# Patient Record
Sex: MALE | Race: WHITE | NOT HISPANIC OR LATINO | Employment: FULL TIME | URBAN - METROPOLITAN AREA
[De-identification: names, ages, dates, MRNs, and addresses within clinical notes are randomized per-mention and may not be internally consistent; named-entity substitution may affect disease eponyms.]

---

## 2017-11-22 ENCOUNTER — ALLSCRIPTS OFFICE VISIT (OUTPATIENT)
Dept: OTHER | Facility: OTHER | Age: 57
End: 2017-11-22

## 2017-11-22 DIAGNOSIS — E78.5 HYPERLIPIDEMIA: ICD-10-CM

## 2017-11-22 DIAGNOSIS — I10 ESSENTIAL (PRIMARY) HYPERTENSION: ICD-10-CM

## 2017-11-22 DIAGNOSIS — Z12.5 ENCOUNTER FOR SCREENING FOR MALIGNANT NEOPLASM OF PROSTATE: ICD-10-CM

## 2017-11-22 DIAGNOSIS — Z72.0 TOBACCO USE: ICD-10-CM

## 2017-11-23 ENCOUNTER — LAB CONVERSION - ENCOUNTER (OUTPATIENT)
Dept: OTHER | Facility: OTHER | Age: 57
End: 2017-11-23

## 2017-11-23 LAB
A/G RATIO (HISTORICAL): 1.7 (CALC) (ref 1–2.5)
ALBUMIN SERPL BCP-MCNC: 4.5 G/DL (ref 3.6–5.1)
ALP SERPL-CCNC: 75 U/L (ref 40–115)
ALT SERPL W P-5'-P-CCNC: 32 U/L (ref 9–46)
AST SERPL W P-5'-P-CCNC: 20 U/L (ref 10–35)
BASOPHILS # BLD AUTO: 1.1 %
BASOPHILS # BLD AUTO: 79 CELLS/UL (ref 0–200)
BILIRUB SERPL-MCNC: 0.4 MG/DL (ref 0.2–1.2)
BILIRUB UR QL STRIP: NEGATIVE
BUN SERPL-MCNC: 12 MG/DL (ref 7–25)
BUN/CREA RATIO (HISTORICAL): NORMAL (CALC) (ref 6–22)
CALCIUM SERPL-MCNC: 9.2 MG/DL (ref 8.6–10.3)
CHLORIDE SERPL-SCNC: 106 MMOL/L (ref 98–110)
CHOLEST SERPL-MCNC: 156 MG/DL
CHOLEST/HDLC SERPL: 4.1 (CALC)
CO2 SERPL-SCNC: 28 MMOL/L (ref 20–31)
COLOR UR: YELLOW
COMMENT (HISTORICAL): CLEAR
CREAT SERPL-MCNC: 0.9 MG/DL (ref 0.7–1.33)
DEPRECATED RDW RBC AUTO: 12.7 % (ref 11–15)
EGFR AFRICAN AMERICAN (HISTORICAL): 109 ML/MIN/1.73M2
EGFR-AMERICAN CALC (HISTORICAL): 94 ML/MIN/1.73M2
EOSINOPHIL # BLD AUTO: 310 CELLS/UL (ref 15–500)
EOSINOPHIL # BLD AUTO: 4.3 %
FECAL OCCULT BLOOD DIAGNOSTIC (HISTORICAL): NEGATIVE
GAMMA GLOBULIN (HISTORICAL): 2.6 G/DL (CALC) (ref 1.9–3.7)
GLUCOSE (HISTORICAL): 90 MG/DL (ref 65–99)
GLUCOSE (HISTORICAL): NEGATIVE
HCT VFR BLD AUTO: 41.9 % (ref 38.5–50)
HDLC SERPL-MCNC: 38 MG/DL
HGB BLD-MCNC: 13.9 G/DL (ref 13.2–17.1)
KETONES UR STRIP-MCNC: NEGATIVE MG/DL
LDL CHOLESTEROL (HISTORICAL): 93 MG/DL (CALC)
LEUKOCYTE ESTERASE UR QL STRIP: NEGATIVE
LYMPHOCYTES # BLD AUTO: 1872 CELLS/UL (ref 850–3900)
LYMPHOCYTES # BLD AUTO: 26 %
MCH RBC QN AUTO: 30.8 PG (ref 27–33)
MCHC RBC AUTO-ENTMCNC: 33.2 G/DL (ref 32–36)
MCV RBC AUTO: 92.9 FL (ref 80–100)
MONOCYTES # BLD AUTO: 504 CELLS/UL (ref 200–950)
MONOCYTES (HISTORICAL): 7 %
NEUTROPHILS # BLD AUTO: 4435 CELLS/UL (ref 1500–7800)
NEUTROPHILS # BLD AUTO: 61.6 %
NITRITE UR QL STRIP: NEGATIVE
NON-HDL-CHOL (CHOL-HDL) (HISTORICAL): 118 MG/DL (CALC)
PH UR STRIP.AUTO: 5.5 [PH] (ref 5–8)
PLATELET # BLD AUTO: 277 THOUSAND/UL (ref 140–400)
PMV BLD AUTO: 10 FL (ref 7.5–12.5)
POTASSIUM SERPL-SCNC: 4.3 MMOL/L (ref 3.5–5.3)
PROSTATE SPECIFIC ANTIGEN TOTAL (HISTORICAL): 0.5 NG/ML
PROT UR STRIP-MCNC: NEGATIVE MG/DL
RBC # BLD AUTO: 4.51 MILLION/UL (ref 4.2–5.8)
SODIUM SERPL-SCNC: 141 MMOL/L (ref 135–146)
SP GR UR STRIP.AUTO: 1.02 (ref 1–1.03)
TOTAL PROTEIN (HISTORICAL): 7.1 G/DL (ref 6.1–8.1)
TRIGL SERPL-MCNC: 152 MG/DL
TSH SERPL DL<=0.05 MIU/L-ACNC: 0.86 MIU/L (ref 0.4–4.5)
WBC # BLD AUTO: 7.2 THOUSAND/UL (ref 3.8–10.8)

## 2017-11-23 NOTE — PROGRESS NOTES
Assessment    1  Hypertension (401 9) (I10)   2  Hyperlipidemia (272 4) (E78 5)    Plan  Encounter for colorectal cancer screening    · *1 - [address] Physician Referral  Consult  Status: Active Requested for: 63EAS4445 02:03PM  Care Summary provided  : Yes  Encounter for screening for malignant neoplasm of prostate, Hyperlipidemia,Hypertension    · (1) PSA (SCREEN) (Dx V76 44 Screen for Prostate Cancer); Status:Active; Requestedfor:22Nov2017;   Hyperlipidemia, Hypertension    · (1) CBC/PLT/DIFF; Status:Active; Requested for:22Nov2017;    · (1) COMPREHENSIVE METABOLIC PANEL; Status:Active; Requested for:22Nov2017;    · (1) LIPID PANEL FASTING W DIRECT LDL REFLEX; Status:Active; Requestedfor:22Nov2017;    · (1) TSH; Status:Active; Requested for:22Nov2017;    · (1) URINALYSIS (will reflex a microscopy if leukocytes, occult blood, protein or nitrites arenot within normal limits); Status:Active; Requested for:22Nov2017;   Hypertension    · Begin or continue regular aerobic exercise  Gradually work up to at least {count1}sessions of {dur1} of exercise a week ; Status:Complete;   Done: 54OUD6131 08:57AM   · Eat no more than 30 grams of fat per day ; Status:Complete;   Done: 52PVZ9817 08:57AM   · We want to put you on the DASH diet for 2000 calories ; Status:Complete;   Done:22Nov2017 08:57AM   · You need to quit smoking ; Status:Complete;   Done: 85EVO9983 08:57AM  Tobacco abuse    · * CT LUNG SCREENING PROGRAM; Status:Hold For - Scheduling; Requestedfor:22Nov2017;     Discussion/Summary    Await lab, referral for colonoscopy, okay on meds  The treatment plan was reviewed with the patient/guardian  The patient/guardian understands and agrees with the treatment plan      Chief Complaint  Pt here for follow up for hypertension and hyperlipidemia  Pt will like PCP to take bp not MA      History of Present Illness  The patient is here today for a follow-up visit     His hypertension is primary, but-- stable  the patient is adherent with his medication regimen  He has no significant interval events  Symptoms: The patient denies any symptoms currently  Associated symptoms include no headache  Lifestyle and Disease Management:      Review of Systems   Constitutional: recent 14 lb weight gain, but-- not feeling poorly-- and-- not feeling tired  Cardiovascular: no chest pain  Respiratory: no shortness of breath  Active Problems  1  Disc degeneration, lumbosacral (722 52) (M51 37)   2  Encounter for colorectal cancer screening (V76 51) (Z12 11,Z12 12)   3  Encounter for screening for malignant neoplasm of prostate (V76 44) (Z12 5)   4  Flu vaccine need (V04 81) (Z23)   5  Foot pain (729 5) (M79 673)   6  Hyperlipidemia (272 4) (E78 5)   7  Hypertension (401 9) (I10)   8  Lumbar facet arthropathy (721 3) (M12 88)    Past Medical History  1  History of hypercholesterolemia (V12 29) (Z86 39)   2  Hypertension (401 9) (I10)    The active problems and past medical history were reviewed and updated today  Surgical History  1  History of Knee Surgery   2  History of Knee Surgery Right    The surgical history was reviewed and updated today  Family History  Mother    1  Family history of Obesity (278 00) (E66 9)  Father    2  Family history of Aneurysm (442 9) (I72 9)    The family history was reviewed and updated today  Social History     · Active advance directive (V49 89) (Z78 9)   · Current every day smoker (305 1) (F17 200)   · Full-time employment   ·    · One child  The social history was reviewed and updated today  Current Meds   1  Olmesartan Medoxomil-HCTZ 40-12 5 MG Oral Tablet; Take 1 tablet daily; Therapy: 70LLP8808 to (Candis Caceres)  Requested for: 48Dtq9803; Last Rx:22Sns8100 Ordered   2  Simvastatin 40 MG Oral Tablet; TAKE 1 TABLET AT BEDTIME;  Therapy: 37Xlg7456 to (Evaluate:07Cqs5727)  Requested for: 70Wst0676; Last Rx:76Kby5781 Ordered    The medication list was reviewed and updated today  Allergies  1  No Known Drug Allergies    Vitals  Vital Signs    Recorded: 22Nov2017 08:53AM Recorded: 22Nov2017 08:21AM   Temperature  96 9 F, Temporal   Heart Rate  76   Respiration  16   Systolic 655    Diastolic 82    Height  6 ft 2 5 in   Weight  273 lb 6 oz   BMI Calculated  34 63   BSA Calculated  2 49       Physical Exam   Constitutional  General appearance: Abnormal   appears healthy-- and-- obese  Pulmonary  Auscultation of lungs: Clear to auscultation, equal breath sounds bilaterally, no wheezes, no rales, no rhonci  Cardiovascular  Auscultation of heart: Normal rate and rhythm, normal S1 and S2, without murmurs  Examination of extremities for edema and/or varicosities: Normal    Carotid pulses: Normal    Lymphatic  Palpation of lymph nodes in neck: No lymphadenopathy  Results/Data  PHQ-9 Adult Depression Screening 22Nov2017 08:23AM User, s     Test Name Result Flag Reference   PHQ-9 Adult Depression Score 0       Over the last two weeks, how often have you been bothered by any of the following problems? Little interest or pleasure in doing things: Not at all - 0 Feeling down, depressed, or hopeless: Not at all - 0 Trouble falling or staying asleep, or sleeping too much: Not at all - 0 Feeling tired or having little energy: Not at all - 0 Poor appetite or over eating: Not at all - 0 Feeling bad about yourself - or that you are a failure or have let yourself or your family down: Not at all - 0 Trouble concentrating on things, such as reading the newspaper or watching television: Not at all - 0 Moving or speaking so slowly that other people could have noticed   Or the opposite -  being so fidgety or restless that you have been moving around a lot more than usual: Not at all - 0 Thoughts that you would be better off dead, or of hurting yourself in some way: Not at all - 0   PHQ-9 Adult Depression Screening Negative     PHQ-9 Difficulty Level Not difficult at all PHQ-9 Severity No Depression           Signatures   Electronically signed by :  Saul Lama MD; Nov 22 2017  8:58AM EST                       (Author)

## 2017-11-25 ENCOUNTER — GENERIC CONVERSION - ENCOUNTER (OUTPATIENT)
Dept: OTHER | Facility: OTHER | Age: 57
End: 2017-11-25

## 2018-01-10 NOTE — RESULT NOTES
Verified Results  (Q) LIPID PANEL WITH REFLEX TO DIRECT LDL 89UNO8954 10:27AM Cassandra Christopher     Test Name Result Flag Reference   CHOLESTEROL, TOTAL 156 mg/dL  <200   HDL CHOLESTEROL 38 mg/dL L >79   TRIGLICERIDES 027 mg/dL H <150   LDL-CHOLESTEROL 93 mg/dL (calc)     Reference range: <100     Desirable range <100 mg/dL for patients with CHD or  diabetes and <70 mg/dL for diabetic patients with  known heart disease  LDL-C is now calculated using the Bony-Weems   calculation, which is a validated novel method providing   better accuracy than the Friedewald equation in the   estimation of LDL-C  Makenna CurrieBellin Health's Bell[facility] Memorial Hospitalnemo Simpson  [facility]6;203(01): 5839-5226   (http://Senseg/faq/ULN917)   CHOL/HDLC RATIO 4 1 (calc)  <5 0   NON HDL CHOLESTEROL 118 mg/dL (calc)  <130   For patients with diabetes plus 1 major ASCVD risk   factor, treating to a non-HDL-C goal of <100 mg/dL   (LDL-C of <70 mg/dL) is considered a therapeutic   option

## 2018-01-11 NOTE — MISCELLANEOUS
Message   Recorded as Task   Date: 02/05/2016 11:46 AM, Created By: Jaron Urban   Task Name: Miscellaneous   Assigned To: [facility]   Regarding Patient: Katherin Pelaez, Status: Active   Comment:    Peggy Nelson - 05 Feb 2016 11:46 AM     TASK CREATED  Pt came in and stated sample of Pensaid worked  He would like a rx sent to his pharmacy  Please call pt on cell [phone]  Tejas Clark - 05 Feb 2016 12:09 PM     TASK REPLIED TO: Previously Assigned To [facility],Team  Please inform patient the pennsaid was already [facility] to [facility], they should have called him  Please provide him the phone number   Kizzy Reynolds - 05 Feb 2016 1:18 PM     TASK REPLIED TO: Previously Assigned To [facility]  Spoke with pt and advised of the same  [facility] phone # provided to pt  Active Problems    1  Disc degeneration, lumbosacral (722 52) (M51 37)   2  Encounter for screening for malignant neoplasm of prostate (V76 44) (Z12 5)   3  Flu vaccine need (V04 81) (Z23)   4  Foot pain (729 5) (M79 673)   5  Hyperlipidemia (272 4) (E78 5)   6  Hypertension (401 9) (I10)   7  Left low back pain, with sciatica presence unspecified (724 2) (M54 5)   8  Lumbar facet arthropathy (721 3) (M47 816)   9  Sacroiliac joint pain (724 6) (M53 3)    Current Meds   1  Benicar HCT 40-12 5 MG Oral Tablet; TAKE 1 TABLET DAILY; Therapy: 48DBU7250 to (Evaluate:18Jun2016)  Requested for: 26TYL3487; Last   CT:80EET0905 Ordered   2  Pennsaid 2 % Transdermal Solution; 2 pumps BID prn; Therapy: 38MAM8303 to (Popeye Bassett)  Requested for: 19CYY4020; Last   Rx:56Jep7305 Ordered   3  Simvastatin 40 MG Oral Tablet; TAKE 1 TABLET DAILY AT BEDTIME; Therapy: 39Ksj8827 to (Evaluate:18Jun2016)  Requested for: 18DPC6709; Last   NO:34ONW2086 Ordered    Allergies    1  No Known Drug Allergies    Signatures   Electronically signed by :  Rick Dee, ; Feb 5 2016 1:18PM EST                       (Author)

## 2018-01-13 VITALS
DIASTOLIC BLOOD PRESSURE: 82 MMHG | WEIGHT: 273.38 LBS | HEIGHT: 75 IN | SYSTOLIC BLOOD PRESSURE: 122 MMHG | TEMPERATURE: 96.9 F | BODY MASS INDEX: 33.99 KG/M2 | HEART RATE: 76 BPM | RESPIRATION RATE: 16 BRPM

## 2018-01-13 NOTE — RESULT NOTES
Verified Results  (Q) COMPREHENSIVE METABOLIC PNL W/ADJUSTED CALCIUM 89XVJ2073 10:19AM Nicky Gut   REPORT COMMENT:  FASTING:YES     Test Name Result Flag Reference   GLUCOSE 95 mg/dL  65-99   Fasting reference interval   UREA NITROGEN (BUN) 15 mg/dL  7-25   CREATININE 0 84 mg/dL  0 70-1 33   For patients >52years of age, the reference limit  for Creatinine is approximately 13% higher for people  identified as -American  eGFR NON-AFR  AMERICAN 99 mL/min/1 73m2  > OR = 60   eGFR AFRICAN AMERICAN 114 mL/min/1 73m2  > OR = 60   BUN/CREATININE RATIO   5-06   NOT APPLICABLE (calc)   SODIUM 141 mmol/L  135-146   POTASSIUM 4 7 mmol/L  3 5-5 3   CHLORIDE 105 mmol/L     CARBON DIOXIDE 26 mmol/L  19-30   CALCIUM 9 4 mg/dL  8 6-10 3   CALCIUM (ADJUSTED FOR$ALBUMIN) 9 2 mg/dL (calc)  8 6-10 2   PROTEIN, TOTAL 7 0 g/dL  6 1-8 1   ALBUMIN 4 7 g/dL  3 6-5 1   GLOBULIN 2 3 g/dL (calc)  1 9-3 7   ALBUMIN/GLOBULIN RATIO 2 0 (calc)  1 0-2 5   BILIRUBIN, TOTAL 0 3 mg/dL  0 2-1 2   ALKALINE PHOSPHATASE 73 U/L     AST 17 U/L  10-35   ALT 22 U/L  9-46     (Q) LIPID PANEL WITH DIRECT LDL 18TRX1686 10:19AM Nicky Gut     Test Name Result Flag Reference   CHOLESTEROL, TOTAL 157 mg/dL  125-200   HDL CHOLESTEROL 43 mg/dL  > OR = 40   TRIGLICERIDES 84 mg/dL  <602   DIRECT  mg/dL  <130   Desirable range <100 mg/dL for patients with CHD or  diabetes and <70 mg/dL for diabetic patients with  known heart disease  CHOL/HDLC RATIO 3 7 (calc)  < OR = 5 0   NON HDL CHOLESTEROL 114 mg/dL (calc)     Target for non-HDL cholesterol is 30 mg/dL higher than   LDL cholesterol target

## 2018-01-15 NOTE — PROGRESS NOTES
Assessment    1  Sacroiliac joint pain (724 6) (M53 3)   2  Lumbar facet arthropathy (721 3) (M47 816)   3  Disc degeneration, lumbosacral (722 52) (M51 37)    Plan  Disc degeneration, lumbosacral, Lumbar facet arthropathy, Sacroiliac joint pain    · *1 - [facility] Physical Therapy  Consult  Please evaluate and treat for left low back pain, facet arthropathy, DDD, core  strengthening, and sij pain  Please convert to HEP when appropriate  Status: Hold For - Scheduling  Requested for:  90VWO7928   Ordered; For: Disc degeneration, lumbosacral, Lumbar facet arthropathy, Sacroiliac joint pain; Ordered By: Jenel Galeazzi Performed:  Due: 41GNX5206  Care Summary provided  : Yes  Lumbar facet arthropathy, Sacroiliac joint pain    · Pennsaid 2 % Transdermal Solution; 2 pumps BID prn   Rx By: Jenel Galeazzi; Dispense: 30 Days ; #:112 GM; Refill: 0; For: Lumbar facet arthropathy, Sacroiliac joint pain; KIET = N; Verified Transmission to [facility] [address]; Last Updated By: System, [employer]Script[employer]; 2/1/2016 3:40:51 PM    Discussion/Summary    Patient is a 59-year-old male with history of hypertension presenting with on and off chronic low back pain  Patient states pain initially began after injury at work in [employer]  which caused pain to radiate to the posterior aspect of the left lower extremity  Since then pain has been only in the low back, but no pain at this time  Patient is not positive on exam  Based on patient's location he does point to the sacroiliac region  Recommended that if the pain does return he should start physical therapy and continue home excise program  At this time provided exercise sheet to start on a daily basis to help prevent flareups  Also recommended to replace Advil with Pennsaid topical   Sample also provided  If no relief he will return to Advil and decrease in only use when necessary      If pain does return and is severe recommended to return for evaluation and examination  f/u prn  Possible side effects of new medications were reviewed with the patient/guardian today  The treatment plan was reviewed with the patient/guardian  The patient/guardian understands and agrees with the treatment plan      Chief Complaint    1  Back Pain    History of Present Illness  Patient is a 61-year-old male with history of hypertension presenting with on and off chronic low back pain  Patient states pain initially began after injury at [facility] [employer] which caused pain to radiate to the posterior aspect of the left lower extremity  Since then pain has been only in the low back  Recently pain was the left side the low back without radiation  He was given prednisone which did not provide relief the patient had discontinued this and return to Advil which did help his pain  At this time he does not have any pain  Pain is occasional described as sharp, cramping, shooting  Pain is increased with bending, coughing and sneezing  He's had moderate relief with heat and ice treatment  He is currently taking Advil 200 mg 2 tablets in the morning and one tablet in the evening  Previously was on prednisone  Denies any changes in bowel or bladder denies any weakness  Linsey Fidelnavi presents with complaints of no back pain  Review of Systems    Constitutional: no fever, no recent weight gain and no recent weight loss  Eyes: no double vision and no blurry vision  Cardiovascular: no chest pain, no palpitations and no lower extremity edema  Respiratory: no complaints of shortness of breath and no wheezing  Musculoskeletal: no difficulty walking, no muscle weakness, no joint stiffness, no joint swelling, no limb swelling`, no pain in extremity and no decreased range of motion  Neurological: no dizziness, no difficulty swallowing, no memory loss, no loss of consciousness and no seizures  Gastrointestinal: no nausea, no vomiting, no constipation and no diarrhea  Genitourinary: no difficulty initiating urine stream, no genital pain and no frequent urination  Integumentary: no complaints of skin rash  Psychiatric: no depression  Endocrine: no excessive thirst, no adrenal disease, no hypothyroidism and no hyperthyroidism  Hematologic/Lymphatic: no tendency for easy bruising and no tendency for easy bleeding  ROS reviewed  Active Problems    1  Disc degeneration, lumbosacral (722 52) (M51 37)   2  Encounter for screening for malignant neoplasm of prostate (V76 44) (Z12 5)   3  Flu vaccine need (V04 81) (Z23)   4  Foot pain (729 5) (M79 673)   5  Hyperlipidemia (272 4) (E78 5)   6  Hypertension (401 9) (I10)   7  Left low back pain, with sciatica presence unspecified (724 2) (M54 5)    Past Medical History    1  History of hypercholesterolemia (V12 29) (Z86 39)   2  Hypertension (401 9) (I10)    The active problems and past medical history were reviewed and updated today  Surgical History    1  History of Knee Surgery   2  History of Knee Surgery Right    The surgical history was reviewed and updated today  Family History    1  Family history of Obesity (278 00) (E66 9)    2  Family history of Aneurysm (442 9) (I72 9)    The family history was reviewed and updated today  Social History    · Current every day smoker (305 1) (F17 200)   ·    · One child  The social history was reviewed and updated today  The social history was reviewed and is unchanged  Current Meds   1  Benicar HCT 40-12 5 MG Oral Tablet; TAKE 1 TABLET DAILY; Therapy: 58QCK5267 to (Evaluate:18Jun2016)  Requested for: 07AZN7801; Last   IM:26HSZ4213 Ordered   2  Simvastatin 40 MG Oral Tablet; TAKE 1 TABLET DAILY AT BEDTIME; Therapy: 71Ggf3041 to (Evaluate:18Jun2016)  Requested for: 29VQQ9968; Last   Rx:57Xzz2928 Ordered    The medication list was reviewed and updated today  Allergies    1   No Known Drug Allergies    Vitals  Vital Signs [Data Includes: Current Encounter]    Recorded: 32XVW7576 03:02PM   Temperature 98 6 F, Oral   Heart Rate 86   Systolic 280   Diastolic 81   Height 6 ft 2 5 in   Weight 268 lb    BMI Calculated 33 95   BSA Calculated 2 48   Pain Scale 0     Physical Exam    Constitutional   General appearance: Well developed, well nourished, alert, in no distress, non-toxic and no overt pain behavior  Eyes   Sclera: anicteric   HEENT   Hearing grossly intact  Neck   Neck: Supple, symmetric, trachea midline, no masses  Pulmonary   Respiratory effort: Even and unlabored  Cardiovascular   Examination of extremities: No edema or pitting edema present  Skin   Skin and subcutaneous tissue: Normal without rashes or lesions, well hydrated  Psychiatric   Mood and affect: Mood and affect appropriate  Neurologic   Cranial nerves: Cranial nerves II-XII grossly intact  Musculoskeletal   Gait and station: Normal     Lumbar/Sacral Spine examination demonstrates  No tenderness to palpation The lumbosacral region  Negative straight leg raise, negative Romulo's  No motor or sensory deficits appreciated  Results/Data  Results Free Text Form [facility]:   Results     MRI:  MRI Lumbar spine dated 12/28/2015  Multilevel disc degeneration lumbar spondylosis   There is moderate right neural foraminal stenosis at L3-4 from degenerative disc bulge with small superimposed central disc protrusion, ligamentum flavum thickening, and facet hypertrophy  Mild to moderate left foraminal stenosis at thickening and facet hypertrophy  Mild to moderate left foraminal stenosis at L4-5 from disc bulge and facet hypertrophy  Disc bulge with central disc protrusion at l5-S1 with mild narrowing of the left subarticular recess  Disc material approaches but does not compress, the traversing left S1 nerve root sleeve        Signatures   Electronically signed by : Duarte Bravo MD; Feb 1 2016  4:54PM EST                       (Author)

## 2018-01-18 NOTE — RESULT NOTES
Verified Results  (Q) COMPREHENSIVE METABOLIC PNL W/O CO2, ALT, eGFR 22Oct2016 07:22AM Herb [place]     Test Name Result Flag Reference   GLUCOSE 91 mg/dL  65-99   Fasting reference interval   UREA NITROGEN (BUN) 11 mg/dL  7-25   CREATININE 0 80 mg/dL  0 70-1 33   For patients >52years of age, the reference limit  for Creatinine is approximately 13% higher for people  identified as -American  BUN/CREATININE RATIO   7-67   NOT APPLICABLE (calc)   SODIUM 142 mmol/L  135-146   POTASSIUM 4 1 mmol/L  3 5-5 3   CHLORIDE 109 mmol/L     CALCIUM 9 6 mg/dL  8 6-10 3   PROTEIN, TOTAL 7 1 g/dL  6 1-8 1   ALBUMIN 4 9 g/dL  3 6-5 1   GLOBULIN 2 2 g/dL (calc)  1 9-3 7   ALBUMIN/GLOBULIN RATIO 2 2 (calc)  1 0-2 5   BILIRUBIN, TOTAL 0 3 mg/dL  0 2-1 2   ALKALINE PHOSPHATASE 83 U/L     AST 18 U/L  10-35     (Q) LIPID PANEL WITH DIRECT LDL 22Oct2016 07:22AM Herb Den[facility]     Test Name Result Flag Reference   CHOLESTEROL, TOTAL 159 mg/dL  125-200   HDL CHOLESTEROL 37 mg/dL L > OR = 40   TRIGLICERIDES 136 mg/dL H <150   DIRECT LDL 94 mg/dL  <130   Desirable range <100 mg/dL for patients with CHD or  diabetes and <70 mg/dL for diabetic patients with  known heart disease  CHOL/HDLC RATIO 4 3 (calc)  < OR = 5 0   NON HDL CHOLESTEROL 122 mg/dL (calc)     Target for non-HDL cholesterol is 30 mg/dL higher than   LDL cholesterol target       (Q) TSH, 3RD GENERATION 22Oct2016 07:22AM Herb [place]     Test Name Result Flag Reference   TSH 1 14 mIU/L  0 40-4 50     (Q) URINALYSIS MICROSCOPIC 22Oct2016 07:22AM Herb Camelia     Test Name Result Flag Reference   WBC 0-5 /HPF  < OR = 5   RBC NONE SEEN /HPF  < OR = 2   SQUAMOUS EPITHELIAL CELLS NONE SEEN /HPF  < OR = 5   BACTERIA NONE SEEN /HPF  NONE SEEN   HYALINE CAST NONE SEEN /LPF  NONE SEEN     (1) PSA (SCREEN) (Dx V76 44 Screen for Prostate Cancer) 06SUV9461 07:22AM Herb Denise   REPORT COMMENT:  Herb Escobedo # [id number]  FASTING:YES     Test Name Result Flag Reference   PSA, TOTAL 0 6 ng/mL  < OR = 4 0   This test was performed using the Siemens  chemiluminescent method  Values obtained from  different assay methods cannot be used  interchangeably  PSA levels, regardless of  value, should not be interpreted as absolute  evidence of the presence or absence of disease

## 2018-01-29 ENCOUNTER — OFFICE VISIT (OUTPATIENT)
Dept: FAMILY MEDICINE CLINIC | Facility: CLINIC | Age: 58
End: 2018-01-29
Payer: COMMERCIAL

## 2018-01-29 VITALS
BODY MASS INDEX: 34.75 KG/M2 | SYSTOLIC BLOOD PRESSURE: 130 MMHG | OXYGEN SATURATION: 92 % | HEIGHT: 75 IN | WEIGHT: 279.5 LBS | TEMPERATURE: 96.5 F | HEART RATE: 72 BPM | DIASTOLIC BLOOD PRESSURE: 74 MMHG | RESPIRATION RATE: 16 BRPM

## 2018-01-29 DIAGNOSIS — J18.9 PNEUMONIA OF BOTH LOWER LOBES DUE TO INFECTIOUS ORGANISM: Primary | ICD-10-CM

## 2018-01-29 PROCEDURE — 99213 OFFICE O/P EST LOW 20 MIN: CPT | Performed by: FAMILY MEDICINE

## 2018-01-29 RX ORDER — PROMETHAZINE HYDROCHLORIDE AND CODEINE PHOSPHATE 6.25; 1 MG/5ML; MG/5ML
5 SYRUP ORAL EVERY 4 HOURS PRN
Qty: 120 ML | Refills: 0 | Status: SHIPPED | OUTPATIENT
Start: 2018-01-29 | End: 2018-08-01 | Stop reason: CLARIF

## 2018-01-29 RX ORDER — PREDNISONE 20 MG/1
20 TABLET ORAL DAILY
Qty: 10 TABLET | Refills: 0 | Status: SHIPPED | OUTPATIENT
Start: 2018-01-29 | End: 2018-08-01 | Stop reason: CLARIF

## 2018-01-29 RX ORDER — OLMESARTAN MEDOXOMIL AND HYDROCHLOROTHIAZIDE 40/12.5 40; 12.5 MG/1; MG/1
1 TABLET ORAL DAILY
COMMUNITY
Start: 2017-02-27 | End: 2018-07-20 | Stop reason: SDUPTHER

## 2018-01-29 RX ORDER — LEVOFLOXACIN 500 MG/1
500 TABLET, FILM COATED ORAL EVERY 24 HOURS
Qty: 10 TABLET | Refills: 0 | Status: SHIPPED | OUTPATIENT
Start: 2018-01-29 | End: 2018-02-08

## 2018-01-29 RX ORDER — SIMVASTATIN 40 MG
1 TABLET ORAL
COMMUNITY
Start: 2014-09-01 | End: 2018-07-20 | Stop reason: SDUPTHER

## 2018-01-29 NOTE — LETTER
January 29, 2018     Patient: Sara Cortes   YOB: 1960   Date of Visit: 1/29/2018       To Whom it May Concern:    Sara Cortes is under my professional care  He was seen in my office on 1/29/2018  He may return to work on 2/1/18  If you have any questions or concerns, please don't hesitate to call           Sincerely,          Ashley Guido MD        CC: Sara Rivasnd

## 2018-01-29 NOTE — PATIENT INSTRUCTIONS
See how pt does on meds  Community-Acquired Pneumonia, Ambulatory Care   GENERAL INFORMATION:   Community-acquired pneumonia  (CAP) is a lung infection that you get from being around other people in the community  When you have CAP, your lungs become inflamed and do not work well  CAP is caused by different germs, including bacteria, viruses, and fungi (yeasts)  The germs are easily spread from an infected person to others by coughing, sneezing, or close contact  Common symptoms include the following:   · Dry cough or coughing up mucus, which may be streaked with blood    · Fever, chills, or severe shaking    · Shortness of breath, wheezing, or chest pain    · Feeling tired easily    · Fast heartbeat    · Headache, muscle pain, or abdominal pain or discomfort    · Trouble thinking clearly  Seek immediate care for the following symptoms:   · Symptoms that do not get better, or they get worse    · Confusion and trouble thinking clearly    · Trouble breathing, or faster breathing than normal    · Lips or fingernails turn gray or blue  Treatment for CAP  depends on what type of germ is causing your CAP and how bad your symptoms are  Medicines may be needed to help treat pneumonia caused by bacteria, a virus, or fungus  You may also be given medicines to decrease your temperature and help you breathe easier  You may get oxygen through a mask placed over your nose and mouth or through small tubes placed in your nostrils  A thoracentesis may be done to remove extra fluid from your chest   Prevent or manage CAP:   · Avoid the spread of germs  Wash your hands often with soap and water  Use gel hand cleanser when there is no soap and water available  Do not touch your eyes, nose, or mouth unless you have washed your hands first  Cover your mouth when you cough  Cough into a tissue or your shirtsleeve so you do not spread germs from your hands  If you are sick, stay away from others as much as possible      · Drink liquids as directed  Ask how much liquid to drink each day and which liquids are best for you  Liquids help thin your mucus, which may make it easier for you to cough it up  · Get vaccinated  The pneumococcal vaccine is given to adults aged 72 years or older to prevent pneumococcal disease  People aged 23 to 59 years at high risk for pneumococcal disease also should get the pneumococcal vaccine  It may need to be repeated 5 years later  Get an influenza (flu) vaccine every year as soon as it becomes available  · Do not smoke  and do not allow others to smoke around you  Smoking increases your risk of lung infections and CAP  Smoking also makes it harder for you to get better after a lung infection  Ask for information if you need help quitting  Follow up with your healthcare provider as directed:  Write down your questions so you remember to ask them during your visits  CARE AGREEMENT:   You have the right to help plan your care  Learn about your health condition and how it may be treated  Discuss treatment options with your caregivers to decide what care you want to receive  You always have the right to refuse treatment  The above information is an  only  It is not intended as medical advice for individual conditions or treatments  Talk to your doctor, nurse or pharmacist before following any medical regimen to see if it is safe and effective for you  © 2014 [address] is for End User's use only and may not be sold, redistributed or otherwise used for commercial purposes  All illustrations and images included in CareNotes® are the copyrighted property of [employer]  or [employer]    on meds

## 2018-01-29 NOTE — PROGRESS NOTES
Assessment/Plan:    No problem-specific Assessment & Plan notes found for this encounter  Diagnoses and all orders for this visit:    Pneumonia of both lower lobes due to infectious organism  Comments:  see how pt does on antibiotics and steroids    Other orders  -     simvastatin (ZOCOR) 40 mg tablet; Take 1 tablet by mouth  -     olmesartan-hydrochlorothiazide (BENICAR HCT) 40-12 5 MG per tablet; Take 1 tablet by mouth daily          Subjective:      Patient ID: Lamin Dsouza is a 62 y o  male  Cough   This is a new problem  The current episode started in the past 7 days  The problem has been waxing and waning  The problem occurs every few hours  The cough is productive of sputum and productive of purulent sputum  Associated symptoms include chills, headaches, nasal congestion, rhinorrhea, a sore throat and shortness of breath  Pertinent negatives include no ear pain or fever  The symptoms are aggravated by cold air  Risk factors for lung disease include smoking/tobacco exposure  He has tried OTC cough suppressant for the symptoms  The treatment provided no relief  The following portions of the patient's history were reviewed and updated as appropriate: allergies, current medications, past family history, past medical history, past social history, past surgical history and problem list     Review of Systems   Constitutional: Positive for chills  Negative for fever  HENT: Positive for rhinorrhea and sore throat  Negative for ear pain  Respiratory: Positive for cough and shortness of breath  Gastrointestinal: Negative for nausea and vomiting  Neurological: Positive for headaches  Objective:     Physical Exam   Constitutional: He appears well-developed and well-nourished  HENT:   Right Ear: Tympanic membrane normal    Left Ear: Tympanic membrane normal    Nose: Mucosal edema present  Right sinus exhibits no maxillary sinus tenderness   Left sinus exhibits no maxillary sinus tenderness  Mouth/Throat: No oropharyngeal exudate or posterior oropharyngeal edema  Cardiovascular: Normal rate and regular rhythm  Pulmonary/Chest: No respiratory distress  He has wheezes in the right lower field and the left lower field  He has rhonchi in the right lower field and the left lower field  He has rales in the right lower field and the left lower field

## 2018-01-31 ENCOUNTER — OFFICE VISIT (OUTPATIENT)
Dept: FAMILY MEDICINE CLINIC | Facility: CLINIC | Age: 58
End: 2018-01-31
Payer: COMMERCIAL

## 2018-01-31 VITALS
SYSTOLIC BLOOD PRESSURE: 130 MMHG | HEIGHT: 75 IN | BODY MASS INDEX: 34.66 KG/M2 | TEMPERATURE: 98.8 F | DIASTOLIC BLOOD PRESSURE: 80 MMHG | RESPIRATION RATE: 18 BRPM | HEART RATE: 78 BPM | WEIGHT: 278.8 LBS | OXYGEN SATURATION: 95 %

## 2018-01-31 DIAGNOSIS — J40 BRONCHITIS: Primary | ICD-10-CM

## 2018-01-31 PROCEDURE — 99213 OFFICE O/P EST LOW 20 MIN: CPT | Performed by: FAMILY MEDICINE

## 2018-01-31 RX ORDER — MONTELUKAST SODIUM 10 MG/1
10 TABLET ORAL
Qty: 10 TABLET | Refills: 0 | Status: SHIPPED | OUTPATIENT
Start: 2018-01-31 | End: 2018-08-01 | Stop reason: CLARIF

## 2018-01-31 NOTE — LETTER
January 31, 2018     Patient: Ekaterina White   YOB: 1960   Date of Visit: 1/31/2018       To Whom it May Concern:    Ekaterina White is under my professional care  He was seen in my office on 1/31/2018  He may return to work on 2/2/18  If you have any questions or concerns, please don't hesitate to call           Sincerely,          Salud Cervantes MD        CC: Ekaterina Decree

## 2018-01-31 NOTE — PATIENT INSTRUCTIONS
Ok take plain mucinex with cough med, if cough persiating after done with antibiotics and prednisone rec taking Singulair

## 2018-01-31 NOTE — PROGRESS NOTES
Assessment/Plan:    Pneumonia of both lower lobes due to infectious organism  Ok take plain mucinex with cough med, if cough persiating after done with antibiotics and prednisone rec taking Singulair    Bronchitis  Ok take plain mucinex with cough med, if cough persiating after done with antibiotics and prednisone rec taking Singulair       Diagnoses and all orders for this visit:    Bronchitis          Subjective:      Patient ID: Albertina Pearson is a 62 y o  male  Cough   This is a new problem  The current episode started in the past 7 days  The problem has been gradually improving  The problem occurs every few hours  The cough is productive of purulent sputum (worse at night, now white not yellow)  Associated symptoms include nasal congestion, postnasal drip and rhinorrhea  Pertinent negatives include no chest pain, chills, ear pain, fever, headaches, sore throat or shortness of breath  The symptoms are aggravated by cold air  Risk factors for lung disease include smoking/tobacco exposure  He has tried prescription cough suppressant for the symptoms  The treatment provided moderate relief  His past medical history is significant for bronchitis  The following portions of the patient's history were reviewed and updated as appropriate: allergies, current medications, past family history, past medical history, past social history, past surgical history and problem list     Review of Systems   Constitutional: Negative for chills and fever  HENT: Positive for postnasal drip and rhinorrhea  Negative for ear pain and sore throat  Respiratory: Positive for cough  Negative for shortness of breath  Cardiovascular: Negative for chest pain  Neurological: Positive for weakness  Negative for headaches  Objective:     Physical Exam   Constitutional: He appears well-developed and well-nourished  No distress     HENT:   Right Ear: External ear normal    Left Ear: External ear normal    Mouth/Throat: No oropharyngeal exudate  Eyes: Right eye exhibits no discharge  Left eye exhibits no discharge  Cardiovascular: Normal rate and regular rhythm  Pulmonary/Chest: He has wheezes  Scattered wheezes, no rales and rhonchi   Lymphadenopathy:     He has no cervical adenopathy

## 2018-07-20 DIAGNOSIS — E78.5 HYPERLIPIDEMIA, UNSPECIFIED HYPERLIPIDEMIA TYPE: ICD-10-CM

## 2018-07-20 DIAGNOSIS — I10 ESSENTIAL HYPERTENSION: Primary | ICD-10-CM

## 2018-07-20 DIAGNOSIS — J40 BRONCHITIS: ICD-10-CM

## 2018-07-23 RX ORDER — OLMESARTAN MEDOXOMIL AND HYDROCHLOROTHIAZIDE 40/12.5 40; 12.5 MG/1; MG/1
1 TABLET ORAL DAILY
Qty: 90 TABLET | Refills: 0 | Status: SHIPPED | OUTPATIENT
Start: 2018-07-23 | End: 2019-01-04 | Stop reason: SDUPTHER

## 2018-07-23 RX ORDER — SIMVASTATIN 40 MG
40 TABLET ORAL
Qty: 90 TABLET | Refills: 0 | Status: SHIPPED | OUTPATIENT
Start: 2018-07-23 | End: 2019-01-04 | Stop reason: SDUPTHER

## 2018-08-01 ENCOUNTER — OFFICE VISIT (OUTPATIENT)
Dept: FAMILY MEDICINE CLINIC | Facility: CLINIC | Age: 58
End: 2018-08-01
Payer: COMMERCIAL

## 2018-08-01 VITALS
SYSTOLIC BLOOD PRESSURE: 138 MMHG | WEIGHT: 285.2 LBS | TEMPERATURE: 98.4 F | RESPIRATION RATE: 18 BRPM | HEIGHT: 75 IN | BODY MASS INDEX: 35.46 KG/M2 | DIASTOLIC BLOOD PRESSURE: 90 MMHG | HEART RATE: 74 BPM

## 2018-08-01 DIAGNOSIS — I10 ESSENTIAL HYPERTENSION: Primary | ICD-10-CM

## 2018-08-01 DIAGNOSIS — Z12.5 SCREENING FOR PROSTATE CANCER: ICD-10-CM

## 2018-08-01 DIAGNOSIS — E78.5 HYPERLIPIDEMIA, UNSPECIFIED HYPERLIPIDEMIA TYPE: ICD-10-CM

## 2018-08-01 PROBLEM — J18.9 PNEUMONIA OF BOTH LOWER LOBES DUE TO INFECTIOUS ORGANISM: Status: RESOLVED | Noted: 2018-01-29 | Resolved: 2018-08-01

## 2018-08-01 PROBLEM — J40 BRONCHITIS: Status: RESOLVED | Noted: 2018-01-31 | Resolved: 2018-08-01

## 2018-08-01 PROBLEM — Z72.0 TOBACCO ABUSE: Status: ACTIVE | Noted: 2017-11-22

## 2018-08-01 PROCEDURE — 99213 OFFICE O/P EST LOW 20 MIN: CPT | Performed by: FAMILY MEDICINE

## 2018-08-01 NOTE — PROGRESS NOTES
Assessment/Plan:    Hypertension  BP stable but not as good as it has been in past       Diagnoses and all orders for this visit:    Essential hypertension  -     Comprehensive metabolic panel; Future  -     CBC and differential; Future  -     Urinalysis with microscopic; Future    Hyperlipidemia, unspecified hyperlipidemia type  -     Lipid Panel with Direct LDL reflex; Future  -     TSH, 3rd generation; Future    Screening for prostate cancer  -     PSA, total and free; Future          Subjective:      Patient ID: Lady Bonilla is a 62 y o  male  Hypertension   This is a chronic problem  The current episode started more than 1 year ago  The problem is unchanged  The problem is controlled  Pertinent negatives include no anxiety, blurred vision, chest pain, headaches, palpitations, peripheral edema or shortness of breath  There are no associated agents to hypertension  Risk factors for coronary artery disease include dyslipidemia, obesity, male gender and smoking/tobacco exposure  Past treatments include angiotensin blockers  The current treatment provides moderate improvement  There are no compliance problems  The following portions of the patient's history were reviewed and updated as appropriate: allergies, current medications, past family history, past medical history, past social history, past surgical history and problem list       Review of Systems   Constitutional: Negative for activity change, appetite change, fatigue and unexpected weight change  Eyes: Negative for blurred vision  Respiratory: Negative for shortness of breath  Cardiovascular: Negative for chest pain and palpitations  Musculoskeletal: Positive for back pain  Neurological: Negative for headaches           Objective:      /90   Pulse 74   Temp 98 4 °F (36 9 °C) (Temporal)   Resp 18   Ht 6' 2 5" (1 892 m)   Wt 129 kg (285 lb 3 2 oz)   BMI 36 13 kg/m²          Physical Exam   Constitutional: He appears well-developed and well-nourished  Neck: No thyromegaly present  Cardiovascular: Normal rate, regular rhythm and normal heart sounds  Pulmonary/Chest: Breath sounds normal    Musculoskeletal: He exhibits no edema  Lymphadenopathy:     He has no cervical adenopathy  Vitals reviewed

## 2018-08-07 ENCOUNTER — TELEPHONE (OUTPATIENT)
Dept: FAMILY MEDICINE CLINIC | Facility: CLINIC | Age: 58
End: 2018-08-07

## 2018-08-07 DIAGNOSIS — M62.830 LUMBAR PARASPINAL MUSCLE SPASM: Primary | ICD-10-CM

## 2018-08-07 RX ORDER — METHOCARBAMOL 500 MG/1
500 TABLET, FILM COATED ORAL 3 TIMES DAILY
Qty: 30 TABLET | Refills: 1 | Status: SHIPPED | OUTPATIENT
Start: 2018-08-07 | End: 2018-08-22 | Stop reason: SDUPTHER

## 2018-08-07 NOTE — TELEPHONE ENCOUNTER
Pt called stating he hurt his back and in need for muscle relaxer  Pt will like Rx to be sent to [facility] on [address]

## 2018-08-08 ENCOUNTER — TELEPHONE (OUTPATIENT)
Dept: FAMILY MEDICINE CLINIC | Facility: CLINIC | Age: 58
End: 2018-08-08

## 2018-08-08 NOTE — TELEPHONE ENCOUNTER
Pharmacist states pt should be able to retrieve Rx at the [facility] in [place]

## 2018-08-22 ENCOUNTER — OFFICE VISIT (OUTPATIENT)
Dept: FAMILY MEDICINE CLINIC | Facility: CLINIC | Age: 58
End: 2018-08-22
Payer: COMMERCIAL

## 2018-08-22 VITALS
WEIGHT: 280.2 LBS | TEMPERATURE: 98.4 F | DIASTOLIC BLOOD PRESSURE: 98 MMHG | SYSTOLIC BLOOD PRESSURE: 160 MMHG | BODY MASS INDEX: 34.84 KG/M2 | RESPIRATION RATE: 18 BRPM | HEART RATE: 70 BPM | HEIGHT: 75 IN

## 2018-08-22 DIAGNOSIS — Z12.11 SCREENING FOR COLON CANCER: ICD-10-CM

## 2018-08-22 DIAGNOSIS — M51.37 DISC DEGENERATION, LUMBOSACRAL: Primary | ICD-10-CM

## 2018-08-22 DIAGNOSIS — M62.830 LUMBAR PARASPINAL MUSCLE SPASM: ICD-10-CM

## 2018-08-22 DIAGNOSIS — I10 ESSENTIAL HYPERTENSION: ICD-10-CM

## 2018-08-22 PROCEDURE — 99213 OFFICE O/P EST LOW 20 MIN: CPT | Performed by: FAMILY MEDICINE

## 2018-08-22 PROCEDURE — 3008F BODY MASS INDEX DOCD: CPT | Performed by: FAMILY MEDICINE

## 2018-08-22 RX ORDER — METHYLPREDNISOLONE 4 MG/1
TABLET ORAL
Qty: 21 TABLET | Refills: 0 | Status: SHIPPED | OUTPATIENT
Start: 2018-08-22 | End: 2018-11-14 | Stop reason: CLARIF

## 2018-08-22 RX ORDER — AMLODIPINE BESYLATE 5 MG/1
5 TABLET ORAL DAILY
Qty: 90 TABLET | Refills: 1 | Status: SHIPPED | OUTPATIENT
Start: 2018-08-22 | End: 2019-03-20 | Stop reason: SDUPTHER

## 2018-08-22 RX ORDER — METHOCARBAMOL 500 MG/1
500 TABLET, FILM COATED ORAL 3 TIMES DAILY
Qty: 30 TABLET | Refills: 1 | Status: SHIPPED | OUTPATIENT
Start: 2018-08-22 | End: 2018-12-11

## 2018-08-22 NOTE — PROGRESS NOTES
Assessment/Plan:    No problem-specific Assessment & Plan notes found for this encounter  There are no diagnoses linked to this encounter  Subjective:      Patient ID: Torres Ohara is a 62 y o  male  Back Pain   This is a chronic problem  The current episode started more than 1 year ago  The problem occurs 2 to 4 times per day  The problem has been gradually worsening since onset  The pain is present in the lumbar spine  The quality of the pain is described as aching  The pain is at a severity of 5/10  The pain is moderate  The symptoms are aggravated by bending and twisting  Pertinent negatives include no bladder incontinence, bowel incontinence, chest pain, leg pain, numbness or paresthesias  He has tried analgesics and muscle relaxant for the symptoms  The treatment provided mild relief  The following portions of the patient's history were reviewed and updated as appropriate: allergies, current medications, past family history, past medical history, past social history, past surgical history and problem list       Review of Systems   Constitutional: Negative for activity change, appetite change, fatigue and unexpected weight change  Respiratory: Negative for shortness of breath  Cardiovascular: Negative for chest pain  Gastrointestinal: Negative for bowel incontinence  Genitourinary: Negative for bladder incontinence  Musculoskeletal: Positive for back pain  Neurological: Negative for numbness and paresthesias  Objective:      Pulse 70   Temp 98 4 °F (36 9 °C) (Temporal)   Resp 18   Ht 6' 2 5" (1 892 m)   Wt 127 kg (280 lb 3 2 oz)   BMI 35 49 kg/m²          Physical Exam   Constitutional: He appears well-developed and well-nourished  Neck: No thyromegaly present  Cardiovascular: Normal rate, regular rhythm and normal heart sounds  Pulmonary/Chest: Breath sounds normal    Musculoskeletal: He exhibits tenderness          Lumbar back: He exhibits decreased range of motion, tenderness and spasm  Vitals reviewed

## 2018-09-22 ENCOUNTER — APPOINTMENT (OUTPATIENT)
Dept: LAB | Facility: HOSPITAL | Age: 58
End: 2018-09-22
Payer: COMMERCIAL

## 2018-09-22 DIAGNOSIS — Z12.11 SCREENING FOR COLON CANCER: ICD-10-CM

## 2018-09-22 LAB — HEMOCCULT STL QL IA: POSITIVE

## 2018-09-22 PROCEDURE — G0328 FECAL BLOOD SCRN IMMUNOASSAY: HCPCS

## 2018-09-23 DIAGNOSIS — R19.5 POSITIVE FECAL OCCULT BLOOD TEST: Primary | ICD-10-CM

## 2018-11-14 ENCOUNTER — OFFICE VISIT (OUTPATIENT)
Dept: FAMILY MEDICINE CLINIC | Facility: CLINIC | Age: 58
End: 2018-11-14
Payer: COMMERCIAL

## 2018-11-14 VITALS
DIASTOLIC BLOOD PRESSURE: 82 MMHG | RESPIRATION RATE: 16 BRPM | TEMPERATURE: 96.8 F | BODY MASS INDEX: 35.29 KG/M2 | HEART RATE: 86 BPM | HEIGHT: 74 IN | WEIGHT: 275 LBS | SYSTOLIC BLOOD PRESSURE: 138 MMHG

## 2018-11-14 DIAGNOSIS — E66.9 OBESITY (BMI 35.0-39.9 WITHOUT COMORBIDITY): ICD-10-CM

## 2018-11-14 DIAGNOSIS — E78.5 HYPERLIPIDEMIA, UNSPECIFIED HYPERLIPIDEMIA TYPE: ICD-10-CM

## 2018-11-14 DIAGNOSIS — I10 ESSENTIAL HYPERTENSION: Primary | ICD-10-CM

## 2018-11-14 PROCEDURE — 3075F SYST BP GE 130 - 139MM HG: CPT | Performed by: FAMILY MEDICINE

## 2018-11-14 PROCEDURE — 99213 OFFICE O/P EST LOW 20 MIN: CPT | Performed by: FAMILY MEDICINE

## 2018-11-14 PROCEDURE — 4004F PT TOBACCO SCREEN RCVD TLK: CPT | Performed by: FAMILY MEDICINE

## 2018-11-14 PROCEDURE — 3079F DIAST BP 80-89 MM HG: CPT | Performed by: FAMILY MEDICINE

## 2018-11-14 PROCEDURE — 3008F BODY MASS INDEX DOCD: CPT | Performed by: FAMILY MEDICINE

## 2018-11-14 NOTE — PATIENT INSTRUCTIONS
Same meds    Obesity   AMBULATORY CARE:   Obesity  is when your body mass index (BMI) is greater than 30  Your healthcare provider will use your height and weight to measure your BMI  The risks of obesity include  many health problems, such as injuries or physical disability  You may need tests to check for the following:  · Diabetes     · High blood pressure or high cholesterol     · Heart disease     · Gallbladder or liver disease     · Cancer of the colon, breast, prostate, liver, or kidney     · Sleep apnea     · Arthritis or gout  Seek care immediately if:   · You have a severe headache, confusion, or difficulty speaking  · You have weakness on one side of your body  · You have chest pain, sweating, or shortness of breath  Contact your healthcare provider if:   · You have symptoms of gallbladder or liver disease, such as pain in your upper abdomen  · You have knee or hip pain and discomfort while walking  · You have symptoms of diabetes, such as intense hunger and thirst, and frequent urination  · You have symptoms of sleep apnea, such as snoring or daytime sleepiness  · You have questions or concerns about your condition or care  Treatment for obesity  focuses on helping you lose weight to improve your health  Even a small decrease in BMI can reduce the risk for many health problems  Your healthcare provider will help you set a weight-loss goal   · Lifestyle changes  are the first step in treating obesity  These include making healthy food choices and getting regular physical activity  Your healthcare provider may suggest a weight-loss program that involves coaching, education, and therapy  · Medicine  may help you lose weight when it is used with a healthy diet and physical activity  · Surgery  can help you lose weight if you are very obese and have other health problems  There are several types of weight-loss surgery  Ask your healthcare provider for more information    Be successful losing weight:   · Set small, realistic goals  An example of a small goal is to walk for 20 minutes 5 days a week  Anther goal is to lose 5% of your body weight  · Tell friends, family members, and coworkers about your goals  and ask for their support  Ask a friend to lose weight with you, or join a weight-loss support group  · Identify foods or triggers that may cause you to overeat , and find ways to avoid them  Remove tempting high-calorie foods from your home and workplace  Place a bowl of fresh fruit on your kitchen counter  If stress causes you to eat, then find other ways to cope with stress  · Keep a diary to track what you eat and drink  Also write down how many minutes of physical activity you do each day  Weigh yourself once a week and record it in your diary  Eating changes: You will need to eat 500 to 1,000 fewer calories each day than you currently eat to lose 1 to 2 pounds a week  The following changes will help you cut calories:  · Eat smaller portions  Use small plates, no larger than 9 inches in diameter  Fill your plate half full of fruits and vegetables  Measure your food using measuring cups until you know what a serving size looks like  · Eat 3 meals and 1 or 2 snacks each day  Plan your meals in advance  Johny Lauren and eat at home most of the time  Eat slowly  · Eat fruits and vegetables at every meal   They are low in calories and high in fiber, which makes you feel full  Do not add butter, margarine, or cream sauce to vegetables  Use herbs to season steamed vegetables  · Eat less fat and fewer fried foods  Eat more baked or grilled chicken and fish  These protein sources are lower in calories and fat than red meat  Limit fast food  Dress your salads with olive oil and vinegar instead of bottled dressing  · Limit the amount of sugar you eat  Do not drink sugary beverages  Limit alcohol    Activity changes:  Physical activity is good for your body in many ways  It helps you burn calories and build strong muscles  It decreases stress and depression, and improves your mood  It can also help you sleep better  Talk to your healthcare provider before you begin an exercise program   · Exercise for at least 30 minutes 5 days a week  Start slowly  Set aside time each day for physical activity that you enjoy and that is convenient for you  It is best to do both weight training and an activity that increases your heart rate, such as walking, bicycling, or swimming  · Find ways to be more active  Do yard work and housecleaning  Walk up the stairs instead of using elevators  Spend your leisure time going to events that require walking, such as outdoor festivals or fairs  This extra physical activity can help you lose weight and keep it off  Follow up with your healthcare provider as directed: You may need to meet with a dietitian  Write down your questions so you remember to ask them during your visits  © 2017 [address] Information is for End User's use only and may not be sold, redistributed or otherwise used for commercial purposes  All illustrations and images included in CareNotes® are the copyrighted property of [employer]  or [employer]  The above information is an  only  It is not intended as medical advice for individual conditions or treatments  Talk to your doctor, nurse or pharmacist before following any medical regimen to see if it is safe and effective for you  Weight Management   AMBULATORY CARE:   Why it is important to manage your weight:  Being overweight increases your risk of health conditions such as heart disease, high blood pressure, type 2 diabetes, and certain types of cancer  It can also increase your risk for osteoarthritis, sleep apnea, and other respiratory problems  Aim for a slow, steady weight loss  Even a small amount of weight loss can lower your risk of health problems    How to lose weight safely:  A safe and healthy way to lose weight is to eat fewer calories and get regular exercise  You can lose up about 1 pound a week by decreasing the number of calories you eat by 500 calories each day  You can decrease calories by eating smaller portion sizes or by cutting out high-calorie foods  Read labels to find out how many calories are in the foods you eat  You can also burn calories with exercise such as walking, swimming, or biking  You will be more likely to keep weight off if you make these changes part of your lifestyle  Healthy meal plan for weight management:  A healthy meal plan includes a variety of foods, contains fewer calories, and helps you stay healthy  A healthy meal plan includes the following:  · Eat whole-grain foods more often  A healthy meal plan should contain fiber  Fiber is the part of grains, fruits, and vegetables that is not broken down by your body  Whole-grain foods are healthy and provide extra fiber in your diet  Some examples of whole-grain foods are whole-wheat breads and pastas, oatmeal, brown rice, and bulgur  · Eat a variety of vegetables every day  Include dark, leafy greens such as spinach, kale, jaya greens, and mustard greens  Eat yellow and orange vegetables such as carrots, sweet potatoes, and winter squash  · Eat a variety of fruits every day  Choose fresh or canned fruit (canned in its own juice or light syrup) instead of juice  Fruit juice has very little or no fiber  · Eat low-fat dairy foods  Drink fat-free (skim) milk or 1% milk  Eat fat-free yogurt and low-fat cottage cheese  Try low-fat cheeses such as mozzarella and other reduced-fat cheeses  · Choose meat and other protein foods that are low in fat  Choose beans or other legumes such as split peas or lentils  Choose fish, skinless poultry (chicken or turkey), or lean cuts of red meat (beef or pork)  Before you cook meat or poultry, cut off any visible fat       · Use less fat and oil  Try baking foods instead of frying them  Add less fat, such as margarine, sour cream, regular salad dressing and mayonnaise to foods  Eat fewer high-fat foods  Some examples of high-fat foods include french fries, doughnuts, ice cream, and cakes  · Eat fewer sweets  Limit foods and drinks that are high in sugar  This includes candy, cookies, regular soda, and sweetened drinks  Ways to decrease calories:   · Eat smaller portions  ¨ Use a small plate with smaller servings  ¨ Do not eat second helpings  ¨ When you eat at a restaurant, ask for a box and place half of your meal in the box before you eat  ¨ Share an entrée with someone else  · Replace high-calorie snacks with healthy, low-calorie snacks  ¨ Choose fresh fruit, vegetables, fat-free rice cakes, or air-popped popcorn instead of potato chips, nuts, or chocolate  ¨ Choose water or calorie-free drinks instead of soda or sweetened drinks  · Eat regular meals  Skipping meals can lead to overeating later in the day  Eat a healthy snack in place of a meal if you do not have time to eat a regular meal      · Do not shop for groceries when you are hungry  You may be more likely to make unhealthy food choices  Take a grocery list of healthy foods and shop after you have eaten  Exercise:  Exercise at least 30 minutes per day on most days of the week  Some examples of exercise include walking, biking, dancing, and swimming  You can also fit in more physical activity by taking the stairs instead of the elevator or parking farther away from stores  Ask your healthcare provider about the best exercise plan for you  Other things to consider as you try to lose weight:   · Be aware of situations that may give you the urge to overeat, such as eating while watching television  Find ways to avoid these situations  For example, read a book, go for a walk, or do crafts      · Meet with a weight loss support group or friends who are also trying to lose weight  This may help you stay motivated to continue working on your weight loss goals  © 2017 [address] Information is for End User's use only and may not be sold, redistributed or otherwise used for commercial purposes  All illustrations and images included in CareNotes® are the copyrighted property of A [employer] M , Inc  or [employer]  The above information is an  only  It is not intended as medical advice for individual conditions or treatments  Talk to your doctor, nurse or pharmacist before following any medical regimen to see if it is safe and effective for you

## 2018-11-14 NOTE — PROGRESS NOTES
Assessment/Plan:    Hypertension  Same meds       Diagnoses and all orders for this visit:    Essential hypertension    Hyperlipidemia, unspecified hyperlipidemia type          Subjective:      Patient ID: Nicolette Gambino is a 62 y o  male  Hypertension   This is a chronic problem  The current episode started more than 1 year ago  The problem is unchanged  The problem is controlled  Pertinent negatives include no anxiety, blurred vision, chest pain, headaches, palpitations, peripheral edema or shortness of breath  There are no associated agents to hypertension  Risk factors for coronary artery disease include obesity, male gender, smoking/tobacco exposure and dyslipidemia  Past treatments include angiotensin blockers, diuretics and calcium channel blockers  The current treatment provides significant improvement  There are no compliance problems  The following portions of the patient's history were reviewed and updated as appropriate: allergies, current medications, past family history, past medical history, past social history, past surgical history and problem list       Review of Systems   Constitutional: Negative for activity change, appetite change and unexpected weight change  Eyes: Negative for blurred vision  Respiratory: Negative for shortness of breath  Cardiovascular: Negative for chest pain and palpitations  Musculoskeletal: Positive for back pain  Neurological: Negative for headaches  Psychiatric/Behavioral: The patient is not nervous/anxious  Objective:      /82   Pulse 86   Temp (!) 96 8 °F (36 °C) (Temporal)   Resp 16   Ht 6' 2 09" (1 882 m)   Wt 125 kg (275 lb)   BMI 35 22 kg/m²          Physical Exam   Constitutional: He appears well-developed and well-nourished  Neck: No thyromegaly present  Cardiovascular: Normal rate, regular rhythm and normal heart sounds  Pulmonary/Chest: Breath sounds normal    Vitals reviewed  BMI Counseling:  Body mass index is 35 22 kg/m²  Discussed with patient's BMI with him  The BMI is above average  BMI counseling and education was provided to the patient  Nutrition recommendations include reducing portion sizes, decreasing overall calorie intake and 3-5 servings of fruits/vegetables daily  Exercise recommendations include exercising 3-5 times per week

## 2018-11-23 LAB
ALBUMIN SERPL-MCNC: 4.9 G/DL (ref 3.6–5.1)
ALBUMIN/GLOB SERPL: 1.9 (CALC) (ref 1–2.5)
ALP SERPL-CCNC: 80 U/L (ref 40–115)
ALT SERPL-CCNC: 33 U/L (ref 9–46)
APPEARANCE UR: CLEAR
AST SERPL-CCNC: 19 U/L (ref 10–35)
BACTERIA UR QL AUTO: NORMAL /HPF
BASOPHILS # BLD AUTO: 92 CELLS/UL (ref 0–200)
BASOPHILS NFR BLD AUTO: 1.2 %
BILIRUB SERPL-MCNC: 0.4 MG/DL (ref 0.2–1.2)
BILIRUB UR QL STRIP: NEGATIVE
BUN SERPL-MCNC: 14 MG/DL (ref 7–25)
BUN/CREAT SERPL: ABNORMAL (CALC) (ref 6–22)
CALCIUM SERPL-MCNC: 10 MG/DL (ref 8.6–10.3)
CHLORIDE SERPL-SCNC: 105 MMOL/L (ref 98–110)
CHOLEST SERPL-MCNC: 168 MG/DL
CHOLEST/HDLC SERPL: 4.3 (CALC)
CO2 SERPL-SCNC: 26 MMOL/L (ref 20–32)
COLOR UR: YELLOW
CREAT SERPL-MCNC: 0.97 MG/DL (ref 0.7–1.33)
EOSINOPHIL # BLD AUTO: 262 CELLS/UL (ref 15–500)
EOSINOPHIL NFR BLD AUTO: 3.4 %
ERYTHROCYTE [DISTWIDTH] IN BLOOD BY AUTOMATED COUNT: 12.2 % (ref 11–15)
GLOBULIN SER CALC-MCNC: 2.6 G/DL (CALC) (ref 1.9–3.7)
GLUCOSE SERPL-MCNC: 104 MG/DL (ref 65–99)
GLUCOSE UR QL STRIP: NEGATIVE
HCT VFR BLD AUTO: 42 % (ref 38.5–50)
HDLC SERPL-MCNC: 39 MG/DL
HGB BLD-MCNC: 14.2 G/DL (ref 13.2–17.1)
HGB UR QL STRIP: NEGATIVE
HYALINE CASTS #/AREA URNS LPF: NORMAL /LPF
KETONES UR QL STRIP: NEGATIVE
LDLC SERPL CALC-MCNC: 100 MG/DL (CALC)
LEUKOCYTE ESTERASE UR QL STRIP: NEGATIVE
LYMPHOCYTES # BLD AUTO: 1378 CELLS/UL (ref 850–3900)
LYMPHOCYTES NFR BLD AUTO: 17.9 %
MCH RBC QN AUTO: 30.9 PG (ref 27–33)
MCHC RBC AUTO-ENTMCNC: 33.8 G/DL (ref 32–36)
MCV RBC AUTO: 91.3 FL (ref 80–100)
MONOCYTES # BLD AUTO: 639 CELLS/UL (ref 200–950)
MONOCYTES NFR BLD AUTO: 8.3 %
NEUTROPHILS # BLD AUTO: 5328 CELLS/UL (ref 1500–7800)
NEUTROPHILS NFR BLD AUTO: 69.2 %
NITRITE UR QL STRIP: NEGATIVE
NONHDLC SERPL-MCNC: 129 MG/DL (CALC)
PH UR STRIP: NORMAL [PH] (ref 5–8)
PLATELET # BLD AUTO: 324 THOUSAND/UL (ref 140–400)
PMV BLD REES-ECKER: 10.1 FL (ref 7.5–12.5)
POTASSIUM SERPL-SCNC: 4.6 MMOL/L (ref 3.5–5.3)
PROT SERPL-MCNC: 7.5 G/DL (ref 6.1–8.1)
PROT UR QL STRIP: NEGATIVE
PSA FREE MFR SERPL: 25 % (CALC)
PSA FREE SERPL-MCNC: 0.2 NG/ML
PSA SERPL-MCNC: 0.8 NG/ML
RBC # BLD AUTO: 4.6 MILLION/UL (ref 4.2–5.8)
RBC #/AREA URNS HPF: NORMAL /HPF
SL AMB EGFR AFRICAN AMERICAN: 99 ML/MIN/1.73M2
SL AMB EGFR NON AFRICAN AMERICAN: 86 ML/MIN/1.73M2
SODIUM SERPL-SCNC: 141 MMOL/L (ref 135–146)
SP GR UR STRIP: 1.02 (ref 1–1.03)
SQUAMOUS #/AREA URNS HPF: NORMAL /HPF
TRIGL SERPL-MCNC: 191 MG/DL
TSH SERPL-ACNC: 1.08 MIU/L (ref 0.4–4.5)
WBC # BLD AUTO: 7.7 THOUSAND/UL (ref 3.8–10.8)
WBC #/AREA URNS HPF: NORMAL /HPF

## 2018-11-26 ENCOUNTER — TELEPHONE (OUTPATIENT)
Dept: FAMILY MEDICINE CLINIC | Facility: CLINIC | Age: 58
End: 2018-11-26

## 2018-11-26 NOTE — TELEPHONE ENCOUNTER
----- Message from Ni Moraes MD sent at 11/23/2018  3:31 PM EST -----  Lb all stable,no changes meds

## 2018-12-11 ENCOUNTER — APPOINTMENT (OUTPATIENT)
Dept: LAB | Facility: HOSPITAL | Age: 58
End: 2018-12-11
Payer: COMMERCIAL

## 2018-12-11 ENCOUNTER — OFFICE VISIT (OUTPATIENT)
Dept: GASTROENTEROLOGY | Facility: CLINIC | Age: 58
End: 2018-12-11
Payer: COMMERCIAL

## 2018-12-11 ENCOUNTER — TELEPHONE (OUTPATIENT)
Dept: GASTROENTEROLOGY | Facility: CLINIC | Age: 58
End: 2018-12-11

## 2018-12-11 VITALS
WEIGHT: 282.4 LBS | HEIGHT: 74 IN | HEART RATE: 80 BPM | SYSTOLIC BLOOD PRESSURE: 172 MMHG | BODY MASS INDEX: 36.24 KG/M2 | TEMPERATURE: 98.1 F | DIASTOLIC BLOOD PRESSURE: 93 MMHG

## 2018-12-11 DIAGNOSIS — I10 ESSENTIAL HYPERTENSION: ICD-10-CM

## 2018-12-11 DIAGNOSIS — E78.5 HYPERLIPIDEMIA, UNSPECIFIED HYPERLIPIDEMIA TYPE: ICD-10-CM

## 2018-12-11 DIAGNOSIS — Z12.5 SCREENING FOR PROSTATE CANCER: ICD-10-CM

## 2018-12-11 DIAGNOSIS — R19.5 POSITIVE FIT (FECAL IMMUNOCHEMICAL TEST): Primary | ICD-10-CM

## 2018-12-11 DIAGNOSIS — Z11.59 NEED FOR HEPATITIS C SCREENING TEST: ICD-10-CM

## 2018-12-11 PROCEDURE — 86803 HEPATITIS C AB TEST: CPT

## 2018-12-11 PROCEDURE — 36415 COLL VENOUS BLD VENIPUNCTURE: CPT

## 2018-12-11 PROCEDURE — 99242 OFF/OP CONSLTJ NEW/EST SF 20: CPT | Performed by: INTERNAL MEDICINE

## 2018-12-11 NOTE — PROGRESS NOTES
[facility] - Outpatient Consultation  Kareem Mckinnon 62 y o  male MRN: [id number]  Encounter: [id number]          ASSESSMENT AND PLAN:    Kareem Mckinnon is a 62 y o  male with history of hemorrhoids who presents today, per referral by Dr Angela Paris, in consultation regarding evaluation and management of a positive fecal occult blood test       1  Positive fecal immunohistochemical test  This was positive on 9/22/18  Most recent CBC was normal  He associated this bleeding with significant straining and constipation during the test  This is now improved  Will schedule for colonoscopy at this time to evaluate for any GI bleed  Discussed risks, benefits, and alternatives to the procedure  Bowel prep instructions were given  Given his schedule as a  , he would prefer for his procedure to be scheduled on a Saturday, will see is he can be scheduled with my colleague who has Saturday block time for procedures  2  Need for hepatitis C screening  He has never had hepatitis C screening  Will order hepatitis C antibody to be drawn  Most recent LFTs were normal      Follow up as needed  _________________________________________________________________    HPI:  Kareem Mckinnon is a 62 y o  male with history of hemorrhoids who presents today, per referral by Dr Angela Paris, in consultation regarding evaluation and management of a positive fecal occult blood test completed on 9/22/18  Patient reports that he was constipated at the time of the test and he had significant straining  He attributes this to changing his Atkins drinks to a generic brand drink  Since stopping these drinks, constipation has improved  He has never noticed any other blood in his stool or on the toilet paper  CBC on 11/21 was within normal limits  He denies melena, diarrhea, abdominal pain, nausea, vomiting, or any other GI related symptoms  He has never had prior abdominal surgery   Last colonoscopy was 10 years ago and was reportedly normal       He is a current every day smoker, smoking 1/2 pack per day  He only drinks socially at this time but has a history of heavy drinking  REVIEW OF SYSTEMS:    CONSTITUTIONAL: Denies any fever, chills, rigors, and weight loss  HEENT: No earache or tinnitus  Denies hearing loss or visual disturbances  CARDIOVASCULAR: No chest pain or palpitations  RESPIRATORY: Denies any cough, hemoptysis, shortness of breath or dyspnea on exertion  GASTROINTESTINAL: As noted in the History of Present Illness  GENITOURINARY: No problems with urination  Denies any hematuria or dysuria  NEUROLOGIC: No dizziness or vertigo, denies headaches  MUSCULOSKELETAL: Denies any muscle or joint pain  SKIN: Denies skin rashes or itching  ENDOCRINE: Denies excessive thirst  Denies intolerance to heat or cold  PSYCHOSOCIAL: Denies depression or anxiety  Denies any recent memory loss         Historical Information   Past Medical History:   Diagnosis Date    Disc degeneration, lumbosacral 1/6/2016    Hyperlipidemia     hypercholesterolemia    Hypertension     last assessed 11/22/17    [facility] 1/[facility]/2018     Past Surgical History:   Procedure Laterality Date    COLONOSCOPY      KNEE SURGERY Right     UPPER GASTROINTESTINAL ENDOSCOPY       Social History   History   Alcohol Use    Yes     Comment: Occassional     History   Drug Use No     History   Smoking Status    Current Every Day Smoker    Packs/day: 0 50    Years: 40 00    Types: Cigarettes   Smokeless Tobacco    Never Used     Family History   Problem Relation Age of Onset    Obesity Mother     Aneurysm Father        Meds/Allergies       Current Outpatient Prescriptions:     amLODIPine (NORVASC) 5 mg tablet    olmesartan-hydrochlorothiazide (BENICAR HCT) 40-12 5 MG per tablet    simvastatin (ZOCOR) 40 mg tablet    Na Sulfate-K Sulfate-Mg Sulf (SUPREP BOWEL PREP KIT) 17 5-3 13-1 6 GM/177ML SOLN    No Known Allergies        Objective     Blood pressure (!) 172/93, pulse 80, temperature 98 1 °F (36 7 °C), temperature source Tympanic, height 6' 2 09" (1 882 m), weight 128 kg (282 lb 6 4 oz)  Body mass index is 36 17 kg/m²  PHYSICAL EXAM:      General Appearance:   Alert, cooperative, no distress  Obese  HEENT:   Normocephalic, atraumatic, anicteric      Neck:  Supple, symmetrical, trachea midline   Lungs:   Clear to auscultation bilaterally; no rales, rhonchi or wheezing; respirations unlabored    Heart[de-identified]   Regular rate and rhythm; no murmur, rub, or gallop  Abdomen:   Soft, non-tender, non-distended; normal bowel sounds; no masses, no organomegaly    Genitalia:   Deferred    Rectal:   Deferred    Extremities:  No cyanosis, clubbing or edema    Pulses:  2+ and symmetric    Skin:  No jaundice, rashes, or lesions    Lymph nodes:  No palpable cervical lymphadenopathy        Lab Results:   No visits with results within 1 Day(s) from this visit     Latest known visit with results is:   Orders Only on 11/21/2018   Component Date Value    Total Cholesterol 11/21/2018 168     HDL 11/21/2018 39*    Triglycerides 11/21/2018 191*    LDL Direct 11/21/2018 100*    Chol HDLC Ratio 11/21/2018 4 3     Non-HDL Cholesterol 11/21/2018 129     Glucose, Random 11/21/2018 104*    BUN 11/21/2018 14     Creatinine 11/21/2018 0 97     eGFR Non  11/21/2018 86     SL AMB EGFR  AMER* 11/21/2018 99     SL AMB BUN/CREATININE RA* 83/31/5481 NOT APPLICABLE     Sodium 72/09/7388 141     Potassium 11/21/2018 4 6     Chloride 11/21/2018 105     CO2 11/21/2018 26     SL AMB CALCIUM 11/21/2018 10 0     SL AMB PROTEIN, TOTAL 11/21/2018 7 5     Albumin 11/21/2018 4 9     Globulin 11/21/2018 2 6     Albumin/Globulin Ratio 11/21/2018 1 9     TOTAL BILIRUBIN 11/21/2018 0 4     Alkaline Phosphatase 11/21/2018 80     SL AMB AST 11/21/2018 19     SL AMB ALT 11/21/2018 33     Color UA 11/21/2018 YELLOW     Urine Appearance 11/21/2018 CLEAR     Specific Gravity 11/21/2018 1 022     Ph 11/21/2018 < OR = 5 0     Glucose, Urine 11/21/2018 NEGATIVE     Bilirubin, Urine 11/21/2018 NEGATIVE     Ketone, Urine 11/21/2018 NEGATIVE     Blood, Urine 11/21/2018 NEGATIVE     Protein, Urine 11/21/2018 NEGATIVE     SL AMB NITRITES URINE, Q* 11/21/2018 NEGATIVE     Leukocyte Esterase 11/21/2018 NEGATIVE     SL AMB WBC, URINE 11/21/2018 NONE SEEN     RBC, Urine 11/21/2018 NONE SEEN     Squamous Epithelial Cells 11/21/2018 NONE SEEN     Bacteria, UA 11/21/2018 NONE SEEN     Hyaline Casts 11/21/2018 NONE SEEN     White Blood Cell Count 11/21/2018 7 7     Red Blood Cell Count 11/21/2018 4 60     Hemoglobin 11/21/2018 14 2     HCT 11/21/2018 42 0     MCV 11/21/2018 91 3     MCH 11/21/2018 30 9     MCHC 11/21/2018 33 8     RDW 11/21/2018 12 2     Platelet Count 66/74/2405 324     SL AMB MPV 11/21/2018 10 1     Neutrophils (Absolute) 11/21/2018 5328     Lymphocytes (Absolute) 11/21/2018 1378     Monocytes (Absolute) 11/21/2018 639     Eosinophils (Absolute) 11/21/2018 262     Basophils ABS 11/21/2018 92     Neutrophils 11/21/2018 69 2     Lymphocytes 11/21/2018 17 9     Monocytes 11/21/2018 8 3     Eosinophils 11/21/2018 3 4     Basophils PCT 11/21/2018 1 2     TSH 11/21/2018 1 08     Prostate Specific Antige* 11/21/2018 0 8     PSA, Free 11/21/2018 0 2     PSA, Free Pct 11/21/2018 25*         Radiology Results:   No results found  Attestation:   By signing my name below, Chichibrandt Shaw, attest that this documentation has been prepared under the direction and in the presence of Jose Kiran,   Electronically Signed: Shawnee Lovell  12/11/18     I, Jose Kiran, personally performed the services described in this documentation  All medical record entries made by the brandinibnemo were at my direction and in my presence   I have reviewed the chart and discharge instructions and agree that the record reflects my personal performance and is accurate and complete    Erin Cowan DO  12/11/18

## 2018-12-11 NOTE — LETTER
December 11, 2018     Angela Paris MD  [address] [facility] [place] [address]    Patient: Kareem Mckinnon   YOB: 1960   Date of Visit: 12/11/2018       Dear Dr Alice Finch:    Thank you for referring Kareem Mckinnon to me for evaluation  Below are my notes for this consultation  If you have questions, please do not hesitate to call me  I look forward to following your patient along with you  Sincerely,        Bradly Mary,         CC: No Recipients  Shawnee Bassett  12/11/2018 10:45 AM  Sign at close encounter  [facility] - Outpatient Consultation  Kareem Mckinnon 62 y o  male MRN: [id number]  Encounter: [id number]          ASSESSMENT AND PLAN:    Kareem Mckinnon is a 62 y o  male with history of hemorrhoids who presents today, per referral by Dr Angela Paris, in consultation regarding evaluation and management of a positive fecal occult blood test       1  Positive fecal occult blood test  Fecal occult blood test was positive on 9/22/18  Most recent CBC was normal  He associated this bleeding with significant straining and constipation during the test  This is now improved  Will schedule for colonoscopy at this time to evaluate for any GI bleed  Discussed risks, benefits, and alternatives to the procedure  Bowel prep instructions were given  Given his schedule as a  , he would prefer for his procedure to be scheduled on a Saturday  2  Need for hepatitis C screening  He has never had hepatitis C screening  Will order hepatitis C antibody to be drawn  Most recent LFTs were normal        ______________________________________________________________________    HPI:  Kareem Mckinnon is a 62 y o  male with history of hemorrhoids who presents today, per referral by Dr Angela Paris, in consultation regarding evaluation and management of a positive fecal occult blood test completed on 9/22/18   Patient reports that he was constipated at the time of the test and he had significant straining  He attributes this to changing his Atkins drinks to a generic brand drink  Since stopping these drinks, constipation has improved  He has never noticed any other blood in his stool or on the toilet paper  CBC on 11/21 was within normal limits  He denies melena, diarrhea, abdominal pain, nausea, vomiting, or any other GI related symptoms  He has never had prior abdominal surgery  Last colonoscopy was 10 years ago and was reportedly normal       He is a current every day smoker, smoking 1/2 pack per day  He only drinks socially at this time but has a history of heavy drinking  REVIEW OF SYSTEMS:    CONSTITUTIONAL: Denies any fever, chills, rigors, and weight loss  HEENT: No earache or tinnitus  Denies hearing loss or visual disturbances  CARDIOVASCULAR: No chest pain or palpitations  RESPIRATORY: Denies any cough, hemoptysis, shortness of breath or dyspnea on exertion  GASTROINTESTINAL: As noted in the History of Present Illness  GENITOURINARY: No problems with urination  Denies any hematuria or dysuria  NEUROLOGIC: No dizziness or vertigo, denies headaches  MUSCULOSKELETAL: Denies any muscle or joint pain  SKIN: Denies skin rashes or itching  ENDOCRINE: Denies excessive thirst  Denies intolerance to heat or cold  PSYCHOSOCIAL: Denies depression or anxiety  Denies any recent memory loss         Historical Information   Past Medical History:   Diagnosis Date    Disc degeneration, lumbosacral 1/6/2016    Hyperlipidemia     hypercholesterolemia    Hypertension     last assessed 11/22/17    Pneumonia of both lower lobes due to infectious organism ([facility] ) 1/29/2018     Past Surgical History:   Procedure Laterality Date    COLONOSCOPY      KNEE SURGERY Right     UPPER GASTROINTESTINAL ENDOSCOPY       Social History   History   Alcohol Use    Yes     Comment: Occassional     History   Drug Use No     History   Smoking Status    Current Every Day Smoker    Packs/day: 0 50    Years: 40 00    Types: Cigarettes   Smokeless Tobacco    Never Used     Family History   Problem Relation Age of Onset    Obesity Mother     Aneurysm Father        Meds/Allergies       Current Outpatient Prescriptions:     amLODIPine (NORVASC) 5 mg tablet    olmesartan-hydrochlorothiazide (BENICAR HCT) 40-12 5 MG per tablet    simvastatin (ZOCOR) 40 mg tablet    Na Sulfate-K Sulfate-Mg Sulf (SUPREP BOWEL PREP KIT) 17 5-3 13-1 6 GM/177ML SOLN    No Known Allergies        Objective     Blood pressure (!) 172/93, pulse 80, temperature 98 1 °F (36 7 °C), temperature source Tympanic, height 6' 2 09" (1 882 m), weight 128 kg (282 lb 6 4 oz)  Body mass index is 36 17 kg/m²  PHYSICAL EXAM:      General Appearance:   Alert, cooperative, no distress  Obese  HEENT:   Normocephalic, atraumatic, anicteric      Neck:  Supple, symmetrical, trachea midline   Lungs:   Clear to auscultation bilaterally; no rales, rhonchi or wheezing; respirations unlabored    Heart[de-identified]   Regular rate and rhythm; no murmur, rub, or gallop  Abdomen:   Soft, non-tender, non-distended; normal bowel sounds; no masses, no organomegaly    Genitalia:   Deferred    Rectal:   Deferred    Extremities:  No cyanosis, clubbing or edema    Pulses:  2+ and symmetric    Skin:  No jaundice, rashes, or lesions    Lymph nodes:  No palpable cervical lymphadenopathy        Lab Results:   No visits with results within 1 Day(s) from this visit     Latest known visit with results is:   Orders Only on 11/21/2018   Component Date Value    Total Cholesterol 11/21/2018 168     HDL 11/21/2018 39*    Triglycerides 11/21/2018 191*    LDL Direct 11/21/2018 100*    Chol HDLC Ratio 11/21/2018 4 3     Non-HDL Cholesterol 11/21/2018 129     Glucose, Random 11/21/2018 104*    BUN 11/21/2018 14     Creatinine 11/21/2018 0 97     eGFR Non  11/21/2018 86     SL AMB EGFR  AMER* 11/21/2018 99  SL AMB BUN/CREATININE RA* 49/00/2214 NOT APPLICABLE     Sodium 50/12/7335 141     Potassium 11/21/2018 4 6     Chloride 11/21/2018 105     CO2 11/21/2018 26     SL AMB CALCIUM 11/21/2018 10 0     SL AMB PROTEIN, TOTAL 11/21/2018 7 5     Albumin 11/21/2018 4 9     Globulin 11/21/2018 2 6     Albumin/Globulin Ratio 11/21/2018 1 9     TOTAL BILIRUBIN 11/21/2018 0 4     Alkaline Phosphatase 11/21/2018 80     SL AMB AST 11/21/2018 19     SL AMB ALT 11/21/2018 33     Color UA 11/21/2018 YELLOW     Urine Appearance 11/21/2018 CLEAR     Specific Gravity 11/21/2018 1 022     Ph 11/21/2018 < OR = 5 0     Glucose, Urine 11/21/2018 NEGATIVE     Bilirubin, Urine 11/21/2018 NEGATIVE     Ketone, Urine 11/21/2018 NEGATIVE     Blood, Urine 11/21/2018 NEGATIVE     Protein, Urine 11/21/2018 NEGATIVE     SL AMB NITRITES URINE, Q* 11/21/2018 NEGATIVE     Leukocyte Esterase 11/21/2018 NEGATIVE     SL AMB WBC, URINE 11/21/2018 NONE SEEN     RBC, Urine 11/21/2018 NONE SEEN     Squamous Epithelial Cells 11/21/2018 NONE SEEN     Bacteria, UA 11/21/2018 NONE SEEN     Hyaline Casts 11/21/2018 NONE SEEN     White Blood Cell Count 11/21/2018 7 7     Red Blood Cell Count 11/21/2018 4 60     Hemoglobin 11/21/2018 14 2     HCT 11/21/2018 42 0     MCV 11/21/2018 91 3     MCH 11/21/2018 30 9     MCHC 11/21/2018 33 8     RDW 11/21/2018 12 2     Platelet Count 36/20/7123 324     SL AMB MPV 11/21/2018 10 1     Neutrophils (Absolute) 11/21/2018 5328     Lymphocytes (Absolute) 11/21/2018 1378     Monocytes (Absolute) 11/21/2018 639     Eosinophils (Absolute) 11/21/2018 262     Basophils ABS 11/21/2018 92     Neutrophils 11/21/2018 69 2     Lymphocytes 11/21/2018 17 9     Monocytes 11/21/2018 8 3     Eosinophils 11/21/2018 3 4     Basophils PCT 11/21/2018 1 2     TSH 11/21/2018 1 08     Prostate Specific Antige* 11/21/2018 0 8     PSA, Free 11/21/2018 0 2     PSA, Free Pct 11/21/2018 25* Radiology Results:   No results found  Attestation:   By signing my name below, Juanito Shaw, attest that this documentation has been prepared under the direction and in the presence of Jose Kiran DO  Electronically Signed: Shawnee Lovell  12/11/18     I, Jose Kiran, personally performed the services described in this documentation  All medical record entries made by the brandinibnemo were at my direction and in my presence  I have reviewed the chart and discharge instructions and agree that the record reflects my personal performance and is accurate and complete    Jose Kiran DO  12/11/18

## 2018-12-11 NOTE — TELEPHONE ENCOUNTER
This patient was seen in our office by Dr Ramana Peng and needs a colonoscopy   He would like to schedule on a Saturday with with Dr Raghav Adhikari

## 2018-12-11 NOTE — PATIENT INSTRUCTIONS
Patient wants to schedule with Dr Jo-Ann Arias at [facility] on a Saturday, I sent Yousuf Haddad a message

## 2018-12-12 LAB — HCV AB SER QL: NORMAL

## 2018-12-12 NOTE — TELEPHONE ENCOUNTER
Sussy-I informed Braden Doshi of your message re: this pt  She does not have a Saturday schedule yet for Dr Justin Mayfield  But once we have it I will schedule and let you know

## 2018-12-18 ENCOUNTER — TELEPHONE (OUTPATIENT)
Dept: GASTROENTEROLOGY | Facility: AMBULARY SURGERY CENTER | Age: 58
End: 2018-12-18

## 2018-12-18 PROBLEM — Z12.11 SCREEN FOR COLON CANCER: Status: ACTIVE | Noted: 2018-12-18

## 2019-01-04 ENCOUNTER — ANESTHESIA EVENT (OUTPATIENT)
Dept: GASTROENTEROLOGY | Facility: HOSPITAL | Age: 59
End: 2019-01-04
Payer: COMMERCIAL

## 2019-01-04 DIAGNOSIS — E78.5 HYPERLIPIDEMIA, UNSPECIFIED HYPERLIPIDEMIA TYPE: ICD-10-CM

## 2019-01-04 DIAGNOSIS — I10 ESSENTIAL HYPERTENSION: ICD-10-CM

## 2019-01-04 RX ORDER — SIMVASTATIN 40 MG
40 TABLET ORAL
Qty: 90 TABLET | Refills: 0 | Status: SHIPPED | OUTPATIENT
Start: 2019-01-04 | End: 2019-03-20 | Stop reason: SDUPTHER

## 2019-01-04 RX ORDER — OLMESARTAN MEDOXOMIL AND HYDROCHLOROTHIAZIDE 40/12.5 40; 12.5 MG/1; MG/1
1 TABLET ORAL DAILY
Qty: 90 TABLET | Refills: 0 | Status: SHIPPED | OUTPATIENT
Start: 2019-01-04 | End: 2019-03-20 | Stop reason: SDUPTHER

## 2019-01-04 NOTE — ANESTHESIA PREPROCEDURE EVALUATION
Review of Systems/Medical History  Patient summary reviewed  Chart reviewed  No history of anesthetic complications     Cardiovascular  Hyperlipidemia, Hypertension ,    Pulmonary  Smoker ,        GI/Hepatic            Endo/Other     GYN       Hematology   Musculoskeletal    Arthritis     Neurology   Psychology           Physical Exam    Airway    Mallampati score: II  TM Distance: >3 FB  Neck ROM: full     Dental       Cardiovascular      Pulmonary      Other Findings        Anesthesia Plan  ASA Score- 2     Anesthesia Type- IV sedation with anesthesia with ASA Monitors  Additional Monitors:   Airway Plan:         Plan Factors-    Induction- intravenous  Postoperative Plan-     Informed Consent- Anesthetic plan and risks discussed with patient  I personally reviewed this patient with the CRNA  Discussed and agreed on the Anesthesia Plan with the CRNA  Dara Soulier

## 2019-01-05 ENCOUNTER — HOSPITAL ENCOUNTER (OUTPATIENT)
Facility: HOSPITAL | Age: 59
Setting detail: OUTPATIENT SURGERY
Discharge: HOME | End: 2019-01-05
Attending: INTERNAL MEDICINE | Admitting: INTERNAL MEDICINE
Payer: COMMERCIAL

## 2019-01-05 ENCOUNTER — ANESTHESIA (OUTPATIENT)
Dept: GASTROENTEROLOGY | Facility: HOSPITAL | Age: 59
End: 2019-01-05
Payer: COMMERCIAL

## 2019-01-05 VITALS
DIASTOLIC BLOOD PRESSURE: 61 MMHG | HEART RATE: 74 BPM | OXYGEN SATURATION: 94 % | RESPIRATION RATE: 16 BRPM | SYSTOLIC BLOOD PRESSURE: 112 MMHG | BODY MASS INDEX: 34.65 KG/M2 | HEIGHT: 74 IN | TEMPERATURE: 97.2 F | WEIGHT: 270 LBS

## 2019-01-05 DIAGNOSIS — Z12.11 SCREEN FOR COLON CANCER: ICD-10-CM

## 2019-01-05 PROCEDURE — 88305 TISSUE EXAM BY PATHOLOGIST: CPT | Performed by: PATHOLOGY

## 2019-01-05 PROCEDURE — 45385 COLONOSCOPY W/LESION REMOVAL: CPT | Performed by: INTERNAL MEDICINE

## 2019-01-05 RX ORDER — PROPOFOL 10 MG/ML
INJECTION, EMULSION INTRAVENOUS AS NEEDED
Status: DISCONTINUED | OUTPATIENT
Start: 2019-01-05 | End: 2019-01-05 | Stop reason: SURG

## 2019-01-05 RX ORDER — LIDOCAINE HYDROCHLORIDE 10 MG/ML
INJECTION, SOLUTION INFILTRATION; PERINEURAL AS NEEDED
Status: DISCONTINUED | OUTPATIENT
Start: 2019-01-05 | End: 2019-01-05 | Stop reason: SURG

## 2019-01-05 RX ORDER — SODIUM CHLORIDE 9 MG/ML
125 INJECTION, SOLUTION INTRAVENOUS CONTINUOUS
Status: DISCONTINUED | OUTPATIENT
Start: 2019-01-05 | End: 2019-01-05 | Stop reason: HOSPADM

## 2019-01-05 RX ORDER — PROPOFOL 10 MG/ML
INJECTION, EMULSION INTRAVENOUS CONTINUOUS PRN
Status: DISCONTINUED | OUTPATIENT
Start: 2019-01-05 | End: 2019-01-05 | Stop reason: SURG

## 2019-01-05 RX ADMIN — SODIUM CHLORIDE: 0.9 INJECTION, SOLUTION INTRAVENOUS at 08:42

## 2019-01-05 RX ADMIN — PROPOFOL 50 MG: 10 INJECTION, EMULSION INTRAVENOUS at 08:50

## 2019-01-05 RX ADMIN — LIDOCAINE HYDROCHLORIDE ANHYDROUS 5 ML: 10 INJECTION, SOLUTION INFILTRATION at 08:45

## 2019-01-05 RX ADMIN — PROPOFOL 50 MG: 10 INJECTION, EMULSION INTRAVENOUS at 08:59

## 2019-01-05 RX ADMIN — PROPOFOL 50 MG: 10 INJECTION, EMULSION INTRAVENOUS at 08:48

## 2019-01-05 RX ADMIN — SODIUM CHLORIDE 125 ML/HR: 0.9 INJECTION, SOLUTION INTRAVENOUS at 08:17

## 2019-01-05 RX ADMIN — PROPOFOL 50 MG: 10 INJECTION, EMULSION INTRAVENOUS at 08:55

## 2019-01-05 RX ADMIN — PROPOFOL 100 MCG/KG/MIN: 10 INJECTION, EMULSION INTRAVENOUS at 08:48

## 2019-01-05 NOTE — OP NOTE
COLONOSCOPY    PROCEDURE: Colonoscopy/ Polypectomy (Cold Snare)    INDICATIONS: Rectal Bleeding    POST-OP DIAGNOSIS: See the impression below    SEDATION: Monitored anesthesia care, check anesthesia records    PHYSICAL EXAM:    /76   Pulse 84   Temp (!) 97 2 °F (36 2 °C) (Temporal)   Resp 16   Ht 6' 2" (1 88 m)   Wt 122 kg (270 lb)   SpO2 95%   BMI 34 67 kg/m²   Body mass index is 34 67 kg/m²  General: NAD  Heart: S1 & S2 normal, RRR  Lungs: CTA, No rales or rhonchi  Abdomen: Soft, nontender, nondistended, good bowel sounds    CONSENT:  Informed consent was obtained for the procedure, including sedation after explaining the risks and benefits of the procedure  Risks including but not limited to bleeding, perforation, infection, aspiration were discussed in detail  Also explained about less than 100%$ sensitivity with the exam and other alternatives  PREPARATION:   EKG tracing, pulse oximetry, blood pressure were monitored throughout the procedure  Patient was identified by myself both verbally and by visual inspection of ID band  DESCRIPTION:   Patient was placed in the left lateral decubitus position and was sedated with the above medication  Digital rectal examination was performed  The colonoscope was introduced in to the anal canal and advanced up to cecum, which was identified by the appendiceal orifice and IC valve  A careful inspection was made as the colonoscope was withdrawn, including a retroflexed view of the rectum; findings and interventions are described below  Appropriate photodocumentation was obtained  The quality of the colonic preparation was adequate  FINDINGS:    1  Cecum and ileocecal valve-normal mucosa    2  Transverse colon-4-5 mm polyp was removed with cold snare    3  Sigmoid colon-few diverticuli seen    4   Internal hemorrhoids          IMPRESSIONS:      As above    RECOMMENDATIONS:    Check pathology    COMPLICATIONS:  None; patient tolerated the procedure well     DISPOSITION: PACU           CONDITION: Stable

## 2019-01-05 NOTE — H&P (VIEW-ONLY)
[facility] - Outpatient Consultation  Valentino March 62 y o  male MRN: [id number]  Encounter: [id number]          ASSESSMENT AND PLAN:    Valentino March is a 62 y o  male with history of hemorrhoids who presents today, per referral by Dr Eufemia Espinoza, in consultation regarding evaluation and management of a positive fecal occult blood test       1  Positive fecal immunohistochemical test  This was positive on 9/22/18  Most recent CBC was normal  He associated this bleeding with significant straining and constipation during the test  This is now improved  Will schedule for colonoscopy at this time to evaluate for any GI bleed  Discussed risks, benefits, and alternatives to the procedure  Bowel prep instructions were given  Given his schedule as a  , he would prefer for his procedure to be scheduled on a Saturday, will see is he can be scheduled with my colleague who has Saturday block time for procedures  2  Need for hepatitis C screening  He has never had hepatitis C screening  Will order hepatitis C antibody to be drawn  Most recent LFTs were normal      Follow up as needed  _________________________________________________________________    HPI:  Valentino March is a 62 y o  male with history of hemorrhoids who presents today, per referral by Dr Eufemia Espinoza, in consultation regarding evaluation and management of a positive fecal occult blood test completed on 9/22/18  Patient reports that he was constipated at the time of the test and he had significant straining  He attributes this to changing his Atkins drinks to a generic brand drink  Since stopping these drinks, constipation has improved  He has never noticed any other blood in his stool or on the toilet paper  CBC on 11/21 was within normal limits  He denies melena, diarrhea, abdominal pain, nausea, vomiting, or any other GI related symptoms  He has never had prior abdominal surgery   Last colonoscopy was 10 years ago and was reportedly normal       He is a current every day smoker, smoking 1/2 pack per day  He only drinks socially at this time but has a history of heavy drinking  REVIEW OF SYSTEMS:    CONSTITUTIONAL: Denies any fever, chills, rigors, and weight loss  HEENT: No earache or tinnitus  Denies hearing loss or visual disturbances  CARDIOVASCULAR: No chest pain or palpitations  RESPIRATORY: Denies any cough, hemoptysis, shortness of breath or dyspnea on exertion  GASTROINTESTINAL: As noted in the History of Present Illness  GENITOURINARY: No problems with urination  Denies any hematuria or dysuria  NEUROLOGIC: No dizziness or vertigo, denies headaches  MUSCULOSKELETAL: Denies any muscle or joint pain  SKIN: Denies skin rashes or itching  ENDOCRINE: Denies excessive thirst  Denies intolerance to heat or cold  PSYCHOSOCIAL: Denies depression or anxiety  Denies any recent memory loss         Historical Information   Past Medical History:   Diagnosis Date    Disc degeneration, lumbosacral 1/6/2016    Hyperlipidemia     hypercholesterolemia    Hypertension     last assessed 11/22/17    [facility]neumonia of both lower lobes [facility] organism [facility] 1/29/2018     Past Surgical History:   Procedure Laterality Date    COLONOSCOPY      KNEE SURGERY Right     UPPER GASTROINTESTINAL ENDOSCOPY       Social History   History   Alcohol Use    Yes     Comment: Occassional     History   Drug Use No     History   Smoking Status    Current Every Day Smoker    Packs/day: 0 50    Years: 40 00    Types: Cigarettes   Smokeless Tobacco    Never Used     Family History   Problem Relation Age of Onset    Obesity Mother     Aneurysm Father        Meds/Allergies       Current Outpatient Prescriptions:     amLODIPine (NORVASC) 5 mg tablet    olmesartan-hydrochlorothiazide (BENICAR HCT) 40-12 5 MG per tablet    simvastatin (ZOCOR) 40 mg tablet    Na Sulfate-K Sulfate-Mg Sulf (SUPREP BOWEL PREP KIT) 17 5-3 13-1 6 GM/177ML SOLN    No Known Allergies        Objective     Blood pressure (!) 172/93, pulse 80, temperature 98 1 °F (36 7 °C), temperature source Tympanic, height 6' 2 09" (1 882 m), weight 128 kg (282 lb 6 4 oz)  Body mass index is 36 17 kg/m²  PHYSICAL EXAM:      General Appearance:   Alert, cooperative, no distress  Obese  HEENT:   Normocephalic, atraumatic, anicteric      Neck:  Supple, symmetrical, trachea midline   Lungs:   Clear to auscultation bilaterally; no rales, rhonchi or wheezing; respirations unlabored    Heart[de-identified]   Regular rate and rhythm; no murmur, rub, or gallop  Abdomen:   Soft, non-tender, non-distended; normal bowel sounds; no masses, no organomegaly    Genitalia:   Deferred    Rectal:   Deferred    Extremities:  No cyanosis, clubbing or edema    Pulses:  2+ and symmetric    Skin:  No jaundice, rashes, or lesions    Lymph nodes:  No palpable cervical lymphadenopathy        Lab Results:   No visits with results within 1 Day(s) from this visit     Latest known visit with results is:   Orders Only on 11/21/2018   Component Date Value    Total Cholesterol 11/21/2018 168     HDL 11/21/2018 39*    Triglycerides 11/21/2018 191*    LDL Direct 11/21/2018 100*    Chol HDLC Ratio 11/21/2018 4 3     Non-HDL Cholesterol 11/21/2018 129     Glucose, Random 11/21/2018 104*    BUN 11/21/2018 14     Creatinine 11/21/2018 0 97     eGFR Non  11/21/2018 86     SL AMB EGFR  AMER* 11/21/2018 99     SL AMB BUN/CREATININE RA* 46/08/0147 NOT APPLICABLE     Sodium 08/79/4414 141     Potassium 11/21/2018 4 6     Chloride 11/21/2018 105     CO2 11/21/2018 26     SL AMB CALCIUM 11/21/2018 10 0     SL AMB PROTEIN, TOTAL 11/21/2018 7 5     Albumin 11/21/2018 4 9     Globulin 11/21/2018 2 6     Albumin/Globulin Ratio 11/21/2018 1 9     TOTAL BILIRUBIN 11/21/2018 0 4     Alkaline Phosphatase 11/21/2018 80     SL AMB AST 11/21/2018 19     SL AMB ALT 11/21/2018 33     Color UA 11/21/2018 YELLOW     Urine Appearance 11/21/2018 CLEAR     Specific Gravity 11/21/2018 1 022     Ph 11/21/2018 < OR = 5 0     Glucose, Urine 11/21/2018 NEGATIVE     Bilirubin, Urine 11/21/2018 NEGATIVE     Ketone, Urine 11/21/2018 NEGATIVE     Blood, Urine 11/21/2018 NEGATIVE     Protein, Urine 11/21/2018 NEGATIVE     SL AMB NITRITES URINE, Q* 11/21/2018 NEGATIVE     Leukocyte Esterase 11/21/2018 NEGATIVE     SL AMB WBC, URINE 11/21/2018 NONE SEEN     RBC, Urine 11/21/2018 NONE SEEN     Squamous Epithelial Cells 11/21/2018 NONE SEEN     Bacteria, UA 11/21/2018 NONE SEEN     Hyaline Casts 11/21/2018 NONE SEEN     White Blood Cell Count 11/21/2018 7 7     Red Blood Cell Count 11/21/2018 4 60     Hemoglobin 11/21/2018 14 2     HCT 11/21/2018 42 0     MCV 11/21/2018 91 3     MCH 11/21/2018 30 9     MCHC 11/21/2018 33 8     RDW 11/21/2018 12 2     Platelet Count 43/96/8612 324     SL AMB MPV 11/21/2018 10 1     Neutrophils (Absolute) 11/21/2018 5328     Lymphocytes (Absolute) 11/21/2018 1378     Monocytes (Absolute) 11/21/2018 639     Eosinophils (Absolute) 11/21/2018 262     Basophils ABS 11/21/2018 92     Neutrophils 11/21/2018 69 2     Lymphocytes 11/21/2018 17 9     Monocytes 11/21/2018 8 3     Eosinophils 11/21/2018 3 4     Basophils PCT 11/21/2018 1 2     TSH 11/21/2018 1 08     Prostate Specific Antige* 11/21/2018 0 8     PSA, Free 11/21/2018 0 2     PSA, Free Pct 11/21/2018 25*         Radiology Results:   No results found  Attestation:   By signing my name below, Pooja Vann, attest that this documentation has been prepared under the direction and in the presence of Archie Perales DO  Electronically Signed: Shawnee Ruiz  12/11/18     I, Archie Perales, personally performed the services described in this documentation  All medical record entries made by the scribnemo were at my direction and in my presence   I have reviewed the chart and discharge instructions and agree that the record reflects my personal performance and is accurate and complete    Adamaris Chopra, DO  12/11/18

## 2019-01-10 ENCOUNTER — TELEPHONE (OUTPATIENT)
Dept: GASTROENTEROLOGY | Facility: AMBULARY SURGERY CENTER | Age: 59
End: 2019-01-10

## 2019-01-10 NOTE — TELEPHONE ENCOUNTER
----- Message from Maria Luz Vivas MD sent at 1/7/2019  6:33 PM EST -----    Colon polyps removed came back as precancerous tubular adenoma  Next colonoscopy in 3 years  Patient to call our office for any GI symptoms

## 2019-01-10 NOTE — LETTER
January 10, 2019     Pamela Francisco  [address] [id number]      Dear Mr Valente Lorenzana: We have attempted to reach you regarding your results  We ask that you please contact our office upon receipt of this letter to receive your results  Thank you in advance for your cooperation and assistance          Sincerely,   [facility]  [phone]

## 2019-03-06 ENCOUNTER — OFFICE VISIT (OUTPATIENT)
Dept: FAMILY MEDICINE CLINIC | Facility: CLINIC | Age: 59
End: 2019-03-06
Payer: COMMERCIAL

## 2019-03-06 VITALS
HEIGHT: 75 IN | SYSTOLIC BLOOD PRESSURE: 136 MMHG | WEIGHT: 281.4 LBS | RESPIRATION RATE: 16 BRPM | HEART RATE: 94 BPM | DIASTOLIC BLOOD PRESSURE: 94 MMHG | TEMPERATURE: 97.7 F | BODY MASS INDEX: 34.99 KG/M2

## 2019-03-06 DIAGNOSIS — M51.37 DISC DEGENERATION, LUMBOSACRAL: Primary | ICD-10-CM

## 2019-03-06 PROCEDURE — 4004F PT TOBACCO SCREEN RCVD TLK: CPT | Performed by: NURSE PRACTITIONER

## 2019-03-06 PROCEDURE — 99213 OFFICE O/P EST LOW 20 MIN: CPT | Performed by: NURSE PRACTITIONER

## 2019-03-06 PROCEDURE — 3008F BODY MASS INDEX DOCD: CPT | Performed by: NURSE PRACTITIONER

## 2019-03-06 RX ORDER — METHOCARBAMOL 500 MG/1
500 TABLET, FILM COATED ORAL 4 TIMES DAILY
COMMUNITY
End: 2019-03-20 | Stop reason: SDUPTHER

## 2019-03-06 NOTE — PROGRESS NOTES
Chief Complaint   Patient presents with    Back Pain     Pt here for back pain  Pt states he pulled his back  Assessment/Plan:     Diagnoses and all orders for this visit:    Disc degeneration, lumbosacral    Other orders  -     methocarbamol (ROBAXIN) 500 mg tablet; Take 500 mg by mouth 4 (four) times a day          Subjective:      Patient ID: Trevon Roth is a 61 y o  male  Patient states he restarted with methocarbamol 500mg  He has a refill still  Wants to document the medication  He states he had some issues with his back pain  He thinks he may need surgery  Patient states he had two MRI completed  Back Pain   This is a recurrent problem  The problem occurs intermittently  The problem has been waxing and waning since onset  The pain is present in the lumbar spine  The quality of the pain is described as cramping and stabbing  The pain is moderate  The pain is the same all the time  The symptoms are aggravated by position  Pertinent negatives include no chest pain, leg pain, numbness, tingling or weakness  He has tried muscle relaxant and home exercises for the symptoms  The treatment provided mild relief  The following portions of the patient's history were reviewed and updated as appropriate: allergies, current medications, past family history, past medical history, past social history, past surgical history and problem list     Review of Systems   Constitutional: Negative for fatigue  Respiratory: Negative for chest tightness and shortness of breath  Cardiovascular: Negative for chest pain and palpitations  Musculoskeletal: Positive for back pain  Neurological: Negative for tingling, weakness and numbness           Objective:       /94 (BP Location: Left arm, Patient Position: Sitting, Cuff Size: Adult)   Pulse 94   Temp 97 7 °F (36 5 °C) (Tympanic)   Resp 16   Ht 6' 3 01" (1 905 m)   Wt 128 kg (281 lb 6 4 oz)   BMI 35 16 kg/m²          Physical Exam Constitutional: He is oriented to person, place, and time  Vital signs are normal  He appears well-developed and well-nourished  He does not appear ill  HENT:   Head: Normocephalic and atraumatic  Eyes: Pupils are equal    Cardiovascular: Normal rate, regular rhythm, S1 normal and S2 normal    No murmur heard  Pulmonary/Chest: Effort normal and breath sounds normal  No respiratory distress  Musculoskeletal:        Lumbar back: He exhibits pain and spasm  Back:    Neurological: He is alert and oriented to person, place, and time  Psychiatric: He has a normal mood and affect   Thought content normal

## 2019-03-20 ENCOUNTER — OFFICE VISIT (OUTPATIENT)
Dept: FAMILY MEDICINE CLINIC | Facility: CLINIC | Age: 59
End: 2019-03-20
Payer: COMMERCIAL

## 2019-03-20 VITALS
HEART RATE: 80 BPM | HEIGHT: 75 IN | BODY MASS INDEX: 34.74 KG/M2 | RESPIRATION RATE: 16 BRPM | TEMPERATURE: 98.6 F | WEIGHT: 279.4 LBS | SYSTOLIC BLOOD PRESSURE: 150 MMHG | DIASTOLIC BLOOD PRESSURE: 86 MMHG

## 2019-03-20 DIAGNOSIS — E66.9 OBESITY (BMI 30.0-34.9): ICD-10-CM

## 2019-03-20 DIAGNOSIS — E78.5 HYPERLIPIDEMIA, UNSPECIFIED HYPERLIPIDEMIA TYPE: ICD-10-CM

## 2019-03-20 DIAGNOSIS — M51.37 DISC DEGENERATION, LUMBOSACRAL: Primary | ICD-10-CM

## 2019-03-20 DIAGNOSIS — I10 ESSENTIAL HYPERTENSION: ICD-10-CM

## 2019-03-20 PROCEDURE — 4004F PT TOBACCO SCREEN RCVD TLK: CPT | Performed by: FAMILY MEDICINE

## 2019-03-20 PROCEDURE — 3008F BODY MASS INDEX DOCD: CPT | Performed by: FAMILY MEDICINE

## 2019-03-20 PROCEDURE — 99214 OFFICE O/P EST MOD 30 MIN: CPT | Performed by: FAMILY MEDICINE

## 2019-03-20 RX ORDER — AMLODIPINE BESYLATE 5 MG/1
5 TABLET ORAL DAILY
Qty: 90 TABLET | Refills: 1 | Status: SHIPPED | OUTPATIENT
Start: 2019-03-20 | End: 2019-11-06 | Stop reason: SDUPTHER

## 2019-03-20 RX ORDER — SIMVASTATIN 40 MG
40 TABLET ORAL
Qty: 90 TABLET | Refills: 1 | Status: SHIPPED | OUTPATIENT
Start: 2019-03-20 | End: 2019-11-06 | Stop reason: SDUPTHER

## 2019-03-20 RX ORDER — OLMESARTAN MEDOXOMIL AND HYDROCHLOROTHIAZIDE 40/12.5 40; 12.5 MG/1; MG/1
1 TABLET ORAL DAILY
Qty: 90 TABLET | Refills: 1 | Status: SHIPPED | OUTPATIENT
Start: 2019-03-20 | End: 2019-11-06 | Stop reason: SDUPTHER

## 2019-03-20 RX ORDER — METHOCARBAMOL 500 MG/1
500 TABLET, FILM COATED ORAL 3 TIMES DAILY PRN
Qty: 60 TABLET | Refills: 2 | Status: SHIPPED | OUTPATIENT
Start: 2019-03-20 | End: 2020-04-01 | Stop reason: SDUPTHER

## 2019-03-20 NOTE — PROGRESS NOTES
Assessment/Plan:    Disc degeneration, lumbosacral  Had bad spasm 2 weeks ago-lasted 7 hours and wife nearly called ambulance       Diagnoses and all orders for this visit:    Disc degeneration, lumbosacral  -     Ambulatory referral to Physical Therapy; Future    Obesity (BMI 30 0-34  9)          Subjective:      Patient ID: Lamin Dsouza is a 61 y o  male  Back Pain   This is a chronic problem  The current episode started more than 1 year ago  The problem occurs constantly  The problem has been waxing and waning since onset  The pain is present in the lumbar spine  The quality of the pain is described as aching  The pain radiates to the right thigh  The pain is at a severity of 6/10  The pain is moderate  Pertinent negatives include no bladder incontinence, bowel incontinence, chest pain or numbness  He has tried analgesics and muscle relaxant for the symptoms  The treatment provided moderate relief  The following portions of the patient's history were reviewed and updated as appropriate: allergies, current medications, past family history, past medical history, past social history, past surgical history and problem list       Review of Systems   Constitutional: Negative for activity change, appetite change and unexpected weight change  Respiratory: Negative for shortness of breath  Cardiovascular: Negative for chest pain  Gastrointestinal: Negative for bowel incontinence  Genitourinary: Negative for bladder incontinence  Musculoskeletal: Positive for back pain  Neurological: Negative for dizziness and numbness  Objective:      /92 (BP Location: Left arm, Patient Position: Sitting, Cuff Size: Standard)   Pulse 80   Temp 98 6 °F (37 °C) (Temporal)   Resp 16   Ht 6' 3" (1 905 m)   Wt 127 kg (279 lb 6 4 oz)   BMI 34 92 kg/m²          Physical Exam   Constitutional: He appears well-developed and well-nourished  Neck: No thyromegaly present     Cardiovascular: Normal rate and normal heart sounds  Pulmonary/Chest: Effort normal and breath sounds normal    Musculoskeletal: He exhibits no edema  Lymphadenopathy:     He has no cervical adenopathy  Vitals reviewed  BMI Counseling: Body mass index is 34 92 kg/m²  Discussed the patient's BMI with him  The BMI is above average  BMI counseling and education was provided to the patient  Nutrition recommendations include reducing portion sizes, decreasing overall calorie intake, 3-5 servings of fruits/vegetables daily, reducing fast food intake and consuming healthier snacks  Exercise recommendations include exercising 3-5 times per week

## 2019-03-20 NOTE — PATIENT INSTRUCTIONS
Obesity   AMBULATORY CARE:   Obesity  is when your body mass index (BMI) is greater than 30  Your healthcare provider will use your height and weight to measure your BMI  The risks of obesity include  many health problems, such as injuries or physical disability  You may need tests to check for the following:  · Diabetes     · High blood pressure or high cholesterol     · Heart disease     · Gallbladder or liver disease     · Cancer of the colon, breast, prostate, liver, or kidney     · Sleep apnea     · Arthritis or gout  Seek care immediately if:   · You have a severe headache, confusion, or difficulty speaking  · You have weakness on one side of your body  · You have chest pain, sweating, or shortness of breath  Contact your healthcare provider if:   · You have symptoms of gallbladder or liver disease, such as pain in your upper abdomen  · You have knee or hip pain and discomfort while walking  · You have symptoms of diabetes, such as intense hunger and thirst, and frequent urination  · You have symptoms of sleep apnea, such as snoring or daytime sleepiness  · You have questions or concerns about your condition or care  Treatment for obesity  focuses on helping you lose weight to improve your health  Even a small decrease in BMI can reduce the risk for many health problems  Your healthcare provider will help you set a weight-loss goal   · Lifestyle changes  are the first step in treating obesity  These include making healthy food choices and getting regular physical activity  Your healthcare provider may suggest a weight-loss program that involves coaching, education, and therapy  · Medicine  may help you lose weight when it is used with a healthy diet and physical activity  · Surgery  can help you lose weight if you are very obese and have other health problems  There are several types of weight-loss surgery  Ask your healthcare provider for more information    Be successful losing weight:   · Set small, realistic goals  An example of a small goal is to walk for 20 minutes 5 days a week  Anther goal is to lose 5% of your body weight  · Tell friends, family members, and coworkers about your goals  and ask for their support  Ask a friend to lose weight with you, or join a weight-loss support group  · Identify foods or triggers that may cause you to overeat , and find ways to avoid them  Remove tempting high-calorie foods from your home and workplace  Place a bowl of fresh fruit on your kitchen counter  If stress causes you to eat, then find other ways to cope with stress  · Keep a diary to track what you eat and drink  Also write down how many minutes of physical activity you do each day  Weigh yourself once a week and record it in your diary  Eating changes: You will need to eat 500 to 1,000 fewer calories each day than you currently eat to lose 1 to 2 pounds a week  The following changes will help you cut calories:  · Eat smaller portions  Use small plates, no larger than 9 inches in diameter  Fill your plate half full of fruits and vegetables  Measure your food using measuring cups until you know what a serving size looks like  · Eat 3 meals and 1 or 2 snacks each day  Plan your meals in advance  Seda Figures and eat at home most of the time  Eat slowly  · Eat fruits and vegetables at every meal   They are low in calories and high in fiber, which makes you feel full  Do not add butter, margarine, or cream sauce to vegetables  Use herbs to season steamed vegetables  · Eat less fat and fewer fried foods  Eat more baked or grilled chicken and fish  These protein sources are lower in calories and fat than red meat  Limit fast food  Dress your salads with olive oil and vinegar instead of bottled dressing  · Limit the amount of sugar you eat  Do not drink sugary beverages  Limit alcohol  Activity changes:  Physical activity is good for your body in many ways   It helps you burn calories and build strong muscles  It decreases stress and depression, and improves your mood  It can also help you sleep better  Talk to your healthcare provider before you begin an exercise program   · Exercise for at least 30 minutes 5 days a week  Start slowly  Set aside time each day for physical activity that you enjoy and that is convenient for you  It is best to do both weight training and an activity that increases your heart rate, such as walking, bicycling, or swimming  · Find ways to be more active  Do yard work and housecleaning  Walk up the stairs instead of using elevators  Spend your leisure time going to events that require walking, such as outdoor festivals or fairs  This extra physical activity can help you lose weight and keep it off  Follow up with your healthcare provider as directed: You may need to meet with a dietitian  Write down your questions so you remember to ask them during your visits  © 2017 [facility] Information is for End User's use only and may not be sold, redistributed or otherwise used for commercial purposes  All illustrations and images included in CareNotes® are the copyrighted property of [employer]  or [employer]  The above information is an  only  It is not intended as medical advice for individual conditions or treatments  Talk to your doctor, nurse or pharmacist before following any medical regimen to see if it is safe and effective for you  Weight Management   AMBULATORY CARE:   Why it is important to manage your weight:  Being overweight increases your risk of health conditions such as heart disease, high blood pressure, type 2 diabetes, and certain types of cancer  It can also increase your risk for osteoarthritis, sleep apnea, and other respiratory problems  Aim for a slow, steady weight loss  Even a small amount of weight loss can lower your risk of health problems    How to lose weight safely:  A safe and healthy way to lose weight is to eat fewer calories and get regular exercise  You can lose up about 1 pound a week by decreasing the number of calories you eat by 500 calories each day  You can decrease calories by eating smaller portion sizes or by cutting out high-calorie foods  Read labels to find out how many calories are in the foods you eat  You can also burn calories with exercise such as walking, swimming, or biking  You will be more likely to keep weight off if you make these changes part of your lifestyle  Healthy meal plan for weight management:  A healthy meal plan includes a variety of foods, contains fewer calories, and helps you stay healthy  A healthy meal plan includes the following:  · Eat whole-grain foods more often  A healthy meal plan should contain fiber  Fiber is the part of grains, fruits, and vegetables that is not broken down by your body  Whole-grain foods are healthy and provide extra fiber in your diet  Some examples of whole-grain foods are whole-wheat breads and pastas, oatmeal, brown rice, and bulgur  · Eat a variety of vegetables every day  Include dark, leafy greens such as spinach, kale, jaya greens, and mustard greens  Eat yellow and orange vegetables such as carrots, sweet potatoes, and winter squash  · Eat a variety of fruits every day  Choose fresh or canned fruit (canned in its own juice or light syrup) instead of juice  Fruit juice has very little or no fiber  · Eat low-fat dairy foods  Drink fat-free (skim) milk or 1% milk  Eat fat-free yogurt and low-fat cottage cheese  Try low-fat cheeses such as mozzarella and other reduced-fat cheeses  · Choose meat and other protein foods that are low in fat  Choose beans or other legumes such as split peas or lentils  Choose fish, skinless poultry (chicken or turkey), or lean cuts of red meat (beef or pork)  Before you cook meat or poultry, cut off any visible fat  · Use less fat and oil  Try baking foods instead of frying them  Add less fat, such as margarine, sour cream, regular salad dressing and mayonnaise to foods  Eat fewer high-fat foods  Some examples of high-fat foods include french fries, doughnuts, ice cream, and cakes  · Eat fewer sweets  Limit foods and drinks that are high in sugar  This includes candy, cookies, regular soda, and sweetened drinks  Ways to decrease calories:   · Eat smaller portions  ¨ Use a small plate with smaller servings  ¨ Do not eat second helpings  ¨ When you eat at a restaurant, ask for a box and place half of your meal in the box before you eat  ¨ Share an entrée with someone else  · Replace high-calorie snacks with healthy, low-calorie snacks  ¨ Choose fresh fruit, vegetables, fat-free rice cakes, or air-popped popcorn instead of potato chips, nuts, or chocolate  ¨ Choose water or calorie-free drinks instead of soda or sweetened drinks  · Eat regular meals  Skipping meals can lead to overeating later in the day  Eat a healthy snack in place of a meal if you do not have time to eat a regular meal      · Do not shop for groceries when you are hungry  You may be more likely to make unhealthy food choices  Take a grocery list of healthy foods and shop after you have eaten  Exercise:  Exercise at least 30 minutes per day on most days of the week  Some examples of exercise include walking, biking, dancing, and swimming  You can also fit in more physical activity by taking the stairs instead of the elevator or parking farther away from stores  Ask your healthcare provider about the best exercise plan for you  Other things to consider as you try to lose weight:   · Be aware of situations that may give you the urge to overeat, such as eating while watching television  Find ways to avoid these situations  For example, read a book, go for a walk, or do crafts      · Meet with a weight loss support group or friends who are also trying to lose weight  This may help you stay motivated to continue working on your weight loss goals  © 2017 [facility] Information is for End User's use only and may not be sold, redistributed or otherwise used for commercial purposes  All illustrations and images included in CareNotes® are the copyrighted property of [employer]  or [employer]  The above information is an  only  It is not intended as medical advice for individual conditions or treatments  Talk to your doctor, nurse or pharmacist before following any medical regimen to see if it is safe and effective for you

## 2019-05-15 ENCOUNTER — OFFICE VISIT (OUTPATIENT)
Dept: FAMILY MEDICINE CLINIC | Facility: CLINIC | Age: 59
End: 2019-05-15
Payer: COMMERCIAL

## 2019-05-15 VITALS
BODY MASS INDEX: 34.57 KG/M2 | HEIGHT: 75 IN | DIASTOLIC BLOOD PRESSURE: 84 MMHG | SYSTOLIC BLOOD PRESSURE: 120 MMHG | HEART RATE: 88 BPM | WEIGHT: 278 LBS

## 2019-05-15 DIAGNOSIS — E78.5 HYPERLIPIDEMIA, UNSPECIFIED HYPERLIPIDEMIA TYPE: ICD-10-CM

## 2019-05-15 DIAGNOSIS — I10 ESSENTIAL HYPERTENSION: Primary | ICD-10-CM

## 2019-05-15 PROCEDURE — 3074F SYST BP LT 130 MM HG: CPT | Performed by: FAMILY MEDICINE

## 2019-05-15 PROCEDURE — 99213 OFFICE O/P EST LOW 20 MIN: CPT | Performed by: FAMILY MEDICINE

## 2019-05-15 PROCEDURE — 4004F PT TOBACCO SCREEN RCVD TLK: CPT | Performed by: FAMILY MEDICINE

## 2019-05-15 PROCEDURE — 3079F DIAST BP 80-89 MM HG: CPT | Performed by: FAMILY MEDICINE

## 2019-05-15 PROCEDURE — 3008F BODY MASS INDEX DOCD: CPT | Performed by: FAMILY MEDICINE

## 2019-05-26 LAB
ALBUMIN SERPL-MCNC: 4.6 G/DL (ref 3.6–5.1)
ALBUMIN/GLOB SERPL: 1.9 (CALC) (ref 1–2.5)
ALP SERPL-CCNC: 73 U/L (ref 40–115)
ALT SERPL-CCNC: 47 U/L (ref 9–46)
AST SERPL-CCNC: 29 U/L (ref 10–35)
BILIRUB SERPL-MCNC: 0.4 MG/DL (ref 0.2–1.2)
BUN SERPL-MCNC: 12 MG/DL (ref 7–25)
BUN/CREAT SERPL: ABNORMAL (CALC) (ref 6–22)
CALCIUM SERPL-MCNC: 9.3 MG/DL (ref 8.6–10.3)
CHLORIDE SERPL-SCNC: 106 MMOL/L (ref 98–110)
CHOLEST SERPL-MCNC: 155 MG/DL
CHOLEST/HDLC SERPL: 3.9 (CALC)
CO2 SERPL-SCNC: 25 MMOL/L (ref 20–32)
CREAT SERPL-MCNC: 0.86 MG/DL (ref 0.7–1.33)
GLOBULIN SER CALC-MCNC: 2.4 G/DL (CALC) (ref 1.9–3.7)
GLUCOSE SERPL-MCNC: 101 MG/DL (ref 65–99)
HDLC SERPL-MCNC: 40 MG/DL
LDLC SERPL CALC-MCNC: 89 MG/DL (CALC)
NONHDLC SERPL-MCNC: 115 MG/DL (CALC)
POTASSIUM SERPL-SCNC: 4.4 MMOL/L (ref 3.5–5.3)
PROT SERPL-MCNC: 7 G/DL (ref 6.1–8.1)
SL AMB EGFR AFRICAN AMERICAN: 110 ML/MIN/1.73M2
SL AMB EGFR NON AFRICAN AMERICAN: 95 ML/MIN/1.73M2
SODIUM SERPL-SCNC: 138 MMOL/L (ref 135–146)
TRIGL SERPL-MCNC: 157 MG/DL
TSH SERPL-ACNC: 0.97 MIU/L (ref 0.4–4.5)

## 2019-05-28 ENCOUNTER — TELEPHONE (OUTPATIENT)
Dept: FAMILY MEDICINE CLINIC | Facility: CLINIC | Age: 59
End: 2019-05-28

## 2019-11-06 ENCOUNTER — OFFICE VISIT (OUTPATIENT)
Dept: FAMILY MEDICINE CLINIC | Facility: CLINIC | Age: 59
End: 2019-11-06
Payer: COMMERCIAL

## 2019-11-06 VITALS
DIASTOLIC BLOOD PRESSURE: 60 MMHG | HEIGHT: 75 IN | RESPIRATION RATE: 16 BRPM | HEART RATE: 68 BPM | SYSTOLIC BLOOD PRESSURE: 120 MMHG | TEMPERATURE: 98.1 F | BODY MASS INDEX: 33.47 KG/M2 | WEIGHT: 269.2 LBS

## 2019-11-06 DIAGNOSIS — E78.5 HYPERLIPIDEMIA, UNSPECIFIED HYPERLIPIDEMIA TYPE: ICD-10-CM

## 2019-11-06 DIAGNOSIS — Z72.0 TOBACCO ABUSE: Primary | ICD-10-CM

## 2019-11-06 DIAGNOSIS — I10 ESSENTIAL HYPERTENSION: ICD-10-CM

## 2019-11-06 PROCEDURE — 3078F DIAST BP <80 MM HG: CPT | Performed by: FAMILY MEDICINE

## 2019-11-06 PROCEDURE — 3074F SYST BP LT 130 MM HG: CPT | Performed by: FAMILY MEDICINE

## 2019-11-06 PROCEDURE — 99213 OFFICE O/P EST LOW 20 MIN: CPT | Performed by: FAMILY MEDICINE

## 2019-11-06 PROCEDURE — 3008F BODY MASS INDEX DOCD: CPT | Performed by: FAMILY MEDICINE

## 2019-11-06 RX ORDER — OLMESARTAN MEDOXOMIL AND HYDROCHLOROTHIAZIDE 40/12.5 40; 12.5 MG/1; MG/1
1 TABLET ORAL DAILY
Qty: 90 TABLET | Refills: 1 | Status: SHIPPED | OUTPATIENT
Start: 2019-11-06 | End: 2019-11-06 | Stop reason: SDUPTHER

## 2019-11-06 RX ORDER — SIMVASTATIN 40 MG
40 TABLET ORAL
Qty: 90 TABLET | Refills: 1 | Status: SHIPPED | OUTPATIENT
Start: 2019-11-06 | End: 2020-04-01 | Stop reason: SDUPTHER

## 2019-11-06 RX ORDER — OLMESARTAN MEDOXOMIL AND HYDROCHLOROTHIAZIDE 40/12.5 40; 12.5 MG/1; MG/1
1 TABLET ORAL DAILY
Qty: 90 TABLET | Refills: 1 | Status: SHIPPED | OUTPATIENT
Start: 2019-11-06 | End: 2020-04-01 | Stop reason: SDUPTHER

## 2019-11-06 RX ORDER — AMLODIPINE BESYLATE 5 MG/1
5 TABLET ORAL DAILY
Qty: 90 TABLET | Refills: 1 | Status: SHIPPED | OUTPATIENT
Start: 2019-11-06 | End: 2020-04-01 | Stop reason: SDUPTHER

## 2019-11-06 RX ORDER — AMLODIPINE BESYLATE 5 MG/1
5 TABLET ORAL DAILY
Qty: 90 TABLET | Refills: 1 | Status: SHIPPED | OUTPATIENT
Start: 2019-11-06 | End: 2019-11-06 | Stop reason: SDUPTHER

## 2019-11-06 RX ORDER — SIMVASTATIN 40 MG
40 TABLET ORAL
Qty: 90 TABLET | Refills: 1 | Status: SHIPPED | OUTPATIENT
Start: 2019-11-06 | End: 2019-11-06 | Stop reason: SDUPTHER

## 2019-11-06 NOTE — PROGRESS NOTES
Assessment and Plan:    Problem List Items Addressed This Visit        Cardiovascular and Mediastinum    Hypertension     BP stable         Relevant Medications    olmesartan-hydrochlorothiazide (BENICAR HCT) 40-12 5 MG per tablet    amLODIPine (NORVASC) 5 mg tablet       Other    Hyperlipidemia     Lipids stable         Relevant Medications    simvastatin (ZOCOR) 40 mg tablet    Tobacco abuse - Primary     counselled-not ready to quit,has cut down                      Diagnoses and all orders for this visit:    Tobacco abuse    Hyperlipidemia, unspecified hyperlipidemia type  -     simvastatin (ZOCOR) 40 mg tablet; Take 1 tablet (40 mg total) by mouth daily at bedtime    Essential hypertension  -     olmesartan-hydrochlorothiazide (BENICAR HCT) 40-12 5 MG per tablet; Take 1 tablet by mouth daily  -     amLODIPine (NORVASC) 5 mg tablet; Take 1 tablet (5 mg total) by mouth daily              Subjective:      Patient ID: Candido Potter is a 61 y o  male  CC:    Chief Complaint   Patient presents with    Follow-up     pt is here for a 6 month follow up    Warts     pt has warts on his left hand, 2 fingers  HPI:    Here for BP check, has lost some weight-was doing treadmill and strained hip, watching diet, no cp, sob or headaches      The following portions of the patient's history were reviewed and updated as appropriate: allergies, current medications, past family history, past medical history, past social history, past surgical history and problem list    Review of Systems   Constitutional: Positive for unexpected weight change  Negative for activity change and appetite change  A few pounds loss   Respiratory: Negative for cough and shortness of breath  Cardiovascular: Negative for chest pain, palpitations and leg swelling  Musculoskeletal: Positive for arthralgias and back pain  Knee and shoulder   Neurological: Negative for dizziness and headaches           Data to review: Objective:    Vitals:    11/06/19 1602   BP: 120/60   BP Location: Left arm   Patient Position: Sitting   Cuff Size: Adult   Pulse: 68   Resp: 16   Temp: 98 1 °F (36 7 °C)   TempSrc: Temporal   Weight: 122 kg (269 lb 3 2 oz)   Height: 6' 3" (1 905 m)        Physical Exam   Constitutional: He appears well-developed and well-nourished  Neck: No thyromegaly present  Cardiovascular: Normal rate, regular rhythm, normal heart sounds and intact distal pulses  No murmur heard  Pulmonary/Chest: Effort normal and breath sounds normal  He has no wheezes  He has no rales  Musculoskeletal: He exhibits no edema  Lymphadenopathy:     He has no cervical adenopathy  Psychiatric: He has a normal mood and affect  Vitals reviewed

## 2019-12-30 ENCOUNTER — OFFICE VISIT (OUTPATIENT)
Dept: URGENT CARE | Facility: MEDICAL CENTER | Age: 59
End: 2019-12-30
Payer: COMMERCIAL

## 2019-12-30 VITALS
HEART RATE: 70 BPM | TEMPERATURE: 97.6 F | OXYGEN SATURATION: 97 % | HEIGHT: 74 IN | WEIGHT: 265 LBS | RESPIRATION RATE: 18 BRPM | DIASTOLIC BLOOD PRESSURE: 70 MMHG | SYSTOLIC BLOOD PRESSURE: 122 MMHG | BODY MASS INDEX: 34.01 KG/M2

## 2019-12-30 DIAGNOSIS — H69.82 DYSFUNCTION OF LEFT EUSTACHIAN TUBE: Primary | ICD-10-CM

## 2019-12-30 PROCEDURE — 99213 OFFICE O/P EST LOW 20 MIN: CPT | Performed by: FAMILY MEDICINE

## 2019-12-30 RX ORDER — AMOXICILLIN AND CLAVULANATE POTASSIUM 875; 125 MG/1; MG/1
TABLET, FILM COATED ORAL
Refills: 0 | COMMUNITY
Start: 2019-12-26 | End: 2020-04-01 | Stop reason: CLARIF

## 2019-12-30 RX ORDER — FLUTICASONE PROPIONATE 50 MCG
2 SPRAY, SUSPENSION (ML) NASAL DAILY
Qty: 16 G | Refills: 0 | Status: SHIPPED | OUTPATIENT
Start: 2019-12-30 | End: 2020-04-06 | Stop reason: ALTCHOICE

## 2019-12-30 NOTE — PROGRESS NOTES
[facility]        NAME: Vy Barker is a 61 y o  male  : 1960    MRN: [id number]  DATE: 2019  TIME: 3:22 PM    Assessment and Plan   Dysfunction of left eustachian tube [H69 82]  1  Dysfunction of left eustachian tube  fluticasone (FLONASE) 50 mcg/act nasal spray         Patient Instructions       Follow up with PCP in 3-5 days  Proceed to  ER if symptoms worsen  Chief Complaint     Chief Complaint   Patient presents with    Nasal Congestion     Patient c/o nasal congestion x 4-5 days was seen and given amoxicillin 875/125 mg          History of Present Illness       49-year-old male here today with complaint of blockage of his left ear present for over 10 days  [facility] was [facility] in [facility] [place] near where he was prescribed Augmentin  He believes he was treated for ear infection which has resolved  He now describes popping sensation blockage in his left ear  Denies any purulent drainage  He is complaining of occasional nasal congestion on and off for runny nose  Denies fever  Denies dizziness  Review of Systems   Review of Systems   HENT: Positive for congestion and ear pain  Neurological: Negative            Current Medications       Current Outpatient Medications:     amLODIPine (NORVASC) 5 mg tablet, Take 1 tablet (5 mg total) by mouth daily, Disp: 90 tablet, Rfl: 1    amoxicillin-clavulanate (AUGMENTIN) 875-125 mg per tablet, take 1 tablet by mouth every 12 hours for 10 days, Disp: , Rfl: 0    fluticasone (FLONASE) 50 mcg/act nasal spray, 2 sprays into each nostril daily, Disp: 16 g, Rfl: 0    methocarbamol (ROBAXIN) 500 mg tablet, Take 1 tablet (500 mg total) by mouth 3 (three) times a day as needed for muscle spasms, Disp: 60 tablet, Rfl: 2    olmesartan-hydrochlorothiazide (BENICAR HCT) 40-12 5 MG per tablet, Take 1 tablet by mouth daily, Disp: 90 tablet, Rfl: 1    simvastatin (ZOCOR) 40 mg tablet, Take 1 tablet (40 mg total) by mouth daily at bedtime, Disp: 90 tablet, Rfl: 1    Current Allergies     Allergies as of 12/30/2019    (No Known Allergies)            The following portions of the patient's history were reviewed and updated as appropriate: allergies, current medications, past family history, past medical history, past social history, past surgical history and problem list      Past Medical History:   Diagnosis Date    Disc degeneration, lumbosacral 1/6/2016    Hyperlipidemia     hypercholesterolemia    Hypertension     last assessed 11/22/17    Pneumonia of both lower lobes due to infectious organism ([facility] [address] ) 1/29/2018       Past Surgical History:   Procedure Laterality Date    COLONOSCOPY      HEMORRHOID SURGERY      KNEE SURGERY Right     MD COLONOSCOPY FLX DX W/COLLJ SPEC WHEN PFRMD N/A 1/5/2019    Procedure: COLONOSCOPY;  Surgeon: King Junior MD;  [facility]; Service: Gastroenterology    UPPER GASTROINTESTINAL ENDOSCOPY         Family History   Problem Relation Age of Onset    Obesity Mother     Aneurysm Father          Medications have been verified  Objective   /70   Pulse 70   Temp 97 6 °F (36 4 °C)   Resp 18   Ht 6' 2" (1 88 m)   Wt 120 kg (265 lb)   SpO2 97%   BMI 34 02 kg/m²        Physical Exam     Physical Exam   Constitutional: He appears well-developed  HENT:   Mouth/Throat: Oropharynx is clear and moist    Hypertrophic boggy turbinates  Eyes: Pupils are equal, round, and reactive to light

## 2019-12-30 NOTE — PATIENT INSTRUCTIONS
I prescribed fluticasone nasal spray-2 sprays in each nostril once daily  Advised patient complete course of Augmentin  If symptoms persist or worsen, he is to follow up with primary care provider  Eustachian Tube Dysfunction   WHAT YOU NEED TO KNOW:   What is eustachian tube dysfunction? Eustachian tube dysfunction (ETD) is a condition that prevents your eustachian tubes from opening properly  It can also cause them to become blocked  Eustachian tubes connect your middle ear to the back of your nose and throat  These tubes open and allow air to flow in and out when you sneeze, swallow, or yawn  What causes or increases my risk of ETD? ETD may be caused by swelling or buildup of mucus in your eustachian tubes  Allergies, a cold, or sinus infection can increase your risk for ETD  Smoking also increases your risk for ETD  What are the signs and symptoms of ETD? · Fullness or pressure in your ears    · Muffled hearing    · Pain in one or both ears    · Ringing in your ears    · Popping or clicking feeling in your ears    · Trouble keeping your balance  How is ETD diagnosed? Your healthcare provider will ask about your symptoms  He will examine your ears, your nose, and the back of your throat  He may also do a hearing test    How is ETD treated? Your ETD may get better on its own without any treatment  You may need any of the following:  · Exercises  such as swallowing, yawning, or chewing gum may help to open your eustachian tubes  Your healthcare provider may also recommend that you take a deep breath and then blow with your mouth shut and your nostrils pinched closed  · Air pressure devices  push air into your nose and eustachian tubes to help relieve air pressure in your ear  · Treatment for allergies  such as decongestants, antihistamines, and nasal steroids may improve ETD  They may help decrease swelling of the eustachian tubes  · Ear tubes  may help to keep your middle ear open   During this procedure, your healthcare provider will cut a small hole in your eardrum  · A myringotomy  is procedure to make a small cut in your eardrum and suction out fluid from your middle ear  · Tuboplasty  is a procedure to widen your eustachian tubes  When should I contact my healthcare provider? · Your symptoms do not improve or get worse  · You have a fever  · You have any hearing loss  · You have questions or concerns about your condition or care  CARE AGREEMENT:   You have the right to help plan your care  Learn about your health condition and how it may be treated  Discuss treatment options with your caregivers to decide what care you want to receive  You always have the right to refuse treatment  The above information is an  only  It is not intended as medical advice for individual conditions or treatments  Talk to your doctor, nurse or pharmacist before following any medical regimen to see if it is safe and effective for you  © 2017 [employer]  Information is for End User's use only and may not be sold, redistributed or otherwise used for commercial purposes  All illustrations and images included in CareNotes® are the copyrighted property of [employer]  or [employer]

## 2020-04-01 ENCOUNTER — OFFICE VISIT (OUTPATIENT)
Dept: FAMILY MEDICINE CLINIC | Facility: CLINIC | Age: 60
End: 2020-04-01
Payer: COMMERCIAL

## 2020-04-01 VITALS
TEMPERATURE: 97.4 F | DIASTOLIC BLOOD PRESSURE: 68 MMHG | SYSTOLIC BLOOD PRESSURE: 116 MMHG | RESPIRATION RATE: 18 BRPM | WEIGHT: 283 LBS | HEIGHT: 74 IN | BODY MASS INDEX: 36.32 KG/M2 | HEART RATE: 80 BPM

## 2020-04-01 DIAGNOSIS — M76.62 ACHILLES TENDINITIS OF LEFT LOWER EXTREMITY: ICD-10-CM

## 2020-04-01 DIAGNOSIS — E78.5 HYPERLIPIDEMIA, UNSPECIFIED HYPERLIPIDEMIA TYPE: ICD-10-CM

## 2020-04-01 DIAGNOSIS — M51.37 DISC DEGENERATION, LUMBOSACRAL: ICD-10-CM

## 2020-04-01 DIAGNOSIS — I10 ESSENTIAL HYPERTENSION: ICD-10-CM

## 2020-04-01 DIAGNOSIS — Z12.5 SCREENING FOR PROSTATE CANCER: Primary | ICD-10-CM

## 2020-04-01 DIAGNOSIS — R00.2 PALPITATIONS: ICD-10-CM

## 2020-04-01 PROCEDURE — 3078F DIAST BP <80 MM HG: CPT | Performed by: FAMILY MEDICINE

## 2020-04-01 PROCEDURE — 3074F SYST BP LT 130 MM HG: CPT | Performed by: FAMILY MEDICINE

## 2020-04-01 PROCEDURE — 3008F BODY MASS INDEX DOCD: CPT | Performed by: FAMILY MEDICINE

## 2020-04-01 PROCEDURE — 4004F PT TOBACCO SCREEN RCVD TLK: CPT | Performed by: FAMILY MEDICINE

## 2020-04-01 PROCEDURE — 99214 OFFICE O/P EST MOD 30 MIN: CPT | Performed by: FAMILY MEDICINE

## 2020-04-01 RX ORDER — AMLODIPINE BESYLATE 5 MG/1
5 TABLET ORAL DAILY
Qty: 90 TABLET | Refills: 1 | Status: SHIPPED | OUTPATIENT
Start: 2020-04-01 | End: 2020-04-03 | Stop reason: SDUPTHER

## 2020-04-01 RX ORDER — OLMESARTAN MEDOXOMIL AND HYDROCHLOROTHIAZIDE 40/12.5 40; 12.5 MG/1; MG/1
1 TABLET ORAL DAILY
Qty: 90 TABLET | Refills: 1 | Status: SHIPPED | OUTPATIENT
Start: 2020-04-01 | End: 2020-04-03 | Stop reason: SDUPTHER

## 2020-04-01 RX ORDER — SIMVASTATIN 40 MG
40 TABLET ORAL
Qty: 90 TABLET | Refills: 1 | Status: SHIPPED | OUTPATIENT
Start: 2020-04-01 | End: 2020-04-03 | Stop reason: SDUPTHER

## 2020-04-01 RX ORDER — METHOCARBAMOL 500 MG/1
500 TABLET, FILM COATED ORAL 3 TIMES DAILY PRN
Qty: 60 TABLET | Refills: 2 | Status: SHIPPED | OUTPATIENT
Start: 2020-04-01 | End: 2021-05-17 | Stop reason: CLARIF

## 2020-04-03 DIAGNOSIS — E78.5 HYPERLIPIDEMIA, UNSPECIFIED HYPERLIPIDEMIA TYPE: ICD-10-CM

## 2020-04-03 DIAGNOSIS — I10 ESSENTIAL HYPERTENSION: ICD-10-CM

## 2020-04-03 RX ORDER — AMLODIPINE BESYLATE 5 MG/1
5 TABLET ORAL DAILY
Qty: 90 TABLET | Refills: 1 | Status: SHIPPED | OUTPATIENT
Start: 2020-04-03 | End: 2020-08-18 | Stop reason: SDUPTHER

## 2020-04-03 RX ORDER — SIMVASTATIN 40 MG
40 TABLET ORAL
Qty: 90 TABLET | Refills: 1 | Status: SHIPPED | OUTPATIENT
Start: 2020-04-03 | End: 2020-08-18 | Stop reason: SDUPTHER

## 2020-04-03 RX ORDER — OLMESARTAN MEDOXOMIL AND HYDROCHLOROTHIAZIDE 40/12.5 40; 12.5 MG/1; MG/1
1 TABLET ORAL DAILY
Qty: 90 TABLET | Refills: 1 | Status: SHIPPED | OUTPATIENT
Start: 2020-04-03 | End: 2020-08-18 | Stop reason: SDUPTHER

## 2020-04-06 ENCOUNTER — APPOINTMENT (OUTPATIENT)
Dept: RADIOLOGY | Facility: MEDICAL CENTER | Age: 60
End: 2020-04-06
Payer: COMMERCIAL

## 2020-04-06 ENCOUNTER — OFFICE VISIT (OUTPATIENT)
Dept: URGENT CARE | Facility: MEDICAL CENTER | Age: 60
End: 2020-04-06
Payer: COMMERCIAL

## 2020-04-06 VITALS
HEIGHT: 74 IN | WEIGHT: 280 LBS | BODY MASS INDEX: 35.94 KG/M2 | TEMPERATURE: 97.7 F | HEART RATE: 87 BPM | OXYGEN SATURATION: 97 % | DIASTOLIC BLOOD PRESSURE: 86 MMHG | SYSTOLIC BLOOD PRESSURE: 160 MMHG | RESPIRATION RATE: 18 BRPM

## 2020-04-06 DIAGNOSIS — M25.552 LEFT HIP PAIN: Primary | ICD-10-CM

## 2020-04-06 DIAGNOSIS — M76.62 ACHILLES TENDINITIS OF LEFT LOWER EXTREMITY: ICD-10-CM

## 2020-04-06 DIAGNOSIS — M25.552 LEFT HIP PAIN: ICD-10-CM

## 2020-04-06 PROCEDURE — 73630 X-RAY EXAM OF FOOT: CPT

## 2020-04-06 PROCEDURE — 99213 OFFICE O/P EST LOW 20 MIN: CPT | Performed by: FAMILY MEDICINE

## 2020-04-06 PROCEDURE — 73502 X-RAY EXAM HIP UNI 2-3 VIEWS: CPT

## 2020-04-06 RX ORDER — NAPROXEN 500 MG/1
500 TABLET ORAL 2 TIMES DAILY WITH MEALS
Qty: 20 TABLET | Refills: 0 | Status: SHIPPED | OUTPATIENT
Start: 2020-04-06 | End: 2020-08-18 | Stop reason: ALTCHOICE

## 2020-04-06 RX ORDER — IBUPROFEN 400 MG/1
800 TABLET ORAL ONCE
Status: COMPLETED | OUTPATIENT
Start: 2020-04-06 | End: 2020-04-06

## 2020-04-06 RX ADMIN — IBUPROFEN 800 MG: 400 TABLET ORAL at 11:16

## 2020-04-09 LAB
ALBUMIN SERPL-MCNC: 4.6 G/DL (ref 3.6–5.1)
ALBUMIN/GLOB SERPL: 1.9 (CALC) (ref 1–2.5)
ALP SERPL-CCNC: 74 U/L (ref 35–144)
ALT SERPL-CCNC: 49 U/L (ref 9–46)
APPEARANCE UR: CLEAR
AST SERPL-CCNC: 29 U/L (ref 10–35)
BASOPHILS # BLD AUTO: 90 CELLS/UL (ref 0–200)
BASOPHILS NFR BLD AUTO: 1.6 %
BILIRUB SERPL-MCNC: 0.3 MG/DL (ref 0.2–1.2)
BILIRUB UR QL STRIP: NEGATIVE
BUN SERPL-MCNC: 20 MG/DL (ref 7–25)
BUN/CREAT SERPL: ABNORMAL (CALC) (ref 6–22)
CALCIUM SERPL-MCNC: 9.6 MG/DL (ref 8.6–10.3)
CHLORIDE SERPL-SCNC: 106 MMOL/L (ref 98–110)
CHOLEST SERPL-MCNC: 149 MG/DL
CHOLEST/HDLC SERPL: 3.9 (CALC)
CO2 SERPL-SCNC: 24 MMOL/L (ref 20–32)
COLOR UR: YELLOW
CREAT SERPL-MCNC: 0.93 MG/DL (ref 0.7–1.25)
EOSINOPHIL # BLD AUTO: 213 CELLS/UL (ref 15–500)
EOSINOPHIL NFR BLD AUTO: 3.8 %
ERYTHROCYTE [DISTWIDTH] IN BLOOD BY AUTOMATED COUNT: 13.2 % (ref 11–15)
GLOBULIN SER CALC-MCNC: 2.4 G/DL (CALC) (ref 1.9–3.7)
GLUCOSE SERPL-MCNC: 113 MG/DL (ref 65–99)
GLUCOSE UR QL STRIP: NEGATIVE
HCT VFR BLD AUTO: 42 % (ref 38.5–50)
HDLC SERPL-MCNC: 38 MG/DL
HGB BLD-MCNC: 14.3 G/DL (ref 13.2–17.1)
HGB UR QL STRIP: NEGATIVE
KETONES UR QL STRIP: NEGATIVE
LDLC SERPL CALC-MCNC: 86 MG/DL (CALC)
LEUKOCYTE ESTERASE UR QL STRIP: NEGATIVE
LYMPHOCYTES # BLD AUTO: 1613 CELLS/UL (ref 850–3900)
LYMPHOCYTES NFR BLD AUTO: 28.8 %
MCH RBC QN AUTO: 31 PG (ref 27–33)
MCHC RBC AUTO-ENTMCNC: 34 G/DL (ref 32–36)
MCV RBC AUTO: 90.9 FL (ref 80–100)
MONOCYTES # BLD AUTO: 633 CELLS/UL (ref 200–950)
MONOCYTES NFR BLD AUTO: 11.3 %
NEUTROPHILS # BLD AUTO: 3052 CELLS/UL (ref 1500–7800)
NEUTROPHILS NFR BLD AUTO: 54.5 %
NITRITE UR QL STRIP: NEGATIVE
NONHDLC SERPL-MCNC: 111 MG/DL (CALC)
PH UR STRIP: 5.5 [PH] (ref 5–8)
PLATELET # BLD AUTO: 289 THOUSAND/UL (ref 140–400)
PMV BLD REES-ECKER: 9.7 FL (ref 7.5–12.5)
POTASSIUM SERPL-SCNC: 4.2 MMOL/L (ref 3.5–5.3)
PROT SERPL-MCNC: 7 G/DL (ref 6.1–8.1)
PROT UR QL STRIP: NEGATIVE
PSA FREE MFR SERPL: 29 % (CALC)
PSA FREE SERPL-MCNC: 0.2 NG/ML
PSA SERPL-MCNC: 0.7 NG/ML
RBC # BLD AUTO: 4.62 MILLION/UL (ref 4.2–5.8)
SL AMB EGFR AFRICAN AMERICAN: 103 ML/MIN/1.73M2
SL AMB EGFR NON AFRICAN AMERICAN: 89 ML/MIN/1.73M2
SODIUM SERPL-SCNC: 140 MMOL/L (ref 135–146)
SP GR UR STRIP: 1.03 (ref 1–1.03)
TRIGL SERPL-MCNC: 150 MG/DL
TSH SERPL-ACNC: 1.01 MIU/L (ref 0.4–4.5)
WBC # BLD AUTO: 5.6 THOUSAND/UL (ref 3.8–10.8)

## 2020-04-22 ENCOUNTER — OFFICE VISIT (OUTPATIENT)
Dept: FAMILY MEDICINE CLINIC | Facility: CLINIC | Age: 60
End: 2020-04-22
Payer: COMMERCIAL

## 2020-04-22 VITALS
HEIGHT: 74 IN | WEIGHT: 280 LBS | BODY MASS INDEX: 35.94 KG/M2 | SYSTOLIC BLOOD PRESSURE: 128 MMHG | TEMPERATURE: 98.8 F | DIASTOLIC BLOOD PRESSURE: 78 MMHG | RESPIRATION RATE: 18 BRPM | HEART RATE: 72 BPM

## 2020-04-22 DIAGNOSIS — M51.37 DISC DEGENERATION, LUMBOSACRAL: Primary | ICD-10-CM

## 2020-04-22 DIAGNOSIS — M48.07 SPINAL STENOSIS OF LUMBOSACRAL REGION: ICD-10-CM

## 2020-04-22 PROCEDURE — 3008F BODY MASS INDEX DOCD: CPT | Performed by: FAMILY MEDICINE

## 2020-04-22 PROCEDURE — 3074F SYST BP LT 130 MM HG: CPT | Performed by: FAMILY MEDICINE

## 2020-04-22 PROCEDURE — 4004F PT TOBACCO SCREEN RCVD TLK: CPT | Performed by: FAMILY MEDICINE

## 2020-04-22 PROCEDURE — 99213 OFFICE O/P EST LOW 20 MIN: CPT | Performed by: FAMILY MEDICINE

## 2020-04-22 PROCEDURE — 3078F DIAST BP <80 MM HG: CPT | Performed by: FAMILY MEDICINE

## 2020-04-22 RX ORDER — IBUPROFEN 200 MG
TABLET ORAL EVERY 6 HOURS PRN
COMMUNITY
End: 2020-08-18 | Stop reason: ALTCHOICE

## 2020-04-22 RX ORDER — ASCORBIC ACID 500 MG
500 TABLET ORAL DAILY
COMMUNITY

## 2020-04-22 RX ORDER — AMOXICILLIN 500 MG
CAPSULE ORAL
COMMUNITY

## 2020-04-22 RX ORDER — PREDNISONE 10 MG/1
TABLET ORAL
Qty: 30 TABLET | Refills: 0 | Status: SHIPPED | OUTPATIENT
Start: 2020-04-22 | End: 2020-08-18 | Stop reason: ALTCHOICE

## 2020-04-24 DIAGNOSIS — M48.07 SPINAL STENOSIS OF LUMBOSACRAL REGION: ICD-10-CM

## 2020-04-24 DIAGNOSIS — M51.37 DISC DEGENERATION, LUMBOSACRAL: Primary | ICD-10-CM

## 2020-04-28 ENCOUNTER — TELEPHONE (OUTPATIENT)
Dept: FAMILY MEDICINE CLINIC | Facility: CLINIC | Age: 60
End: 2020-04-28

## 2020-04-28 ENCOUNTER — TRANSCRIBE ORDERS (OUTPATIENT)
Dept: FAMILY MEDICINE CLINIC | Facility: CLINIC | Age: 60
End: 2020-04-28

## 2020-04-28 DIAGNOSIS — M51.36 DDD (DEGENERATIVE DISC DISEASE), LUMBAR: Primary | ICD-10-CM

## 2020-04-28 DIAGNOSIS — M51.37 DISC DEGENERATION, LUMBOSACRAL: Primary | ICD-10-CM

## 2020-04-28 DIAGNOSIS — M54.50 LUMBAR BACK PAIN: ICD-10-CM

## 2020-04-28 DIAGNOSIS — M25.551 HIP PAIN, BILATERAL: ICD-10-CM

## 2020-04-28 DIAGNOSIS — M25.552 HIP PAIN, BILATERAL: ICD-10-CM

## 2020-04-28 DIAGNOSIS — M48.07 SPINAL STENOSIS OF LUMBOSACRAL REGION: ICD-10-CM

## 2020-05-06 ENCOUNTER — EVALUATION (OUTPATIENT)
Dept: PHYSICAL THERAPY | Facility: REHABILITATION | Age: 60
End: 2020-05-06
Payer: COMMERCIAL

## 2020-05-06 DIAGNOSIS — M51.36 DDD (DEGENERATIVE DISC DISEASE), LUMBAR: ICD-10-CM

## 2020-05-06 DIAGNOSIS — M25.551 HIP PAIN, BILATERAL: ICD-10-CM

## 2020-05-06 DIAGNOSIS — M54.50 LUMBAR BACK PAIN: ICD-10-CM

## 2020-05-06 DIAGNOSIS — M25.552 HIP PAIN, BILATERAL: ICD-10-CM

## 2020-05-06 PROCEDURE — 97162 PT EVAL MOD COMPLEX 30 MIN: CPT

## 2020-05-06 PROCEDURE — 97110 THERAPEUTIC EXERCISES: CPT

## 2020-05-07 ENCOUNTER — OFFICE VISIT (OUTPATIENT)
Dept: PHYSICAL THERAPY | Facility: REHABILITATION | Age: 60
End: 2020-05-07
Payer: COMMERCIAL

## 2020-05-07 DIAGNOSIS — M25.552 HIP PAIN, BILATERAL: ICD-10-CM

## 2020-05-07 DIAGNOSIS — M54.50 LUMBAR BACK PAIN: ICD-10-CM

## 2020-05-07 DIAGNOSIS — M25.551 HIP PAIN, BILATERAL: ICD-10-CM

## 2020-05-07 DIAGNOSIS — M51.36 DDD (DEGENERATIVE DISC DISEASE), LUMBAR: Primary | ICD-10-CM

## 2020-05-07 PROCEDURE — 97110 THERAPEUTIC EXERCISES: CPT

## 2020-05-07 PROCEDURE — 97140 MANUAL THERAPY 1/> REGIONS: CPT

## 2020-05-11 ENCOUNTER — APPOINTMENT (OUTPATIENT)
Dept: PHYSICAL THERAPY | Facility: REHABILITATION | Age: 60
End: 2020-05-11
Payer: COMMERCIAL

## 2020-05-13 ENCOUNTER — OFFICE VISIT (OUTPATIENT)
Dept: PHYSICAL THERAPY | Facility: REHABILITATION | Age: 60
End: 2020-05-13
Payer: COMMERCIAL

## 2020-05-13 DIAGNOSIS — M54.50 LUMBAR BACK PAIN: ICD-10-CM

## 2020-05-13 DIAGNOSIS — M25.552 HIP PAIN, BILATERAL: ICD-10-CM

## 2020-05-13 DIAGNOSIS — M25.551 HIP PAIN, BILATERAL: ICD-10-CM

## 2020-05-13 DIAGNOSIS — M51.36 DDD (DEGENERATIVE DISC DISEASE), LUMBAR: Primary | ICD-10-CM

## 2020-05-13 PROCEDURE — 97112 NEUROMUSCULAR REEDUCATION: CPT

## 2020-05-13 PROCEDURE — 97110 THERAPEUTIC EXERCISES: CPT

## 2020-05-14 ENCOUNTER — OFFICE VISIT (OUTPATIENT)
Dept: PHYSICAL THERAPY | Facility: REHABILITATION | Age: 60
End: 2020-05-14
Payer: COMMERCIAL

## 2020-05-14 DIAGNOSIS — M51.36 DDD (DEGENERATIVE DISC DISEASE), LUMBAR: Primary | ICD-10-CM

## 2020-05-14 DIAGNOSIS — M25.551 HIP PAIN, BILATERAL: ICD-10-CM

## 2020-05-14 DIAGNOSIS — M54.50 LUMBAR BACK PAIN: ICD-10-CM

## 2020-05-14 DIAGNOSIS — M25.552 HIP PAIN, BILATERAL: ICD-10-CM

## 2020-05-14 PROCEDURE — 97140 MANUAL THERAPY 1/> REGIONS: CPT

## 2020-05-14 PROCEDURE — 97112 NEUROMUSCULAR REEDUCATION: CPT

## 2020-05-14 PROCEDURE — 97110 THERAPEUTIC EXERCISES: CPT

## 2020-05-20 ENCOUNTER — OFFICE VISIT (OUTPATIENT)
Dept: PHYSICAL THERAPY | Facility: REHABILITATION | Age: 60
End: 2020-05-20
Payer: COMMERCIAL

## 2020-05-20 DIAGNOSIS — M54.50 LUMBAR BACK PAIN: ICD-10-CM

## 2020-05-20 DIAGNOSIS — M51.36 DDD (DEGENERATIVE DISC DISEASE), LUMBAR: Primary | ICD-10-CM

## 2020-05-20 DIAGNOSIS — M25.552 HIP PAIN, BILATERAL: ICD-10-CM

## 2020-05-20 DIAGNOSIS — M25.551 HIP PAIN, BILATERAL: ICD-10-CM

## 2020-05-20 PROCEDURE — 97112 NEUROMUSCULAR REEDUCATION: CPT

## 2020-05-20 PROCEDURE — 97110 THERAPEUTIC EXERCISES: CPT

## 2020-05-21 ENCOUNTER — APPOINTMENT (OUTPATIENT)
Dept: PHYSICAL THERAPY | Facility: REHABILITATION | Age: 60
End: 2020-05-21
Payer: COMMERCIAL

## 2020-08-18 ENCOUNTER — OFFICE VISIT (OUTPATIENT)
Dept: FAMILY MEDICINE CLINIC | Facility: CLINIC | Age: 60
End: 2020-08-18
Payer: COMMERCIAL

## 2020-08-18 VITALS
WEIGHT: 282.6 LBS | HEIGHT: 74 IN | DIASTOLIC BLOOD PRESSURE: 78 MMHG | HEART RATE: 72 BPM | TEMPERATURE: 98.5 F | BODY MASS INDEX: 36.27 KG/M2 | SYSTOLIC BLOOD PRESSURE: 136 MMHG

## 2020-08-18 DIAGNOSIS — M51.37 DISC DEGENERATION, LUMBOSACRAL: Primary | ICD-10-CM

## 2020-08-18 DIAGNOSIS — E78.5 HYPERLIPIDEMIA, UNSPECIFIED HYPERLIPIDEMIA TYPE: ICD-10-CM

## 2020-08-18 DIAGNOSIS — I10 ESSENTIAL HYPERTENSION: ICD-10-CM

## 2020-08-18 PROCEDURE — 3725F SCREEN DEPRESSION PERFORMED: CPT | Performed by: FAMILY MEDICINE

## 2020-08-18 PROCEDURE — 3075F SYST BP GE 130 - 139MM HG: CPT | Performed by: FAMILY MEDICINE

## 2020-08-18 PROCEDURE — 4004F PT TOBACCO SCREEN RCVD TLK: CPT | Performed by: FAMILY MEDICINE

## 2020-08-18 PROCEDURE — 3078F DIAST BP <80 MM HG: CPT | Performed by: FAMILY MEDICINE

## 2020-08-18 PROCEDURE — 3008F BODY MASS INDEX DOCD: CPT | Performed by: FAMILY MEDICINE

## 2020-08-18 PROCEDURE — 99214 OFFICE O/P EST MOD 30 MIN: CPT | Performed by: FAMILY MEDICINE

## 2020-08-18 RX ORDER — OLMESARTAN MEDOXOMIL AND HYDROCHLOROTHIAZIDE 40/12.5 40; 12.5 MG/1; MG/1
1 TABLET ORAL DAILY
Qty: 90 TABLET | Refills: 1 | Status: SHIPPED | OUTPATIENT
Start: 2020-08-18 | End: 2021-02-15

## 2020-08-18 RX ORDER — SIMVASTATIN 40 MG
40 TABLET ORAL
Qty: 90 TABLET | Refills: 1 | Status: SHIPPED | OUTPATIENT
Start: 2020-08-18 | End: 2021-02-15

## 2020-08-18 RX ORDER — AMLODIPINE BESYLATE 5 MG/1
5 TABLET ORAL DAILY
Qty: 90 TABLET | Refills: 1 | Status: SHIPPED | OUTPATIENT
Start: 2020-08-18 | End: 2021-02-04

## 2020-08-18 NOTE — PROGRESS NOTES
Assessment and Plan:    Problem List Items Addressed This Visit        Cardiovascular and Mediastinum    Hypertension       Other    Hyperlipidemia                 Diagnoses and all orders for this visit:    Hyperlipidemia, unspecified hyperlipidemia type  -     simvastatin (ZOCOR) 40 mg tablet; Take 1 tablet (40 mg total) by mouth daily at bedtime    Essential hypertension  -     olmesartan-hydrochlorothiazide (BENICAR HCT) 40-12 5 MG per tablet; Take 1 tablet by mouth daily  -     amLODIPine (NORVASC) 5 mg tablet; Take 1 tablet (5 mg total) by mouth daily              Subjective:      Patient ID: Ekaterina White is a 61 y o  male  CC:    Chief Complaint   Patient presents with    Follow-up    Hyperlipidemia    Hypertension       HPI:    F/u lipids and BP, doing wel, last lab stable, back doing ok      The following portions of the patient's history were reviewed and updated as appropriate: allergies, current medications, past family history, past medical history, past social history, past surgical history and problem list       Review of Systems   Constitutional: Negative for activity change, appetite change and fatigue  Respiratory: Negative for shortness of breath  Cardiovascular: Negative for chest pain  Musculoskeletal: Positive for back pain  Occ   Neurological: Negative for dizziness and headaches  Data to review:       Objective:    Vitals:    08/18/20 1633   BP: 136/78   BP Location: Left arm   Patient Position: Sitting   Pulse: 72   Temp: 98 5 °F (36 9 °C)   TempSrc: Temporal   Weight: 128 kg (282 lb 9 6 oz)   Height: 6' 2" (1 88 m)        Physical Exam  Vitals signs reviewed  Constitutional:       Appearance: Normal appearance  He is obese  Neck:      Vascular: No carotid bruit  Cardiovascular:      Rate and Rhythm: Normal rate and regular rhythm  Pulses: Normal pulses  Heart sounds: Normal heart sounds     Pulmonary:      Effort: Pulmonary effort is normal  Breath sounds: Normal breath sounds  Musculoskeletal:      Right lower leg: No edema  Left lower leg: No edema  Lymphadenopathy:      Cervical: No cervical adenopathy  Neurological:      Mental Status: He is alert

## 2020-09-16 ENCOUNTER — HOSPITAL ENCOUNTER (EMERGENCY)
Facility: HOSPITAL | Age: 60
Discharge: HOME | End: 2020-09-16
Attending: EMERGENCY MEDICINE | Admitting: EMERGENCY MEDICINE
Payer: COMMERCIAL

## 2020-09-16 ENCOUNTER — APPOINTMENT (EMERGENCY)
Dept: RADIOLOGY | Facility: HOSPITAL | Age: 60
End: 2020-09-16
Payer: COMMERCIAL

## 2020-09-16 VITALS
DIASTOLIC BLOOD PRESSURE: 81 MMHG | RESPIRATION RATE: 10 BRPM | TEMPERATURE: 98 F | OXYGEN SATURATION: 99 % | SYSTOLIC BLOOD PRESSURE: 147 MMHG | HEART RATE: 62 BPM

## 2020-09-16 DIAGNOSIS — R00.2 PALPITATIONS: Primary | ICD-10-CM

## 2020-09-16 LAB
ALBUMIN SERPL BCP-MCNC: 4.3 G/DL (ref 3.5–5)
ALP SERPL-CCNC: 78 U/L (ref 46–116)
ALT SERPL W P-5'-P-CCNC: 94 U/L (ref 12–78)
ANION GAP SERPL CALCULATED.3IONS-SCNC: 9 MMOL/L (ref 4–13)
AST SERPL W P-5'-P-CCNC: 42 U/L (ref 5–45)
ATRIAL RATE: 72 BPM
BASOPHILS # BLD AUTO: 0.07 THOUSANDS/ΜL (ref 0–0.1)
BASOPHILS NFR BLD AUTO: 1 % (ref 0–1)
BILIRUB SERPL-MCNC: 0.28 MG/DL (ref 0.2–1)
BUN SERPL-MCNC: 11 MG/DL (ref 5–25)
CALCIUM SERPL-MCNC: 9.4 MG/DL (ref 8.3–10.1)
CHLORIDE SERPL-SCNC: 106 MMOL/L (ref 100–108)
CO2 SERPL-SCNC: 23 MMOL/L (ref 21–32)
CREAT SERPL-MCNC: 0.93 MG/DL (ref 0.6–1.3)
EOSINOPHIL # BLD AUTO: 0.12 THOUSAND/ΜL (ref 0–0.61)
EOSINOPHIL NFR BLD AUTO: 2 % (ref 0–6)
ERYTHROCYTE [DISTWIDTH] IN BLOOD BY AUTOMATED COUNT: 12.4 % (ref 11.6–15.1)
GFR SERPL CREATININE-BSD FRML MDRD: 89 ML/MIN/1.73SQ M
GLUCOSE SERPL-MCNC: 116 MG/DL (ref 65–140)
HCT VFR BLD AUTO: 43.9 % (ref 36.5–49.3)
HGB BLD-MCNC: 14.4 G/DL (ref 12–17)
IMM GRANULOCYTES # BLD AUTO: 0.03 THOUSAND/UL (ref 0–0.2)
IMM GRANULOCYTES NFR BLD AUTO: 1 % (ref 0–2)
LYMPHOCYTES # BLD AUTO: 1.47 THOUSANDS/ΜL (ref 0.6–4.47)
LYMPHOCYTES NFR BLD AUTO: 25 % (ref 14–44)
MCH RBC QN AUTO: 30.4 PG (ref 26.8–34.3)
MCHC RBC AUTO-ENTMCNC: 32.8 G/DL (ref 31.4–37.4)
MCV RBC AUTO: 93 FL (ref 82–98)
MONOCYTES # BLD AUTO: 0.49 THOUSAND/ΜL (ref 0.17–1.22)
MONOCYTES NFR BLD AUTO: 8 % (ref 4–12)
NEUTROPHILS # BLD AUTO: 3.63 THOUSANDS/ΜL (ref 1.85–7.62)
NEUTS SEG NFR BLD AUTO: 63 % (ref 43–75)
NRBC BLD AUTO-RTO: 0 /100 WBCS
P AXIS: 54 DEGREES
PLATELET # BLD AUTO: 266 THOUSANDS/UL (ref 149–390)
PMV BLD AUTO: 9.9 FL (ref 8.9–12.7)
POTASSIUM SERPL-SCNC: 3.7 MMOL/L (ref 3.5–5.3)
PR INTERVAL: 198 MS
PROT SERPL-MCNC: 7.8 G/DL (ref 6.4–8.2)
QRS AXIS: -32 DEGREES
QRSD INTERVAL: 100 MS
QT INTERVAL: 406 MS
QTC INTERVAL: 444 MS
RBC # BLD AUTO: 4.73 MILLION/UL (ref 3.88–5.62)
SODIUM SERPL-SCNC: 138 MMOL/L (ref 136–145)
T WAVE AXIS: 33 DEGREES
TROPONIN I SERPL-MCNC: <0.02 NG/ML
TSH SERPL DL<=0.05 MIU/L-ACNC: 1.14 UIU/ML (ref 0.36–3.74)
VENTRICULAR RATE: 72 BPM
WBC # BLD AUTO: 5.81 THOUSAND/UL (ref 4.31–10.16)

## 2020-09-16 PROCEDURE — 93005 ELECTROCARDIOGRAM TRACING: CPT

## 2020-09-16 PROCEDURE — 71045 X-RAY EXAM CHEST 1 VIEW: CPT

## 2020-09-16 PROCEDURE — 93010 ELECTROCARDIOGRAM REPORT: CPT | Performed by: INTERNAL MEDICINE

## 2020-09-16 PROCEDURE — 84443 ASSAY THYROID STIM HORMONE: CPT | Performed by: EMERGENCY MEDICINE

## 2020-09-16 PROCEDURE — 99285 EMERGENCY DEPT VISIT HI MDM: CPT | Performed by: EMERGENCY MEDICINE

## 2020-09-16 PROCEDURE — 99285 EMERGENCY DEPT VISIT HI MDM: CPT

## 2020-09-16 PROCEDURE — 80053 COMPREHEN METABOLIC PANEL: CPT | Performed by: EMERGENCY MEDICINE

## 2020-09-16 PROCEDURE — 36415 COLL VENOUS BLD VENIPUNCTURE: CPT | Performed by: EMERGENCY MEDICINE

## 2020-09-16 PROCEDURE — 85025 COMPLETE CBC W/AUTO DIFF WBC: CPT | Performed by: EMERGENCY MEDICINE

## 2020-09-16 PROCEDURE — 84484 ASSAY OF TROPONIN QUANT: CPT | Performed by: EMERGENCY MEDICINE

## 2020-09-16 NOTE — DISCHARGE INSTRUCTIONS
Please follow up with your primary care doctor  Please return to the emergency department if you have worsening palpitations, chest pain, shortness of breath or other concerning symptoms

## 2020-09-16 NOTE — Clinical Note
Sara Sebastian was seen and treated in our emergency department on 9/16/2020  Diagnosis:     Kirby Rojas  may return to work on return date  He may return on this date: 09/17/2020    Cleared to return to work on 9/17/2020     If you have any questions or concerns, please don't hesitate to call        Shawn Avelar MD    ______________________________           _______________          _______________  [facility] Representative                              Date                                Time

## 2020-09-16 NOTE — ED PROVIDER NOTES
History  Chief Complaint   Patient presents with    Palpitations     Palpatations after drinking 5 hour energy drink  HPI     51-year-old with past medical history hypertension, hyperlipidemia, and tobacco use who presents for evaluation of palpitations  Patient states he has a   He drank a 5 hour energy drink this morning  Approximately 1 hour later he started to experience palpitations  He describes feeling as his heart was racing and he felt pounding in his chest   He also had some associated lightheadedness but no syncope or loss of consciousness  Patient states he pulled his truck over and call 911  By the time EMS arrived, patient states he was feeling better  He states this entire episode lasted approximately 15 minutes  Denies any chest pain, diaphoresis, nausea or vomiting during this episode  Patient states he now feels back to normal   Patient denies any recent fevers, chills, cough, congestion, abdominal pain, nausea, vomiting, or diarrhea  Denies any leg pain or leg swelling  Patient states he had palpitations approximately 15 years ago and was seen at the hospital and was told everything was normal, this was likely due to caffeine intake  He was subsequently told to reduce his caffeine intake  States he has been compliant with this however today he did drink a full 5 her energy  Patient denies any prior cardiac history or history of DVT/PE  Patient smokes cigarettes, 0 5 packs per day  Endorses occasional alcohol use, denies recreational drug use  Prior to Admission Medications   Prescriptions Last Dose Informant Patient Reported? Taking?    Chromium 500 MCG TABS 9/15/2020 at Unknown time Self Yes Yes   Sig: Take by mouth   Coenzyme Q10 (COQ-10) 10 MG CAPS 9/15/2020 at Unknown time Self Yes Yes   Sig: Take by mouth   Multiple Vitamins-Minerals (MENS MULTIVITAMIN PLUS PO) 9/15/2020 at Unknown time Self Yes Yes   Sig: Take by mouth   Omega-3 Fatty Acids (FISH OIL) 1200 MG CAPS 9/15/2020 at Unknown time Self Yes Yes   Sig: Take by mouth   Zinc 50 MG CAPS 9/15/2020 at Unknown time Self Yes Yes   Sig: Take by mouth   amLODIPine (NORVASC) 5 mg tablet 9/15/2020 at Unknown time  No Yes   Sig: Take 1 tablet (5 mg total) by mouth daily   ascorbic acid (VITAMIN C) 500 mg tablet 9/15/2020 at Unknown time Self Yes Yes   Sig: Take 500 mg by mouth daily   methocarbamol (ROBAXIN) 500 mg tablet Not Taking at Unknown time Self No No   Sig: Take 1 tablet (500 mg total) by mouth 3 (three) times a day as needed for muscle spasms   Patient not taking: Reported on 9/16/2020   olmesartan-hydrochlorothiazide (BENICAR HCT) 40-12 5 MG per tablet 9/15/2020 at Unknown time  No Yes   Sig: Take 1 tablet by mouth daily   simvastatin (ZOCOR) 40 mg tablet 9/15/2020 at Unknown time  No Yes   Sig: Take 1 tablet (40 mg total) by mouth daily at bedtime      Facility-Administered Medications: None       Past Medical History:   Diagnosis Date    Disc degeneration, lumbosacral 1/6/2016    Hyperlipidemia     hypercholesterolemia    Hypertension     last assessed 11/22/17    Pneumonia of both lower lobes due to infectious organism 1/29/2018       Past Surgical History:   Procedure Laterality Date    COLONOSCOPY      HEMORRHOID SURGERY      KNEE SURGERY Right     MD COLONOSCOPY FLX DX W/COLLJ SPEC WHEN PFRMD N/A 1/5/2019    Procedure: COLONOSCOPY;  Surgeon: Hayley Kerns MD;  [facility]; Service: Gastroenterology    UPPER GASTROINTESTINAL ENDOSCOPY         Family History   Problem Relation Age of Onset    Obesity Mother     Aneurysm Father      I have reviewed and agree with the history as documented      E-Cigarette/Vaping    E-Cigarette Use Never User      E-Cigarette/Vaping Substances     Social History     Tobacco Use    Smoking status: Current Every Day Smoker     Packs/day: 0 50     Years: 40 00     Pack years: 20 00     Types: Cigarettes    Smokeless tobacco: Never Used   Substance Use Topics    Alcohol use: Yes     Comment: Occassional 1 x / month    Drug use: No        Review of Systems   Constitutional: Negative for appetite change, chills, fatigue, fever and unexpected weight change  HENT: Negative for congestion, rhinorrhea and sore throat  Eyes: Negative for visual disturbance  Respiratory: Negative for cough, chest tightness, shortness of breath and wheezing  Cardiovascular: Positive for palpitations  Negative for chest pain and leg swelling  Gastrointestinal: Negative for abdominal distention, abdominal pain, blood in stool, constipation, diarrhea, nausea and vomiting  Genitourinary: Negative for dysuria, frequency, hematuria and urgency  Musculoskeletal: Negative for arthralgias and myalgias  Skin: Negative for color change, pallor and rash  Neurological: Positive for light-headedness  Negative for dizziness, syncope, speech difficulty, weakness, numbness and headaches  All other systems reviewed and are negative  Physical Exam  ED Triage Vitals [09/16/20 1048]   Temperature Pulse Respirations Blood Pressure SpO2   98 °F (36 7 °C) 69 18 165/80 97 %      Temp Source Heart Rate Source Patient Position - Orthostatic VS BP Location FiO2 (%)   Oral -- -- -- --      Pain Score       --             Orthostatic Vital Signs  Vitals:    09/16/20 1048 09/16/20 1115 09/16/20 1200   BP: 165/80 152/78 147/81   Pulse: 69 70 62       Physical Exam  Vitals signs and nursing note reviewed  Constitutional:       General: He is not in acute distress  Appearance: Normal appearance  He is well-developed and normal weight  He is not ill-appearing, toxic-appearing or diaphoretic  HENT:      Head: Normocephalic and atraumatic  Nose: Nose normal       Mouth/Throat:      Mouth: Mucous membranes are moist       Pharynx: Oropharynx is clear  Eyes:      Conjunctiva/sclera: Conjunctivae normal       Pupils: Pupils are equal, round, and reactive to light     Neck: Musculoskeletal: Neck supple  Cardiovascular:      Rate and Rhythm: Normal rate and regular rhythm  Pulses: Normal pulses  Heart sounds: Normal heart sounds  No murmur  No friction rub  No gallop  Pulmonary:      Effort: Pulmonary effort is normal  No respiratory distress  Breath sounds: Normal breath sounds  No wheezing or rales  Chest:      Chest wall: No tenderness  Abdominal:      General: Bowel sounds are normal  There is no distension  Palpations: Abdomen is soft  Tenderness: There is no abdominal tenderness  There is no guarding or rebound  Musculoskeletal:         General: No swelling or tenderness  Right lower leg: No edema  Left lower leg: No edema  Skin:     General: Skin is warm and dry  Capillary Refill: Capillary refill takes less than 2 seconds  Coloration: Skin is not pale  Findings: No erythema or rash  Neurological:      General: No focal deficit present  Mental Status: He is alert and oriented to person, place, and time  Cranial Nerves: No cranial nerve deficit  Sensory: No sensory deficit  Motor: No weakness     Psychiatric:         Mood and Affect: Mood normal          Behavior: Behavior normal          ED Medications  Medications - No data to display    Diagnostic Studies  Results Reviewed     Procedure Component Value Units Date/Time    Comprehensive metabolic panel [[id number]]  (Abnormal) Collected:  09/16/20 1102    Lab Status:  Final result Specimen:  Blood from Arm, Right Updated:  09/16/20 1246     Sodium 138 mmol/L      Potassium 3 7 mmol/L      Chloride 106 mmol/L      CO2 23 mmol/L      ANION GAP 9 mmol/L      BUN 11 mg/dL      Creatinine 0 93 mg/dL      Glucose 116 mg/dL      Calcium 9 4 mg/dL      AST 42 U/L      ALT 94 U/L      Alkaline Phosphatase 78 U/L      Total Protein 7 8 g/dL      Albumin 4 3 g/dL      Total Bilirubin 0 28 mg/dL      eGFR 89 ml/min/1 73sq m     Narrative:       National Kid[employer] Disease Foundation guidelines for Chronic Kidney Disease (CKD):     Stage 1 with normal or high GFR (GFR > 90 mL/min/1 73 square meters)    Stage 2 Mild CKD (GFR = 60-89 mL/min/1 73 square meters)    Stage 3A Moderate CKD (GFR = 45-59 mL/min/1 73 square meters)    Stage 3B Moderate CKD (GFR = 30-44 mL/min/1 73 square meters)    Stage 4 Severe CKD (GFR = 15-29 mL/min/1 73 square meters)    Stage 5 End Stage CKD (GFR <15 mL/min/1 73 square meters)  Note: GFR calculation is accurate only with a steady state creatinine    TSH, 3rd generation with Free T4 reflex [[id number]]  (Normal) Collected:  09/16/20 1102    Lab Status:  Final result Specimen:  Blood from Arm, Right Updated:  09/16/20 1246     TSH 3RD GENERATON 1 140 uIU/mL     Narrative:       Patients undergoing fluorescein dye angiography may retain small amounts of fluorescein in the body for 48-72 hours post procedure  Samples containing fluorescein can produce falsely depressed TSH values  If the patient had this procedure,a specimen should be resubmitted post fluorescein clearance        Troponin I [[id number]] Collected:  09/16/20 1102    Lab Status:  Final result Specimen:  Blood from Arm, Right Updated:  09/16/20 1214     Troponin I <0 02 ng/mL     CBC and differential [[id number]] Collected:  09/16/20 1102    Lab Status:  Final result Specimen:  Blood from Arm, Right Updated:  09/16/20 1117     WBC 5 81 Thousand/uL      RBC 4 73 Million/uL      Hemoglobin 14 4 g/dL      Hematocrit 43 9 %      MCV 93 fL      MCH 30 4 pg      MCHC 32 8 g/dL      RDW 12 4 %      MPV 9 9 fL      Platelets 554 Thousands/uL      nRBC 0 /100 WBCs      Neutrophils Relative 63 %      Immat GRANS % 1 %      Lymphocytes Relative 25 %      Monocytes Relative 8 %      Eosinophils Relative 2 %      Basophils Relative 1 %      Neutrophils Absolute 3 63 Thousands/µL      Immature Grans Absolute 0 03 Thousand/uL      Lymphocytes Absolute 1 47 Thousands/µL      Monocytes Absolute 0 49 Thousand/µL      Eosinophils Absolute 0 12 Thousand/µL      Basophils Absolute 0 07 Thousands/µL                  XR chest 1 view portable   Final Result by Woodward Dancer, MD (09/16 1256)      No acute cardiopulmonary disease  Workstation performed: [id number]               Procedures  ECG 12 Lead Documentation Only    Date/Time: 9/16/2020 12:00 PM  Performed by: Rafael Garcia MD  Authorized by: Rafael Garcia MD     Indications / Diagnosis:  Palpitations  ECG reviewed by me, the ED Provider: yes    Patient location:  ED  Previous ECG:     Previous ECG:  Unavailable    Comparison to cardiac monitor: Yes    Interpretation:     Interpretation: abnormal    Rate:     ECG rate:  72    ECG rate assessment: normal    Rhythm:     Rhythm: sinus rhythm    Ectopy:     Ectopy: PAC    QRS:     QRS axis:  Left    QRS intervals:  Normal  Conduction:     Conduction: normal    ST segments:     ST segments:  Normal  T waves:     T waves: normal            ED Course                                       MDM    61year-old with past medical history hypertension, hyperlipidemia, and tobacco use who presents for evaluation of palpitations  Differential diagnosis includes:  Arrhythmia, ACS, hyperthyroidism, electrolyte abnormality, anemia  Will obtain cardiac workup with CBC, CMP, troponin, EKG, CXR, TSH   CXR negative for any acute findings  CBC normal  Troponin negative  EKG shows normal sinus rhythm with PAC  Discussed results with patient and his wife  Discussed that patient should follow up with his primary care doctor  Discussed that patient should limit caffeine intake  Provided patient with strict return precautions including worsening palpitations, chest pain, shortness of breath, or lightheadedness/syncope  Provided patient with a note for work  Patient expressed understanding was agreeable for discharge      Disposition  Final diagnoses:   Palpitations     Time reflects when diagnosis was documented in both MDM as applicable and the Disposition within this note     Time User Action Codes Description Comment    9/16/2020 12:58 PM Jimmy Cast Add [R00 2] Palpitations       ED Disposition     ED Disposition Condition Date/Time Comment    Discharge Stable Wed Sep 16, 2020 12:58 PM Cristinoashley Molina discharge to home/self care  Follow-up Information     Follow up With Specialties Details Why Contact Info    Sadiq Smith MD Family Medicine Schedule an appointment as soon as possible for a visit   [address] [place] [address] Drive  [phone]            Discharge Medication List as of 9/16/2020  1:00 PM      CONTINUE these medications which have NOT CHANGED    Details   amLODIPine (NORVASC) 5 mg tablet Take 1 tablet (5 mg total) by mouth daily, Starting Tue 8/18/2020, Normal      ascorbic acid (VITAMIN C) 500 mg tablet Take 500 mg by mouth daily, Historical Med      Chromium 500 MCG TABS Take by mouth, Historical Med      Coenzyme Q10 (COQ-10) 10 MG CAPS Take by mouth, Historical Med      Multiple Vitamins-Minerals (MENS MULTIVITAMIN PLUS PO) Take by mouth, Historical Med      olmesartan-hydrochlorothiazide (BENICAR HCT) 40-12 5 MG per tablet Take 1 tablet by mouth daily, Starting Tue 8/18/2020, Normal      Omega-3 Fatty Acids (FISH OIL) 1200 MG CAPS Take by mouth, Historical Med      simvastatin (ZOCOR) 40 mg tablet Take 1 tablet (40 mg total) by mouth daily at bedtime, Starting Tue 8/18/2020, Normal      Zinc 50 MG CAPS Take by mouth, Historical Med      methocarbamol (ROBAXIN) 500 mg tablet Take 1 tablet (500 mg total) by mouth 3 (three) times a day as needed for muscle spasms, Starting Wed 4/1/2020, Normal           No discharge procedures on file  PDMP Review     None           ED Provider  Attending physically available and evaluated Cristino Jesse LEHMAN managed the patient along with the ED Attending      Electronically Signed by         Addi Love MD  09/16/20 1620

## 2020-09-16 NOTE — ED ATTENDING ATTESTATION
9/16/2020  I, Kita Fisher MD, saw and evaluated the patient  I have discussed the patient with the resident/non-physician practitioner and agree with the resident's/non-physician practitioner's findings, Plan of Care, and MDM as documented in the resident's/non-physician practitioner's note, except where noted  All available labs and Radiology studies were reviewed  I was present for key portions of any procedure(s) performed by the resident/non-physician practitioner and I was immediately available to provide assistance  At this point I agree with the current assessment done in the Emergency Department  I have conducted an independent evaluation of this patient a history and physical is as follows:    ED Course         Critical Care Time  Procedures    62 yo male with htn, hld, smoker, 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

## 2021-02-04 DIAGNOSIS — I10 ESSENTIAL HYPERTENSION: ICD-10-CM

## 2021-02-04 RX ORDER — AMLODIPINE BESYLATE 5 MG/1
TABLET ORAL
Qty: 90 TABLET | Refills: 3 | Status: SHIPPED | OUTPATIENT
Start: 2021-02-04 | End: 2021-08-24 | Stop reason: HOSPADM

## 2021-02-14 DIAGNOSIS — E78.5 HYPERLIPIDEMIA, UNSPECIFIED HYPERLIPIDEMIA TYPE: ICD-10-CM

## 2021-02-14 DIAGNOSIS — I10 ESSENTIAL HYPERTENSION: ICD-10-CM

## 2021-02-15 RX ORDER — OLMESARTAN MEDOXOMIL AND HYDROCHLOROTHIAZIDE 40/12.5 40; 12.5 MG/1; MG/1
TABLET ORAL
Qty: 90 TABLET | Refills: 1 | Status: SHIPPED | OUTPATIENT
Start: 2021-02-15 | End: 2021-08-16

## 2021-02-15 RX ORDER — SIMVASTATIN 40 MG
TABLET ORAL
Qty: 90 TABLET | Refills: 1 | Status: SHIPPED | OUTPATIENT
Start: 2021-02-15 | End: 2021-08-16

## 2021-02-19 ENCOUNTER — OFFICE VISIT (OUTPATIENT)
Dept: FAMILY MEDICINE CLINIC | Facility: CLINIC | Age: 61
End: 2021-02-19
Payer: COMMERCIAL

## 2021-02-19 VITALS
SYSTOLIC BLOOD PRESSURE: 132 MMHG | HEIGHT: 74 IN | TEMPERATURE: 97.4 F | DIASTOLIC BLOOD PRESSURE: 74 MMHG | WEIGHT: 281 LBS | BODY MASS INDEX: 36.06 KG/M2 | HEART RATE: 80 BPM

## 2021-02-19 DIAGNOSIS — H66.002 NON-RECURRENT ACUTE SUPPURATIVE OTITIS MEDIA OF LEFT EAR WITHOUT SPONTANEOUS RUPTURE OF TYMPANIC MEMBRANE: ICD-10-CM

## 2021-02-19 DIAGNOSIS — I10 ESSENTIAL HYPERTENSION: ICD-10-CM

## 2021-02-19 DIAGNOSIS — Z12.5 SCREENING FOR PROSTATE CANCER: ICD-10-CM

## 2021-02-19 DIAGNOSIS — E78.5 HYPERLIPIDEMIA, UNSPECIFIED HYPERLIPIDEMIA TYPE: Primary | ICD-10-CM

## 2021-02-19 PROCEDURE — 4004F PT TOBACCO SCREEN RCVD TLK: CPT | Performed by: FAMILY MEDICINE

## 2021-02-19 PROCEDURE — 3075F SYST BP GE 130 - 139MM HG: CPT | Performed by: FAMILY MEDICINE

## 2021-02-19 PROCEDURE — 3008F BODY MASS INDEX DOCD: CPT | Performed by: FAMILY MEDICINE

## 2021-02-19 PROCEDURE — 3078F DIAST BP <80 MM HG: CPT | Performed by: FAMILY MEDICINE

## 2021-02-19 PROCEDURE — 3725F SCREEN DEPRESSION PERFORMED: CPT | Performed by: FAMILY MEDICINE

## 2021-02-19 PROCEDURE — 99214 OFFICE O/P EST MOD 30 MIN: CPT | Performed by: FAMILY MEDICINE

## 2021-02-19 RX ORDER — AZITHROMYCIN 250 MG/1
TABLET, FILM COATED ORAL
Qty: 6 TABLET | Refills: 0 | Status: SHIPPED | OUTPATIENT
Start: 2021-02-19 | End: 2021-02-23

## 2021-02-19 NOTE — PROGRESS NOTES
Assessment and Plan:    Problem List Items Addressed This Visit     None                 Diagnoses and all orders for this visit:    Hyperlipidemia, unspecified hyperlipidemia type  -     Lipid panel; Future  -     TSH, 3rd generation; Future    Essential hypertension  -     Comprehensive metabolic panel; Future  -     UA (URINE) with reflex to Scope; Future  -     CBC and differential; Future    Screening for prostate cancer  -     PSA, total and free; Future              Subjective:      Patient ID: Puja Desir is a 64 y o  male  CC:    Chief Complaint   Patient presents with    Follow-up       HPI:    F/u HTN and lipids, no cp, sob, no headache, ringing in l ear-feels like water      The following portions of the patient's history were reviewed and updated as appropriate: allergies, current medications, past family history, past medical history, past social history, past surgical history and problem list       Review of Systems   Constitutional: Negative for activity change, appetite change, fatigue and unexpected weight change  HENT: Positive for tinnitus  Negative for ear discharge and ear pain  Respiratory: Negative for shortness of breath  Cardiovascular: Negative for chest pain  Neurological: Negative for dizziness and headaches  Data to review:       Objective:    Vitals:    02/19/21 1546   BP: 146/84   BP Location: Left arm   Patient Position: Sitting   Cuff Size: Adult   Pulse: 80   Temp: (!) 97 4 °F (36 3 °C)   TempSrc: Temporal   Weight: 127 kg (281 lb)   Height: 6' 2" (1 88 m)        Physical Exam  Vitals signs reviewed  Constitutional:       Appearance: Normal appearance  He is obese  HENT:      Right Ear: Tympanic membrane normal       Left Ear: Tympanic membrane is erythematous and bulging  Nose: No congestion or rhinorrhea  Neck:      Vascular: No carotid bruit  Cardiovascular:      Rate and Rhythm: Normal rate and regular rhythm        Pulses: Normal pulses  Heart sounds: Normal heart sounds  Pulmonary:      Effort: Pulmonary effort is normal       Breath sounds: Normal breath sounds  Musculoskeletal:      Right lower leg: No edema  Left lower leg: No edema  Lymphadenopathy:      Cervical: No cervical adenopathy  Neurological:      Mental Status: He is alert

## 2021-05-17 ENCOUNTER — OFFICE VISIT (OUTPATIENT)
Dept: FAMILY MEDICINE CLINIC | Facility: CLINIC | Age: 61
End: 2021-05-17
Payer: COMMERCIAL

## 2021-05-17 VITALS
DIASTOLIC BLOOD PRESSURE: 70 MMHG | BODY MASS INDEX: 36.32 KG/M2 | SYSTOLIC BLOOD PRESSURE: 128 MMHG | WEIGHT: 283 LBS | RESPIRATION RATE: 16 BRPM | HEART RATE: 68 BPM | HEIGHT: 74 IN | TEMPERATURE: 97.8 F

## 2021-05-17 DIAGNOSIS — I10 ESSENTIAL HYPERTENSION: ICD-10-CM

## 2021-05-17 DIAGNOSIS — H65.22 LEFT CHRONIC SEROUS OTITIS MEDIA: Primary | ICD-10-CM

## 2021-05-17 DIAGNOSIS — H93.19 TINNITUS, UNSPECIFIED LATERALITY: ICD-10-CM

## 2021-05-17 PROCEDURE — 99213 OFFICE O/P EST LOW 20 MIN: CPT | Performed by: FAMILY MEDICINE

## 2021-05-17 PROCEDURE — 3008F BODY MASS INDEX DOCD: CPT | Performed by: FAMILY MEDICINE

## 2021-05-17 PROCEDURE — 4004F PT TOBACCO SCREEN RCVD TLK: CPT | Performed by: FAMILY MEDICINE

## 2021-05-17 RX ORDER — CEFUROXIME AXETIL 500 MG/1
500 TABLET ORAL EVERY 12 HOURS SCHEDULED
Qty: 28 TABLET | Refills: 0 | Status: SHIPPED | OUTPATIENT
Start: 2021-05-17 | End: 2021-05-31

## 2021-05-17 NOTE — PATIENT INSTRUCTIONS
Await ENT francy, 2 week course of antibiotics  Obesity   AMBULATORY CARE:   Obesity  is when your body mass index (BMI) is greater than 30  Your healthcare provider will use your height and weight to measure your BMI  The risks of obesity include  many health problems, such as injuries or physical disability  You may need tests to check for the following:  · Diabetes    · High blood pressure or high cholesterol    · Heart disease    · Gallbladder or liver disease    · Cancer of the colon, breast, prostate, liver, or kidney    · Sleep apnea    · Arthritis or gout    Seek care immediately if:   · You have a severe headache, confusion, or difficulty speaking  · You have weakness on one side of your body  · You have chest pain, sweating, or shortness of breath  Contact your healthcare provider if:   · You have symptoms of gallbladder or liver disease, such as pain in your upper abdomen  · You have knee or hip pain and discomfort while walking  · You have symptoms of diabetes, such as intense hunger and thirst, and frequent urination  · You have symptoms of sleep apnea, such as snoring or daytime sleepiness  · You have questions or concerns about your condition or care  Treatment for obesity  focuses on helping you lose weight to improve your health  Even a small decrease in BMI can reduce the risk for many health problems  Your healthcare provider will help you set a weight-loss goal   · Lifestyle changes  are the first step in treating obesity  These include making healthy food choices and getting regular physical activity  Your healthcare provider may suggest a weight-loss program that involves coaching, education, and therapy  · Medicine  may help you lose weight when it is used with a healthy diet and physical activity  · Surgery  can help you lose weight if you are very obese and have other health problems  There are several types of weight-loss surgery   Ask your healthcare provider for more information  Be successful losing weight:   · Set small, realistic goals  An example of a small goal is to walk for 20 minutes 5 days a week  Anther goal is to lose 5% of your body weight  · Tell friends, family members, and coworkers about your goals  and ask for their support  Ask a friend to lose weight with you, or join a weight-loss support group  · Identify foods or triggers that may cause you to overeat , and find ways to avoid them  Remove tempting high-calorie foods from your home and workplace  Place a bowl of fresh fruit on your kitchen counter  If stress causes you to eat, then find other ways to cope with stress  · Keep a diary to track what you eat and drink  Also write down how many minutes of physical activity you do each day  Weigh yourself once a week and record it in your diary  Eating changes: You will need to eat 500 to 1,000 fewer calories each day than you currently eat to lose 1 to 2 pounds a week  The following changes will help you cut calories:  · Eat smaller portions  Use small plates, no larger than 9 inches in diameter  Fill your plate half full of fruits and vegetables  Measure your food using measuring cups until you know what a serving size looks like  · Eat 3 meals and 1 or 2 snacks each day  Plan your meals in advance  [place]  and eat at home most of the time  Eat slowly  Do not skip meals  Skipping meals can lead to overeating later in the day  This can make it harder for you to lose weight  Talk with a dietitian to help you make a meal plan and schedule that is right for you  · Eat fruits and vegetables at every meal   They are low in calories and high in fiber, which makes you feel full  Do not add butter, margarine, or cream sauce to vegetables  Use herbs to season steamed vegetables  · Eat less fat and fewer fried foods  Eat more baked or grilled chicken and fish  These protein sources are lower in calories and fat than red meat   Limit fast food  Dress your salads with olive oil and vinegar instead of bottled dressing  · Limit the amount of sugar you eat  Do not drink sugary beverages  Limit alcohol  Activity changes:  Physical activity is good for your body in many ways  It helps you burn calories and build strong muscles  It decreases stress and depression, and improves your mood  It can also help you sleep better  Talk to your healthcare provider before you begin an exercise program   · Exercise for at least 30 minutes 5 days a week  Start slowly  Set aside time each day for physical activity that you enjoy and that is convenient for you  It is best to do both weight training and an activity that increases your heart rate, such as walking, bicycling, or swimming  · Find ways to be more active  Do yard work and housecleaning  Walk up the stairs instead of using elevators  Spend your leisure time going to events that require walking, such as outdoor festivals or fairs  This extra physical activity can help you lose weight and keep it off  Follow up with your healthcare provider as directed: You may need to meet with a dietitian  Write down your questions so you remember to ask them during your visits  © Copyright [address] [place] [address] Information is for End User's use only and may not be sold, redistributed or otherwise used for commercial purposes  All illustrations and images included in CareNotes® are the copyrighted property of [employer]  or [facility] Mathias   The above information is an  only  It is not intended as medical advice for individual conditions or treatments  Talk to your doctor, nurse or pharmacist before following any medical regimen to see if it is safe and effective for you      Weight Management   AMBULATORY CARE:   Why it is important to manage your weight:  Being overweight increases your risk of health conditions such as heart disease, high blood pressure, type 2 diabetes, and certain types of cancer  It can also increase your risk for osteoarthritis, sleep apnea, and other respiratory problems  Aim for a slow, steady weight loss  Even a small amount of weight loss can lower your risk of health problems  How to lose weight safely:  A safe and healthy way to lose weight is to eat fewer calories and get regular exercise  · You can lose up about 1 pound a week by decreasing the number of calories you eat by 500 calories each day  You can decrease calories by eating smaller portion sizes or by cutting out high-calorie foods  Read labels to find out how many calories are in the foods you eat  · You can also burn calories with exercise such as walking, swimming, or biking  You will be more likely to keep weight off if you make these changes part of your lifestyle  Exercise at least 30 minutes per day on most days of the week  You can also fit in more physical activity by taking the stairs instead of the elevator or parking farther away from stores  Ask your healthcare provider about the best exercise plan for you  Healthy meal plan for weight management:  A healthy meal plan includes a variety of foods, contains fewer calories, and helps you stay healthy  A healthy meal plan includes the following:     · Eat whole-grain foods more often  A healthy meal plan should contain fiber  Fiber is the part of grains, fruits, and vegetables that is not broken down by your body  Whole-grain foods are healthy and provide extra fiber in your diet  Some examples of whole-grain foods are whole-wheat breads and pastas, oatmeal, brown rice, and bulgur  · Eat a variety of vegetables every day  Include dark, leafy greens such as spinach, kale, jaya greens, and mustard greens  Eat yellow and orange vegetables such as carrots, sweet potatoes, and winter squash  · Eat a variety of fruits every day  Choose fresh or canned fruit (canned in its own juice or light syrup) instead of juice  Fruit juice has very little or no fiber  · Eat low-fat dairy foods  Drink fat-free (skim) milk or 1% milk  Eat fat-free yogurt and low-fat cottage cheese  Try low-fat cheeses such as mozzarella and other reduced-fat cheeses  · Choose meat and other protein foods that are low in fat  Choose beans or other legumes such as split peas or lentils  Choose fish, skinless poultry (chicken or turkey), or lean cuts of red meat (beef or pork)  Before you cook meat or poultry, cut off any visible fat  · Use less fat and oil  Try baking foods instead of frying them  Add less fat, such as margarine, sour cream, regular salad dressing and mayonnaise to foods  Eat fewer high-fat foods  Some examples of high-fat foods include french fries, doughnuts, ice cream, and cakes  · Eat fewer sweets  Limit foods and drinks that are high in sugar  This includes candy, cookies, regular soda, and sweetened drinks  Ways to decrease calories:   · Eat smaller portions  ? Use a small plate with smaller servings  ? Do not eat second helpings  ? When you eat at a restaurant, ask for a box and place half of your meal in the box before you eat  ? Share an entrée with someone else  · Replace high-calorie snacks with healthy, low-calorie snacks  ? Choose fresh fruit, vegetables, fat-free rice cakes, or air-popped popcorn instead of potato chips, nuts, or chocolate  ? Choose water or calorie-free drinks instead of soda or sweetened drinks  · Do not shop for groceries when you are hungry  You may be more likely to make unhealthy food choices  Take a grocery list of healthy foods and shop after you have eaten  · Eat regular meals  Do not skip meals  Skipping meals can lead to overeating later in the day  This can make it harder for you to lose weight   Eat a healthy snack in place of a meal if you do not have time to eat a regular meal  Talk with a dietitian to help you create a meal plan and schedule that is right for you  Other things to consider as you try to lose weight:   · Be aware of situations that may give you the urge to overeat, such as eating while watching television  Find ways to avoid these situations  For example, read a book, go for a walk, or do crafts  · Meet with a weight loss support group or friends who are also trying to lose weight  This may help you stay motivated to continue working on your weight loss goals  © Copyright [address] Information is for End User's use only and may not be sold, redistributed or otherwise used for commercial purposes  All illustrations and images included in CareNotes® are the copyrighted property of [employer]  or [address] [place], [place] [address]raquel   The above information is an  only  It is not intended as medical advice for individual conditions or treatments  Talk to your doctor, nurse or pharmacist before following any medical regimen to see if it is safe and effective for you

## 2021-05-17 NOTE — PROGRESS NOTES
Assessment and Plan:    Problem List Items Addressed This Visit        Nervous and Auditory    Left chronic serous otitis media - Primary     Since persistent problem with do 2 weeks antibiotics and with associated tinnitus rec ENT eval           Other Visit Diagnoses     Tinnitus, unspecified laterality                     Diagnoses and all orders for this visit:    Left chronic serous otitis media    Tinnitus, unspecified laterality              Subjective:      Patient ID: Batsheva Nuñez is a 64 y o  male  CC:    Chief Complaint   Patient presents with   [place] Mccord     Patient present today for left ear pain  Patient states it has been bothering him since Christmas 2019  HPI:    Persistent l ear pain/pressure for over a year, associated with ringing  In same ear, giving more pain      The following portions of the patient's history were reviewed and updated as appropriate: allergies, current medications, past family history, past medical history, past social history, past surgical history and problem list       Review of Systems   Constitutional: Negative for activity change, appetite change, chills and fever  HENT: Positive for ear pain, sinus pressure, sinus pain and tinnitus  Respiratory: Negative for cough and shortness of breath  Cardiovascular: Negative for chest pain  Neurological: Negative for dizziness and headaches  Data to review:       Objective:    Vitals:    05/17/21 1521   BP: 128/70   BP Location: Left arm   Patient Position: Sitting   Cuff Size: Large   Pulse: 68   Resp: 16   Temp: 97 8 °F (36 6 °C)   TempSrc: Tympanic   Weight: 128 kg (283 lb)   Height: 6' 2" (1 88 m)        Physical Exam  Vitals signs reviewed  Constitutional:       Appearance: Normal appearance  He is obese  HENT:      Ears:      Comments: r ear slight amount of flaky wax, l ear with retracted TM and effusion  Cardiovascular:      Rate and Rhythm: Normal rate and regular rhythm        Pulses: Normal pulses  Heart sounds: Normal heart sounds  Pulmonary:      Effort: Pulmonary effort is normal       Breath sounds: Normal breath sounds  Lymphadenopathy:      Cervical: No cervical adenopathy  Neurological:      Mental Status: He is alert  BMI Counseling: Body mass index is 36 34 kg/m²  The BMI is above normal  Nutrition recommendations include reducing portion sizes, decreasing overall calorie intake, 3-5 servings of fruits/vegetables daily, reducing fast food intake and consuming healthier snacks  Exercise recommendations include exercising 3-5 times per week

## 2021-05-24 LAB
ALBUMIN SERPL-MCNC: 4.6 G/DL (ref 3.6–5.1)
ALBUMIN/GLOB SERPL: 1.8 (CALC) (ref 1–2.5)
ALP SERPL-CCNC: 70 U/L (ref 35–144)
ALT SERPL-CCNC: 58 U/L (ref 9–46)
APPEARANCE UR: CLEAR
AST SERPL-CCNC: 34 U/L (ref 10–35)
BASOPHILS # BLD AUTO: 89 CELLS/UL (ref 0–200)
BASOPHILS NFR BLD AUTO: 1.5 %
BILIRUB SERPL-MCNC: 0.4 MG/DL (ref 0.2–1.2)
BILIRUB UR QL STRIP: NEGATIVE
BUN SERPL-MCNC: 13 MG/DL (ref 7–25)
BUN/CREAT SERPL: ABNORMAL (CALC) (ref 6–22)
CALCIUM SERPL-MCNC: 9.3 MG/DL (ref 8.6–10.3)
CHLORIDE SERPL-SCNC: 103 MMOL/L (ref 98–110)
CHOLEST SERPL-MCNC: 167 MG/DL
CHOLEST/HDLC SERPL: 4 (CALC)
CO2 SERPL-SCNC: 23 MMOL/L (ref 20–32)
COLOR UR: YELLOW
CREAT SERPL-MCNC: 0.93 MG/DL (ref 0.7–1.25)
EOSINOPHIL # BLD AUTO: 159 CELLS/UL (ref 15–500)
EOSINOPHIL NFR BLD AUTO: 2.7 %
ERYTHROCYTE [DISTWIDTH] IN BLOOD BY AUTOMATED COUNT: 12.7 % (ref 11–15)
GLOBULIN SER CALC-MCNC: 2.5 G/DL (CALC) (ref 1.9–3.7)
GLUCOSE SERPL-MCNC: 114 MG/DL (ref 65–99)
GLUCOSE UR QL STRIP: NEGATIVE
HCT VFR BLD AUTO: 44.1 % (ref 38.5–50)
HDLC SERPL-MCNC: 42 MG/DL
HGB BLD-MCNC: 14.8 G/DL (ref 13.2–17.1)
HGB UR QL STRIP: NEGATIVE
KETONES UR QL STRIP: NEGATIVE
LDLC SERPL CALC-MCNC: 98 MG/DL (CALC)
LEUKOCYTE ESTERASE UR QL STRIP: NEGATIVE
LYMPHOCYTES # BLD AUTO: 1705 CELLS/UL (ref 850–3900)
LYMPHOCYTES NFR BLD AUTO: 28.9 %
MCH RBC QN AUTO: 31.6 PG (ref 27–33)
MCHC RBC AUTO-ENTMCNC: 33.6 G/DL (ref 32–36)
MCV RBC AUTO: 94 FL (ref 80–100)
MONOCYTES # BLD AUTO: 614 CELLS/UL (ref 200–950)
MONOCYTES NFR BLD AUTO: 10.4 %
NEUTROPHILS # BLD AUTO: 3334 CELLS/UL (ref 1500–7800)
NEUTROPHILS NFR BLD AUTO: 56.5 %
NITRITE UR QL STRIP: NEGATIVE
NONHDLC SERPL-MCNC: 125 MG/DL (CALC)
PH UR STRIP: 5.5 [PH] (ref 5–8)
PLATELET # BLD AUTO: 286 THOUSAND/UL (ref 140–400)
PMV BLD REES-ECKER: 10 FL (ref 7.5–12.5)
POTASSIUM SERPL-SCNC: 4.2 MMOL/L (ref 3.5–5.3)
PROT SERPL-MCNC: 7.1 G/DL (ref 6.1–8.1)
PROT UR QL STRIP: NEGATIVE
PSA FREE MFR SERPL: 40 % (CALC)
PSA FREE SERPL-MCNC: 0.2 NG/ML
PSA SERPL-MCNC: 0.5 NG/ML
RBC # BLD AUTO: 4.69 MILLION/UL (ref 4.2–5.8)
SL AMB EGFR AFRICAN AMERICAN: 102 ML/MIN/1.73M2
SL AMB EGFR NON AFRICAN AMERICAN: 88 ML/MIN/1.73M2
SODIUM SERPL-SCNC: 137 MMOL/L (ref 135–146)
SP GR UR STRIP: 1.02 (ref 1–1.03)
TRIGL SERPL-MCNC: 177 MG/DL
TSH SERPL-ACNC: 1.01 MIU/L (ref 0.4–4.5)
WBC # BLD AUTO: 5.9 THOUSAND/UL (ref 3.8–10.8)

## 2021-08-09 ENCOUNTER — OFFICE VISIT (OUTPATIENT)
Dept: URGENT CARE | Facility: MEDICAL CENTER | Age: 61
End: 2021-08-09
Payer: COMMERCIAL

## 2021-08-09 VITALS
WEIGHT: 271 LBS | TEMPERATURE: 100.2 F | RESPIRATION RATE: 24 BRPM | HEART RATE: 95 BPM | HEIGHT: 74 IN | OXYGEN SATURATION: 98 % | BODY MASS INDEX: 34.78 KG/M2

## 2021-08-09 DIAGNOSIS — Z11.59 SPECIAL SCREENING EXAMINATION FOR UNSPECIFIED VIRAL DISEASE: Primary | ICD-10-CM

## 2021-08-09 DIAGNOSIS — B34.9 VIRAL SYNDROME: ICD-10-CM

## 2021-08-09 PROCEDURE — S9083 URGENT CARE CENTER GLOBAL: HCPCS | Performed by: FAMILY MEDICINE

## 2021-08-09 PROCEDURE — G0382 LEV 3 HOSP TYPE B ED VISIT: HCPCS | Performed by: FAMILY MEDICINE

## 2021-08-09 PROCEDURE — U0003 INFECTIOUS AGENT DETECTION BY NUCLEIC ACID (DNA OR RNA); SEVERE ACUTE RESPIRATORY SYNDROME CORONAVIRUS 2 (SARS-COV-2) (CORONAVIRUS DISEASE [COVID-19]), AMPLIFIED PROBE TECHNIQUE, MAKING USE OF HIGH THROUGHPUT TECHNOLOGIES AS DESCRIBED BY CMS-2020-01-R: HCPCS | Performed by: FAMILY MEDICINE

## 2021-08-09 PROCEDURE — U0005 INFEC AGEN DETEC AMPLI PROBE: HCPCS | Performed by: FAMILY MEDICINE

## 2021-08-09 NOTE — LETTER
August 9, 2021     Patient: Jos Escalera   YOB: 1960   Date of Visit: 8/9/2021       To Whom It May Concern: It is my medical opinion that Jos Escalera may return to work on 8/11/2021       If you have any questions or concerns, please don't hesitate to call           Sincerely,        Nancy Jeffrey MD    CC: No Recipients

## 2021-08-09 NOTE — PATIENT INSTRUCTIONS
Vital signs are stable except  Temperature is 100 2 degrees  COVID-19 test performed  Advised patient to continue ibuprofen or Tylenol as needed for fever for 6 hours  Increase fluid intake  He is to remain in quarantine until COVID 19 test results are back  He expressed understanding  Viral Syndrome   WHAT YOU NEED TO KNOW:   Viral syndrome is a term used for symptoms of an infection caused by a virus  Viruses are spread easily from person to person through the air and on shared items  DISCHARGE INSTRUCTIONS:   Call your local emergency number (911 in the [address] [place] [address],[address]) or have someone else call if:   · You have a seizure  · You cannot be woken  · You have chest pain or trouble breathing  Return to the emergency department if:   · You have a stiff neck, a bad headache, and sensitivity to light  · You feel weak, dizzy, or confused  · You stop urinating or urinate a lot less than usual     · You cough up blood or thick yellow or green mucus  · You have severe abdominal pain or your abdomen is larger than usual     Call your doctor if:   · Your symptoms do not get better with treatment or get worse after 3 days  · You have a rash or ear pain  · You have burning when you urinate  · You have questions or concerns about your condition or care  Medicines: You may  need any of the following:  · Acetaminophen  decreases pain and fever  It is available without a doctor's order  Ask how much to take and how often to take it  Follow directions  Read the labels of all other medicines you are using to see if they also contain acetaminophen, or ask your doctor or pharmacist  Acetaminophen can cause liver damage if not taken correctly  Do not use more than 4 grams (4,000 milligrams) total of acetaminophen in one day  · NSAIDs , such as ibuprofen, help decrease swelling, pain, and fever  NSAIDs can cause stomach bleeding or kidney problems in certain people   If you take blood thinner medicine, always ask your healthcare provider if NSAIDs are safe for you  Always read the medicine label and follow directions  · Cold medicine  helps decrease swelling, control a cough, and relieve chest or nasal congestion  · Saline nasal spray  helps decrease nasal congestion  · Take your medicine as directed  Contact your healthcare provider if you think your medicine is not helping or if you have side effects  Tell him of her if you are allergic to any medicine  Keep a list of the medicines, vitamins, and herbs you take  Include the amounts, and when and why you take them  Bring the list or the pill bottles to follow-up visits  Carry your medicine list with you in case of an emergency  Manage your symptoms:   · Drink liquids as directed to prevent dehydration  Ask how much liquid to drink each day and which liquids are best for you  Ask if you should drink an oral rehydration solution (ORS)  An ORS has the right amounts of water, salts, and sugar you need to replace body fluids  This may help prevent dehydration caused by vomiting or diarrhea  Do not drink liquids with caffeine  Liquids with caffeine can make dehydration worse  · Get plenty of rest to help your body heal   Take naps throughout the day  Ask your healthcare provider when you can return to work and your normal activities  · Use a cool mist humidifier to help you breathe easier  Ask your healthcare provider how to use a cool mist humidifier  · Eat honey or use cough drops for a sore throat  Cough drops are available without a doctor's order  Follow directions for taking cough drops  · Do not smoke or be close to anyone who is smoking  Nicotine and other chemicals in cigarettes and cigars can cause lung damage  Smoking can also delay healing  Ask your healthcare provider for information if you currently smoke and need help to quit  E-cigarettes or smokeless tobacco still contain nicotine   Talk to your healthcare provider before you use these products  Prevent the spread of germs:       · Wash your hands often  Wash your hands several times each day  Wash after you use the bathroom, change a child's diaper, and before you prepare or eat food  Use soap and water every time  Rub your soapy hands together, lacing your fingers  Wash the front and back of your hands, and in between your fingers  Use the fingers of one hand to scrub under the fingernails of the other hand  Wash for at least 20 seconds  Rinse with warm, running water for several seconds  Then dry your hands with a clean towel or paper towel  Use hand  that contains alcohol if soap and water are not available  Do not touch your eyes, nose, or mouth without washing your hands first          · Cover a sneeze or cough  Use a tissue that covers your mouth and nose  Throw the tissue away in a trash can right away  Use the bend of your arm if a tissue is not available  Wash your hands well with soap and water or use a hand   · Stay away from others while you are sick  Avoid crowds as much as possible  · Ask about vaccines you may need  Talk to your healthcare provider about your vaccine history  He or she will tell you which vaccines you need, and when to get them  ? Get the influenza (flu) vaccine as soon as recommended each year  The flu vaccine is available starting in September or October  Flu viruses change, so it is important to get a flu vaccine every year  ? Get the pneumonia vaccine if recommended  This vaccine is usually recommended every 5 years  Your provider will tell you when to get this vaccine, if needed  Follow up with your doctor as directed:  Write down your questions so you remember to ask them during your visits  © Copyright [employer] 2021 Information is for End User's use only and may not be sold, redistributed or otherwise used for commercial purposes   All illustrations and images included in CareNotes® are the copyrighted property of [employer]  or [facility] Mathias   The above information is an  only  It is not intended as medical advice for individual conditions or treatments  Talk to your doctor, nurse or pharmacist before following any medical regimen to see if it is safe and effective for you

## 2021-08-09 NOTE — PROGRESS NOTES
[address] [facility]        NAME: Wolf Ibarra is a 64 y o  male  : 1960    MRN: [id number]  DATE: 2021  TIME: 1:09 PM    Assessment and Plan   Special screening examination for unspecified viral disease [Z11 59]  1  Special screening examination for unspecified viral disease  Novel Coronavirus (Covid-19),PCR [address] - Office Collection   2  Viral syndrome           Patient Instructions       Follow up with PCP in 3-5 days  Proceed to  ER if symptoms worsen  Chief Complaint     Chief Complaint   Patient presents with    Fatigue     Patient states he has not felt weel for the last couple of weeks seh wakes with fatigue chills and feeling foggy in his head         History of Present Illness        66-year-old male here today with vague complaints of fatigue, sweats for the approximately 2 weeks possibly longer  He informs me that this morning he had a temperature of a 100 2 degrees in took some ibuprofen at around 6:00 a m  Leslie Christensen Also complaining fatigue, fogginess  Denies nausea, vomiting, diarrhea  Denies loss of smell or loss of taste  Denies any known contact with anyone testing positive COVID-19  He informs me he received [place] vaccine back in  and believes that his symptoms are attributable to that vaccine  Recent return from a trip in his cabin over the weekend with his wife  His wife is asymptomatic  Denies any rash  Denies arthralgia myalgia      Review of Systems   Review of Systems   Constitutional: Positive for chills, diaphoresis, fatigue and fever  HENT: Negative  Respiratory: Negative  Gastrointestinal: Negative  Neurological: Negative            Current Medications       Current Outpatient Medications:     amLODIPine (NORVASC) 5 mg tablet, TAKE 1 TABLET DAILY, Disp: 90 tablet, Rfl: 3    ascorbic acid (VITAMIN C) 500 mg tablet, Take 500 mg by mouth daily, Disp: , Rfl:     Chromium 500 MCG TABS, Take by mouth, Disp: , Rfl:     Coenzyme Q10 (COQ-10) 10 MG CAPS, Take by mouth, Disp: , Rfl:     Multiple Vitamins-Minerals (MENS MULTIVITAMIN PLUS PO), Take by mouth, Disp: , Rfl:     olmesartan-hydrochlorothiazide (BENICAR HCT) 40-12 5 MG per tablet, TAKE 1 TABLET DAILY, Disp: 90 tablet, Rfl: 1    Omega-3 Fatty Acids (FISH OIL) 1200 MG CAPS, Take by mouth, Disp: , Rfl:     simvastatin (ZOCOR) 40 mg tablet, TAKE 1 TABLET DAILY AT BEDTIME, Disp: 90 tablet, Rfl: 1    Zinc 50 MG CAPS, Take by mouth, Disp: , Rfl:     Current Allergies     Allergies as of 08/09/2021    (No Known Allergies)            The following portions of the patient's history were reviewed and updated as appropriate: allergies, current medications, past family history, past medical history, past social history, past surgical history and problem list      Past Medical History:   Diagnosis Date    Disc degeneration, lumbosacral 1/6/2016    Hyperlipidemia     hypercholesterolemia    Hypertension     last assessed 11/22/17    Pneumonia of both lower lobes due to infectious organism 1/29/2018       Past Surgical History:   Procedure Laterality Date    COLONOSCOPY      HEMORRHOID SURGERY      KNEE SURGERY Bilateral     AR COLONOSCOPY FLX DX W/COLLJ SPEC WHEN PFRMD N/A 1/5/2019    Procedure: COLONOSCOPY;  Surgeon: Angelina Contreras MD;  [facility]; Service: Gastroenterology    UPPER GASTROINTESTINAL ENDOSCOPY         Family History   Problem Relation Age of Onset    Obesity Mother     Aneurysm Father          Medications have been verified  Objective   Pulse 95   Temp 100 2 °F (37 9 °C)   Resp (!) 24   Ht 6' 2" (1 88 m)   Wt 123 kg (271 lb)   SpO2 98%   BMI 34 79 kg/m²   No LMP for male patient  Physical Exam     Physical Exam  Vitals and nursing note reviewed  Constitutional:       Appearance: Normal appearance     HENT:      Right Ear: Tympanic membrane normal       Left Ear: Tympanic membrane normal       Mouth/Throat:      Mouth: Mucous membranes are moist       Comments: Cobblestoning observed the posterior pharynx  No exudates visualized  Cardiovascular:      Rate and Rhythm: Normal rate and regular rhythm  Pulmonary:      Effort: Pulmonary effort is normal       Breath sounds: Normal breath sounds  Abdominal:      General: Bowel sounds are normal       Palpations: Abdomen is soft  Musculoskeletal:         General: Normal range of motion  Cervical back: Normal range of motion and neck supple  Skin:     General: Skin is warm  Neurological:      General: No focal deficit present  Mental Status: He is alert

## 2021-08-10 LAB — SARS-COV-2 RNA RESP QL NAA+PROBE: NEGATIVE

## 2021-08-14 DIAGNOSIS — I10 ESSENTIAL HYPERTENSION: ICD-10-CM

## 2021-08-14 DIAGNOSIS — E78.5 HYPERLIPIDEMIA, UNSPECIFIED HYPERLIPIDEMIA TYPE: ICD-10-CM

## 2021-08-16 RX ORDER — SIMVASTATIN 40 MG
TABLET ORAL
Qty: 90 TABLET | Refills: 3 | Status: SHIPPED | OUTPATIENT
Start: 2021-08-16 | End: 2021-08-24 | Stop reason: HOSPADM

## 2021-08-16 RX ORDER — OLMESARTAN MEDOXOMIL AND HYDROCHLOROTHIAZIDE 40/12.5 40; 12.5 MG/1; MG/1
TABLET ORAL
Qty: 90 TABLET | Refills: 3 | Status: SHIPPED | OUTPATIENT
Start: 2021-08-16 | End: 2021-08-24 | Stop reason: HOSPADM

## 2021-08-20 ENCOUNTER — RA CDI HCC (OUTPATIENT)
Dept: OTHER | Facility: HOSPITAL | Age: 61
End: 2021-08-20

## 2021-08-20 NOTE — PROGRESS NOTES
[facility]  coding opportunities       Chart reviewed, no opportunity found: CHART REVIEWED, NO OPPORTUNITY FOUND                        Patients insurance company: [employer] (Medicare Advantage and Commercial)

## 2021-08-22 ENCOUNTER — OFFICE VISIT (OUTPATIENT)
Dept: URGENT CARE | Facility: MEDICAL CENTER | Age: 61
End: 2021-08-22
Payer: COMMERCIAL

## 2021-08-22 VITALS
BODY MASS INDEX: 35.29 KG/M2 | TEMPERATURE: 98.5 F | OXYGEN SATURATION: 97 % | WEIGHT: 275 LBS | SYSTOLIC BLOOD PRESSURE: 118 MMHG | RESPIRATION RATE: 20 BRPM | DIASTOLIC BLOOD PRESSURE: 56 MMHG | HEART RATE: 78 BPM | HEIGHT: 74 IN

## 2021-08-22 DIAGNOSIS — R53.83 OTHER FATIGUE: Primary | ICD-10-CM

## 2021-08-22 PROCEDURE — 99213 OFFICE O/P EST LOW 20 MIN: CPT | Performed by: PHYSICIAN ASSISTANT

## 2021-08-22 NOTE — LETTER
August 22, 2021     Patient: Brook Hobbs   YOB: 1960   Date of Visit: 8/22/2021       To Whom It May Concern: It is my medical opinion that Brook Hobbs   May return to duty 8/24/2021    If you have any questions or concerns, please don't hesitate to call           Sincerely,        Morales Marti PA-C    CC: No Recipients

## 2021-08-22 NOTE — PROGRESS NOTES
[address] [facility]        NAME: Festus Myers is a 64 y o  male  : 1960    MRN: [id number]  DATE: 2021  TIME: 6:56 PM    Assessment and Plan   Other fatigue [R53 83]  1  Other fatigue           Patient Instructions      I explained to the patient that he needs a workup for his continuing issues and needs to follow up with his family doctor  Since he continues to have increasing episodes of feeling as if he is going to pass out I told him to hold his Amoldipine but to take his other medication  He is to see his family doctor on Friday, but they feel that he should be seen sooner due to his increasing difficulties  I explained to him that if his symptoms increase he should go back to the emergency room  Follow up with PCP in 1-2 days  Chief Complaint     Chief Complaint   Patient presents with    Fatigue     Patient presents with fatigue, decreased appetite, decreased BP  Patient denies any CP SOB or dizziness         History of Present Illness        Patient has had a near syncopal episode and his wife has been taking his pressure at home and it has been in the 905 systolic  He feels tired, fatigued, unable to do anything and has no appetite  He has had a 10 lb weight loss  Records reviewed from his near syncopal episode showed elevated liver function tests  He has also had some shaking chills and fever though they seem to have subsided  His COVID test was negative  Fatigue  This is a recurrent problem  The current episode started 1 to 4 weeks ago  The problem occurs constantly  The problem has been gradually worsening  Associated symptoms include anorexia and fatigue  Review of Systems   Review of Systems   Constitutional: Positive for fatigue  Gastrointestinal: Positive for anorexia  All other systems reviewed and are negative          Current Medications       Current Outpatient Medications:     amLODIPine (NORVASC) 5 mg tablet, TAKE 1 TABLET DAILY, Disp: 90 tablet, Rfl: 3    ascorbic acid (VITAMIN C) 500 mg tablet, Take 500 mg by mouth daily, Disp: , Rfl:     Coenzyme Q10 (COQ-10) 10 MG CAPS, Take by mouth, Disp: , Rfl:     Multiple Vitamins-Minerals (MENS MULTIVITAMIN PLUS PO), Take by mouth, Disp: , Rfl:     olmesartan-hydrochlorothiazide (BENICAR HCT) 40-12 5 MG per tablet, TAKE 1 TABLET DAILY, Disp: 90 tablet, Rfl: 3    Omega-3 Fatty Acids (FISH OIL) 1200 MG CAPS, Take by mouth, Disp: , Rfl:     simvastatin (ZOCOR) 40 mg tablet, TAKE 1 TABLET DAILY AT BEDTIME, Disp: 90 tablet, Rfl: 3    Zinc 50 MG CAPS, Take by mouth, Disp: , Rfl:     Chromium 500 MCG TABS, Take by mouth (Patient not taking: Reported on 8/22/2021), Disp: , Rfl:     Current Allergies     Allergies as of 08/22/2021    (No Known Allergies)            The following portions of the patient's history were reviewed and updated as appropriate: allergies, current medications, past family history, past medical history, past social history, past surgical history and problem list      Past Medical History:   Diagnosis Date    Disc degeneration, lumbosacral 1/6/2016    Hyperlipidemia     hypercholesterolemia    Hypertension     last assessed 11/22/17    Pneumonia of both lower lobes due to infectious organism 1/29/2018       Past Surgical History:   Procedure Laterality Date    COLONOSCOPY      HEMORRHOID SURGERY      KNEE SURGERY Bilateral     [place] COLONOSCOPY FLX DX W/COLLJ SPEC WHEN PFRMD N/A 1/5/2019    Procedure: COLONOSCOPY;  Surgeon: Radha Craft MD;  [facility]; Service: Gastroenterology    UPPER GASTROINTESTINAL ENDOSCOPY         Family History   Problem Relation Age of Onset    Obesity Mother     Aneurysm Father          Medications have been verified  Objective   /56   Pulse 78   Temp 98 5 °F (36 9 °C)   Resp 20   Ht 6' 2" (1 88 m)   Wt 125 kg (275 lb)   SpO2 97%   BMI 35 31 kg/m²   No LMP for male patient         Physical Exam     Physical Exam  Vitals and nursing note reviewed  Constitutional:       Appearance: Normal appearance  He is obese  Cardiovascular:      Rate and Rhythm: Normal rate and regular rhythm  Pulses: Normal pulses  Heart sounds: Normal heart sounds  Pulmonary:      Effort: Pulmonary effort is normal       Breath sounds: Normal breath sounds  Abdominal:      General: Bowel sounds are normal       Palpations: Abdomen is soft  Neurological:      Mental Status: He is alert

## 2021-08-23 ENCOUNTER — HOSPITAL ENCOUNTER (OUTPATIENT)
Facility: HOSPITAL | Age: 61
Setting detail: OBSERVATION
Discharge: HOME | End: 2021-08-24
Attending: EMERGENCY MEDICINE | Admitting: INTERNAL MEDICINE
Payer: COMMERCIAL

## 2021-08-23 ENCOUNTER — APPOINTMENT (EMERGENCY)
Dept: RADIOLOGY | Facility: HOSPITAL | Age: 61
End: 2021-08-23
Payer: COMMERCIAL

## 2021-08-23 ENCOUNTER — APPOINTMENT (OUTPATIENT)
Dept: RADIOLOGY | Facility: HOSPITAL | Age: 61
End: 2021-08-23
Payer: COMMERCIAL

## 2021-08-23 ENCOUNTER — APPOINTMENT (OUTPATIENT)
Dept: NON INVASIVE DIAGNOSTICS | Facility: HOSPITAL | Age: 61
End: 2021-08-23
Payer: COMMERCIAL

## 2021-08-23 DIAGNOSIS — I48.91 NEW ONSET A-FIB (HCC): Primary | ICD-10-CM

## 2021-08-23 DIAGNOSIS — R79.89 ELEVATED LFTS: ICD-10-CM

## 2021-08-23 DIAGNOSIS — I48.91 NEW ONSET ATRIAL FIBRILLATION (HCC): ICD-10-CM

## 2021-08-23 DIAGNOSIS — R53.83 FATIGUE: ICD-10-CM

## 2021-08-23 PROBLEM — R74.01 TRANSAMINITIS: Status: ACTIVE | Noted: 2021-08-23

## 2021-08-23 PROBLEM — I48.0 PAROXYSMAL ATRIAL FIBRILLATION (HCC): Status: ACTIVE | Noted: 2021-08-23

## 2021-08-23 PROBLEM — E87.6 HYPOKALEMIA: Status: ACTIVE | Noted: 2021-08-23

## 2021-08-23 LAB
ALBUMIN SERPL BCP-MCNC: 2.3 G/DL (ref 3.5–5)
ALBUMIN SERPL BCP-MCNC: 2.6 G/DL (ref 3.5–5)
ALP SERPL-CCNC: 445 U/L (ref 46–116)
ALP SERPL-CCNC: 542 U/L (ref 46–116)
ALT SERPL W P-5'-P-CCNC: 266 U/L (ref 12–78)
ALT SERPL W P-5'-P-CCNC: 309 U/L (ref 12–78)
ANION GAP SERPL CALCULATED.3IONS-SCNC: 4 MMOL/L (ref 4–13)
ANION GAP SERPL CALCULATED.3IONS-SCNC: 9 MMOL/L (ref 4–13)
ANISOCYTOSIS BLD QL SMEAR: PRESENT
ANISOCYTOSIS BLD QL SMEAR: PRESENT
APTT PPP: 30 SECONDS (ref 23–37)
ARTIFACT: PRESENT
ARTIFACT: PRESENT
AST SERPL W P-5'-P-CCNC: 125 U/L (ref 5–45)
AST SERPL W P-5'-P-CCNC: 158 U/L (ref 5–45)
ATRIAL RATE: 131 BPM
BASOPHILS # BLD MANUAL: 0 THOUSAND/UL (ref 0–0.1)
BASOPHILS # BLD MANUAL: 0.45 THOUSAND/UL (ref 0–0.1)
BASOPHILS NFR MAR MANUAL: 0 % (ref 0–1)
BASOPHILS NFR MAR MANUAL: 4 % (ref 0–1)
BILIRUB SERPL-MCNC: 1.16 MG/DL (ref 0.2–1)
BILIRUB SERPL-MCNC: 1.84 MG/DL (ref 0.2–1)
BILIRUB UR QL STRIP: ABNORMAL
BILIRUB UR QL STRIP: ABNORMAL
BUN SERPL-MCNC: 17 MG/DL (ref 5–25)
BUN SERPL-MCNC: 17 MG/DL (ref 5–25)
CALCIUM ALBUM COR SERPL-MCNC: 10 MG/DL (ref 8.3–10.1)
CALCIUM ALBUM COR SERPL-MCNC: 9.5 MG/DL (ref 8.3–10.1)
CALCIUM SERPL-MCNC: 8.1 MG/DL (ref 8.3–10.1)
CALCIUM SERPL-MCNC: 8.9 MG/DL (ref 8.3–10.1)
CHLORIDE SERPL-SCNC: 104 MMOL/L (ref 100–108)
CHLORIDE SERPL-SCNC: 108 MMOL/L (ref 100–108)
CLARITY UR: CLEAR
CLARITY UR: CLEAR
CO2 SERPL-SCNC: 23 MMOL/L (ref 21–32)
CO2 SERPL-SCNC: 23 MMOL/L (ref 21–32)
COLOR UR: ABNORMAL
COLOR UR: ABNORMAL
COLOR, POC: NORMAL
CREAT SERPL-MCNC: 0.85 MG/DL (ref 0.6–1.3)
CREAT SERPL-MCNC: 0.89 MG/DL (ref 0.6–1.3)
EOSINOPHIL # BLD MANUAL: 0.23 THOUSAND/UL (ref 0–0.4)
EOSINOPHIL # BLD MANUAL: 0.31 THOUSAND/UL (ref 0–0.4)
EOSINOPHIL NFR BLD MANUAL: 2 % (ref 0–6)
EOSINOPHIL NFR BLD MANUAL: 3 % (ref 0–6)
ERYTHROCYTE [DISTWIDTH] IN BLOOD BY AUTOMATED COUNT: 13.8 % (ref 11.6–15.1)
ERYTHROCYTE [DISTWIDTH] IN BLOOD BY AUTOMATED COUNT: 14.1 % (ref 11.6–15.1)
GFR SERPL CREATININE-BSD FRML MDRD: 92 ML/MIN/1.73SQ M
GFR SERPL CREATININE-BSD FRML MDRD: 94 ML/MIN/1.73SQ M
GLUCOSE SERPL-MCNC: 116 MG/DL (ref 65–140)
GLUCOSE SERPL-MCNC: 158 MG/DL (ref 65–140)
GLUCOSE UR STRIP-MCNC: NEGATIVE MG/DL
GLUCOSE UR STRIP-MCNC: NEGATIVE MG/DL
HBV CORE AB SER QL: NORMAL
HBV CORE IGM SER QL: NORMAL
HBV SURFACE AG SER QL: NORMAL
HCT VFR BLD AUTO: 32.6 % (ref 36.5–49.3)
HCT VFR BLD AUTO: 36.5 % (ref 36.5–49.3)
HCV AB SER QL: NORMAL
HGB BLD-MCNC: 10.8 G/DL (ref 12–17)
HGB BLD-MCNC: 12.1 G/DL (ref 12–17)
HGB UR QL STRIP.AUTO: NEGATIVE
HGB UR QL STRIP.AUTO: NEGATIVE
INR PPP: 1.11 (ref 0.84–1.19)
KETONES UR STRIP-MCNC: NEGATIVE MG/DL
KETONES UR STRIP-MCNC: NEGATIVE MG/DL
LEUKOCYTE ESTERASE UR QL STRIP: NEGATIVE
LEUKOCYTE ESTERASE UR QL STRIP: NEGATIVE
LYMPHOCYTES # BLD AUTO: 0.61 THOUSAND/UL (ref 0.6–4.47)
LYMPHOCYTES # BLD AUTO: 1.02 THOUSAND/UL (ref 0.6–4.47)
LYMPHOCYTES # BLD AUTO: 6 % (ref 14–44)
LYMPHOCYTES # BLD AUTO: 9 % (ref 14–44)
MAGNESIUM SERPL-MCNC: 2.1 MG/DL (ref 1.6–2.6)
MAGNESIUM SERPL-MCNC: 2.1 MG/DL (ref 1.6–2.6)
MCH RBC QN AUTO: 30.6 PG (ref 26.8–34.3)
MCH RBC QN AUTO: 30.9 PG (ref 26.8–34.3)
MCHC RBC AUTO-ENTMCNC: 33.1 G/DL (ref 31.4–37.4)
MCHC RBC AUTO-ENTMCNC: 33.2 G/DL (ref 31.4–37.4)
MCV RBC AUTO: 92 FL (ref 82–98)
MCV RBC AUTO: 93 FL (ref 82–98)
MONOCYTES # BLD AUTO: 0.1 THOUSAND/UL (ref 0–1.22)
MONOCYTES # BLD AUTO: 0.45 THOUSAND/UL (ref 0–1.22)
MONOCYTES NFR BLD: 1 % (ref 4–12)
MONOCYTES NFR BLD: 4 % (ref 4–12)
NEUTROPHILS # BLD MANUAL: 9.11 THOUSAND/UL (ref 1.85–7.62)
NEUTROPHILS # BLD MANUAL: 9.19 THOUSAND/UL (ref 1.85–7.62)
NEUTS BAND NFR BLD MANUAL: 2 % (ref 0–8)
NEUTS SEG NFR BLD AUTO: 79 % (ref 43–75)
NEUTS SEG NFR BLD AUTO: 89 % (ref 43–75)
NITRITE UR QL STRIP: NEGATIVE
NITRITE UR QL STRIP: NEGATIVE
P AXIS: 79 DEGREES
PH UR STRIP.AUTO: 6 [PH]
PH UR STRIP.AUTO: 6 [PH] (ref 4.5–8)
PHOSPHATE SERPL-MCNC: 3.3 MG/DL (ref 2.3–4.1)
PHOSPHATE SERPL-MCNC: 3.5 MG/DL (ref 2.3–4.1)
PLATELET # BLD AUTO: 401 THOUSANDS/UL (ref 149–390)
PLATELET # BLD AUTO: 451 THOUSANDS/UL (ref 149–390)
PLATELET BLD QL SMEAR: ABNORMAL
PLATELET BLD QL SMEAR: ADEQUATE
PMV BLD AUTO: 9.2 FL (ref 8.9–12.7)
PMV BLD AUTO: 9.7 FL (ref 8.9–12.7)
POIKILOCYTOSIS BLD QL SMEAR: PRESENT
POLYCHROMASIA BLD QL SMEAR: PRESENT
POTASSIUM SERPL-SCNC: 3.4 MMOL/L (ref 3.5–5.3)
POTASSIUM SERPL-SCNC: 3.8 MMOL/L (ref 3.5–5.3)
PR INTERVAL: 208 MS
PROT SERPL-MCNC: 6.8 G/DL (ref 6.4–8.2)
PROT SERPL-MCNC: 7.6 G/DL (ref 6.4–8.2)
PROT UR STRIP-MCNC: NEGATIVE MG/DL
PROT UR STRIP-MCNC: NEGATIVE MG/DL
PROTHROMBIN TIME: 14.3 SECONDS (ref 11.6–14.5)
QRS AXIS: -50 DEGREES
QRSD INTERVAL: 96 MS
QT INTERVAL: 342 MS
QTC INTERVAL: 505 MS
RBC # BLD AUTO: 3.5 MILLION/UL (ref 3.88–5.62)
RBC # BLD AUTO: 3.96 MILLION/UL (ref 3.88–5.62)
RBC MORPH BLD: PRESENT
RBC MORPH BLD: PRESENT
SODIUM SERPL-SCNC: 135 MMOL/L (ref 136–145)
SODIUM SERPL-SCNC: 136 MMOL/L (ref 136–145)
SP GR UR STRIP.AUTO: 1.02 (ref 1–1.03)
SP GR UR STRIP.AUTO: 1.02 (ref 1–1.03)
T WAVE AXIS: 77 DEGREES
TSH SERPL DL<=0.05 MIU/L-ACNC: 1.83 UIU/ML (ref 0.36–3.74)
UROBILINOGEN UR QL STRIP.AUTO: 2 E.U./DL
UROBILINOGEN UR QL STRIP.AUTO: 4 E.U./DL
VARIANT LYMPHS # BLD AUTO: 1 %
VENTRICULAR RATE: 131 BPM
WBC # BLD AUTO: 10.24 THOUSAND/UL (ref 4.31–10.16)
WBC # BLD AUTO: 11.34 THOUSAND/UL (ref 4.31–10.16)

## 2021-08-23 PROCEDURE — 93306 TTE W/DOPPLER COMPLETE: CPT

## 2021-08-23 PROCEDURE — 93005 ELECTROCARDIOGRAM TRACING: CPT

## 2021-08-23 PROCEDURE — 84100 ASSAY OF PHOSPHORUS: CPT | Performed by: EMERGENCY MEDICINE

## 2021-08-23 PROCEDURE — 96365 THER/PROPH/DIAG IV INF INIT: CPT

## 2021-08-23 PROCEDURE — 93010 ELECTROCARDIOGRAM REPORT: CPT | Performed by: INTERNAL MEDICINE

## 2021-08-23 PROCEDURE — 87340 HEPATITIS B SURFACE AG IA: CPT | Performed by: INTERNAL MEDICINE

## 2021-08-23 PROCEDURE — 84443 ASSAY THYROID STIM HORMONE: CPT | Performed by: INTERNAL MEDICINE

## 2021-08-23 PROCEDURE — 93306 TTE W/DOPPLER COMPLETE: CPT | Performed by: INTERNAL MEDICINE

## 2021-08-23 PROCEDURE — 86705 HEP B CORE ANTIBODY IGM: CPT | Performed by: INTERNAL MEDICINE

## 2021-08-23 PROCEDURE — 84100 ASSAY OF PHOSPHORUS: CPT | Performed by: INTERNAL MEDICINE

## 2021-08-23 PROCEDURE — 99285 EMERGENCY DEPT VISIT HI MDM: CPT | Performed by: EMERGENCY MEDICINE

## 2021-08-23 PROCEDURE — 83735 ASSAY OF MAGNESIUM: CPT | Performed by: INTERNAL MEDICINE

## 2021-08-23 PROCEDURE — 96375 TX/PRO/DX INJ NEW DRUG ADDON: CPT

## 2021-08-23 PROCEDURE — 85007 BL SMEAR W/DIFF WBC COUNT: CPT | Performed by: INTERNAL MEDICINE

## 2021-08-23 PROCEDURE — 81003 URINALYSIS AUTO W/O SCOPE: CPT | Performed by: INTERNAL MEDICINE

## 2021-08-23 PROCEDURE — 99244 OFF/OP CNSLTJ NEW/EST MOD 40: CPT | Performed by: INTERNAL MEDICINE

## 2021-08-23 PROCEDURE — 99220 PR INITIAL OBSERVATION CARE/DAY 70 MINUTES: CPT | Performed by: INTERNAL MEDICINE

## 2021-08-23 PROCEDURE — 71046 X-RAY EXAM CHEST 2 VIEWS: CPT

## 2021-08-23 PROCEDURE — 80053 COMPREHEN METABOLIC PANEL: CPT | Performed by: INTERNAL MEDICINE

## 2021-08-23 PROCEDURE — 83735 ASSAY OF MAGNESIUM: CPT | Performed by: EMERGENCY MEDICINE

## 2021-08-23 PROCEDURE — 86704 HEP B CORE ANTIBODY TOTAL: CPT | Performed by: INTERNAL MEDICINE

## 2021-08-23 PROCEDURE — 96366 THER/PROPH/DIAG IV INF ADDON: CPT

## 2021-08-23 PROCEDURE — 85730 THROMBOPLASTIN TIME PARTIAL: CPT | Performed by: INTERNAL MEDICINE

## 2021-08-23 PROCEDURE — 85027 COMPLETE CBC AUTOMATED: CPT | Performed by: EMERGENCY MEDICINE

## 2021-08-23 PROCEDURE — 99285 EMERGENCY DEPT VISIT HI MDM: CPT

## 2021-08-23 PROCEDURE — 85027 COMPLETE CBC AUTOMATED: CPT | Performed by: INTERNAL MEDICINE

## 2021-08-23 PROCEDURE — 86803 HEPATITIS C AB TEST: CPT | Performed by: INTERNAL MEDICINE

## 2021-08-23 PROCEDURE — 76700 US EXAM ABDOM COMPLETE: CPT

## 2021-08-23 PROCEDURE — 81003 URINALYSIS AUTO W/O SCOPE: CPT

## 2021-08-23 PROCEDURE — 36415 COLL VENOUS BLD VENIPUNCTURE: CPT | Performed by: EMERGENCY MEDICINE

## 2021-08-23 PROCEDURE — 85610 PROTHROMBIN TIME: CPT | Performed by: INTERNAL MEDICINE

## 2021-08-23 RX ORDER — UBIDECARENONE 10 MG
10 CAPSULE ORAL DAILY
Status: DISCONTINUED | OUTPATIENT
Start: 2021-08-23 | End: 2021-08-23

## 2021-08-23 RX ORDER — NICOTINE 21 MG/24HR
1 PATCH, TRANSDERMAL 24 HOURS TRANSDERMAL DAILY
Status: DISCONTINUED | OUTPATIENT
Start: 2021-08-23 | End: 2021-08-24 | Stop reason: HOSPADM

## 2021-08-23 RX ORDER — CHLORAL HYDRATE 500 MG
1000 CAPSULE ORAL DAILY
Status: DISCONTINUED | OUTPATIENT
Start: 2021-08-23 | End: 2021-08-24 | Stop reason: HOSPADM

## 2021-08-23 RX ORDER — POTASSIUM CHLORIDE 14.9 MG/ML
20 INJECTION INTRAVENOUS ONCE
Status: COMPLETED | OUTPATIENT
Start: 2021-08-23 | End: 2021-08-23

## 2021-08-23 RX ORDER — AMLODIPINE BESYLATE 5 MG/1
5 TABLET ORAL DAILY
Status: DISCONTINUED | OUTPATIENT
Start: 2021-08-23 | End: 2021-08-23

## 2021-08-23 RX ORDER — POTASSIUM CHLORIDE 20 MEQ/1
40 TABLET, EXTENDED RELEASE ORAL ONCE
Status: COMPLETED | OUTPATIENT
Start: 2021-08-23 | End: 2021-08-23

## 2021-08-23 RX ORDER — ZINC SULFATE 50(220)MG
220 CAPSULE ORAL DAILY
Status: DISCONTINUED | OUTPATIENT
Start: 2021-08-23 | End: 2021-08-24 | Stop reason: HOSPADM

## 2021-08-23 RX ORDER — LOSARTAN POTASSIUM 50 MG/1
100 TABLET ORAL DAILY
Status: DISCONTINUED | OUTPATIENT
Start: 2021-08-23 | End: 2021-08-23

## 2021-08-23 RX ORDER — METOPROLOL SUCCINATE 25 MG/1
25 TABLET, EXTENDED RELEASE ORAL DAILY
Status: DISCONTINUED | OUTPATIENT
Start: 2021-08-23 | End: 2021-08-24 | Stop reason: HOSPADM

## 2021-08-23 RX ORDER — ASCORBIC ACID 500 MG
500 TABLET ORAL DAILY
Status: DISCONTINUED | OUTPATIENT
Start: 2021-08-23 | End: 2021-08-24 | Stop reason: HOSPADM

## 2021-08-23 RX ORDER — METOPROLOL TARTRATE 5 MG/5ML
5 INJECTION INTRAVENOUS ONCE
Status: COMPLETED | OUTPATIENT
Start: 2021-08-23 | End: 2021-08-23

## 2021-08-23 RX ORDER — DOCUSATE SODIUM 100 MG/1
100 CAPSULE, LIQUID FILLED ORAL 2 TIMES DAILY
Status: DISCONTINUED | OUTPATIENT
Start: 2021-08-23 | End: 2021-08-24 | Stop reason: HOSPADM

## 2021-08-23 RX ORDER — ACETAMINOPHEN 325 MG/1
650 TABLET ORAL EVERY 6 HOURS PRN
Status: DISCONTINUED | OUTPATIENT
Start: 2021-08-23 | End: 2021-08-24 | Stop reason: HOSPADM

## 2021-08-23 RX ORDER — MAGNESIUM HYDROXIDE/ALUMINUM HYDROXICE/SIMETHICONE 120; 1200; 1200 MG/30ML; MG/30ML; MG/30ML
30 SUSPENSION ORAL EVERY 6 HOURS PRN
Status: DISCONTINUED | OUTPATIENT
Start: 2021-08-23 | End: 2021-08-24 | Stop reason: HOSPADM

## 2021-08-23 RX ORDER — ONDANSETRON 2 MG/ML
4 INJECTION INTRAMUSCULAR; INTRAVENOUS EVERY 6 HOURS PRN
Status: DISCONTINUED | OUTPATIENT
Start: 2021-08-23 | End: 2021-08-24 | Stop reason: HOSPADM

## 2021-08-23 RX ORDER — PRAVASTATIN SODIUM 20 MG
80 TABLET ORAL
Status: DISCONTINUED | OUTPATIENT
Start: 2021-08-23 | End: 2021-08-23

## 2021-08-23 RX ADMIN — METOROPROLOL TARTRATE 5 MG: 5 INJECTION, SOLUTION INTRAVENOUS at 01:38

## 2021-08-23 RX ADMIN — ENOXAPARIN SODIUM 40 MG: 40 INJECTION SUBCUTANEOUS at 10:18

## 2021-08-23 RX ADMIN — POTASSIUM CHLORIDE 20 MEQ: 14.9 INJECTION, SOLUTION INTRAVENOUS at 03:25

## 2021-08-23 RX ADMIN — POTASSIUM CHLORIDE 40 MEQ: 1500 TABLET, EXTENDED RELEASE ORAL at 03:25

## 2021-08-23 RX ADMIN — MULTIPLE VITAMINS W/ MINERALS TAB 1 TABLET: TAB ORAL at 10:18

## 2021-08-23 RX ADMIN — OXYCODONE HYDROCHLORIDE AND ACETAMINOPHEN 500 MG: 500 TABLET ORAL at 10:18

## 2021-08-23 RX ADMIN — Medication 12.5 MG: at 10:23

## 2021-08-23 RX ADMIN — METOPROLOL SUCCINATE 25 MG: 25 TABLET, FILM COATED, EXTENDED RELEASE ORAL at 18:02

## 2021-08-23 RX ADMIN — OMEGA-3 FATTY ACIDS CAP 1000 MG 1000 MG: 1000 CAP at 10:18

## 2021-08-23 RX ADMIN — ZINC SULFATE 220 MG (50 MG) CAPSULE 220 MG: CAPSULE at 10:23

## 2021-08-23 RX ADMIN — POTASSIUM CHLORIDE 40 MEQ: 1500 TABLET, EXTENDED RELEASE ORAL at 10:23

## 2021-08-23 NOTE — H&P
Noah Mckeon 1960, 64 y o  male MRN: [id number]  Unit/Bed#: ED 15 Encounter: [id number]  Primary Care Provider: Debbie Smith MD   Date and time admitted to hospital: 8/23/2021  1:02 AM    * Paroxysmal atrial fibrillation [facility])  Assessment & Plan  Paroxysmal atrial fibrillation converted on its own currently sinus rhythm after repletion of potassium by IV and by mouth total 40 mEq  Patient did not have history of atrial fibrillation; will get echo  Most likely brought about by use of hydrochlorothiazide causing hypokalemia  Will remove hydrochlorothiazide from current medication  Will recheck metabolic profile with CBC  Hypokalemia  Assessment & Plan  Most likely secondary to use of hydrochlorothiazide with out repletion  Will put on hold hydrochlorothiazide component  Repeated with potassium chloride by mouth 20 mEq and IV 20 mEq for total of 40 mEq  Recheck metabolic profile  Tobacco abuse  Assessment & Plan  Patient smokes 1/2 pack per day  Will place patient on nicotine replacement  Hypertension  Assessment & Plan  Benign essential hypertension is being treated with Norvasc at 5 mg daily and Benicar/hydrochlorothiazide 40/12 5 mg daily  Patient was seen by his chiropractor about a month ago and was reported weight loss of approximately 12 lb  Patient also feels lightheaded with current blood pressure medications and might need to cut down on dosage  Will place patient only on Norvasc for now; put on hold Benicar/hydrochlorothiazide and if needed to prescribed prior to discharge, might consider Benicar 20 mg instead  Hyperlipidemia  Assessment & Plan  Currently on Zocor/Pravachol  Elevated transaminases  Patient also noted to have elevated AST ALT, alkaline phosphatase with hyperbilirubinemia of 1 8  Secondary to hepatic congestion due to atrial fibrillation?   Will get right upper quadrant ultrasound and hepatitis panel     VTE Prophylaxis: Enoxaparin (Lovenox)  / sequential compression device   Code Status: No Order full Code  POLST: There is no POLST form on file for this patient (pre-hospital)    Anticipated Length of Stay:  Patient will be admitted on an Emergency basis with an anticipated length of stay of  less than 2 midnights  Justification for Hospital Stay: Please see detailed plans noted above  Chief Complaint:     Complains intermittent lightheadedness and palpitations  History of Present Illness:  Festus Myers is a 64 y o  male who has past medical history significant for hypertension currently on Benicar/hydrochlorothiazide with Norvasc; hyperlipidemia and tobacco use  The patient reports that approximately a month ago he saw his primary chiropractor and reported a 12 lb weight loss  Over the past month as well, he was feeling lightheaded with no dizziness  That said, the patient decrease his blood pressure medication by half  Within the past week, the patient has been complaining of in and out palpitations  However, when they sought help from [facility] within the past few days, he was then found to be in normal sinus rhythm and hence was then sent home  Tonight while sleeping, he was wearing a monitor that was alerting him it might be atrial fibrillation hence they decided to come to the emergency room for evaluation  No chest pain  The patient denies any fever or cough or cold  No shortness of breath  He denies any dyspnea on exertion  No leg edema  Here in the emergency room was seen to be in atrial fibrillation hence given metoprolol 5 mg intravenous x1  It was of note that the patient's potassium was 3 3 hence advised to give potassium 20 mEq intravenous and 20 mEq by mouth  As I was examining the patient slowly he then converted to normal sinus rhythm where he stays right now  Patient's blood pressure is currently around 92 to 98 systolic    Patient then states that he was having some lightheadedness as noted above  Currently, patient is lying flat on bed and is very comfortable  He does not demonstrate any conversational dyspnea  No chest pains  Review of Systems:    Constitutional:  Denies fever or chills   Eyes:  Denies change in visual acuity   HENT:  Denies nasal congestion or sore throat   Respiratory:  Denies cough or shortness of breath   Cardiovascular:  Denies chest pain or edema with note of palpitations as above  GI:  Denies abdominal pain, nausea, vomiting, bloody stools or diarrhea   :  Denies dysuria   Musculoskeletal:  Denies back pain or joint pain   Integument:  Denies rash   Neurologic:  Denies headache, focal weakness or sensory changes   Endocrine:  Denies polyuria or polydipsia   Lymphatic:  Denies swollen glands   Psychiatric:  Denies depression or anxiety     Past Medical and Surgical History:   Past Medical History:   Diagnosis Date    Disc degeneration, lumbosacral 1/6/2016    Hyperlipidemia     hypercholesterolemia    Hypertension     last assessed 11/22/17    Pneumonia of both lower lobes due to infectious organism 1/29/2018     Past Surgical History:   Procedure Laterality Date    COLONOSCOPY      HEMORRHOID SURGERY      KNEE SURGERY Bilateral     TX COLONOSCOPY FLX DX W/COLLJ SPEC WHEN PFRMD N/A 1/5/2019    Procedure: COLONOSCOPY;  Surgeon: Ellen Smith MD;  [facility];   Service: Gastroenterology    UPPER GASTROINTESTINAL ENDOSCOPY         Meds/Allergies:  (Not in a hospital admission)      Allergies: No Known Allergies  History:  Marital Status: /Civil Union   Occupation:  He is a contractor  Patient Pre-hospital Living Situation:  Lives at home with wife  Patient Pre-hospital Level of Mobility:  Ambulatory  Patient Pre-hospital Diet Restrictions:  Regular diet  Substance Use History:   Social History     Substance and Sexual Activity   Alcohol Use Yes    Comment: Occassional 1 x / month     Social History     Tobacco Use   Smoking Status Current Every Day Smoker    Packs/day: 0 50    Years: 40 00    Pack years: 20 00    Types: Cigarettes   Smokeless Tobacco Never Used     Social History     Substance and Sexual Activity   Drug Use No       Family History:  Family History   Problem Relation Age of Onset    Obesity Mother     Aneurysm Father        Physical Exam:     Vitals:   Blood Pressure: 93/58 (08/23/21 0334)  Pulse: 80 (08/23/21 0334)  Temperature: 97 9 °F (36 6 °C) (08/23/21 0116)  Temp Source: Tympanic (08/23/21 0116)  Respirations: 15 (08/23/21 0334)  Weight - Scale: 125 kg (275 lb) (08/23/21 0111)  SpO2: 97 % (08/23/21 0334)    Constitutional:  Well developed, well nourished, no acute distress, non-toxic appearance   Eyes:  PERRL, conjunctiva normal   HENT:  Atraumatic, external ears normal, nose normal, oropharynx moist, no pharyngeal exudates  Neck- normal range of motion, no tenderness, supple   Respiratory:  No respiratory distress, normal breath sounds, no rales, no wheezing   Cardiovascular:  Normal rate, normal rhythm, no murmurs, no gallops, no rubs   GI:  Soft, nondistended, normal bowel sounds, nontender, no organomegaly, no mass, no rebound, no guarding   :  No costovertebral angle tenderness   Musculoskeletal:  No edema, no tenderness, no deformities  Back- no tenderness  Integument:  Well hydrated, no rash   Lymphatic:  No lymphadenopathy noted   Neurologic:  Alert &awake, communicative, CN 2-12 normal, normal motor function, normal sensory function, no focal deficits noted   Psychiatric:  Speech and behavior appropriate       Lab Results: I have personally reviewed pertinent reports        Results from last 7 days   Lab Units 08/23/21  0138   WBC Thousand/uL 11 34*   HEMOGLOBIN g/dL 12 1   HEMATOCRIT % 36 5   PLATELETS Thousands/uL 451*   LYMPHO PCT % 9*   MONO PCT % 4   EOS PCT % 2     Results from last 7 days   Lab Units 08/23/21  0138   POTASSIUM mmol/L 3 4*   CHLORIDE mmol/L 104   CO2 mmol/L 23   BUN mg/dL 17   CREATININE mg/dL 0 89   CALCIUM mg/dL 8 9   ALK PHOS U/L 542*   ALT U/L 309*   AST U/L 158*           EKG:  By monitor, his pattern is consistent with normal sinus rhythm  Imaging: Personally reviewed film revealing no infiltrate  No results found  ** Please Note: Dragon 360 Dictation voice to text software was used in the creation of this document   **

## 2021-08-23 NOTE — LETTER
[address]  [facility] [address]  Dept: [phone]    August 24, 2021     Patient: Nav Souza   YOB: 1960   Date of Visit: 8/23/2021       To Whom it May Concern:    Nav Souza was under my professional care  He was seen in the hospital from 8/23/2021   to 08/24/21  He may return to work on 8/25/21 without limitations  If you have any questions or concerns, please don't hesitate to call           Sincerely,          Carmenza Rico PA-C

## 2021-08-23 NOTE — CASE MANAGEMENT
Spoke with wife, explained CM program/CM role  LOS-1  Unplanned readmission color-green  30 day readmission-no  Bundle-no  Resides with wife, house, 4 MIKE  I PTA for ADL's/ambulation, drives, employed   [address]  Denies HC/DME/IP Rehab  Denies MH illness, D&A Abuse  Primary contact wife [phone]  Wife POA, has LW  Wife will transport home    CM reviewed d/c planning process including the following: identifying help at home, patient preference for d/c planning needs, Discharge Lounge, [facility] Meds to Bed program, availability of treatment team to discuss questions or concerns patient and/or family may have regarding understanding medications and recognizing signs and symptoms once discharged  CM also encouraged patient to follow up with all recommended appointments after discharge  Patient advised of importance for patient and family to participate in managing patients medical well being  Patient/caregiver received discharge checklist  Content reviewed  Patient/caregiver encouraged to participate in discharge plan of care prior to discharge home

## 2021-08-23 NOTE — ASSESSMENT & PLAN NOTE
Most likely secondary to use of hydrochlorothiazide with out repletion  Will put on hold hydrochlorothiazide component  Repeated with potassium chloride by mouth 20 mEq and IV 20 mEq for total of 40 mEq  Recheck metabolic profile

## 2021-08-23 NOTE — ASSESSMENT & PLAN NOTE
Benign essential hypertension is being treated with Norvasc at 5 mg daily and Benicar/hydrochlorothiazide 40/12 5 mg daily  Patient was seen by his chiropractor about a month ago and was reported weight loss of approximately 12 lb  Patient also feels lightheaded with current blood pressure medications and might need to cut down on dosage  Will place patient only on Norvasc for now; put on hold Benicar/hydrochlorothiazide and if needed to prescribed prior to discharge, might consider Benicar 20 mg instead

## 2021-08-23 NOTE — ED ATTENDING ATTESTATION
8/23/2021  I, Rona Barrios MD, saw and evaluated the patient  I have discussed the patient with the resident/non-physician practitioner and agree with the resident's/non-physician practitioner's findings, Plan of Care, and MDM as documented in the resident's/non-physician practitioner's note, except where noted  All available labs and Radiology studies were reviewed  I was present for key portions of any procedure(s) performed by the resident/non-physician practitioner and I was immediately available to provide assistance  At this point I agree with the current assessment done in the Emergency Department  I have conducted an independent evaluation of this patient a history and physical is as follows:    ED Course     70-year-old male presenting to the emergency department for evaluation of abnormal heart rhythm  Patient has been feeling on well for the past month  Patient reports episodes of generalized weakness, episodes chills and rigors as well as an episode of fever that occurred approximately 2 weeks ago  Patient has had mild nonproductive cough  Patient denies any urinary symptoms  Tonight patient used a device which is attached to an xiomara on his phone which determined that he was in atrial fibrillation  Patient denies any chest pain or shortness of breath  No abdominal pain  Ten systems reviewed and negative except as noted  On examination patient is sitting upright in the stretcher no acute respiratory distress  Head is normocephalic atraumatic  Neck is supple  Lungs are diminished bilateral   Heart is irregularly irregular and tachycardic  Abdomen is obese, soft and nontender  Skin without rash  Patient with normal mentation, 5/5 upper and lower motor strength  Patient presenting with AFib with rapid ventricular response  AFib is not likely the cause of the episodes of chills, rigors, elevated temperature      Labs Reviewed   CBC AND DIFFERENTIAL - Abnormal       Result Value Ref Range Status    WBC 11 34 (*) 4 31 - 10 16 Thousand/uL Final    RBC 3 96  3 88 - 5 62 Million/uL Final    Hemoglobin 12 1  12 0 - 17 0 g/dL Final    Hematocrit 36 5  36 5 - 49 3 % Final    MCV 92  82 - 98 fL Final    MCH 30 6  26 8 - 34 3 pg Final    MCHC 33 2  31 4 - 37 4 g/dL Final    RDW 13 8  11 6 - 15 1 % Final    MPV 9 7  8 9 - 12 7 fL Final    Platelets 386 (*) 430 - 390 Thousands/uL Final    Narrative: This is an appended report  These results have been appended to a previously verified report  COMPREHENSIVE METABOLIC PANEL - Abnormal    Sodium 136  136 - 145 mmol/L Final    Potassium 3 4 (*) 3 5 - 5 3 mmol/L Final    Comment: Slightly Hemolyzed; Results May be Affected    Chloride 104  100 - 108 mmol/L Final    CO2 23  21 - 32 mmol/L Final    ANION GAP 9  4 - 13 mmol/L Final    BUN 17  5 - 25 mg/dL Final    Creatinine 0 89  0 60 - 1 30 mg/dL Final    Comment: Standardized to IDMS reference method    Glucose 158 (*) 65 - 140 mg/dL Final    Comment: If the patient is fasting, the ADA then defines impaired fasting glucose as > 100 mg/dL and diabetes as > or equal to 123 mg/dL  Specimen collection should occur prior to Sulfasalazine administration due to the potential for falsely depressed results  Specimen collection should occur prior to Sulfapyridine administration due to the potential for falsely elevated results  Calcium 8 9  8 3 - 10 1 mg/dL Final    Corrected Calcium 10 0  8 3 - 10 1 mg/dL Final     (*) 5 - 45 U/L Final    Comment: Slightly Hemolyzed; Results May be Affected  Specimen collection should occur prior to Sulfasalazine administration due to the potential for falsely depressed results   (*) 12 - 78 U/L Final    Comment: Specimen collection should occur prior to Sulfasalazine and/or Sulfapyridine administration due to the potential for falsely depressed results       Alkaline Phosphatase 542 (*) 46 - 116 U/L Final    Total Protein 7 6  6 4 - 8 2 g/dL Final Albumin 2 6 (*) 3 5 - 5 0 g/dL Final    Total Bilirubin 1 84 (*) 0 20 - 1 00 mg/dL Final    Comment: Use of this assay is not recommended for patients undergoing treatment with eltrombopag due to the potential for falsely elevated results  eGFR 92  ml/min/1 73sq m Final    Narrative:     [employer] guidelines for Chronic Kidney Disease (CKD):     Stage 1 with normal or high GFR (GFR > 90 mL/min/1 73 square meters)    Stage 2 Mild CKD (GFR = 60-89 mL/min/1 73 square meters)    Stage 3A Moderate CKD (GFR = 45-59 mL/min/1 73 square meters)    Stage 3B Moderate CKD (GFR = 30-44 mL/min/1 73 square meters)    Stage 4 Severe CKD (GFR = 15-29 mL/min/1 73 square meters)    Stage 5 End Stage CKD (GFR <15 mL/min/1 73 square meters)  Note: GFR calculation is accurate only with a steady state creatinine   URINE MACROSCOPIC, POC - Abnormal    Color, UA Orange   Final    Clarity, UA Clear   Final    pH, UA 6 0  4 5 - 8 0 Final    Leukocytes, UA Negative  Negative Final    Nitrite, UA Negative  Negative Final    Protein, UA Negative  Negative mg/dl Final    Glucose, UA Negative  Negative mg/dl Final    Ketones, UA Negative  Negative mg/dl Final    Urobilinogen, UA 4 0 (*) 0 2, 1 0 E U /dl E U /dl Final    Bilirubin, UA Interference- unable to analyze (*) Negative Final    Comment: The dipstick result may be falsely positive due to interfering substances  We recommend reliance upon serum bilirubin, liver & kidney function tests to guide patient care if clinically indicated      Blood, UA Negative  Negative Final    Specific Gravity, UA 1 020  1 003 - 1 030 Final    Narrative:     CLINITEK RESULT   MANUAL DIFFERENTIAL(PHLEBS DO NOT ORDER) - Abnormal    Segmented % 79 (*) 43 - 75 % Final    Bands % 2  0 - 8 % Final    Lymphocytes % 9 (*) 14 - 44 % Final    Monocytes % 4  4 - 12 % Final    Eosinophils, % 2  0 - 6 % Final    Basophils % 4 (*) 0 - 1 % Final    Absolute Neutrophils 9 19 (*) 1 85 - 7 62 Thousand/uL Final    Lymphocytes Absolute 1 02  0 60 - 4 47 Thousand/uL Final    Monocytes Absolute 0 45  0 00 - 1 22 Thousand/uL Final    Eosinophils Absolute 0 23  0 00 - 0 40 Thousand/uL Final    Basophils Absolute 0 45 (*) 0 00 - 0 10 Thousand/uL Final    Total Counted     Final    RBC Morphology Present   Final    Anisocytosis Present   Final    Platelet Estimate Adequate  Adequate Final    Artifact Present   Final   MAGNESIUM - Normal    Magnesium 2 1  1 6 - 2 6 mg/dL Final    Comment: Slightly Hemolyzed; Results May be Affected   PHOSPHORUS - Normal    Phosphorus 3 3  2 3 - 4 1 mg/dL Final   POCT URINALYSIS DIPSTICK - Normal    Color, UA -   Final       XR chest 2 views   ED Interpretation   No acute pulmonary disease  AFib with rapid ventricular response was rate controlled with metoprolol  Patient is going to be admitted to Medicine  Lab work is significant for elevated LFTs and elevated alkaline phosphatase suggesting obstructive biliary pattern which will likely require further evaluation as an inpatient with right upper quadrant ultrasound        Critical Care Time  Procedures

## 2021-08-23 NOTE — ED PROVIDER NOTES
History  Chief Complaint   Patient presents with    Rapid Heart Rate     Pt c/o palpitations that come and go  Pt bought [employer] monitor and noticed his heart rate to be elevated  Pt reports feeling dizzy when the palptations occur  Pt seen at urgent care today for same issue  Pt is a 61yo M who presents for palpitations  Patient reports he was woken from sleep with palpitations  Patient used a phone [employer] at that time that told him he was in atrial fibrillation  Patient said to be evaluated  Patient denies any associated chest pain or shortness of breath  Patient does report the per the past month he has generally been feeling unwell  Patient reports intermittent palpitations resolve on their own  During episodes of palpitations patient reports significant fatigue as well as having decreased blood pressure  Wife states during 1 of these episodes she took his blood pressure and found it to be 90s over 60s  Patient also reports intermittent chills with rigors  Patient reports he has not had these in the past 24 hours  Patient has cough that is mild and nonproductive  Wife reports patient has had decreased appetite and lost approximately 10 lb  Patient denies any pain  Patient reports history includes hypertension hyperlipidemia but no cardiac history  Patient reports no history of ever being in atrial fibrillation  Patient states he takes his medications regularly  Patient wife reports the patient has been seen multiple times in the past month due to the symptoms, however they have never found anything  Wife states the only thing they found that his most recent ED visit was his liver enzymes elevated 10 times baseline  Per chart review, patient has been in sinus rhythm with regular rate at all recent visits  Prior to Admission Medications   Prescriptions Last Dose Informant Patient Reported? Taking?    Chromium 500 MCG TABS  Self Yes No   Sig: Take by mouth   Patient not taking: Reported on 8/22/2021   Coenzyme Q10 (COQ-10) 10 MG CAPS  Self Yes No   Sig: Take by mouth   Multiple Vitamins-Minerals (MENS MULTIVITAMIN PLUS PO)  Self Yes No   Sig: Take by mouth   Omega-3 Fatty Acids (FISH OIL) 1200 MG CAPS  Self Yes No   Sig: Take by mouth   Zinc 50 MG CAPS  Self Yes No   Sig: Take by mouth   amLODIPine (NORVASC) 5 mg tablet  Self No No   Sig: TAKE 1 TABLET DAILY   ascorbic acid (VITAMIN C) 500 mg tablet  Self Yes No   Sig: Take 500 mg by mouth daily   olmesartan-hydrochlorothiazide (BENICAR HCT) 40-12 5 MG per tablet   No No   Sig: TAKE 1 TABLET DAILY   simvastatin (ZOCOR) 40 mg tablet   No No   Sig: TAKE 1 TABLET DAILY AT BEDTIME      Facility-Administered Medications: None       Past Medical History:   Diagnosis Date    Disc degeneration, lumbosacral 1/6/2016    Hyperlipidemia     hypercholesterolemia    Hypertension     last assessed 11/22/17    Pneumonia of both lower lobes due to infectious organism 1/29/2018       Past Surgical History:   Procedure Laterality Date    COLONOSCOPY      HEMORRHOID SURGERY      KNEE SURGERY Bilateral     IL COLONOSCOPY FLX DX W/COLLJ SPEC WHEN PFRMD N/A 1/5/2019    Procedure: COLONOSCOPY;  Surgeon: Ken Mckee MD;  [facility]; Service: Gastroenterology    UPPER GASTROINTESTINAL ENDOSCOPY         Family History   Problem Relation Age of Onset    Obesity Mother     Aneurysm Father      I have reviewed and agree with the history as documented      E-Cigarette/Vaping    E-Cigarette Use Never User      E-Cigarette/Vaping Substances     Social History     Tobacco Use    Smoking status: Current Every Day Smoker     Packs/day: 0 50     Years: 40 00     Pack years: 20 00     Types: Cigarettes    Smokeless tobacco: Never Used   Vaping Use    Vaping Use: Never used   Substance Use Topics    Alcohol use: Yes     Comment: Occassional 1 x / month    Drug use: No        Review of Systems   Constitutional: Positive for activity change (Decreased due to fatigue), appetite change (Decreased), chills, fatigue and fever (Resolved)  HENT: Negative for sinus pressure, sinus pain and sore throat  Eyes: Negative for pain and visual disturbance  Respiratory: Positive for cough (Nonproductive)  Negative for chest tightness, shortness of breath and wheezing  Cardiovascular: Positive for palpitations  Negative for chest pain and leg swelling  Gastrointestinal: Positive for nausea (Intermittent)  Negative for abdominal distention, abdominal pain, diarrhea and vomiting  Genitourinary: Negative for difficulty urinating, dysuria, flank pain, frequency and urgency  Musculoskeletal: Positive for myalgias  Negative for back pain, gait problem, neck pain and neck stiffness  Skin: Negative for color change, pallor, rash and wound  Neurological: Positive for weakness (Generalized)  Negative for dizziness, syncope, numbness and headaches  Psychiatric/Behavioral: Negative for agitation, behavioral problems and confusion  The patient is nervous/anxious  Physical Exam  ED Triage Vitals   Temperature Pulse Respirations Blood Pressure SpO2   08/23/21 0116 08/23/21 0111 08/23/21 0111 08/23/21 0111 08/23/21 0111   97 9 °F (36 6 °C) (!) 136 18 125/56 97 %      Temp Source Heart Rate Source Patient Position - Orthostatic VS BP Location FiO2 (%)   08/23/21 0116 08/23/21 0111 08/23/21 0111 08/23/21 0111 --   Tympanic Monitor Lying Right arm       Pain Score       08/23/21 0800       No Pain             Orthostatic Vital Signs  Vitals:    08/23/21 1119 08/23/21 1601 08/23/21 1900 08/23/21 2302   BP: 104/66 98/57 126/62 120/64   Pulse: 105 73 76 78   Patient Position - Orthostatic VS:   Sitting Sitting       Physical Exam  Vitals reviewed  Constitutional:       Appearance: Normal appearance  He is well-developed  He is diaphoretic  He is not toxic-appearing  HENT:      Head: Normocephalic and atraumatic        Right Ear: External ear normal       Left Ear: External ear normal       Nose: Nose normal       Mouth/Throat:      Mouth: Mucous membranes are moist       Pharynx: Oropharynx is clear  Eyes:      Extraocular Movements: Extraocular movements intact  Cardiovascular:      Rate and Rhythm: Tachycardia present  Rhythm irregular  Pulses:           Radial pulses are 2+ on the right side and 2+ on the left side  Dorsalis pedis pulses are 2+ on the right side and 2+ on the left side  Heart sounds: Normal heart sounds  No murmur heard  Pulmonary:      Effort: Pulmonary effort is normal  No respiratory distress  Breath sounds: Normal breath sounds  No stridor  No wheezing  Abdominal:      General: There is no distension  Palpations: Abdomen is soft  There is no mass  Tenderness: There is no abdominal tenderness  There is no guarding  Musculoskeletal:         General: Normal range of motion  Cervical back: Normal range of motion  Right lower leg: No edema  Left lower leg: No edema  Skin:     General: Skin is warm  Capillary Refill: Capillary refill takes less than 2 seconds  Coloration: Skin is not pale  Findings: Rash (Erythematous on trunk; blanching; nonpainful) present  No erythema  Neurological:      Mental Status: He is alert and oriented to person, place, and time  Psychiatric:         Speech: Speech normal          Behavior: Behavior is cooperative           ED Medications  Medications   ascorbic acid (VITAMIN C) tablet 500 mg (500 mg Oral Given 8/23/21 1018)   multivitamin-minerals (CENTRUM) tablet 1 tablet (1 tablet Oral Given 8/23/21 1018)   fish oil capsule 1,000 mg (1,000 mg Oral Given 8/23/21 1018)   zinc sulfate (ZINCATE) capsule 220 mg (220 mg Oral Given 8/23/21 1023)   acetaminophen (TYLENOL) tablet 650 mg (has no administration in time range)   docusate sodium (COLACE) capsule 100 mg (100 mg Oral Not Given 8/23/21 1759)   ondansetron (ZOFRAN) injection 4 mg (has no administration in time range)   aluminum-magnesium hydroxide-simethicone (MYLANTA) oral suspension 30 mL (has no administration in time range)   nicotine (NICODERM CQ) 21 mg/24 hr TD 24 hr patch 1 patch (1 patch Transdermal Not Given 8/23/21 1019)   metoprolol succinate (TOPROL-XL) 24 hr tablet 25 mg (25 mg Oral Given 8/23/21 1802)   metoprolol (LOPRESSOR) injection 5 mg (5 mg Intravenous Given 8/23/21 0138)   potassium chloride (K-DUR,KLOR-CON) CR tablet 40 mEq (40 mEq Oral Given 8/23/21 0325)   potassium chloride 20 mEq IVPB (premix) (0 mEq Intravenous Stopped 8/23/21 0540)   potassium chloride (K-DUR,KLOR-CON) CR tablet 40 mEq (40 mEq Oral Given 8/23/21 1023)       Diagnostic Studies  Results Reviewed     Procedure Component Value Units Date/Time    Manual Differential(PHLEBS Do Not Order) [[id number]]  (Abnormal) Collected: 08/23/21 0629    Lab Status: Final result Specimen: Blood from Arm, Left Updated: 08/23/21 1151     Segmented % 89 %      Lymphocytes % 6 %      Monocytes % 1 %      Eosinophils, % 3 %      Basophils % 0 %      Atypical Lymphocytes % 1 %      Absolute Neutrophils 9 11 Thousand/uL      Lymphocytes Absolute 0 61 Thousand/uL      Monocytes Absolute 0 10 Thousand/uL      Eosinophils Absolute 0 31 Thousand/uL      Basophils Absolute 0 00 Thousand/uL      Total Counted --     RBC Morphology Present     Anisocytosis Present     Poikilocytes Present     Polychromasia Present     Platelet Estimate Increased     Artifact Present    CBC and differential [[id number]]  (Abnormal) Collected: 08/23/21 0629    Lab Status: Final result Specimen: Blood from Arm, Left Updated: 08/23/21 1151     WBC 10 24 Thousand/uL      RBC 3 50 Million/uL      Hemoglobin 10 8 g/dL      Hematocrit 32 6 %      MCV 93 fL      MCH 30 9 pg      MCHC 33 1 g/dL      RDW 14 1 %      MPV 9 2 fL      Platelets 854 Thousands/uL     Narrative: This is an appended report    These results have been appended to a previously verified report  Chronic Hepatitis Panel [[id number]]  (Normal) Collected: 08/23/21 0629    Lab Status: Final result Specimen: Blood from Arm, Left Updated: 08/23/21 1049     Hepatitis B Surface Ag Non-reactive     Hepatitis C Ab Non-reactive     Hep B C IgM Non-reactive     Hep B Core Total Ab Non-reactive    UA (URINE) with reflex to Scope [[id number]]  (Abnormal) Collected: 08/23/21 0629    Lab Status: Final result Specimen: Urine, Other Updated: 08/23/21 0802     Color, UA Dk Yellow     Clarity, UA Clear     Specific Gravity, UA 1 022     pH, UA 6 0     Leukocytes, UA Negative     Nitrite, UA Negative     Protein, UA Negative mg/dl      Glucose, UA Negative mg/dl      Ketones, UA Negative mg/dl      Urobilinogen, UA 2 0 E U /dl      Bilirubin, UA Interference- unable to analyze     Blood, UA Negative    TSH, 3rd generation [[id number]]  (Normal) Collected: 08/23/21 0629    Lab Status: Final result Specimen: Blood from Arm, Left Updated: 08/23/21 0710     TSH 3RD GENERATON 1 830 uIU/mL     Narrative:      Patients undergoing fluorescein dye angiography may retain small amounts of fluorescein in the body for 48-72 hours post procedure  Samples containing fluorescein can produce falsely depressed TSH values  If the patient had this procedure,a specimen should be resubmitted post fluorescein clearance        Protime-INR [[id number]]  (Normal) Collected: 08/23/21 0629    Lab Status: Final result Specimen: Blood from Arm, Left Updated: 08/23/21 0707     Protime 14 3 seconds      INR 1 11    APTT [[id number]]  (Normal) Collected: 08/23/21 0629    Lab Status: Final result Specimen: Blood from Arm, Left Updated: 08/23/21 0707     PTT 30 seconds     Comprehensive metabolic panel [[id number]]  (Abnormal) Collected: 08/23/21 0629    Lab Status: Final result Specimen: Blood from Arm, Left Updated: 08/23/21 0703     Sodium 135 mmol/L      Potassium 3 8 mmol/L      Chloride 108 mmol/L      CO2 23 mmol/L      ANION GAP 4 mmol/L      BUN 17 mg/dL      Creatinine 0 85 mg/dL      Glucose 116 mg/dL      Calcium 8 1 mg/dL      Corrected Calcium 9 5 mg/dL       U/L       U/L      Alkaline Phosphatase 445 U/L      Total Protein 6 8 g/dL      Albumin 2 3 g/dL      Total Bilirubin 1 16 mg/dL      eGFR 94 ml/min/1 73sq m     Narrative:      [facility] guidelines for Chronic Kidney Disease (CKD):     Stage 1 with normal or high GFR (GFR > 90 mL/min/1 73 square meters)    Stage 2 Mild CKD (GFR = 60-89 mL/min/1 73 square meters)    Stage 3A Moderate CKD (GFR = 45-59 mL/min/1 73 square meters)    Stage 3B Moderate CKD (GFR = 30-44 mL/min/1 73 square meters)    Stage 4 Severe CKD (GFR = 15-29 mL/min/1 73 square meters)    Stage 5 End Stage CKD (GFR <15 mL/min/1 73 square meters)  Note: GFR calculation is accurate only with a steady state creatinine    Phosphorus [[id number]]  (Normal) Collected: 08/23/21 0629    Lab Status: Final result Specimen: Blood from Arm, Left Updated: 08/23/21 0703     Phosphorus 3 5 mg/dL     Magnesium [[id number]]  (Normal) Collected: 08/23/21 0629    Lab Status: Final result Specimen: Blood from Arm, Left Updated: 08/23/21 0703     Magnesium 2 1 mg/dL     POCT urinalysis dipstick [[id number]]  (Normal) Resulted: 08/23/21 0240    Lab Status: Final result Specimen: Urine Updated: 08/23/21 0241     Color, UA -    Urine Macroscopic, POC [[id number]]  (Abnormal) Collected: 08/23/21 0239    Lab Status: Final result Specimen: Urine Updated: 08/23/21 0240     Color, UA Orange     Clarity, UA Clear     pH, UA 6 0     Leukocytes, UA Negative     Nitrite, UA Negative     Protein, UA Negative mg/dl      Glucose, UA Negative mg/dl      Ketones, UA Negative mg/dl      Urobilinogen, UA 4 0 E U /dl      Bilirubin, UA Interference- unable to analyze     Blood, UA Negative     Specific Gravity, UA 1 020    Narrative:      [facility] RESULT    Comprehensive metabolic panel [[id number]]  (Abnormal) Collected: 08/23/21 0013    Lab Status: Final result Specimen: Blood from Arm, Left Updated: 08/23/21 0235     Sodium 136 mmol/L      Potassium 3 4 mmol/L      Chloride 104 mmol/L      CO2 23 mmol/L      ANION GAP 9 mmol/L      BUN 17 mg/dL      Creatinine 0 89 mg/dL      Glucose 158 mg/dL      Calcium 8 9 mg/dL      Corrected Calcium 10 0 mg/dL       U/L       U/L      Alkaline Phosphatase 542 U/L      Total Protein 7 6 g/dL      Albumin 2 6 g/dL      Total Bilirubin 1 84 mg/dL      eGFR 92 ml/min/1 73sq m     Narrative:      [facility] guidelines for Chronic Kidney Disease (CKD):     Stage 1 with normal or high GFR (GFR > 90 mL/min/1 73 square meters)    Stage 2 Mild CKD (GFR = 60-89 mL/min/1 73 square meters)    Stage 3A Moderate CKD (GFR = 45-59 mL/min/1 73 square meters)    Stage 3B Moderate CKD (GFR = 30-44 mL/min/1 73 square meters)    Stage 4 Severe CKD (GFR = 15-29 mL/min/1 73 square meters)    Stage 5 End Stage CKD (GFR <15 mL/min/1 73 square meters)  Note: GFR calculation is accurate only with a steady state creatinine    Magnesium [[id number]]  (Normal) Collected: 08/23/21 0138    Lab Status: Final result Specimen: Blood from Arm, Left Updated: 08/23/21 0235     Magnesium 2 1 mg/dL     Phosphorus [[id number]]  (Normal) Collected: 08/23/21 0138    Lab Status: Final result Specimen: Blood from Arm, Left Updated: 08/23/21 0235     Phosphorus 3 3 mg/dL     CBC and differential [[id number]]  (Abnormal) Collected: 08/23/21 0138    Lab Status: Final result Specimen: Blood from Arm, Left Updated: 08/23/21 0214     WBC 11 34 Thousand/uL      RBC 3 96 Million/uL      Hemoglobin 12 1 g/dL      Hematocrit 36 5 %      MCV 92 fL      MCH 30 6 pg      MCHC 33 2 g/dL      RDW 13 8 %      MPV 9 7 fL      Platelets 077 Thousands/uL     Narrative: This is an appended report  These results have been appended to a previously verified report      Manual Differential(PHLEBS Do Not Order) [[id number]] (Abnormal) Collected: 08/23/21 0138    Lab Status: Final result Specimen: Blood from Arm, Left Updated: 08/23/21 0214     Segmented % 79 %      Bands % 2 %      Lymphocytes % 9 %      Monocytes % 4 %      Eosinophils, % 2 %      Basophils % 4 %      Absolute Neutrophils 9 19 Thousand/uL      Lymphocytes Absolute 1 02 Thousand/uL      Monocytes Absolute 0 45 Thousand/uL      Eosinophils Absolute 0 23 Thousand/uL      Basophils Absolute 0 45 Thousand/uL      Total Counted --     RBC Morphology Present     Anisocytosis Present     Platelet Estimate Adequate     Artifact Present                 XR chest 2 views   ED Interpretation by Trina Serrano MD (08/23 0404)   No acute pulmonary disease  Final Result by Velma Hoffman MD (08/23 8287)      No acute cardiopulmonary disease  Workstation performed: [id number]         US abdomen complete    (Results Pending)         Procedures  Procedures      ED Course  ED Course as of Aug 24 0634   Mon Aug 23, 2021   0130 Procedure Note: EKG  Date/Time: 08/23/21 3:15 AM   Interpreted by: Red Juarez MD  Indications / Diagnosis: Palpitations  ECG reviewed by me, the ED Physician: yes   The EKG demonstrates:  Rhythm: irregular rhythm without obvious p waves  Intervals: Slightly prolonged QTc  Axis: LAD  QRS/Blocks: normal QRS  ST Changes: No acute ST Changes, no STD/MIKE  Impression: A-fib with RVR      0156 Mild leukocytosis  WBC(!): 11 34   0157 Reviewed and without actionable derangement  CBC and differential(!)   0202 Improvement of HR following med administration  Pulse: 102   0242 Elevated but slightly decreased from prior     AST(!): 158   0242 Bili slightly elevated  TOTAL BILIRUBIN(!): 1 84   0243 SL AMB POCT UROBILINOGEN(!): 4 0   0243 WNL   Magnesium   0243 WNL   Phosphorus   0300 No acute abnormality on wet read     XR chest 2 views                             SBIRT 20yo+      Most Recent Value   SBIRT (24 yo +)   In order to provide better care to our patients, we are screening all of our patients for alcohol and drug use  Would it be okay to ask you these screening questions? No Filed at: 08/23/2021 0117                [facility]  Number of Diagnoses or Management Options  Elevated LFTs  Fatigue  New onset a-fib [facility])  Diagnosis management comments: Pt is a 61yo M who presents with palpitations  Exam pertinent for irregular rhythm and tachycardia  Differential diagnosis to include but not limited to AFib with RVR, SVT, infection, malignancy  EKG obtained upon arrival and shows irregularly irregular rhythm with tachycardia and no obvious P-waves  Patient most likely AFib with RVR  Patient fact the patient may have been going in and out of AFib over the past month, will avoid cardioversion at this time  Will plan to rate control with 5 mg of metoprolol  Will get workup including CBC, CMP, Mag, phos to evaluate for electrolyte abnormality as well as chest x-ray and UA to evaluate for infection  CBC shows mild leukocytosis but otherwise without actionable derangement  CMP shows elevated LFTs and alk-phos decreased but similar to most recent prior  Bilirubin also elevated and urine showing urobilinogen  Mild hypokalemia also noted and repleted  No evidence of infection in urine  Mag and phos within normal limits  Chest x-ray shows no acute abnormality on wet read  Based on the fact the patient has new onset AFib, will plan for admission  Patient and wife agreeable to plan  Plan to admit patient to IM  Patient discussed with admitting team and admission orders placed  Patient admitted without incident           Amount and/or Complexity of Data Reviewed  Clinical lab tests: reviewed and ordered  Tests in the radiology section of CPT®: ordered and reviewed  Discussion of test results with the performing providers: yes        Disposition  Final diagnoses:   New onset a-fib ([address] )   Fatigue   Elevated LFTs     Time reflects when diagnosis was documented in both MDM as applicable and the Disposition within this note     Time User Action Codes Description Comment    8/23/2021  3:14 AM Raynald Bora Add [I48 91] New onset a-fib ([address] )     8/23/2021  3:14 AM Raynald Bora Add [R53 83] Fatigue     8/23/2021  3:46 AM Raynald Bora Add [R79 89] Elevated LFTs       ED Disposition     ED Disposition Condition Date/Time Comment    Admit Stable Mon Aug 23, 2021  3:14 AM Case was discussed with AVERY and the patient's admission status was agreed to be Admission Status: observation status to the service of Dr Maulik Go  Follow-up Information     Follow up With Specialties Details Why [address], [address] [place] [address] Cardiology, Cardiology Imaging, Nurse Practitioner Follow up on 9/27/2021 1 pm  Please arrive 15 minutes prior to scheduled appointment time and bring an updated list of medications  [address]  [address]  [phone]            Current Discharge Medication List      CONTINUE these medications which have NOT CHANGED    Details   amLODIPine (NORVASC) 5 mg tablet TAKE 1 TABLET DAILY  Qty: 90 tablet, Refills: 3    Associated Diagnoses: Essential hypertension      ascorbic acid (VITAMIN C) 500 mg tablet Take 500 mg by mouth daily      Chromium 500 MCG TABS Take by mouth      Coenzyme Q10 (COQ-10) 10 MG CAPS Take by mouth      Multiple Vitamins-Minerals (MENS MULTIVITAMIN PLUS PO) Take by mouth      olmesartan-hydrochlorothiazide (BENICAR HCT) 40-12 5 MG per tablet TAKE 1 TABLET DAILY  Qty: 90 tablet, Refills: 3    Associated Diagnoses: Essential hypertension      Omega-3 Fatty Acids (FISH OIL) 1200 MG CAPS Take by mouth      simvastatin (ZOCOR) 40 mg tablet TAKE 1 TABLET DAILY AT BEDTIME  Qty: 90 tablet, Refills: 3    Associated Diagnoses: Hyperlipidemia, unspecified hyperlipidemia type      Zinc 50 MG CAPS Take by mouth           No discharge procedures on file      PDMP Review     None           ED Provider  Attending physically available and evaluated Mojica Lanie  FALLON managed the patient along with the ED Attending      Electronically Signed by         Malik Ramirez MD  08/24/21 9245

## 2021-08-23 NOTE — ASSESSMENT & PLAN NOTE
Paroxysmal atrial fibrillation converted on its own currently sinus rhythm after repletion of potassium by IV and by mouth total 40 mEq  Patient did not have history of atrial fibrillation; will get echo  Most likely brought about by use of hydrochlorothiazide causing hypokalemia  Will remove hydrochlorothiazide from current medication  Will recheck metabolic profile with CBC

## 2021-08-23 NOTE — CONSULTS
Consultation - Cardiology Team One  Festus Myers 64 y o  male MRN: [id number]  Unit/Bed#: -01 Encounter: [id number]    Inpatient consult to Cardiology  Consult performed by: TAMICA Brewster  Consult ordered by: Donovan Ravi PA-C      Physician Requesting Consult: Shankar Cruz MD  Reason for Consult / Principal Problem: atrial fibrillation    Assessment/ Plan    1  New onset paroxysmal atrial fibrillation  Initial ECG showed Afib with RVR and PVCs    He was given 5 mg IV Lopressor and potassium was repleted  He converted to NSR at some point after this but went back into afib around 0830 this am  Rates ranging from 80s-110s and patient is symptomatic with palpitations  Start metoprolol tartrate 12 5 mg q6 hours  K 3 8- replete, Mg 2 1, TSH WNL  Check echocardiogram  [id number] (HTN)  Hold off on [facility] for now  If EF reduced will need to add [facility] for stroke risk reduction- patient agreeable  2  HTN- now borderline hypotensive  Patient states BP has been low for him for a week or more  D/C amlodipine in favor of BB for rate control  Continue to monitor BPs     3  Dyslipidemia- on simvastatin 40 mg daily at home  , , HDL 42, LDL 98    4  Tobacco use- 1/2 ppd since age 12, used to smoke more when he was younger  Encouraged cessation  5  Elevated LFTs- trending down this morning  Abd ultrasound ordered per primary team  Denies gastrointestinal symptoms  History of Present Illness   HPI: Festus Myers is a 64y o  year old male smoker with HTN and dyslipidemia who presented to the ED yesterday with c/o intermittent palpitations x 1 month, fatigue, and lightheadedness  He was recently at [facility] due to these complaints but found to be in NSR and sent home  Last night he was wearing a monitor that alerted him that he might be in afib so he came to the ED for further evaluation   ECG showed rapid atrial fibrillation with  bpm  Labs showed normal renal function with mild hypokalemia with K 3 4  TSH WNL and Mg 2 1  LFTs were also elevated and an abdominal ultrasound has been requested  He was given 5 mg IV lopressor with improvement in rates  An echocardiogram has been ordered and cardiology consulted for further evaluation  He lives at home with his wife and works as a  for [employer]  He smokes about 1/2 ppd and has been smoking since age 12  He smoked closer to 1 ppd while in the service  He rarely drinks alcohol and denies any drug use  He does drink caffeine but denies excessive amounts  He is fairly active at baseline around the house/yard and admits to some fatigue recently  He has limited his activities because of the palpitations and lightheadedness  EKG reviewed personally: Afib with RVR,     Telemetry reviewed personally: NSR until 0830  Since 0830 Afib with rates 80s-110s  Review of Systems   Constitutional: Positive for malaise/fatigue  Negative for chills, diaphoresis and weight gain  Cardiovascular: Positive for irregular heartbeat, near-syncope and palpitations  Negative for chest pain, dyspnea on exertion, leg swelling, orthopnea and syncope  Respiratory: Negative for cough, shortness of breath, sleep disturbances due to breathing and sputum production  Gastrointestinal: Negative for bloating, abdominal pain, nausea and vomiting  Neurological: Positive for dizziness and light-headedness  Negative for weakness  All other systems reviewed and are negative      Historical Information   Past Medical History:   Diagnosis Date    Disc degeneration, lumbosacral 1/6/2016    Hyperlipidemia     hypercholesterolemia    Hypertension     last assessed 11/22/17    Pneumonia of both lower lobes due to infectious organism 1/29/2018     Past Surgical History:   Procedure Laterality Date    COLONOSCOPY      HEMORRHOID SURGERY      KNEE SURGERY Bilateral     FL COLONOSCOPY FLX DX W/COLLJ SPEC WHEN PFRMD N/A 1/5/2019    Procedure: COLONOSCOPY;  Surgeon: Elsa Mooney MD;  [facility]; Service: Gastroenterology    UPPER GASTROINTESTINAL ENDOSCOPY       Social History     Substance and Sexual Activity   Alcohol Use Yes    Comment: Occassional 1 x / month     Social History     Substance and Sexual Activity   Drug Use No     Social History     Tobacco Use   Smoking Status Current Every Day Smoker    Packs/day: 0 50    Years: 40 00    Pack years: 20 00    Types: Cigarettes   Smokeless Tobacco Never Used     Family History:   Family History   Problem Relation Age of Onset    Obesity Mother     Aneurysm Father        Meds/Allergies   all current active meds have been reviewed and current meds:   Current Facility-Administered Medications   Medication Dose Route Frequency    acetaminophen (TYLENOL) tablet 650 mg  650 mg Oral Q6H PRN    aluminum-magnesium hydroxide-simethicone (MYLANTA) oral suspension 30 mL  30 mL Oral Q6H PRN    amLODIPine (NORVASC) tablet 5 mg  5 mg Oral Daily    ascorbic acid (VITAMIN C) tablet 500 mg  500 mg Oral Daily    docusate sodium (COLACE) capsule 100 mg  100 mg Oral BID    enoxaparin (LOVENOX) subcutaneous injection 40 mg  40 mg Subcutaneous Daily    fish oil capsule 1,000 mg  1,000 mg Oral Daily    multivitamin-minerals (CENTRUM) tablet 1 tablet  1 tablet Oral Daily    nicotine (NICODERM CQ) 21 mg/24 hr TD 24 hr patch 1 patch  1 patch Transdermal Daily    ondansetron (ZOFRAN) injection 4 mg  4 mg Intravenous Q6H PRN    pravastatin (PRAVACHOL) tablet 80 mg  80 mg Oral Daily With Dinner    zinc sulfate (ZINCATE) capsule 220 mg  220 mg Oral Daily        No Known Allergies    Objective   Vitals: Blood pressure 114/75, pulse 77, temperature 97 7 °F (36 5 °C), resp  rate 17, weight 125 kg (275 lb), SpO2 96 %  , Body mass index is 35 31 kg/m²  ,     Systolic (00HQQ), SDV:353 , Min:93 , XFN:993     Diastolic (85BCE), RDV:82, Min:51, Max:75      Intake/Output Summary (Last 24 hours) at 8/23/2021 0840  Last data filed at 8/23/2021 0540  Gross per 24 hour   Intake 100 ml   Output --   Net 100 ml     Wt Readings from Last 3 Encounters:   08/23/21 125 kg (275 lb)   08/22/21 125 kg (275 lb)   08/09/21 123 kg (271 lb)     Invasive Devices     Peripheral Intravenous Line            Peripheral IV 08/23/21 Left Antecubital <1 day              Physical Exam  Vitals reviewed  Constitutional:       General: He is not in acute distress  Appearance: Normal appearance  Neck:      Vascular: No hepatojugular reflux or JVD  Cardiovascular:      Rate and Rhythm: Tachycardia present  Rhythm irregularly irregular  Heart sounds: Normal heart sounds  No murmur heard  No friction rub  No gallop  Pulmonary:      Effort: Pulmonary effort is normal  No respiratory distress  Breath sounds: No rales  Comments: Room air  Abdominal:      General: Bowel sounds are normal  There is no distension  Palpations: Abdomen is soft  Tenderness: There is no abdominal tenderness  Musculoskeletal:         General: No tenderness  Normal range of motion  Cervical back: Normal range of motion and neck supple  Right lower leg: No edema  Left lower leg: No edema  Skin:     General: Skin is warm and dry  Capillary Refill: Capillary refill takes less than 2 seconds  Findings: No erythema  Neurological:      Mental Status: He is alert and oriented to person, place, and time     Psychiatric:         Mood and Affect: Mood normal      LABORATORY RESULTS:      CBC with diff:   Results from last 7 days   Lab Units 08/23/21  0629 08/23/21  0138   WBC Thousand/uL 10 24* 11 34*   HEMOGLOBIN g/dL 10 8* 12 1   HEMATOCRIT % 32 6* 36 5   MCV fL 93 92   PLATELETS Thousands/uL 401* 451*   MCH pg 30 9 30 6   MCHC g/dL 33 1 33 2   RDW % 14 1 13 8   MPV fL 9 2 9 7     CMP:  Results from last 7 days   Lab Units 08/23/21  0629 08/23/21  0138   POTASSIUM mmol/L 3 8 3 4*   CHLORIDE mmol/L 108 104   CO2 mmol/L 23 23   BUN mg/dL 17 17   CREATININE mg/dL 0 85 0 89   CALCIUM mg/dL 8 1* 8 9   AST U/L 125* 158*   ALT U/L 266* 309*   ALK PHOS U/L 445* 542*   EGFR ml/min/1 73sq m 94 92     BMP:  Results from last 7 days   Lab Units 08/23/21  0629 08/23/21  0138   POTASSIUM mmol/L 3 8 3 4*   CHLORIDE mmol/L 108 104   CO2 mmol/L 23 23   BUN mg/dL 17 17   CREATININE mg/dL 0 85 0 89   CALCIUM mg/dL 8 1* 8 9     Lab Results   Component Value Date    CREATININE 0 85 08/23/2021    CREATININE 0 89 08/23/2021    CREATININE 0 93 05/22/2021     Results from last 7 days   Lab Units 08/23/21  0629 08/23/21  0138   MAGNESIUM mg/dL 2 1 2 1     Results from last 7 days   Lab Units 08/23/21  0629   TSH 3RD GENERATON uIU/mL 1 830     Results from last 7 days   Lab Units 08/23/21  0629   INR  1 11     Lipid Profile:   Lab Results   Component Value Date    CHOL 156 11/22/2017    CHOL 159 10/22/2016    CHOL 157 01/09/2016     Lab Results   Component Value Date    HDL 42 05/22/2021    HDL 38 (L) 04/08/2020    HDL 40 (L) 05/25/2019     Lab Results   Component Value Date    LDLCALC 98 05/22/2021    LDLCALC 86 04/08/2020    LDLCALC 89 05/25/2019     Lab Results   Component Value Date    TRIG 177 (H) 05/22/2021    TRIG 150 (H) 04/08/2020    TRIG 157 (H) 05/25/2019     Imaging: I have personally reviewed pertinent reports  and I have personally reviewed pertinent films in PACS    Counseling / Coordination of Care  Total floor / unit time spent today 45 minutes  Greater than 50% of total time was spent with the patient and / or family counseling and / or coordination of care  A description of the counseling / coordination of care: Review of history, current assessment, development of a plan  Code Status: Level 1 - Full Code    ** Please Note: Dragon 360 Dictation voice to text software may have been used in the creation of this document   **

## 2021-08-23 NOTE — ASSESSMENT & PLAN NOTE
· New-onset  · Was noted to convert in ED apparently after 5mg IV Lopressor and potassium repletion   Now back in A  fib  · Echo pending  · Continue telemetry monitoring  · Cardiology consult

## 2021-08-23 NOTE — ASSESSMENT & PLAN NOTE
· Patient noted to be on HCTZ as outpatient   This is currently on hold  · Resolved with repletion  · Mag was WNL

## 2021-08-23 NOTE — ASSESSMENT & PLAN NOTE
· Unclear etiology  · Improving  · Abdominal US pending  · Hold statin  · Consider GI consult  · CMP in AM

## 2021-08-23 NOTE — ASSESSMENT & PLAN NOTE
· Benign essential hypertension is being treated with Norvasc at 5 mg daily and olmesartan/hydrochlorothiazide 40/12 5 mg daily  · With hypotension overnight   Home antihypertensives on hold

## 2021-08-23 NOTE — PROGRESS NOTES
[address] [facility]  Progress Note - Marissa Jacobsen 1960, 64 y o  male MRN: [id number]  Unit/Bed#: -01 Encounter: [id number]  Primary Care Provider: Betty Rodriguez MD   Date and time admitted to hospital: 8/23/2021  1:02 AM    POST-ADMIT CHECK    * New onset atrial fibrillation [facility])  Assessment & Plan  · New-onset  · Was noted to convert in ED apparently after 5mg IV Lopressor and potassium repletion  Now back in A  fib  · Echo pending  · Continue telemetry monitoring  · Cardiology consult      Transaminitis  Assessment & Plan  · Unclear etiology  · Improving  · Abdominal US pending  · Hold statin  · Consider GI consult  · CMP in AM    Hypokalemia  Assessment & Plan  · Patient noted to be on HCTZ as outpatient  This is currently on hold  · Resolved with repletion  · Mag was WNL    Hypertension  Assessment & Plan  · Benign essential hypertension is being treated with Norvasc at 5 mg daily and olmesartan/hydrochlorothiazide 40/12 5 mg daily  · With hypotension overnight  Home antihypertensives on hold     Hyperlipidemia  Assessment & Plan  · Statin held in light of elevated LFTs    Tobacco abuse  Assessment & Plan  · Patient smokes 1/2 pack per day  · On nicotine patch   · Encourage cessation         VTE Pharmacologic Prophylaxis:   Lovenox     Patient Centered Rounds: I performed bedside rounds with nursing staff today  Bryson Ohara  Discussions with Specialists or Other Care Team Provider: [facility]    Education and Discussions with Family / Patient: Patient declined call to   Time Spent for Care: 30 minutes  More than 50% of total time spent on counseling and coordination of care as described above      Current Length of Stay: 0 day(s)  Current Patient Status: Observation   Certification Statement: The patient, admitted on an observation basis, will now require > 2 midnight hospital stay due to pending work up of new A fib and transaminitis as above Discharge Plan: once cleared by Cardiology and pending work up of A fib and elevated LFTs    Code Status: Level 1 - Full Code    Subjective:   Mr Rafaela Rodriguez reports that he has been feeling fatigued and with palpitations for the last 1 month  He reports feeling better this AM after the potassium     Objective:     Vitals:   Temp (24hrs), Av 8 °F (36 6 °C), Min:97 7 °F (36 5 °C), Max:97 9 °F (36 6 °C)    Temp:  [97 7 °F (36 5 °C)-97 9 °F (36 6 °C)] 97 7 °F (36 5 °C)  HR:  [] 77  Resp:  [15-18] 17  BP: ()/(51-75) 114/75  SpO2:  [95 %-97 %] 96 %  Body mass index is 35 31 kg/m²  Input and Output Summary (last 24 hours): Intake/Output Summary (Last 24 hours) at 2021 0902  Last data filed at 2021 0540  Gross per 24 hour   Intake 100 ml   Output --   Net 100 ml       Physical Exam:   Physical Exam  Vitals and nursing note reviewed  Constitutional:       Comments: Patient seen sitting in bed comfortably resting, NAD   Cardiovascular:      Rate and Rhythm: Normal rate  Rhythm irregular  Pulmonary:      Effort: Pulmonary effort is normal  No respiratory distress  Breath sounds: Normal breath sounds  Abdominal:      General: Bowel sounds are normal       Palpations: Abdomen is soft  Tenderness: There is no abdominal tenderness  Musculoskeletal:      Right lower leg: No edema  Left lower leg: No edema  Skin:     General: Skin is warm  Neurological:      Mental Status: He is alert and oriented to person, place, and time     Psychiatric:         Mood and Affect: Mood normal          Behavior: Behavior normal         Additional Data:     Labs:  Results from last 7 days   Lab Units 21  0629 21  0138   WBC Thousand/uL 10 24* 11 34*   HEMOGLOBIN g/dL 10 8* 12 1   HEMATOCRIT % 32 6* 36 5   PLATELETS Thousands/uL 401* 451*   BANDS PCT %  --  2   LYMPHO PCT %  --  9*   MONO PCT %  --  4   EOS PCT %  --  2     Results from last 7 days   Lab Units 21  0567 SODIUM mmol/L 135*   POTASSIUM mmol/L 3 8   CHLORIDE mmol/L 108   CO2 mmol/L 23   BUN mg/dL 17   CREATININE mg/dL 0 85   ANION GAP mmol/L 4   CALCIUM mg/dL 8 1*   ALBUMIN g/dL 2 3*   TOTAL BILIRUBIN mg/dL 1 16*   ALK PHOS U/L 445*   ALT U/L 266*   AST U/L 125*   GLUCOSE RANDOM mg/dL 116     Results from last 7 days   Lab Units 08/23/21  0629   INR  1 11                   Lines/Drains:  Invasive Devices     Peripheral Intravenous Line            Peripheral IV 08/23/21 Left Antecubital <1 day                  Telemetry:  Telemetry Orders (From admission, onward)             48 Hour Telemetry Monitoring  Continuous x 48 hours     Question:  Reason for 48 Hour Telemetry  Answer:  Arrhythmias Requiring Medical Therapy (eg  SVT, Vtach/fib, Bradycardia, Uncontrolled A-fib)                 Telemetry Reviewed: A fib   Indication for Continued Telemetry Use: Arrthymias requiring medical therapy           Imaging: No pertinent imaging reviewed  Recent Cultures (last 7 days):         Last 24 Hours Medication List:   Current Facility-Administered Medications   Medication Dose Route Frequency Provider Last Rate    acetaminophen  650 mg Oral Q6H PRN Avi Galarza MD      aluminum-magnesium hydroxide-simethicone  30 mL Oral Q6H PRN Avi Galarza MD      ascorbic acid  500 mg Oral Daily Avi Galarza MD      docusate sodium  100 mg Oral BID Avi Galarza MD      enoxaparin  40 mg Subcutaneous Daily Avi Galarza MD      fish oil  1,000 mg Oral Daily Avi Galarza MD      multivitamin-minerals  1 tablet Oral Daily Avi Galarza MD      nicotine  1 patch Transdermal Daily Avi Galarza MD      ondansetron  4 mg Intravenous Q6H PRN Avi Galarza MD      zinc sulfate  220 mg Oral Daily Avi Galarza MD          Today, Patient Was Seen By: Marc Whiteside PA-C    **Please Note: This note may have been constructed using a voice recognition system  **

## 2021-08-24 ENCOUNTER — HOSPITAL ENCOUNTER (EMERGENCY)
Facility: HOSPITAL | Age: 61
Discharge: HOME | End: 2021-08-24
Attending: EMERGENCY MEDICINE | Admitting: EMERGENCY MEDICINE
Payer: COMMERCIAL

## 2021-08-24 ENCOUNTER — TRANSITIONAL CARE MANAGEMENT (OUTPATIENT)
Dept: FAMILY MEDICINE CLINIC | Facility: CLINIC | Age: 61
End: 2021-08-24

## 2021-08-24 VITALS
SYSTOLIC BLOOD PRESSURE: 115 MMHG | HEART RATE: 74 BPM | TEMPERATURE: 98.1 F | RESPIRATION RATE: 18 BRPM | OXYGEN SATURATION: 97 % | DIASTOLIC BLOOD PRESSURE: 79 MMHG

## 2021-08-24 VITALS
SYSTOLIC BLOOD PRESSURE: 157 MMHG | BODY MASS INDEX: 34.9 KG/M2 | RESPIRATION RATE: 16 BRPM | OXYGEN SATURATION: 97 % | HEART RATE: 78 BPM | TEMPERATURE: 97.7 F | WEIGHT: 271.83 LBS | DIASTOLIC BLOOD PRESSURE: 80 MMHG

## 2021-08-24 DIAGNOSIS — I48.0 PAROXYSMAL A-FIB (HCC): Primary | ICD-10-CM

## 2021-08-24 LAB
ALBUMIN SERPL BCP-MCNC: 2.5 G/DL (ref 3.5–5)
ALP SERPL-CCNC: 412 U/L (ref 46–116)
ALT SERPL W P-5'-P-CCNC: 219 U/L (ref 12–78)
ANION GAP SERPL CALCULATED.3IONS-SCNC: 5 MMOL/L (ref 4–13)
AST SERPL W P-5'-P-CCNC: 77 U/L (ref 5–45)
ATRIAL RATE: 71 BPM
ATRIAL RATE: 73 BPM
BILIRUB SERPL-MCNC: 0.87 MG/DL (ref 0.2–1)
BUN SERPL-MCNC: 14 MG/DL (ref 5–25)
CALCIUM ALBUM COR SERPL-MCNC: 9.6 MG/DL (ref 8.3–10.1)
CALCIUM SERPL-MCNC: 8.4 MG/DL (ref 8.3–10.1)
CHLORIDE SERPL-SCNC: 107 MMOL/L (ref 100–108)
CO2 SERPL-SCNC: 24 MMOL/L (ref 21–32)
CREAT SERPL-MCNC: 0.78 MG/DL (ref 0.6–1.3)
ERYTHROCYTE [DISTWIDTH] IN BLOOD BY AUTOMATED COUNT: 14.2 % (ref 11.6–15.1)
GFR SERPL CREATININE-BSD FRML MDRD: 97 ML/MIN/1.73SQ M
GLUCOSE SERPL-MCNC: 116 MG/DL (ref 65–140)
HCT VFR BLD AUTO: 34.2 % (ref 36.5–49.3)
HGB BLD-MCNC: 11.1 G/DL (ref 12–17)
MCH RBC QN AUTO: 30.3 PG (ref 26.8–34.3)
MCHC RBC AUTO-ENTMCNC: 32.5 G/DL (ref 31.4–37.4)
MCV RBC AUTO: 93 FL (ref 82–98)
P AXIS: 42 DEGREES
P AXIS: 57 DEGREES
PLATELET # BLD AUTO: 475 THOUSANDS/UL (ref 149–390)
PMV BLD AUTO: 9.2 FL (ref 8.9–12.7)
POTASSIUM SERPL-SCNC: 4 MMOL/L (ref 3.5–5.3)
PR INTERVAL: 182 MS
PR INTERVAL: 224 MS
PROT SERPL-MCNC: 7.2 G/DL (ref 6.4–8.2)
QRS AXIS: -31 DEGREES
QRS AXIS: -35 DEGREES
QRSD INTERVAL: 80 MS
QRSD INTERVAL: 88 MS
QT INTERVAL: 386 MS
QT INTERVAL: 410 MS
QTC INTERVAL: 419 MS
QTC INTERVAL: 451 MS
RBC # BLD AUTO: 3.66 MILLION/UL (ref 3.88–5.62)
SODIUM SERPL-SCNC: 136 MMOL/L (ref 136–145)
T WAVE AXIS: -7 DEGREES
T WAVE AXIS: 10 DEGREES
VENTRICULAR RATE: 71 BPM
VENTRICULAR RATE: 73 BPM
WBC # BLD AUTO: 9.06 THOUSAND/UL (ref 4.31–10.16)

## 2021-08-24 PROCEDURE — 85027 COMPLETE CBC AUTOMATED: CPT | Performed by: HOSPITALIST

## 2021-08-24 PROCEDURE — 93005 ELECTROCARDIOGRAM TRACING: CPT

## 2021-08-24 PROCEDURE — 99284 EMERGENCY DEPT VISIT MOD MDM: CPT

## 2021-08-24 PROCEDURE — 80053 COMPREHEN METABOLIC PANEL: CPT | Performed by: HOSPITALIST

## 2021-08-24 PROCEDURE — 93010 ELECTROCARDIOGRAM REPORT: CPT | Performed by: INTERNAL MEDICINE

## 2021-08-24 PROCEDURE — 99214 OFFICE O/P EST MOD 30 MIN: CPT | Performed by: INTERNAL MEDICINE

## 2021-08-24 PROCEDURE — 99217 PR OBSERVATION CARE DISCHARGE MANAGEMENT: CPT | Performed by: PHYSICIAN ASSISTANT

## 2021-08-24 PROCEDURE — 99284 EMERGENCY DEPT VISIT MOD MDM: CPT | Performed by: EMERGENCY MEDICINE

## 2021-08-24 RX ORDER — METOPROLOL SUCCINATE 25 MG/1
25 TABLET, EXTENDED RELEASE ORAL DAILY
Qty: 30 TABLET | Refills: 0 | Status: SHIPPED | OUTPATIENT
Start: 2021-08-25 | End: 2021-08-24 | Stop reason: SDUPTHER

## 2021-08-24 RX ADMIN — DOCUSATE SODIUM 100 MG: 100 CAPSULE, LIQUID FILLED ORAL at 08:39

## 2021-08-24 RX ADMIN — MULTIPLE VITAMINS W/ MINERALS TAB 1 TABLET: TAB ORAL at 08:39

## 2021-08-24 RX ADMIN — OMEGA-3 FATTY ACIDS CAP 1000 MG 1000 MG: 1000 CAP at 08:39

## 2021-08-24 RX ADMIN — ZINC SULFATE 220 MG (50 MG) CAPSULE 220 MG: CAPSULE at 08:39

## 2021-08-24 RX ADMIN — OXYCODONE HYDROCHLORIDE AND ACETAMINOPHEN 500 MG: 500 TABLET ORAL at 08:39

## 2021-08-24 RX ADMIN — METOPROLOL SUCCINATE 25 MG: 25 TABLET, FILM COATED, EXTENDED RELEASE ORAL at 08:39

## 2021-08-24 NOTE — DISCHARGE INSTRUCTIONS
Follow up with your PCP as already scheduled for post hospitalization follow up and regarding elevated liver enzymes      A-fib (Atrial Fibrillation)   WHAT YOU NEED TO KNOW:   A-fib may come and go, or it may be a long-term condition  A-fib can cause blood clots, stroke, or heart failure  These conditions may become life-threatening  It is important to treat and manage A-fib to help prevent a blood clot, stroke, or heart failure  DISCHARGE INSTRUCTIONS:   Call your local emergency number (911 in the [address] [facility]) or have someone call if:   · You have any of the following signs of a heart attack:      ? Squeezing, pressure, or pain in your chest    ? You may  also have any of the following:     § Discomfort or pain in your back, neck, jaw, stomach, or arm    § Shortness of breath    § Nausea or vomiting    § Lightheadedness or a sudden cold sweat    · You have any of the following signs of a stroke:      ? Numbness or drooping on one side of your face     ? Weakness in an arm or leg    ? Confusion or difficulty speaking    ? Dizziness, a severe headache, or vision loss    Call your doctor or cardiologist if:   · Your arm or leg feels warm, tender, and painful  It may look swollen and red  · Your heart rate is more than 110 beats per minute  · You have new or worsening swelling in your legs, feet, ankles, or abdomen  · You are short of breath, even at rest     · You have questions or concerns about your condition or care  Medicines: You may need any of the following:  · Heart medicines  help control your heart rate or rhythm  You may need more than one medicine to treat your symptoms  · Blood thinners  help prevent blood clots  Clots can cause strokes, heart attacks, and death  The following are general safety guidelines to follow while you are taking a blood thinner:    ? Watch for bleeding and bruising while you take blood thinners  Watch for bleeding from your gums or nose   Watch for blood in your urine and bowel movements  Use a soft washcloth on your skin, and a soft toothbrush to brush your teeth  This can keep your skin and gums from bleeding  If you shave, use an electric shaver  Do not play contact sports  ? Tell your dentist and other healthcare providers that you take a blood thinner  Wear a bracelet or necklace that says you take this medicine  ? Do not start or stop any other medicines unless your healthcare provider tells you to  Many medicines cannot be used with blood thinners  ? Take your blood thinner exactly as prescribed by your healthcare provider  Do not skip does or take less than prescribed  Tell your provider right away if you forget to take your blood thinner, or if you take too much  ? Warfarin  is a blood thinner that you may need to take  The following are things you should be aware of if you take warfarin:     § Foods and medicines can affect the amount of warfarin in your blood  Do not make major changes to your diet while you take warfarin  Warfarin works best when you eat about the same amount of vitamin K every day  Vitamin K is found in green leafy vegetables and certain other foods  Ask for more information about what to eat when you are taking warfarin  § You will need to see your healthcare provider for follow-up visits when you are on warfarin  You will need regular blood tests  These tests are used to decide how much medicine you need  · Antiplatelets , such as aspirin, help prevent blood clots  Take your antiplatelet medicine exactly as directed  These medicines make it more likely for you to bleed or bruise  If you are told to take aspirin, do not take acetaminophen or ibuprofen instead  · Take your medicine as directed  Contact your healthcare provider if you think your medicine is not helping or if you have side effects  Tell him or her if you are allergic to any medicine  Keep a list of the medicines, vitamins, and herbs you take   Include the amounts, and when and why you take them  Bring the list or the pill bottles to follow-up visits  Carry your medicine list with you in case of an emergency  Manage A-fib:   · Know your target heart rate  Learn how to check your pulse and monitor your heart rate  · Know the risks if you choose to drink alcohol  Alcohol can increase your risk for A-fib or make A-fib harder to manage  Ask your healthcare provider if it is okay for you to drink any alcohol  He or she can help you set limits for the number of drinks you have in 24 hours and in a week  A drink of alcohol is 12 ounces of beer, 5 ounces of wine, or 1½ ounces of liquor  · Do not smoke  Nicotine can cause heart damage and make it more difficult to manage your A-fib  Do not use e-cigarettes or smokeless tobacco in place of cigarettes or to help you quit  They still contain nicotine  Ask your healthcare provider for information if you currently smoke and need help quitting  · Eat heart-healthy foods  Heart healthy foods will help keep your cholesterol low  These include fruits, vegetables, whole-grain breads, low-fat dairy products, beans, lean meats, and fish  Replace butter and margarine with heart-healthy oils such as olive oil and canola oil  · Maintain a healthy weight  Ask your healthcare provider what a healthy weight is for you  Ask him or her to help you create a safe weight loss plan if you are overweight  Even a small goal of a 10% weight loss can improve your heart health  · Get regular physical activity  Physical activity helps improve your heart health  Get at least 150 minutes of moderate aerobic physical activity each week  Your healthcare provider can help you create an activity plan  · Manage other health conditions  This includes high blood pressure or cholesterol, sleep apnea, diabetes, and other heart conditions  Take medicine as directed and follow your treatment plan   Your healthcare provider may need to change a medicine you are taking if it is causing your A-fib  Do not  stop taking any medicine unless directed by your provider  Follow up with your doctor or cardiologist as directed: You will need regular blood tests and monitoring  Write down your questions so you remember to ask them during your visits  © [employer] 2021 Information is for End User's use only and may not be sold, redistributed or otherwise used for commercial purposes  All illustrations and images included in CareNotes® are the copyrighted property of [employer]  or [facility] Mathias   The above information is an  only  It is not intended as medical advice for individual conditions or treatments  Talk to your doctor, nurse or pharmacist before following any medical regimen to see if it is safe and effective for you  Heart Healthy Diet   WHAT YOU NEED TO KNOW:   A heart healthy diet is an eating plan low in unhealthy fats and sodium (salt)  The plan is high in healthy fats and fiber  A heart healthy diet helps improve your cholesterol levels and lowers your risk for heart disease and stroke  A dietitian will teach you how to read and understand food labels  DISCHARGE INSTRUCTIONS:   Heart healthy diet guidelines to follow:   · Choose foods that contain healthy fats  ? Unsaturated fats  include monounsaturated and polyunsaturated fats  Unsaturated fat is found in foods such as soybean, canola, olive, corn, and safflower oils  It is also found in soft tub margarine that is made with liquid vegetable oil  ? Omega-3 fat  is found in certain fish, such as salmon, tuna, and trout, and in walnuts and flaxseed  Eat fish high in omega-3 fats at least 2 times a week  · Get 20 to 30 grams of fiber each day  Fruits, vegetables, whole-grain foods, and legumes (cooked beans) are good sources of fiber  · Limit or do not have unhealthy fats  ?  Cholesterol  is found in animal foods, such as eggs and lobster, and in dairy products made from whole milk  Limit cholesterol to less than 200 mg each day  ? Saturated fat  is found in meats, such as lake and hamburger  It is also found in chicken or turkey skin, whole milk, and butter  Limit saturated fat to less than 7% of your total daily calories  ? Trans fat  is found in packaged foods, such as potato chips and cookies  It is also in hard margarine, some fried foods, and shortening  Do not eat foods that contain trans fats  · Limit sodium as directed  You may be told to limit sodium to 2,000 to 2,300 mg each day  Choose low-sodium or no-salt-added foods  Add little or no salt to food you prepare  Use herbs and spices in place of salt  Include the following in your heart healthy plan:  Ask your dietitian or healthcare provider how many servings to have from each of the following food groups:  · Grains:      ? Whole-wheat breads, cereals, and pastas, and brown rice    ? Low-fat, low-sodium crackers and chips    · Vegetables:      ? Broccoli, green beans, green peas, and spinach    ? Collards, kale, and lima beans    ? Carrots, sweet potatoes, tomatoes, and peppers    ? Canned vegetables with no salt added    · Fruits:      ? Bananas, peaches, pears, and pineapple    ? Grapes, raisins, and dates    ? Oranges, tangerines, grapefruit, orange juice, and grapefruit juice    ? Apricots, mangoes, melons, and papaya    ? Raspberries and strawberries    ? Canned fruit with no added sugar    · Low-fat dairy:      ? Nonfat (skim) milk, 1% milk, and low-fat almond, cashew, or soy milks fortified with calcium    ? Low-fat cheese, regular or frozen yogurt, and cottage cheese    · Meats and proteins:      ? Lean cuts of beef and pork (loin, leg, round), skinless chicken and turkey    ? Legumes, soy products, egg whites, or nuts    Limit or do not include the following in your heart healthy plan:   · Unhealthy fats and oils:      ?  Whole or 2% milk, cream cheese, sour cream, or cheese    ? High-fat cuts of beef (T-bone steaks, ribs), chicken or turkey with skin, and organ meats such as liver    ? Butter, stick margarine, shortening, and cooking oils such as coconut or palm oil    · Foods and liquids high in sodium:      ? Packaged foods, such as frozen dinners, cookies, macaroni and cheese, and cereals with more than 300 mg of sodium per serving    ? Vegetables with added sodium, such as instant potatoes, vegetables with added sauces, or regular canned vegetables    ? Cured or smoked meats, such as hot dogs, lake, and sausage    ? High-sodium ketchup, barbecue sauce, salad dressing, pickles, olives, soy sauce, or miso    · Foods and liquids high in sugar:      ? Candy, cake, cookies, pies, or doughnuts    ? Soft drinks (soda), sports drinks, or sweetened tea    ? Canned or dry mixes for cakes, soups, sauces, or gravies    Other healthy heart guidelines:   · Do not smoke  Nicotine and other chemicals in cigarettes and cigars can cause lung and heart damage  Ask your healthcare provider for information if you currently smoke and need help to quit  E-cigarettes or smokeless tobacco still contain nicotine  Talk to your healthcare provider before you use these products  · Limit or do not drink alcohol as directed  Alcohol can damage your heart and raise your blood pressure  Your healthcare provider may give you specific daily and weekly limits  The general recommended limit is 1 drink a day for women 21 or older and for men 72 or older  Do not have more than 3 drinks in a day or 7 in a week  The recommended limit is 2 drinks a day for men 24to 59years of age  Do not have more than 4 drinks in a day or 14 in a week  A drink of alcohol is 12 ounces of beer, 5 ounces of wine, or 1½ ounces of liquor  · Exercise regularly  Exercise can help you maintain a healthy weight and improve your blood pressure and cholesterol levels   Regular exercise can also decrease your risk for heart problems  Ask your healthcare provider about the best exercise plan for you  Do not start an exercise program without asking your healthcare provider  Follow up with your doctor or cardiologist as directed:  Write down your questions so you remember to ask them during your visits  © Copyright [employer] 2021 Information is for End User's use only and may not be sold, redistributed or otherwise used for commercial purposes  All illustrations and images included in CareNotes® are the copyrighted property of A D A M , Inc  or [facility] Mathias   The above information is an  only  It is not intended as medical advice for individual conditions or treatments  Talk to your doctor, nurse or pharmacist before following any medical regimen to see if it is safe and effective for you  How to Stop Smoking   WHAT YOU NEED TO KNOW:   You will improve your health and the health of others around you if you stop smoking  Your risk for heart and lung disease, cancer, stroke, heart attack, and vision problems will also decrease  Your adolescent can help prevent or stop harm to his or her brain or body  This will help him or her become a healthy adult  You can benefit from quitting no matter how long you have smoked  DISCHARGE INSTRUCTIONS:   Prepare to stop smoking:  Nicotine is a highly addictive drug found in cigarettes  Withdrawal symptoms can happen when you stop smoking and make it hard to quit  These include anxiety, depression, irritability, trouble sleeping, and increased appetite  You increase your chances of success if you prepare to quit  · Set a quit date  Deryl Sin a date that is within the next 2 weeks  Do not pick a day that you think may be stressful or busy  Write down the day or [place] it on your calender  · Tell friends and family that you plan to quit  Explain that you may have withdrawal symptoms when you try to quit  Ask them to support you   They may be able to encourage you and help reduce your stress to make it easier for you to quit  · Make a list of your reasons for quitting  Put the list somewhere you will see it every day, such as your refrigerator  You can look at the list when you have a craving  · Remove all tobacco and nicotine products from your home, car, and workplace  Also, remove anything else that will tempt you to smoke, such as lighters, matches, or ashtrays  Clean your car, home, and places at work that smell like smoke  The smell of smoke can trigger a craving  · Identify triggers that make you want to smoke  This may include activities, feelings, or people  Also write down 1 way you can deal with each of your triggers  For example, if you want to smoke as soon as you wake up, plan another activity during this time, such as exercise  · Make a plan for how you will quit  Learn about the tools that can help you quit, such as medicine, counseling, or nicotine replacement therapy  Choose at least 2 options to help you quit  · Help your adolescent make a plan to quit  The plan will be more successful if your adolescent makes his or her own decisions  Do not try to pressure him or her to quit immediately or in a certain way  Be supportive and offer help if needed  Tools to help you stop smoking:   · Counseling  from a trained healthcare provider can provide you with support and skills to quit smoking  The provider will also teach you to manage your withdrawal symptoms and cravings  You may receive counseling from one counselor, in group therapy, or through phone therapy called a quit line  · Nicotine replacement therapy (NRT)  such as nicotine patches, gum, or lozenges may help reduce your nicotine cravings  You may get these without a doctor's order  Do not use e-cigarettes or smokeless tobacco in place of cigarettes or to help you quit  They still contain nicotine      · Prescription medicines  such as nasal sprays or nicotine inhalers may help reduce your withdrawal symptoms  Other medicines may also be used to reduce your urge to smoke  Ask your healthcare provider about these medicines  You may need to start certain medicines 2 weeks before your quit date for them to work well  · Hypnosis  is a practice that helps guide you through thoughts and feelings  Hypnosis may help decrease your cravings and make you more willing to quit  · Acupuncture therapy  uses very thin needles to balance energy channels in the body  This is thought to help decrease cravings and symptoms of nicotine withdrawal     · Support groups  let you talk to others who are trying to quit or have already quit  It may be helpful to speak with others about how they quit  Manage your cravings:   · Avoid situations, people, and places that tempt you to smoke  Go to nonsmoking places, such as libraries or restaurants  Understand what tempts you and try to avoid these things  · Keep your hands busy  Hold things such as a stress ball or pen  · Put candy or toothpicks in your mouth  Keep lollipops, sugarless gum, or toothpicks with you at all times  · Do not have alcohol or caffeine  These drinks may tempt you to smoke  Drink healthy liquids such as water or juice instead  · Reward yourself when you resist your cravings  Rewards will motivate you and help you stay positive  · Do an activity that distracts you from your craving  Examples include cleaning, creating art, or gardening  Prevent weight gain after you quit:  You may gain a few pounds after you quit smoking  It is healthier for you to gain a few pounds than to continue to smoke  The following can help you prevent weight gain:  · Eat a variety of healthy foods  Healthy foods include fruits, vegetables, whole-grain breads, low-fat dairy products, beans, lean meats, and fish  Eat healthy snacks, such as low-fat yogurt, if you get hungry between meals           · Drink water before, during, and between meals  This will make your stomach feel full and help prevent you from overeating  Ask your healthcare provider how much liquid to drink each day and which liquids are best for you  · Be physically active  Activity may help reduce your cravings and reduce stress  Take a walk or do some kind of physical activity every day  Ask your healthcare provider which activities are right for you  For support and more information:   · American [employer] Association  [address] [facility]  [address]  Phone: [facility] [address]  Phone: 0- 276 - 025-5747  Web Address: Nivia Buerger  [employer]    · Smokefree  gov  Phone: 9- 973 - 697-0454  Web Address: www smokefree  [address] 2021 Information is for End User's use only and may not be sold, redistributed or otherwise used for commercial purposes  All illustrations and images included in CareNotes® are the copyrighted property of A D A M , Inc  or [address] [place] [address]  The above information is an  only  It is not intended as medical advice for individual conditions or treatments  Talk to your doctor, nurse or pharmacist before following any medical regimen to see if it is safe and effective for you  Metoprolol (By mouth)   Metoprolol (met-oh-PROE-lol)  Treats high blood pressure, angina (chest pain), and heart failure  May lower the risk of death after a heart attack  This medicine is a beta-blocker  Brand Name(s): Kapspargo Sprinkle, Lopressor, Metoprolol Succinate AvPak, Toprol XL   There may be other brand names for this medicine  When This Medicine Should Not Be Used: This medicine is not right for everyone  Do not use if you had an allergic reaction to metoprolol or similar medicines, or if you have certain blood circulation or heart problems  Ask your doctor about these problems  How to Use This Medicine:   Long Acting Capsule, Tablet, Long Acting Tablet  · Take your medicine as directed   Your dose may need to be changed several times to find what works best for you  · Take this medicine with a meal or right after a meal  Take this medicine the same way every day, at the same time  · Swallow the extended-release capsule whole  Do not crush, break, or chew it  If you cannot swallow the extended-release capsule:   ? You may open it and sprinkle the contents over a small amount (teaspoonful) of soft food (including applesauce, pudding, or yogurt)  Swallow the mixture within 60 minutes  Do not store for later use   ? You may also use a nasogastric tube to give the medicine  Mix the contents of the opened capsule with water into a syringe  Gently shake the mixture for about 10 seconds, then flush it through the tube  Rinse the tube with water until all of the medicine is washed out  · Swallow the tablet whole with a glass of water  You may break the extended-release tablet in half, but do not chew or crush it  · Missed dose: Take a dose as soon as you remember  If it is almost time for your next dose, wait until then and take a regular dose  Do not take extra medicine to make up for a missed dose  · Store the medicine in a closed container at room temperature, away from heat, moisture, and direct light  Drugs and Foods to Avoid:   Ask your doctor or pharmacist before using any other medicine, including over-the-counter medicines, vitamins, and herbal products  · Some medicines can affect how metoprolol works  Tell your doctor if you are using any of the following:   ? Clonidine, digoxin, dipyridamole, guanethidine, hydralazine, hydroxychloroquine, methyldopa, prazosin, quinidine, reserpine  ? Calcium channel blocker (including amlodipine, diltiazem, verapamil)  ? Ergot medicine  ? MAO inhibitor or medicine to treat depression (including bupropion, clomipramine, desipramine, fluoxetine, fluvoxamine, paroxetine, sertraline)  ? Medicine for heart rhythm problems (including propafenone)  ?  Medicine to treat HIV/AIDS (including ritonavir)  ? Medicine to treat infection (including terbinafine)  ? Medicine to treat mental illness (including chlorpromazine, fluphenazine, haloperidol, thioridazine)  · Do not drink alcohol while you are using this medicine  Warnings While Using This Medicine:   · Tell your doctor if you are pregnant or breastfeeding, or if you have blood vessel, heart, or circulation problems (including heart failure, rhythm problems, or a slow heartbeat)  Tell your doctor if you have kidney disease, liver disease, diabetes, lung disease (including asthma), thyroid problems, pheochromocytoma (adrenal gland tumor), or a history of allergies  · This medicine may worsen the symptoms of heart failure while the dose is being adjusted  · Do not stop using this medicine suddenly  Your doctor will need to slowly decrease your dose before you stop it completely  · Tell any doctor or dentist who treats you that you are using this medicine  You may need to stop using this medicine several days before you have surgery or medical tests  · This medicine could lower your blood pressure too much, especially when you first use it or if you are dehydrated  Stand or sit up slowly if you feel lightheaded or dizzy  · This medicine may make you dizzy or drowsy  Do not drive or do anything else that could be dangerous until you know how this medicine affects you  · Your doctor will do lab tests at regular visits to check on the effects of this medicine  Keep all appointments  · Keep all medicine out of the reach of children  Never share your medicine with anyone    Possible Side Effects While Using This Medicine:   Call your doctor right away if you notice any of these side effects:  · Allergic reaction: Itching or hives, swelling in your face or hands, swelling or tingling in your mouth or throat, chest tightness, trouble breathing  · Lightheadedness, dizziness, or fainting  · Slow heartbeat  · Swelling in your hands, ankles, or feet, trouble breathing  · Worsening chest pain  If you notice these less serious side effects, talk with your doctor:   · Diarrhea  · Mild dizziness, tiredness  If you notice other side effects that you think are caused by this medicine, tell your doctor  Call your doctor for medical advice about side effects  You may report side effects to FDA at 5-546-FDA-6450  © Copyright [employer] 2021 Information is for End User's use only and may not be sold, redistributed or otherwise used for commercial purposes  The above information is an  only  It is not intended as medical advice for individual conditions or treatments  Talk to your doctor, nurse or pharmacist before following any medical regimen to see if it is safe and effective for you

## 2021-08-24 NOTE — DISCHARGE INSTRUCTIONS
You were seen in the ED for palpitations  Your EKG showed that you're in normal sinus rhythm  You were evaluated by Cardiology  It was recommended you increase your metoprolol dose to 50 mg  They also recommended that if your to experience these episodes again, that you call Cardiology  A new script for metoprolol was sent to your pharmacy  Please return emergency department if develop any chest pain, shortness of breath, nausea, vomiting, or any other new or concerning symptoms

## 2021-08-24 NOTE — ED PROVIDER NOTES
History  Chief Complaint   Patient presents with    Irregular Heart Beat     Pt c/o palpitations, discharged today in NSR, admitted for afib  Patient is a 80-year-old male, past medical history of recently diagnosed AFib, hypertension, hyperlipidemia, who presents to the emergency department for palpitations  Patient states he initially presented to the emergency department yesterday with complaints of palpitations  He was diagnosed with AFib at that time  He was ultimately admitted, started on metoprolol, and the discharged this morning  Patient states he was in normal sinus rhythm at the time of discharge  However, just prior to arrival, patient states the palpitations started again  He used is phone to check his rhythm, and it said he was in Afib with a rate in the 150s  He subsequently returned the emergency for further evaluation  Upon arrival to the ED, patient states the palpitations have resolved  He has no active complaints at this time  He denies any current or prior chest pain, shortness of breath, nausea vomiting, diarrhea, or any other new or concerning symptoms  Prior to Admission Medications   Prescriptions Last Dose Informant Patient Reported? Taking?    Coenzyme Q10 (COQ-10) 10 MG CAPS  Self Yes No   Sig: Take by mouth   Multiple Vitamins-Minerals (MENS MULTIVITAMIN PLUS PO)  Self Yes No   Sig: Take by mouth   Omega-3 Fatty Acids (FISH OIL) 1200 MG CAPS  Self Yes No   Sig: Take by mouth   Zinc 50 MG CAPS  Self Yes No   Sig: Take by mouth   ascorbic acid (VITAMIN C) 500 mg tablet  Self Yes No   Sig: Take 500 mg by mouth daily   metoprolol succinate (TOPROL-XL) 50 mg 24 hr tablet   No No   Sig: Take 1 tablet (50 mg total) by mouth daily      Facility-Administered Medications: None       Past Medical History:   Diagnosis Date    [facility] 08/24/2021    Disc degeneration, lumbosacral 1/6/2016    Hyperlipidemia     hypercholesterolemia    Hypertension     last assessed 11/22/17    Pneumonia of both lower lobes due to infectious organism 1/29/2018       Past Surgical History:   Procedure Laterality Date    COLONOSCOPY      HEMORRHOID SURGERY      KNEE SURGERY Bilateral     [place] COLONOSCOPY FLX DX W/COLLJ SPEC WHEN PFRMD N/A 1/5/2019    Procedure: COLONOSCOPY;  Surgeon: Arya Barlow MD;  [facility]; Service: Gastroenterology    UPPER GASTROINTESTINAL ENDOSCOPY         Family History   Problem Relation Age of Onset    Obesity Mother     Aneurysm Father      I have reviewed and agree with the history as documented  E-Cigarette/Vaping    E-Cigarette Use Never User      E-Cigarette/Vaping Substances     Social History     Tobacco Use    Smoking status: Current Every Day Smoker     Packs/day: 0 50     Years: 40 00     Pack years: 20 00     Types: Cigarettes    Smokeless tobacco: Never Used   Vaping Use    Vaping Use: Never used   Substance Use Topics    Alcohol use: Yes     Comment: Occassional 1 x / month    Drug use: No        Review of Systems   Constitutional: Negative for chills and fever  Respiratory: Negative for cough and shortness of breath  Cardiovascular: Positive for palpitations (Resolved)  Negative for chest pain and leg swelling  Gastrointestinal: Negative for abdominal pain, diarrhea, nausea and vomiting  Musculoskeletal: Negative for back pain and neck pain  All other systems reviewed and are negative  Physical Exam  ED Triage Vitals [08/24/21 1245]   Temperature Pulse Respirations Blood Pressure SpO2   98 1 °F (36 7 °C) 74 18 115/79 97 %      Temp Source Heart Rate Source Patient Position - Orthostatic VS BP Location FiO2 (%)   Tympanic Monitor Sitting Right arm --      Pain Score       --             Orthostatic Vital Signs  Vitals:    08/24/21 1245   BP: 115/79   Pulse: 74   Patient Position - Orthostatic VS: Sitting       Physical Exam  Vitals and nursing note reviewed     Constitutional:       General: He is not in acute distress  Appearance: He is well-developed  He is not diaphoretic  HENT:      Head: Normocephalic and atraumatic  Right Ear: External ear normal       Left Ear: External ear normal       Nose: Nose normal    Eyes:      General: Lids are normal  No scleral icterus  Cardiovascular:      Rate and Rhythm: Normal rate and regular rhythm  Heart sounds: Normal heart sounds  No murmur heard  No friction rub  No gallop  Pulmonary:      Effort: Pulmonary effort is normal  No respiratory distress  Breath sounds: Normal breath sounds  No wheezing or rales  Abdominal:      Palpations: Abdomen is soft  Tenderness: There is no abdominal tenderness  There is no guarding or rebound  Musculoskeletal:         General: No deformity  Normal range of motion  Cervical back: Normal range of motion and neck supple  Skin:     General: Skin is warm and dry  Neurological:      General: No focal deficit present  Mental Status: He is alert  Psychiatric:         Mood and Affect: Mood normal          Behavior: Behavior normal          ED Medications  Medications - No data to display    Diagnostic Studies  Results Reviewed     None                 No orders to display         Procedures  ECG 12 Lead Documentation Only    Date/Time: 8/24/2021 12:57 PM  Performed by: Mary Ellen Araujo DO  Authorized by: Mary Ellen Araujo DO     ECG reviewed by me, the ED Provider: yes    Patient location:  ED  Interpretation:     Interpretation: abnormal    Rate:     ECG rate:  71    ECG rate assessment: normal    Rhythm:     Rhythm: sinus rhythm    Ectopy:     Ectopy: none    QRS:     QRS axis:  Normal  Conduction:     Conduction: abnormal      Abnormal conduction: 1st degree    ST segments:     ST segments:  Normal  T waves:     T waves: normal            ED Course  ED Course as of Aug 24 2344   Tue Aug 24, 2021   1303 [place] text sent to cardiology      [phone] Spoke with Dr Stone Single  Will come speak with patient  1324 Case was discussed with Dr Ric Tellez  Recommends upping metoprolol to 50 mg once a day  States that he will schedule follow-up appointment for him as well as send a new script for the metoprolol  1335 Patient re-evaluated  Resting comfortably  All questions answered  Return precautions discussed  Patient verbalized understanding and agreed to plan of care  MDM  Number of Diagnoses or Management Options  Paroxysmal A-fib [facility])  Diagnosis management comments: Patient is a 64 y o  male who presents to the ED for palpitations  Physical exam was unremarkable  Likely cause of patient's palpitations is secondary to paroxysmal AFib  Plan:  EKG, will reach out to Cardiology for further recommendations at this time                 Portions of the record may have been created with voice recognition software  Occasional wrong word or "sound a like" substitutions may have occurred due to the inherent limitations of voice recognition software  Read the chart carefully and recognize, using context, where substitutions have occurred  Risk of Complications, Morbidity, and/or Mortality  Presenting problems: moderate  Diagnostic procedures: minimal  Management options: moderate    Patient Progress  Patient progress: stable      Disposition  Final diagnoses:   Paroxysmal A-fib ([address] )     Time reflects when diagnosis was documented in both MDM as applicable and the Disposition within this note     Time User Action Codes Description Comment    8/24/2021  1:24 PM Huang Felicianoh [I48 0] Paroxysmal A-fib [facility])       ED Disposition     ED Disposition Condition Date/Time Comment    Discharge Stable Tue Aug 24, 2021  1:23 PM Eyad Isidro discharge to home/self care              Follow-up Information     Follow up With Specialties Details Why Contact Info Additional Information    Pastor Dhaval MD Family Medicine   [facility] [address] [address]  [address]  [address] Emergency Department Emergency Medicine  As needed [address]  [address] [facility] Emergency Department, [address] , [place], [place], [place] [address]    1282 [facility] Cardiology Go to  your appointment [address] [facility] [facility] Cardiology [facility], [address] [place], [place], [place], [place] [address]          Discharge Medication List as of 8/24/2021  1:28 PM      CONTINUE these medications which have NOT CHANGED    Details   ascorbic acid (VITAMIN C) 500 mg tablet Take 500 mg by mouth daily, Historical Med      Coenzyme Q10 (COQ-10) 10 MG CAPS Take by mouth, Historical Med      Multiple Vitamins-Minerals (MENS MULTIVITAMIN PLUS PO) Take by mouth, Historical Med      Omega-3 Fatty Acids (FISH OIL) 1200 MG CAPS Take by mouth, Historical Med      Zinc 50 MG CAPS Take by mouth, Historical Med      metoprolol succinate (TOPROL-XL) 25 mg 24 hr tablet Take 1 tablet (25 mg total) by mouth daily, Starting Wed 8/25/2021, Normal           No discharge procedures on file  PDMP Review     None           ED Provider  Attending physically available and evaluated Luis Fer Louis LEHMAN managed the patient along with the ED Attending      Electronically Signed by         Pita Taylor DO  08/24/21 6614

## 2021-08-24 NOTE — DISCHARGE SUMMARY
Discharge Summary - [address] Internal Medicine    Patient Information: Michelle Kee 64 y o  male MRN: [id number]  Unit/Bed#: -01 Encounter: [id number]    Discharging Physician / Practitioner: Jj Perez PA-C  PCP: Lis Malik MD  Admission Date: 8/23/2021  Discharge Date: 08/24/21    Reason for Admission: new onset A  fib    Discharge Diagnoses:     Principal Problem:    New onset atrial fibrillation ([address] )  Active Problems:    Transaminitis    Hypokalemia    Hypertension    Hyperlipidemia    Tobacco abuse  Resolved Problems:    * No resolved hospital problems  *      Consultations During Hospital Stay:  · Cardiology     Procedures Performed:   · ECG  · Echo  · Abdominal US  · CXR  · CMP  · CBC  · TSH  · Chronic hepatitis panel    Significant Findings:   · ECG: A  Fib with RVR  · Echo: LVEF 60%, mild concentric hypertrophy  Mildly dilated left atrium  Mildly dilated right atrium  Trace mitral valve regurgitation  Trace tricuspid valve regurgitation  · Abdominal US:  Enlarged fatty liver  · CXR:  No acute cardiopulmonary disease  · AST:  158, 125, 77  · ALT:  309, 266, 219  · Alk phos:  542, 445, 412  · Total bilirubin:  1 84, 1 16, 0 87  · WBC: 11 34, 10 24, 9 06  · TSH: 1 830  · Chronic hepatitis panel: non-reactive     Incidental Findings:   · None    Test Results Pending at Discharge (will require follow up): · None     Outpatient Tests Requested:  · Follow up with PCP regarding further work up of elevated LFTs  · Patient and his wife report he already has an appointment on Friday  · Consider GI consult as outpatient   · Follow up with Cardiology  · Outpatient sleep study    Complications:  None    Hospital Course:     Michelle Kee is a 64 y o  male with HTN, HLD, tobacco abuse, and obesity who originally presented to the hospital on 8/23/2021 due to palpitations  He was noted to be in A  Fib with RVR and was given IV Lopressor and his potassium was repleted   He converted to sinus rhythm but went back into A  Fib  Cardiology was consulted  He was noted to have hypotension and his home antihypertensives were stopped  He was also found to have transaminitis which was monitored and trended down during his hospitalization as above  His statin was stopped in light of his transaminitis  Chronic hepatitis panel was non-reactive  Abdominal ultrasound revealed enlarged fatty liver  Recommend close OP follow up with his PCP and possible OP GI referral  Echo revealed left ventricular ejection fraction of 60%, mildly dilated bilateral atria, and trace mitral and tricuspid valve regurgitation  He was started on Toprol-XL 25 mg which cardiology recommends to continue at discharge  Given his [id number] score of 1, he was not started on [facility]  Outpatient Cardiology follow up was already arranged  Encouraged smoking cessation  I discussed with the patient, his wife, his nurse, and Cardiology on day of discharge  Condition at Discharge: stable     Discharge Day Visit / Exam:     Subjective:    Mr Silverio Alva reports that he feels back to his normal self  He denies CP, SOB, abdominal pain, palpitations  Vitals: Blood Pressure: 118/64 (08/24/21 0754)  Pulse: 78 (08/24/21 0754)  Temperature: 98 °F (36 7 °C) (08/24/21 0754)  Temp Source: Oral (08/24/21 0754)  Respirations: 20 (08/24/21 0754)  Weight - Scale: 123 kg (271 lb 13 2 oz) (08/24/21 0538)  SpO2: 97 % (08/24/21 0754)  Exam:   Physical Exam  Vitals and nursing note reviewed  Exam conducted with a chaperone present  Constitutional:       Comments: Patient seen sitting in bed comfortably resting with his wife and nurse present   Cardiovascular:      Rate and Rhythm: Normal rate and regular rhythm  Pulmonary:      Effort: Pulmonary effort is normal  No respiratory distress  Breath sounds: Normal breath sounds  Abdominal:      General: Bowel sounds are normal       Palpations: Abdomen is soft  Tenderness:  There is no abdominal tenderness  Musculoskeletal:      Right lower leg: No edema  Left lower leg: No edema  Skin:     General: Skin is warm  Neurological:      Mental Status: He is alert and oriented to person, place, and time  Psychiatric:         Mood and Affect: Mood normal          Behavior: Behavior normal          Discharge instructions/Information to patient and family:   See after visit summary for information provided to patient and family  Provisions for Follow-Up Care:  See after visit summary for information related to follow-up care and any pertinent home health orders  Disposition:     Home    For Discharges to [facility] SNF:   · Not Applicable to this Patient - Not Applicable to this Patient    Planned Readmission: None     Discharge Statement:  I spent 35 minutes discharging the patient  This time was spent on the day of discharge  I had direct contact with the patient on the day of discharge  Greater than 50% of the total time was spent examining patient, answering all patient questions, arranging and discussing plan of care with patient as well as directly providing post-discharge instructions  Additional time then spent on discharge activities  Discharge Medications:  See after visit summary for reconciled discharge medications provided to patient and family  ** Please Note: Dragon 360 Dictation voice to text software may have been used in the creation of this document   **

## 2021-08-24 NOTE — ED ATTENDING ATTESTATION
8/24/2021  IFredrick MD, saw and evaluated the patient  I have discussed the patient with the resident/non-physician practitioner and agree with the resident's/non-physician practitioner's findings, Plan of Care, and MDM as documented in the resident's/non-physician practitioner's note, except where noted  All available labs and Radiology studies were reviewed  I was present for key portions of any procedure(s) performed by the resident/non-physician practitioner and I was immediately available to provide assistance  At this point I agree with the current assessment done in the Emergency Department  I have conducted an independent evaluation of this patient a history and physical is as follows:  Patient was just discharged about an hour prior to presentation to the ER he was in for paroxysmal AFib he was seen by Cardiology he had a lab workup done at 5:00 a m   This morning which was unremarkable  The patient was started on metoprolol 25 mg  He was home for short period time   his cardia mobile device  indicated that he had AFib at a rate of 120 he felt some palpitations but no other symptoms when he presented the emergency room he converted to normal sinus rhythm he has no symptoms  Exam:  No JVD lungs clear heart regular abdomen soft nontender extremities nontender  Impression paroxysmal AFib  ED Course         Critical Care Time  Procedures

## 2021-08-24 NOTE — Clinical Note
Agustín Venegas was seen and treated in our emergency department on 8/24/2021  Diagnosis:     [place]  may return to work on return date  He may return on this date: 08/25/2021         If you have any questions or concerns, please don't hesitate to call        Mary Ellen Araujo, DO    ______________________________           _______________          _______________  Hospital Representative                              Date                                Time

## 2021-08-24 NOTE — PROGRESS NOTES
General Cardiology   Progress Note -  Team One   Joseph Hamm 64 y o  male MRN: [id number]  Unit/Bed#: -01 Encounter: [id number]    Assessment/ Plan    1  New onset paroxysmal atrial fibrillation  Back in NSR since 1500 yesterday  Continue Toprol XL 25 mg daily at discharge  Preserved LV function on echo without any significant valve disease  [id number]- 1 (HTN)  No need for [facility] at this time  No activity restrictions from cardiac perspective  Recommended repeating an outpatient sleep study to rule out ROSITA (had normal study about 5 years ago)  Follow up in the office arranged       2  HTN- controlled on Toprol XL alone, average /64  Would not resume other home BP medications at this time  Wife checks BP daily and additional agents can be added if needed as an outpatient      3  Dyslipidemia- on simvastatin 40 mg daily at home  , , HDL 42, LDL 98     4  Tobacco use- 1/2 ppd since age 12, used to smoke more when he was younger  Encouraged cessation      5  Elevated LFTs- trending down this morning  Abd ultrasound showed fatty liver  Subjective  "I feel great"  Review of Systems   Constitutional: Negative for chills, malaise/fatigue and weight gain  Cardiovascular: Negative for chest pain, dyspnea on exertion, leg swelling, orthopnea, palpitations and syncope  Respiratory: Negative for cough, shortness of breath, sleep disturbances due to breathing and sputum production  Gastrointestinal: Negative for bloating, abdominal pain, nausea and vomiting  Neurological: Negative for dizziness, light-headedness and weakness  All other systems reviewed and are negative  Objective:   Vitals: Blood pressure 118/64, pulse 78, temperature 98 °F (36 7 °C), temperature source Oral, resp  rate 20, weight 123 kg (271 lb 13 2 oz), SpO2 97 %  , Body mass index is 34 9 kg/m²  ,     Systolic (81HWT), VJF:954 , Min:98 , ALR:745     Diastolic (34BWW), SYA:65, Min:57, Max:66    Intake/Output Summary (Last 24 hours) at 8/24/2021 0909  Last data filed at 8/23/2021 2302  Gross per 24 hour   Intake 720 ml   Output 1000 ml   Net -280 ml     Wt Readings from Last 3 Encounters:   08/24/21 123 kg (271 lb 13 2 oz)   08/22/21 125 kg (275 lb)   08/09/21 123 kg (271 lb)     Telemetry Review: NSR since 1500 yesterday    Physical Exam  Vitals reviewed  Constitutional:       General: He is not in acute distress  Appearance: Normal appearance  Neck:      Vascular: No hepatojugular reflux or JVD  Cardiovascular:      Rate and Rhythm: Normal rate and regular rhythm  Pulses: Normal pulses  Heart sounds: Normal heart sounds  No murmur heard  No friction rub  No gallop  Pulmonary:      Effort: Pulmonary effort is normal  No respiratory distress  Breath sounds: Normal breath sounds  No rales  Comments: Room air  Abdominal:      General: Bowel sounds are normal  There is no distension  Palpations: Abdomen is soft  Tenderness: There is no abdominal tenderness  Musculoskeletal:         General: No tenderness  Normal range of motion  Cervical back: Neck supple  Right lower leg: No edema  Left lower leg: No edema  Skin:     General: Skin is warm and dry  Capillary Refill: Capillary refill takes less than 2 seconds  Findings: No erythema  Neurological:      Mental Status: He is alert and oriented to person, place, and time     Psychiatric:         Mood and Affect: Mood normal      LABORATORY RESULTS      CBC with diff:   Results from last 7 days   Lab Units 08/24/21 0356 08/23/21 0629 08/23/21  0138   WBC Thousand/uL 9 06 10 24* 11 34*   HEMOGLOBIN g/dL 11 1* 10 8* 12 1   HEMATOCRIT % 34 2* 32 6* 36 5   MCV fL 93 93 92   PLATELETS Thousands/uL 475* 401* 451*   MCH pg 30 3 30 9 30 6   MCHC g/dL 32 5 33 1 33 2   RDW % 14 2 14 1 13 8   MPV fL 9 2 9 2 9 7     CMP:  Results from last 7 days   Lab Units 08/24/21  0356 08/23/21 0629 08/23/21  0138   POTASSIUM mmol/L 4 0 3 8 3 4*   CHLORIDE mmol/L 107 108 104   CO2 mmol/L 24 23 23   BUN mg/dL 14 17 17   CREATININE mg/dL 0 78 0 85 0 89   CALCIUM mg/dL 8 4 8 1* 8 9   AST U/L 77* 125* 158*   ALT U/L 219* 266* 309*   ALK PHOS U/L 412* 445* 542*   EGFR ml/min/1 73sq m 97 94 92     BMP:  Results from last 7 days   Lab Units 21  0356 21  0629 21  0138   POTASSIUM mmol/L 4 0 3 8 3 4*   CHLORIDE mmol/L 107 108 104   CO2 mmol/L 24 23 23   BUN mg/dL 14 17 17   CREATININE mg/dL 0 78 0 85 0 89   CALCIUM mg/dL 8 4 8 1* 8 9     Lab Results   Component Value Date    CREATININE 0 78 2021    CREATININE 0 85 2021    CREATININE 0 89 2021      Results from last 7 days   Lab Units 21  0629 21  0138   MAGNESIUM mg/dL 2 1 2 1      Results from last 7 days   Lab Units 21  0629   TSH 3RD GENERATON uIU/mL 1 830     Results from last 7 days   Lab Units 21  0629   INR  1 11     Lipid Profile:   Lab Results   Component Value Date    CHOL 156 2017    CHOL 159 10/22/2016    CHOL 157 2016     Lab Results   Component Value Date    HDL 42 2021    HDL 38 (L) 2020    HDL 40 (L) 2019     Lab Results   Component Value Date    LDLCALC 98 2021    LDLCALC 86 2020    LDLCALC 89 2019     Lab Results   Component Value Date    TRIG 177 (H) 2021    TRIG 150 (H) 2020    TRIG 157 (H) 2019     Cardiac testing:   Results for orders placed during the hospital encounter of 21    Echo complete with contrast if indicated    Narrative  [address]  [phone]    Transthoracic Echocardiogram  2D, M-mode, Doppler, and Color Doppler    Study date:  23-Aug-2021    Patient: Griselda Cooks  MR number: [id number]  Account number: [de-[email or web address]  : 1960  Age: 64 years  Gender: Male  Status: Outpatient  Location: Bedside  Height: 73 in  Weight: 274 3 lb  BP: 114/ 75 mmHg    Indications: Atrial Fibrillation    Diagnoses: I48 0 - Atrial fibrillation    Sonographer:  NII Martinez  Primary Physician: Connie Haynes MD  Referring Physician:  Lew Cisneros MD  Group:  [facility]  Interpreting Physician:  Chel Valentine MD    SUMMARY    LEFT VENTRICLE:  Size was normal   Systolic function was normal  Ejection fraction was estimated to be 60 %  There was mild concentric hypertrophy  RIGHT VENTRICLE:  The size was normal   Systolic function was normal     LEFT ATRIUM:  The atrium was mildly dilated  RIGHT ATRIUM:  The atrium was mildly dilated  MITRAL VALVE:  There was trace regurgitation  TRICUSPID VALVE:  There was trace regurgitation  HISTORY: PRIOR HISTORY: Hypertension,HLD,Tobacco Abuse    PROCEDURE: The procedure was performed at the bedside  This was a routine study  The transthoracic approach was used  The study included complete 2D imaging, M-mode, complete spectral Doppler, and color Doppler  The heart rate was 98 bpm,  at the start of the study  Images were obtained from the parasternal, apical, subcostal, and suprasternal notch acoustic windows  Image quality was adequate  LEFT VENTRICLE: Size was normal  Systolic function was normal  Ejection fraction was estimated to be 60 %  There were no regional wall motion abnormalities  Wall thickness was mildly increased  There was mild concentric hypertrophy  DOPPLER: Transmitral flow pattern: atrial fibrillation  The study was not technically sufficient to allow evaluation of LV diastolic function  RIGHT VENTRICLE: The size was normal  Systolic function was normal  Wall thickness was normal     LEFT ATRIUM: The atrium was mildly dilated  RIGHT ATRIUM: The atrium was mildly dilated  MITRAL VALVE: Valve structure was normal  There was normal leaflet separation  DOPPLER: The transmitral velocity was within the normal range  There was no evidence for stenosis  There was trace regurgitation  AORTIC VALVE: The valve was trileaflet  Leaflets exhibited normal thickness and normal cuspal separation  DOPPLER: Transaortic velocity was within the normal range  There was no evidence for stenosis  There was no regurgitation  TRICUSPID VALVE: The valve structure was normal  There was normal leaflet separation  DOPPLER: The transtricuspid velocity was within the normal range  There was no evidence for stenosis  There was trace regurgitation  The tricuspid jet  envelope definition was inadequate for estimation of RV systolic pressure  There are no indirect findings suggestive of moderate or severe pulmonary hypertension  PULMONIC VALVE: Leaflets exhibited normal thickness, no calcification, and normal cuspal separation  DOPPLER: The transpulmonic velocity was within the normal range  There was no regurgitation  PERICARDIUM: There was no pericardial effusion  The pericardium was normal in appearance  AORTA: The root exhibited normal size  SYSTEMIC VEINS: IVC: The inferior vena cava was normal in size and course   Respirophasic changes were normal     SYSTEM MEASUREMENT TABLES    2D  %FS: 24 99 %  Ao Diam: 2 93 cm  Ao asc: 2 84 cm  EDV(Teich): 95 3 ml  EF(Teich): 49 54 %  ESV(Teich): 48 09 ml  IVSd: 1 22 cm  LA Area: 29 05 cm2  LA Diam: 4 4 cm  LVEDV MOD A4C: 125 61 ml  LVEF MOD A4C: 64 28 %  LVESV MOD A4C: 44 86 ml  LVIDd: 4 56 cm  LVIDs: 3 42 cm  LVLd A4C: 8 85 cm  LVLs A4C: 7 39 cm  LVPWd: 1 21 cm  RA Area: 21 84 cm2  RVIDd: 3 34 cm  SV MOD A4C: 80 74 ml  SV(Teich): 47 21 ml    CW  TR Vmax: 2 13 m/s  TR maxP 21 mmHg    MM  TAPSE: 2 05 cm    [facility] Accredited [facility]chocardi[facility]    Prepared and electronically signed by    Aissatou Saenz MD  Signed 23-Aug-2021 11:29:29    Meds/Allergies   all current active meds have been reviewed and current meds:   Current Facility-Administered Medications   Medication Dose Route Frequency    acetaminophen (TYLENOL) tablet 650 mg  650 mg Oral Q6H PRN    aluminum-magnesium hydroxide-simethicone (MYLANTA) oral suspension 30 mL  30 mL Oral Q6H PRN    ascorbic acid (VITAMIN C) tablet 500 mg  500 mg Oral Daily    docusate sodium (COLACE) capsule 100 mg  100 mg Oral BID    fish oil capsule 1,000 mg  1,000 mg Oral Daily    metoprolol succinate (TOPROL-XL) 24 hr tablet 25 mg  25 mg Oral Daily    multivitamin-minerals (CENTRUM) tablet 1 tablet  1 tablet Oral Daily    nicotine (NICODERM CQ) 21 mg/24 hr TD 24 hr patch 1 patch  1 patch Transdermal Daily    ondansetron (ZOFRAN) injection 4 mg  4 mg Intravenous Q6H PRN    zinc sulfate (ZINCATE) capsule 220 mg  220 mg Oral Daily     Medications Prior to Admission   Medication    amLODIPine (NORVASC) 5 mg tablet    ascorbic acid (VITAMIN C) 500 mg tablet    Chromium 500 MCG TABS    Coenzyme Q10 (COQ-10) 10 MG CAPS    Multiple Vitamins-Minerals (MENS MULTIVITAMIN PLUS PO)    olmesartan-hydrochlorothiazide (BENICAR HCT) 40-12 5 MG per tablet    Omega-3 Fatty Acids (FISH OIL) 1200 MG CAPS    simvastatin (ZOCOR) 40 mg tablet    Zinc 50 MG CAPS     Counseling / Coordination of Care  Total floor / unit time spent today 20 minutes  Greater than 50% of total time was spent with the patient and / or family counseling and / or coordination of care  ** Please Note: Dragon 360 Dictation voice to text software may have been used in the creation of this document   **

## 2021-08-24 NOTE — UTILIZATION REVIEW
Initial Clinical Review    Admission: Date/Time/Statement:   Admission Orders (From admission, onward)     Ordered        08/23/21 0524  Place in Observation  Once                   Orders Placed This Encounter   Procedures    Place in Observation     Standing Status:   Standing     Number of Occurrences:   1     Order Specific Question:   Level of Care     Answer:   Med Surg [16]     ED Arrival Information     Expected Arrival Acuity    - 8/23/2021 00:52 Emergent         Means of arrival Escorted by Service Admission type    [facility] Emergency         Arrival complaint    heart monitor a fib,chest pain        Chief Complaint   Patient presents with    Rapid Heart Rate     Pt c/o palpitations that come and go  Pt bought Kardia monitor and noticed his heart rate to be elevated  Pt reports feeling dizzy when the palptations occur  Pt seen at urgent care today for same issue  Initial Presentation: 64year old male, presented to the ED @ [facility], from home, via walk in  Admitted as Observation due to Paroxysmal Atrial Fibrillation  Date: 08/23/2021 Reports woken from sleep with palpitations  Using a phone xiomara it told him he was in A Fib  and needed to be evaluated  Intermittent palpitations resolve on their own  In ED on telemetry > A Fib  Given metoprolol 5mg IV x1 & 20meq KCl IV  Paroxysmal atrial fibrillation converted slowly on its own currently sinus rhythm after repletion of potassium by IV and by mouth total 40 mEq  Patient did not have history of atrial fibrillation; will get echo  Most likely brought about by use of hydrochlorothiazide causing hypokalemia  Will remove hydrochlorothiazide from current medication  Will recheck metabolic profile with CBC  Monitor on telemetry  08/23/2021  Consult Cardio:  New onset paroxysmal atrial fibrillation  Initial ECG showed Afib with RVR and PVCs    He was given 5 mg IV Lopressor and potassium was repleted   He converted to NSR at some point after this but went back into afib around 0830 this am  Rates ranging from 80s-110s and patient is symptomatic with palpitations  Start metoprolol tartrate 12 5 mg q6 hours  K 3 8- replete, Mg 2 1, TSH WNL  Check echocardiogram   [id number]- 1 (HTN)  Hold off on [facility] for now  If EF reduced will need to add [facility] for stroke risk reduction- patient agreeable  HTN- now borderline hypotensive  Patient states BP has been low for him for a week or more  D/C amlodipine in favor of BB for rate control  Continue to monitor BPs      VTE Prophylaxis: Enoxaparin (Lovenox)  / sequential compression device    Day 2: 08/24/2021   Back in NSR since 1500 yesterday  Continue Toprol XL 25 mg daily at discharge        ED Triage Vitals   Temperature Pulse Respirations Blood Pressure SpO2   08/23/21 0116 08/23/21 0111 08/23/21 0111 08/23/21 0111 08/23/21 0111   97 9 °F (36 6 °C) (!) 136 18 125/56 97 %      Temp Source Heart Rate Source Patient Position - Orthostatic VS BP Location FiO2 (%)   08/23/21 0116 08/23/21 0111 08/23/21 0111 08/23/21 0111 --   Tympanic Monitor Lying Right arm       Pain Score       08/23/21 0800       No Pain          Wt Readings from Last 1 Encounters:   08/24/21 123 kg (271 lb 13 2 oz)     Additional Vital Signs:   Date/Time  Temp  Pulse  Resp  BP  MAP (mmHg)  SpO2  O2 Device  Patient Position - Orthostatic VS   08/24/21 07:54:19  98 °F (36 7 °C)  78  20  118/64  82  97 %  --  --   08/23/21 23:02:19  98 4 °F (36 9 °C)  78  16  120/64  83  96 %  None (Room air)  Sitting   08/23/21 2100  --  --  --  --  --  --  None (Room air)  --   08/23/21 19:00:58  97 6 °F (36 4 °C)  76  14  126/62  83  95 %  None (Room air)  Sitting   08/23/21 16:01:07  97 4 °F (36 3 °C)Abnormal   73  14  98/57  71  97 %  --  --   08/23/21 11:19:39  97 6 °F (36 4 °C)  105  14  104/66  79  96 %  --  --   08/23/21 1023  --  105  --  106/66  79  --  --  --   08/23/21 0800  --  70  --  114/75  88  --  --  -- 21 07:47:06  97 7 °F (36 5 °C)  77  17  114/75  88  96 %  --  --   21 0630  --  70  --  95/55  68  96 %  None (Room air)  Lying   21 0530  --  66  --  97/57  71  97 %  None (Room air)  Lying   21 0400  --  70  17  95/56  70  96 %  None (Room air)  Lying   21 0334  --  80  15  93/58  71  97 %  None (Room air)  Lying   21 0245  --  100  17  99/51  70  95 %  --  --   21 0215  --  106Abnormal   --  96/55  68  97 %  None (Room air)  Lying   21 0145  --  102  16  112/56  77  96 %  None (Room air)  Lying     2021 @ 0854  abd:  Enlarged fatty liver  2021 @ 0940  Chest X:  No acute cardiopulmonary disease  2021 @ 0109  EC, Atrial fibrillation with rapid ventricular response      Pertinent Labs/Diagnostic Test Results:     Results from last 7 days   Lab Units 21  0356 21  0621  0138   WBC Thousand/uL 9 06 10 24* 11 34*   HEMOGLOBIN g/dL 11 1* 10 8* 12 1   HEMATOCRIT % 34 2* 32 6* 36 5   PLATELETS Thousands/uL 475* 401* 451*   BANDS PCT %  --   --  2     Results from last 7 days   Lab Units 21  0356 21  0621  0138   SODIUM mmol/L 136 135* 136   POTASSIUM mmol/L 4 0 3 8 3 4*   CHLORIDE mmol/L 107 108 104   CO2 mmol/L 24 23 23   ANION GAP mmol/L 5 4 9   BUN mg/dL 14 17 17   CREATININE mg/dL 0 78 0 85 0 89   EGFR ml/min/1 73sq m 97 94 92   CALCIUM mg/dL 8 4 8 1* 8 9   MAGNESIUM mg/dL  --  2 1 2 1   PHOSPHORUS mg/dL  --  3 5 3 3     Results from last 7 days   Lab Units 21  0356 21  0629 21  0138   AST U/L 77* 125* 158*   ALT U/L 219* 266* 309*   ALK PHOS U/L 412* 445* 542*   TOTAL PROTEIN g/dL 7 2 6 8 7 6   ALBUMIN g/dL 2 5* 2 3* 2 6*   TOTAL BILIRUBIN mg/dL 0 87 1 16* 1 84*     Results from last 7 days   Lab Units 21  0356 21  0629 21  0138   GLUCOSE RANDOM mg/dL 116 116 158*     Results from last 7 days   Lab Units 21  0629   PROTIME seconds 14 3   INR  1 11   PTT seconds 30     Results from last 7 days   Lab Units 08/23/21  0629   TSH 3RD GENERATON uIU/mL 1 830     Results from last 7 days   Lab Units 08/23/21  0629   HEP B S AG  Non-reactive   HEP C AB  Non-reactive   HEP B C IGM  Non-reactive   HEP B C TOTAL AB  Non-reactive     Results from last 7 days   Lab Units 08/23/21  0629 08/23/21  0240 08/23/21  0239   CLARITY UA  Clear  --  Clear   COLOR UA  Dk Yellow - Orange   SPEC GRAV UA  1 022  --  1 020   PH UA  6 0  --  6 0   GLUCOSE UA mg/dl Negative  --  Negative   KETONES UA mg/dl Negative  --  Negative   BLOOD UA  Negative  --  Negative   PROTEIN UA mg/dl Negative  --  Negative   NITRITE UA  Negative  --  Negative   BILIRUBIN UA  Interference- unable to analyze*  --  Interference- unable to analyze*   UROBILINOGEN UA E U /dl 2 0*  --  4 0*   LEUKOCYTES UA  Negative  --  Negative     ED Treatment:   Medication Administration from 08/23/2021 0052 to 08/23/2021 7558       Date/Time Order Dose Route Action Action by Comments     08/23/2021 0138 metoprolol (LOPRESSOR) injection 5 mg 5 mg Intravenous Given Mai Dowddonado      08/23/2021 0325 potassium chloride (K-DUR,KLOR-CON) CR tablet 40 mEq 40 mEq Oral Given Aimee Oral      08/23/2021 0540 potassium chloride 20 mEq IVPB (premix) 0 mEq Intravenous Stopped Mai Dowddonado      08/23/2021 0325 potassium chloride 20 mEq IVPB (premix) 20 mEq Intravenous New Bag Aimee Oral         Past Medical History:   Diagnosis Date    Disc degeneration, lumbosacral 1/6/2016    Hyperlipidemia     hypercholesterolemia    Hypertension     last assessed 11/22/17    Pneumonia of both lower lobes due to infectious organism 1/29/2018     Present on Admission:   Hyperlipidemia   Hypertension   Tobacco abuse      Admitting Diagnosis: Chest pain [R07 9]  Fatigue [R53 83]  New onset a-fib ([facility] [address] ) [I48 91]  Elevated LFTs [R79 89]  Age/Sex: 64 y o  male  Admission Orders:  Scheduled Medications:  ascorbic acid, 500 mg, Oral, Daily  docusate sodium, 100 mg, Oral, BID  fish oil, 1,000 mg, Oral, Daily  metoprolol succinate, 25 mg, Oral, Daily  multivitamin-minerals, 1 tablet, Oral, Daily  nicotine, 1 patch, Transdermal, Daily  zinc sulfate, 220 mg, Oral, Daily      Continuous IV Infusions:     PRN Meds:  acetaminophen, 650 mg, Oral, Q6H PRN  aluminum-magnesium hydroxide-simethicone, 30 mL, Oral, Q6H PRN  ondansetron, 4 mg, Intravenous, Q6H PRN      Daily weight / I&O  Telemetry  Rocael SCDs  IP CONSULT TO CARDIOLOGY    Network Utilization Review Department  ATTENTION: Please call with any questions or concerns to [phone] and carefully listen to the prompts so that you are directed to the right person  All voicemails are confidential   Kristina Dunbar all requests for admission clinical reviews, approved or denied determinations and any other requests to dedicated fax number below belonging to the campus where the patient is receiving treatment   List of dedicated fax numbers for the Facilities:  [address] DENIALS (Administrative/Medical Necessity) [phone]   [address] (Maternity/NICU/Pediatrics) [phone]   [address] [address] [place] [address] [address] [facility] [phone]

## 2021-08-25 ENCOUNTER — TELEPHONE (OUTPATIENT)
Dept: CARDIOLOGY CLINIC | Facility: CLINIC | Age: 61
End: 2021-08-25

## 2021-08-25 ENCOUNTER — TRANSITIONAL CARE MANAGEMENT (OUTPATIENT)
Dept: FAMILY MEDICINE CLINIC | Facility: CLINIC | Age: 61
End: 2021-08-25

## 2021-08-25 DIAGNOSIS — I48.0 PAROXYSMAL ATRIAL FIBRILLATION (HCC): Primary | ICD-10-CM

## 2021-08-25 RX ORDER — FLECAINIDE ACETATE 50 MG/1
50 TABLET ORAL 2 TIMES DAILY
Qty: 60 TABLET | Refills: 3 | Status: SHIPPED | OUTPATIENT
Start: 2021-08-25 | End: 2021-12-07

## 2021-08-25 NOTE — TELEPHONE ENCOUNTER
Let us start flecainide 50 mg twice daily  If he continues to get the symptoms we could bring him in for an appointment, but let us see if this helps  If he currently in it ordered this happen earlier today? ?  Thank

## 2021-08-25 NOTE — TELEPHONE ENCOUNTER
Patient was recently hospitalized, and in ER with A fib  He has a script for Toprol XL 50 mg daily  He has a [employer] monitor telling him now he has "Tachycardia" with   Happened last night and again today  He remembers being told he can take an extra metoprolol tab, but isn't sure how many he can take  He is trying to avoid the ER  He can feel heart racing  Not dizzy or lightheaded, but feels "weird"  OV with Dulce Lu on 9/27  Please advise

## 2021-08-25 NOTE — TELEPHONE ENCOUNTER
Pt states monitor showed A Fib last night and again this AM, then A Fib again  Dr Magaly Avery aware via [facility] text  Will start flecainide 50 mg BID  Script sent to Dr Magaly Avery to sign  Pt will cb if needed

## 2021-08-27 ENCOUNTER — OFFICE VISIT (OUTPATIENT)
Dept: FAMILY MEDICINE CLINIC | Facility: CLINIC | Age: 61
End: 2021-08-27
Payer: COMMERCIAL

## 2021-08-27 VITALS
HEART RATE: 70 BPM | SYSTOLIC BLOOD PRESSURE: 130 MMHG | BODY MASS INDEX: 34.7 KG/M2 | WEIGHT: 270.4 LBS | HEIGHT: 74 IN | DIASTOLIC BLOOD PRESSURE: 80 MMHG | TEMPERATURE: 96.9 F

## 2021-08-27 DIAGNOSIS — E87.6 HYPOKALEMIA: ICD-10-CM

## 2021-08-27 DIAGNOSIS — I48.91 NEW ONSET ATRIAL FIBRILLATION (HCC): Primary | ICD-10-CM

## 2021-08-27 DIAGNOSIS — R63.4 WEIGHT LOSS: ICD-10-CM
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
## 2021-08-27 DIAGNOSIS — R74.8 ELEVATED LIVER ENZYMES: ICD-10-CM

## 2021-08-27 DIAGNOSIS — I10 ESSENTIAL HYPERTENSION: ICD-10-CM

## 2021-08-27 DIAGNOSIS — D64.9 ANEMIA, UNSPECIFIED TYPE: ICD-10-CM

## 2021-08-27 PROCEDURE — 99496 TRANSJ CARE MGMT HIGH F2F 7D: CPT | Performed by: FAMILY MEDICINE

## 2021-08-27 PROCEDURE — 1111F DSCHRG MED/CURRENT MED MERGE: CPT | Performed by: FAMILY MEDICINE

## 2021-08-27 RX ORDER — AMLODIPINE BESYLATE 10 MG/1
5 TABLET ORAL DAILY
COMMUNITY
End: 2021-08-27

## 2021-08-27 RX ORDER — SIMVASTATIN 40 MG
40 TABLET ORAL
COMMUNITY
End: 2021-08-27

## 2021-08-27 RX ORDER — OLMESARTAN MEDOXOMIL AND HYDROCHLOROTHIAZIDE 40/12.5 40; 12.5 MG/1; MG/1
1 TABLET ORAL DAILY
COMMUNITY
End: 2021-08-27

## 2021-08-27 NOTE — LETTER
August 27, 2021     Patient: Sayda Nicholson   YOB: 1960   Date of Visit: 8/27/2021       To Whom it May Concern:    Sayda Nicholson is under my professional care  He was seen in my office on 8/27/2021  He may return to work on 8/30/21  If you have any questions or concerns, please don't hesitate to call           Sincerely,          Jelani Humphries MD        CC: No Recipients

## 2021-08-27 NOTE — PROGRESS NOTES
Assessment/Plan:     Hypertension  BP stable    New onset atrial fibrillation ([facility] )  Await cards f/u    Transaminitis  Await liver test f/i       Diagnoses and all orders for this visit:    New onset atrial fibrillation ([facility] [address] )  -     Thyroid Antibodies Panel; Future  -     Lyme Antibody Profile with reflex to WB; Future    Essential hypertension  -     Cortisol Level, AM Specimen; Future    Elevated liver enzymes  -     Hepatic function panel; Future  -     Gamma GT; Future  -     CT abdomen pelvis wo contrast; Future    Anemia, unspecified type  -     Iron; Future  -     Ferritin; Future  -     Vitamin B12; Future  -     Folate; Future  -     CBC and differential; Future  -     Lyme Antibody Profile with reflex to WB; Future    Weight loss  -     CT abdomen pelvis wo contrast; Future    Hypokalemia  -     Basic metabolic panel; Future    Mosquito bite, subsequent encounter  -     West Nile antibodies, IgG and IgM; Future    Other orders  -     Discontinue: simvastatin (ZOCOR) 40 mg tablet; Take 40 mg by mouth (Patient not taking: Reported on 8/27/2021)  -     Discontinue: olmesartan-hydrochlorothiazide (BENICAR HCT) 40-12 5 MG per tablet; Take 1 tablet by mouth daily (Patient not taking: Reported on 8/27/2021)  -     Discontinue: amLODIPine (NORVASC) 10 mg tablet; Take 5 mg by mouth daily (Patient not taking: Reported on 8/27/2021)         Subjective:     Patient ID: Nuno Mc is a 64 y o  male      Here for new onset a fib, found by his HR xiomara, when he noticed palpitations,no precipitating factors, also elevated lfts but no abd pain, us showed fatty liver, really not eating much and general felt Freeda Longest, is smoking less, 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, was taken to [id number] but was not in a fib at that time, on metoprolol for rate control and Flecainide for rhythm control, also mild anemia and elevated platelets, before dxed with a fib had episode of shaking chills with sweat, doesn't remember tick bite but did have mosquito bites      Review of Systems   Constitutional: Positive for appetite change and unexpected weight change  Negative for activity change and fatigue  Loss of appetite, 13 lb weight loss   Respiratory: Negative for shortness of breath  Cardiovascular: Positive for palpitations  Negative for chest pain and leg swelling  Neurological: Positive for light-headedness  Negative for dizziness and headaches  Objective:     Physical Exam  Vitals reviewed  Constitutional:       Appearance: Normal appearance  He is obese  Neck:      Vascular: No carotid bruit  Cardiovascular:      Rate and Rhythm: Normal rate and regular rhythm  Pulses: Normal pulses  Heart sounds: Normal heart sounds  Pulmonary:      Effort: Pulmonary effort is normal       Breath sounds: Normal breath sounds  Lymphadenopathy:      Cervical: No cervical adenopathy  Neurological:      Mental Status: He is alert  Vitals:    08/27/21 1549   BP: 130/80   BP Location: Right arm   Patient Position: Sitting   Cuff Size: Adult   Pulse: 70   Temp: (!) 96 9 °F (36 1 °C)   TempSrc: Temporal   Weight: 123 kg (270 lb 6 4 oz)   Height: 6' 2" (1 88 m)       Transitional Care Management Review:  Varun Anderson is a 64 y o  male here for TCM follow up  During the TCM phone call patient stated:    TCM Call (since 7/27/2021)     Date and time call was made  8/24/2021 10:26 AM    Hospital care reviewed  Records reviewed    Patient was hospitialized at  [facility]        Date of Admission  08/23/21    Date of discharge  08/24/21    Diagnosis  afib    Disposition  Home    Current Symptoms  None      TCM Call (since 7/27/2021)     Should patient be enrolled in anticoag monitoring? No    Scheduled for follow up?   Yes    I have advised the patient to call PCP with any new or worsening symptoms  Shira Parish/Medical Assistant          Fleurette Severin, MD

## 2021-08-28 ENCOUNTER — APPOINTMENT (OUTPATIENT)
Dept: LAB | Facility: MEDICAL CENTER | Age: 61
End: 2021-08-28
Payer: COMMERCIAL

## 2021-08-28 DIAGNOSIS — R74.8 ELEVATED LIVER ENZYMES: ICD-10-CM

## 2021-08-28 DIAGNOSIS — D64.9 ANEMIA, UNSPECIFIED TYPE: ICD-10-CM

## 2021-08-28 DIAGNOSIS — I10 ESSENTIAL HYPERTENSION: ICD-10-CM
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
## 2021-08-28 DIAGNOSIS — I48.91 NEW ONSET ATRIAL FIBRILLATION (HCC): ICD-10-CM

## 2021-08-28 DIAGNOSIS — R63.4 WEIGHT LOSS: ICD-10-CM

## 2021-08-28 DIAGNOSIS — E87.6 HYPOKALEMIA: ICD-10-CM

## 2021-08-28 DIAGNOSIS — R74.8 ELEVATED LIVER ENZYMES: Primary | ICD-10-CM

## 2021-08-28 LAB
ALBUMIN SERPL BCP-MCNC: 2.7 G/DL (ref 3.5–5)
ALP SERPL-CCNC: 405 U/L (ref 46–116)
ALT SERPL W P-5'-P-CCNC: 163 U/L (ref 12–78)
ANION GAP SERPL CALCULATED.3IONS-SCNC: 5 MMOL/L (ref 4–13)
AST SERPL W P-5'-P-CCNC: 96 U/L (ref 5–45)
BASOPHILS # BLD AUTO: 0.1 THOUSANDS/ΜL (ref 0–0.1)
BASOPHILS NFR BLD AUTO: 1 % (ref 0–1)
BILIRUB DIRECT SERPL-MCNC: 0.3 MG/DL (ref 0–0.2)
BILIRUB SERPL-MCNC: 0.46 MG/DL (ref 0.2–1)
BUN SERPL-MCNC: 12 MG/DL (ref 5–25)
CALCIUM SERPL-MCNC: 8.7 MG/DL (ref 8.3–10.1)
CHLORIDE SERPL-SCNC: 109 MMOL/L (ref 100–108)
CO2 SERPL-SCNC: 24 MMOL/L (ref 21–32)
CREAT SERPL-MCNC: 0.85 MG/DL (ref 0.6–1.3)
EOSINOPHIL # BLD AUTO: 0.11 THOUSAND/ΜL (ref 0–0.61)
EOSINOPHIL NFR BLD AUTO: 2 % (ref 0–6)
ERYTHROCYTE [DISTWIDTH] IN BLOOD BY AUTOMATED COUNT: 14.4 % (ref 11.6–15.1)
FERRITIN SERPL-MCNC: 513 NG/ML (ref 8–388)
FOLATE SERPL-MCNC: >20 NG/ML (ref 3.1–17.5)
GFR SERPL CREATININE-BSD FRML MDRD: 94 ML/MIN/1.73SQ M
GGT SERPL-CCNC: 357 U/L (ref 5–85)
GLUCOSE P FAST SERPL-MCNC: 115 MG/DL (ref 65–99)
HCT VFR BLD AUTO: 38 % (ref 36.5–49.3)
HGB BLD-MCNC: 11.7 G/DL (ref 12–17)
IMM GRANULOCYTES # BLD AUTO: 0.11 THOUSAND/UL (ref 0–0.2)
IMM GRANULOCYTES NFR BLD AUTO: 2 % (ref 0–2)
IRON SERPL-MCNC: 70 UG/DL (ref 65–175)
LYMPHOCYTES # BLD AUTO: 1.39 THOUSANDS/ΜL (ref 0.6–4.47)
LYMPHOCYTES NFR BLD AUTO: 19 % (ref 14–44)
MCH RBC QN AUTO: 30.2 PG (ref 26.8–34.3)
MCHC RBC AUTO-ENTMCNC: 30.8 G/DL (ref 31.4–37.4)
MCV RBC AUTO: 98 FL (ref 82–98)
MONOCYTES # BLD AUTO: 0.45 THOUSAND/ΜL (ref 0.17–1.22)
MONOCYTES NFR BLD AUTO: 6 % (ref 4–12)
NEUTROPHILS # BLD AUTO: 5.3 THOUSANDS/ΜL (ref 1.85–7.62)
NEUTS SEG NFR BLD AUTO: 70 % (ref 43–75)
NRBC BLD AUTO-RTO: 0 /100 WBCS
PLATELET # BLD AUTO: 541 THOUSANDS/UL (ref 149–390)
PMV BLD AUTO: 10.1 FL (ref 8.9–12.7)
POTASSIUM SERPL-SCNC: 4.1 MMOL/L (ref 3.5–5.3)
PROT SERPL-MCNC: 7.8 G/DL (ref 6.4–8.2)
RBC # BLD AUTO: 3.88 MILLION/UL (ref 3.88–5.62)
SODIUM SERPL-SCNC: 138 MMOL/L (ref 136–145)
VIT B12 SERPL-MCNC: 959 PG/ML (ref 100–900)
WBC # BLD AUTO: 7.46 THOUSAND/UL (ref 4.31–10.16)

## 2021-08-28 PROCEDURE — 86618 LYME DISEASE ANTIBODY: CPT

## 2021-08-28 PROCEDURE — 86788 WEST NILE VIRUS AB IGM: CPT

## 2021-08-28 PROCEDURE — 82533 TOTAL CORTISOL: CPT

## 2021-08-28 PROCEDURE — 86617 LYME DISEASE ANTIBODY: CPT

## 2021-08-28 PROCEDURE — 85025 COMPLETE CBC W/AUTO DIFF WBC: CPT

## 2021-08-28 PROCEDURE — 86789 WEST NILE VIRUS ANTIBODY: CPT

## 2021-08-28 PROCEDURE — 36415 COLL VENOUS BLD VENIPUNCTURE: CPT

## 2021-08-28 PROCEDURE — 82746 ASSAY OF FOLIC ACID SERUM: CPT

## 2021-08-28 PROCEDURE — 82728 ASSAY OF FERRITIN: CPT

## 2021-08-28 PROCEDURE — 80048 BASIC METABOLIC PNL TOTAL CA: CPT

## 2021-08-28 PROCEDURE — 82977 ASSAY OF GGT: CPT

## 2021-08-28 PROCEDURE — 82607 VITAMIN B-12: CPT

## 2021-08-28 PROCEDURE — 80076 HEPATIC FUNCTION PANEL: CPT

## 2021-08-28 PROCEDURE — 86800 THYROGLOBULIN ANTIBODY: CPT

## 2021-08-28 PROCEDURE — 86376 MICROSOMAL ANTIBODY EACH: CPT

## 2021-08-28 PROCEDURE — 83540 ASSAY OF IRON: CPT

## 2021-08-29 LAB
CORTIS AM PEAK SERPL-MCNC: 23.7 UG/DL (ref 4.2–22.4)
THYROPEROXIDASE AB SERPL-ACNC: 32 IU/ML (ref 0–34)

## 2021-08-30 LAB
WNV IGG SER QL IA: NEGATIVE
WNV IGM SER QL IA: NEGATIVE

## 2021-08-31 LAB
B BURGDOR IGG+IGM SER-ACNC: 668
THYROGLOB AB SERPL-ACNC: <1 IU/ML (ref 0–0.9)

## 2021-09-01 ENCOUNTER — TELEPHONE (OUTPATIENT)
Dept: FAMILY MEDICINE CLINIC | Facility: CLINIC | Age: 61
End: 2021-09-01

## 2021-09-01 DIAGNOSIS — R74.8 ELEVATED LIVER ENZYMES: ICD-10-CM

## 2021-09-01 DIAGNOSIS — A69.20 LYME DISEASE: Primary | ICD-10-CM

## 2021-09-01 LAB
B BURGDOR IGG PATRN SER IB-IMP: POSITIVE
B BURGDOR IGM PATRN SER IB-IMP: POSITIVE
B BURGDOR18KD IGG SER QL IB: PRESENT
B BURGDOR23KD IGG SER QL IB: PRESENT
B BURGDOR23KD IGM SER QL IB: PRESENT
B BURGDOR28KD IGG SER QL IB: ABNORMAL
B BURGDOR30KD IGG SER QL IB: ABNORMAL
B BURGDOR39KD IGG SER QL IB: PRESENT
B BURGDOR39KD IGM SER QL IB: PRESENT
B BURGDOR41KD IGG SER QL IB: PRESENT
B BURGDOR41KD IGM SER QL IB: PRESENT
B BURGDOR45KD IGG SER QL IB: PRESENT
B BURGDOR58KD IGG SER QL IB: ABNORMAL
B BURGDOR66KD IGG SER QL IB: ABNORMAL
B BURGDOR93KD IGG SER QL IB: PRESENT

## 2021-09-01 RX ORDER — DOXYCYCLINE HYCLATE 100 MG/1
100 CAPSULE ORAL EVERY 12 HOURS SCHEDULED
Qty: 42 CAPSULE | Refills: 0 | Status: SHIPPED | OUTPATIENT
Start: 2021-09-01 | End: 2021-09-22

## 2021-09-01 NOTE — TELEPHONE ENCOUNTER
Just want to let you know I dxed pt with acute Lyme,ladies, not aware that Lyme can affect liver, Michelle but will order LFTs before his visit with you 9/15 and Arleth Ramirez let him know a fib not likely caused by Lyme disease ( he is seeing you 9 27) but could have been reason for initial temp and feeling so crappy  Any  Insights  Either of you have into Daniel's condition much appreciated      DR Gilson Pimentel

## 2021-09-02 NOTE — TELEPHONE ENCOUNTER
Received message back from GI-PA there says Lyme CAN occasionally affect liver so would definitely do repeat liver tests next Friday since has appt with GI the following week, order in system

## 2021-09-02 NOTE — TELEPHONE ENCOUNTER
If there is a myocarditis present this can precipitate AF  Lyme disease can cause Bradycardia or HB

## 2021-09-08 ENCOUNTER — APPOINTMENT (OUTPATIENT)
Dept: LAB | Facility: MEDICAL CENTER | Age: 61
End: 2021-09-08
Payer: COMMERCIAL

## 2021-09-08 DIAGNOSIS — A69.20 LYME DISEASE: ICD-10-CM

## 2021-09-08 DIAGNOSIS — R74.8 ELEVATED LIVER ENZYMES: ICD-10-CM

## 2021-09-08 LAB
ALBUMIN SERPL BCP-MCNC: 3.4 G/DL (ref 3.5–5)
ALP SERPL-CCNC: 273 U/L (ref 46–116)
ALT SERPL W P-5'-P-CCNC: 92 U/L (ref 12–78)
AST SERPL W P-5'-P-CCNC: 31 U/L (ref 5–45)
BILIRUB DIRECT SERPL-MCNC: 0.2 MG/DL (ref 0–0.2)
BILIRUB SERPL-MCNC: 0.47 MG/DL (ref 0.2–1)
GGT SERPL-CCNC: 270 U/L (ref 5–85)
PROT SERPL-MCNC: 7.8 G/DL (ref 6.4–8.2)

## 2021-09-08 PROCEDURE — 80076 HEPATIC FUNCTION PANEL: CPT

## 2021-09-08 PROCEDURE — 82977 ASSAY OF GGT: CPT

## 2021-09-08 PROCEDURE — 36415 COLL VENOUS BLD VENIPUNCTURE: CPT

## 2021-09-11 ENCOUNTER — HOSPITAL ENCOUNTER (OUTPATIENT)
Dept: RADIOLOGY | Facility: HOSPITAL | Age: 61
Discharge: HOME | End: 2021-09-11
Payer: COMMERCIAL

## 2021-09-11 DIAGNOSIS — R63.4 WEIGHT LOSS: ICD-10-CM

## 2021-09-11 DIAGNOSIS — R74.8 ELEVATED LIVER ENZYMES: ICD-10-CM

## 2021-09-11 PROCEDURE — 74177 CT ABD & PELVIS W/CONTRAST: CPT

## 2021-09-11 PROCEDURE — G1004 CDSM NDSC: HCPCS

## 2021-09-11 RX ADMIN — IOHEXOL 100 ML: 350 INJECTION, SOLUTION INTRAVENOUS at 09:33

## 2021-09-15 ENCOUNTER — CONSULT (OUTPATIENT)
Dept: GASTROENTEROLOGY | Facility: CLINIC | Age: 61
End: 2021-09-15
Payer: COMMERCIAL

## 2021-09-15 VITALS
SYSTOLIC BLOOD PRESSURE: 180 MMHG | BODY MASS INDEX: 33.97 KG/M2 | DIASTOLIC BLOOD PRESSURE: 100 MMHG | TEMPERATURE: 98.2 F | WEIGHT: 264.6 LBS | HEART RATE: 68 BPM

## 2021-09-15 DIAGNOSIS — D64.9 ANEMIA, UNSPECIFIED TYPE: ICD-10-CM

## 2021-09-15 DIAGNOSIS — D12.6 TUBULAR ADENOMA OF COLON: ICD-10-CM

## 2021-09-15 DIAGNOSIS — R63.4 WEIGHT LOSS: ICD-10-CM

## 2021-09-15 DIAGNOSIS — K76.0 HEPATIC STEATOSIS: ICD-10-CM

## 2021-09-15 DIAGNOSIS — R74.8 ELEVATED LIVER ENZYMES: Primary | ICD-10-CM

## 2021-09-15 PROCEDURE — 99214 OFFICE O/P EST MOD 30 MIN: CPT | Performed by: PHYSICIAN ASSISTANT

## 2021-09-15 NOTE — LETTER
September 15, 2021     Suzie Garcia, [address]  [place]  [id number]    Patient: Zayda Little   YOB: 1960   Date of Visit: 9/15/2021       Dear Dr Ester Pickard:    Thank you for referring Zayda Little to me for evaluation  Below are my notes for this consultation  If you have questions, please do not hesitate to call me  I look forward to following your patient along with you  Sincerely,        Bell Morocho PA-C        CC: No Recipients  Bell Morocho PA-C  9/15/2021  5:00 PM  Sign when Signing Visit  [facility] - Outpatient Follow-up Note  Zayda Little 64 y o  male MRN: [id number]  Encounter: [id number]          ASSESSMENT AND PLAN:      1  Elevated liver enzymes  He was noted to have elevated liver enzymes during admission in August for AFib with RVR -  mixed pattern but mostly cholestatic injury  Chronic hepatitis panel negative and abdominal ultrasound shows hepatomegaly and steatosis  No evidence of biliary obstruction  Notably, he was also diagnosed with Lyme disease around the same time and has been on treatment with doxycycline for the past 2 weeks  Blood work from last week shows significant improvement in liver enzymes  I recommend completing liver lab work-up with iron studies and autoimmune serologies given chronic mild elevation of ALT  Continue treatment for Lyme disease  If liver enzymes return to baseline and work-up is negative, would treat for NAFLD  He may resume statin medication as benefits for cardiovascular protection outweigh risks  - Ambulatory referral to Gastroenterology  - CBC and differential; Future  - TIBC; Future  - Iron Saturation %; Future  - OMAR Screen w/ Reflex to Titer/Pattern; Future  - Antimitochondrial antibody; Future  - Anti-smooth muscle antibody, IgG; Future  - IgG; Future  - Hepatic function panel; Future  - US elastography; Future    2   Hepatic steatosis  He likely has underlying NAFLD given chronic mild elevation of ALT, moderate hepatic steatosis, and hepatomegaly  I recommend ultrasound with elastography to assess for fibrosis  Complete liver lab evaluation as discussed above  He successfully lost 10-15 lb, and I encouraged continued weight loss  He will continue to watch his portion sizes and avoid high calorie, high sugar foods  He will also avoid alcohol  3  Anemia, unspecified type  He was noted to have mild anemia during hospitalization last month  Most recent hemoglobin rebounding 11 7, MCV normal  Plan to repeat hemoglobin and check iron studies  If evidence of true iron deficiency anemia, would recommend EGD and colonoscopy  - Ambulatory referral to Gastroenterology  - CBC and differential; Future    4  Weight loss  He has intentionally lost 10-15 lb  No abnormal weight loss  - Ambulatory referral to Gastroenterology    5  Tubular adenoma of colon  He had 1 TA removed in Jan 2019  He was recommended 3 year recall  We can schedule at the next office visit  Follow-up in 6-8 weeks  ______________________________________________________________________    SUBJECTIVE:  79-year-old male with history of obesity, hepatic steatosis, tinnitus, lyme disease, and new onset atrial fibrillation referred by PCP for elevated liver enzymes, weight loss, anemia  He was noted to have elevated liver enzymes when he was admitted 8/23/21 with new onset atrial fibrillation with RVR  At that time, , , alk-phos 542, total bili 1 84  Previous labs from 2019, 2020, and May 2021 only showed mild elevation of ALT  Workup in the hospital involved chronic hepatitis panel which was negative  Right upper quadrant ultrasound showed enlarged fatty liver but no evidence of biliary obstruction  He followed up with his PCP and underwent additional testing which was positive for Lyme disease  He has been on doxycycline for the past 2 weeks  He is feeling well overall  He denies any rash, joint pain, nausea, vomiting, heartburn, abdominal pain, diarrhea, constipation, blood in the stool  He has been trying to lose weight and successfully lost 10-15 lb depending on which scale he uses  He has been eating smaller portions and avoiding ice cream and other high calorie foods  He has also not had any alcohol in the past 3 weeks  He was never a big drinker, he would have 1-2 beers when out at a restaurant here and there  Answers for HPI/ROS submitted by the patient on 9/14/2021  Diagnostic workup: CT scan, ultrasound    He had an upper GI done many years ago which was normal  He had colonoscopy in January 2019 with 1 tubular adenoma removed  He was recommended repeat in 3 years, so he is due in a few months  REVIEW OF SYSTEMS IS OTHERWISE NEGATIVE  Historical Information   Past Medical History:   Diagnosis Date    [facility]-fib [facility]) 08/24/2021    Disc degeneration, lumbosacral 1/6/2016    Hyperlipidemia     hypercholesterolemia    Hypertension     last assessed 11/22/17    Pneumonia of both lower lobes due to infectious organism 1/29/2018     Past Surgical History:   Procedure Laterality Date    COLONOSCOPY      HEMORRHOID SURGERY      KNEE SURGERY Bilateral     WV COLONOSCOPY FLX DX W/COLLJ SPEC WHEN PFRMD N/A 1/5/2019    Procedure: COLONOSCOPY;  Surgeon: Gopal Kumar MD;  [facility];   Service: Gastroenterology    UPPER GASTROINTESTINAL ENDOSCOPY       Social History   Social History     Substance and Sexual Activity   Alcohol Use Yes    Comment: Occassional 1 x / month     Social History     Substance and Sexual Activity   Drug Use No     Social History     Tobacco Use   Smoking Status Current Every Day Smoker    Packs/day: 0 50    Years: 40 00    Pack years: 20 00    Types: Cigarettes   Smokeless Tobacco Never Used     Family History   Problem Relation Age of Onset    Obesity Mother     Aneurysm Father        Meds/Allergies       Current Outpatient Medications:     ascorbic acid (VITAMIN C) 500 mg tablet    Coenzyme Q10 (COQ-10) 10 MG CAPS    doxycycline hyclate (Vibramycin) 100 mg capsule    flecainide (TAMBOCOR) 50 mg tablet    metoprolol succinate (TOPROL-XL) 50 mg 24 hr tablet    Multiple Vitamins-Minerals (MENS MULTIVITAMIN PLUS PO)    Omega-3 Fatty Acids (FISH OIL) 1200 MG CAPS    Zinc 50 MG CAPS    No Known Allergies        Objective     Blood pressure (!) 180/100, pulse 68, temperature 98 2 °F (36 8 °C), temperature source Tympanic, weight 120 kg (264 lb 9 6 oz)  Body mass index is 33 97 kg/m²  PHYSICAL EXAM:      General Appearance:   Alert, cooperative, no distress   HEENT:   Normocephalic, atraumatic, anicteric      Neck:  Supple, symmetrical, trachea midline   Lungs:   Clear to auscultation bilaterally; no rales, rhonchi or wheezing; respirations unlabored    Heart[de-identified]   Regular rate and rhythm; no murmur, rub, or gallop  Abdomen:   Soft, non-tender, non-distended; normal bowel sounds; no masses, no organomegaly    Genitalia:   Deferred    Rectal:   Deferred    Extremities:  No cyanosis, clubbing or edema    Pulses:  2+ and symmetric    Skin:  No jaundice, rashes, or lesions    Lymph nodes:  No palpable cervical lymphadenopathy        Lab Results:   No visits with results within 1 Day(s) from this visit     Latest known visit with results is:   Appointment on 09/08/2021   Component Date Value    Total Bilirubin 09/08/2021 0 47     Bilirubin, Direct 09/08/2021 0 20     Alkaline Phosphatase 09/08/2021 273*    AST 09/08/2021 31     ALT 09/08/2021 92*    Total Protein 09/08/2021 7 8     Albumin 09/08/2021 3 4*    GGT 09/08/2021 270*         Radiology Results:   Echo complete with contrast if indicated    Result Date: 8/23/2021  Narrative: [address] [facility], [address] [facility] [phone] Transthoracic Echocardiogram 2D, M-mode, Doppler, and Color Doppler Study date:  23-Aug-2021 Patient: Palak Quintana MR number: [id number] Account number: [de-identified] : 1960 Age: 64 years Gender: Male Status: Outpatient Location: Bedside Height: 73 in Weight: 274 3 lb BP: 114/ 75 mmHg Indications: Atrial Fibrillation Diagnoses: I48 0 - Atrial fibrillation Sonographer:  NII Acevedo Primary Physician: Alexy Wright MD Referring Physician:  Lisa Churchill MD Group:  [facility] Interpreting Physician:  Becky Bradshaw MD SUMMARY LEFT VENTRICLE: Size was normal  Systolic function was normal  Ejection fraction was estimated to be 60 %  There was mild concentric hypertrophy  RIGHT VENTRICLE: The size was normal  Systolic function was normal  LEFT ATRIUM: The atrium was mildly dilated  RIGHT ATRIUM: The atrium was mildly dilated  MITRAL VALVE: There was trace regurgitation  TRICUSPID VALVE: There was trace regurgitation  HISTORY: PRIOR HISTORY: Hypertension,HLD,Tobacco Abuse PROCEDURE: The procedure was performed at the bedside  This was a routine study  The transthoracic approach was used  The study included complete 2D imaging, M-mode, complete spectral Doppler, and color Doppler  The heart rate was 98 bpm, at the start of the study  Images were obtained from the parasternal, apical, subcostal, and suprasternal notch acoustic windows  Image quality was adequate  LEFT VENTRICLE: Size was normal  Systolic function was normal  Ejection fraction was estimated to be 60 %  There were no regional wall motion abnormalities  Wall thickness was mildly increased  There was mild concentric hypertrophy  DOPPLER: Transmitral flow pattern: atrial fibrillation  The study was not technically sufficient to allow evaluation of LV diastolic function  RIGHT VENTRICLE: The size was normal  Systolic function was normal  Wall thickness was normal  LEFT ATRIUM: The atrium was mildly dilated  RIGHT ATRIUM: The atrium was mildly dilated   MITRAL VALVE: Valve structure was normal  There was normal leaflet separation  DOPPLER: The transmitral velocity was within the normal range  There was no evidence for stenosis  There was trace regurgitation  AORTIC VALVE: The valve was trileaflet  Leaflets exhibited normal thickness and normal cuspal separation  DOPPLER: Transaortic velocity was within the normal range  There was no evidence for stenosis  There was no regurgitation  TRICUSPID VALVE: The valve structure was normal  There was normal leaflet separation  DOPPLER: The transtricuspid velocity was within the normal range  There was no evidence for stenosis  There was trace regurgitation  The tricuspid jet envelope definition was inadequate for estimation of RV systolic pressure  There are no indirect findings suggestive of moderate or severe pulmonary hypertension  PULMONIC VALVE: Leaflets exhibited normal thickness, no calcification, and normal cuspal separation  DOPPLER: The transpulmonic velocity was within the normal range  There was no regurgitation  PERICARDIUM: There was no pericardial effusion  The pericardium was normal in appearance  AORTA: The root exhibited normal size  SYSTEMIC VEINS: IVC: The inferior vena cava was normal in size and course  Respirophasic changes were normal  SYSTEM MEASUREMENT TABLES 2D %FS: 24 99 % Ao Diam: 2 93 cm Ao asc: 2 84 cm EDV(Teich): 95 3 ml EF(Teich): 49 54 % ESV(Teich): 48 09 ml IVSd: 1 22 cm LA Area: 29 05 cm2 LA Diam: 4 4 cm LVEDV MOD A4C: 125 61 ml LVEF MOD A4C: 64 28 % LVESV MOD A4C: 44 86 ml LVIDd: 4 56 cm LVIDs: 3 42 cm LVLd A4C: 8 85 cm LVLs A4C: 7 39 cm LVPWd: 1 21 cm RA Area: 21 84 cm2 RVIDd: 3 34 cm SV MOD A4C: 80 74 ml SV(Teich): 47 21 ml CW TR Vmax: 2 13 m/s TR maxP 21 mmHg MM TAPSE: 2 05 cm [employer] Accredited Echocardiography Laboratory Prepared and electronically signed by Harleen Au MD Signed 23-Aug-2021 11:29:29     XR chest 2 views    Result Date: 2021  Narrative: CHEST INDICATION:   afib   COMPARISON:  Chest radiograph from 9/16/2020  EXAM PERFORMED/VIEWS:  XR CHEST PA & LATERAL  DUAL ENERGY SUBTRACTION  FINDINGS: Cardiomediastinal silhouette appears unremarkable  No acute disease  Benign linear atelectasis or scar in the left base  No effusion or pneumothorax  Osseous structures appear within normal limits for patient age  Impression: No acute cardiopulmonary disease  Workstation performed: [id number]     US abdomen complete    Result Date: 8/24/2021  Narrative: ABDOMEN ULTRASOUND, COMPLETE INDICATION:   abnormal liver enzymes  COMPARISON: None TECHNIQUE:   Real-time ultrasound of the abdomen was performed with a curvilinear transducer with both volumetric sweeps and still imaging techniques  FINDINGS: PANCREAS:  Visualized portions of the pancreas are within normal limits  AORTA AND IVC:  Visualized portions are normal for patient age  LIVER: Size:  Enlarged  The liver measures 20 9 cm in the midclavicular line  Contour:  Surface contour is smooth  Parenchyma: There is moderate diffuse increased echogenicity with smooth echotexture and acoustic beam attenuation  Most consistent with moderate hepatic steatosis  Focal fatty sparing adjacent to the gallbladder fossa  No evidence of suspicious mass  Limited imaging of the main portal vein shows it to be patent and hepatopetal  BILIARY: No gallbladder findings  No intrahepatic biliary dilatation  CBD measures 4 mm  No choledocholithiasis  KIDNEY: Right kidney measures 12 6 x 7 2 x 7 9 cm  Within normal limits  Left kidney measures 12 5 x 6 2 x 5 2 cm  Within normal limits  SPLEEN: Measures 12 6 x 10 9 x 8 6 cm  Within normal limits  ASCITES:  None  Impression: Enlarged fatty liver   Workstation performed: [id number]

## 2021-09-15 NOTE — PROGRESS NOTES
[facility] - Outpatient Follow-up Note  Sedrick Orozco 64 y o  male MRN: [id number]  Encounter: [id number]          ASSESSMENT AND PLAN:      1  Elevated liver enzymes  He was noted to have elevated liver enzymes during admission in August for AFib with RVR -  mixed pattern but mostly cholestatic injury  Chronic hepatitis panel negative and abdominal ultrasound shows hepatomegaly and steatosis  No evidence of biliary obstruction  Notably, he was also diagnosed with Lyme disease around the same time and has been on treatment with doxycycline for the past 2 weeks  Blood work from last week shows significant improvement in liver enzymes  I recommend completing liver lab work-up with iron studies and autoimmune serologies given chronic mild elevation of ALT  Continue treatment for Lyme disease  If liver enzymes return to baseline and work-up is negative, would treat for NAFLD  He may resume statin medication as benefits for cardiovascular protection outweigh risks  - Ambulatory referral to Gastroenterology  - CBC and differential; Future  - TIBC; Future  - Iron Saturation %; Future  - OMAR Screen w/ Reflex to Titer/Pattern; Future  - Antimitochondrial antibody; Future  - Anti-smooth muscle antibody, IgG; Future  - IgG; Future  - Hepatic function panel; Future  - US elastography; Future    2  Hepatic steatosis  He likely has underlying NAFLD given chronic mild elevation of ALT, moderate hepatic steatosis, and hepatomegaly  I recommend ultrasound with elastography to assess for fibrosis  Complete liver lab evaluation as discussed above  He successfully lost 10-15 lb, and I encouraged continued weight loss  He will continue to watch his portion sizes and avoid high calorie, high sugar foods  He will also avoid alcohol  3  Anemia, unspecified type  He was noted to have mild anemia during hospitalization last month   Most recent hemoglobin rebounding 11 7, MCV normal  Plan to repeat hemoglobin and check iron studies  If evidence of true iron deficiency anemia, would recommend EGD and colonoscopy  - Ambulatory referral to Gastroenterology  - CBC and differential; Future    4  Weight loss  He has intentionally lost 10-15 lb  No abnormal weight loss  - Ambulatory referral to Gastroenterology    5  Tubular adenoma of colon  He had 1 TA removed in Jan 2019  He was recommended 3 year recall  We can schedule at the next office visit  Follow-up in 6-8 weeks  ______________________________________________________________________    SUBJECTIVE:  28-year-old male with history of obesity, hepatic steatosis, tinnitus, lyme disease, and new onset atrial fibrillation referred by PCP for elevated liver enzymes, weight loss, anemia  He was noted to have elevated liver enzymes when he was admitted 8/23/21 with new onset atrial fibrillation with RVR  At that time, , , alk-phos 542, total bili 1 84  Previous labs from 2019, 2020, and May 2021 only showed mild elevation of ALT  Workup in the hospital involved chronic hepatitis panel which was negative  Right upper quadrant ultrasound showed enlarged fatty liver but no evidence of biliary obstruction  He followed up with his PCP and underwent additional testing which was positive for Lyme disease  He has been on doxycycline for the past 2 weeks  He is feeling well overall  He denies any rash, joint pain, nausea, vomiting, heartburn, abdominal pain, diarrhea, constipation, blood in the stool  He has been trying to lose weight and successfully lost 10-15 lb depending on which scale he uses  He has been eating smaller portions and avoiding ice cream and other high calorie foods  He has also not had any alcohol in the past 3 weeks  He was never a big drinker, he would have 1-2 beers when out at a restaurant here and there      Answers for HPI/ROS submitted by the patient on 9/14/2021  Diagnostic workup: CT scan, ultrasound    He had an upper GI done many years ago which was normal  He had colonoscopy in January 2019 with 1 tubular adenoma removed  He was recommended repeat in 3 years, so he is due in a few months  REVIEW OF SYSTEMS IS OTHERWISE NEGATIVE  Historical Information   Past Medical History:   Diagnosis Date    A-fib [facility]) 08/24/2021    Disc degeneration, lumbosacral 1/6/2016    Hyperlipidemia     hypercholesterolemia    Hypertension     last assessed 11/22/17    Pneumonia of both lower lobes due to infectious organism 1/29/2018     Past Surgical History:   Procedure Laterality Date    COLONOSCOPY      HEMORRHOID SURGERY      KNEE SURGERY Bilateral     AZ COLONOSCOPY FLX DX W/COLLJ SPEC WHEN PFRMD N/A 1/5/2019    Procedure: COLONOSCOPY;  Surgeon: Cisco Thorne MD;  [facility]: [facility]; Service: Gastroenterology    UPPER GASTROINTESTINAL ENDOSCOPY       Social History   Social History     Substance and Sexual Activity   Alcohol Use Yes    Comment: Occassional 1 x / month     Social History     Substance and Sexual Activity   Drug Use No     Social History     Tobacco Use   Smoking Status Current Every Day Smoker    Packs/day: 0 50    Years: 40 00    Pack years: 20 00    Types: Cigarettes   Smokeless Tobacco Never Used     Family History   Problem Relation Age of Onset    Obesity Mother     Aneurysm Father        Meds/Allergies       Current Outpatient Medications:     ascorbic acid (VITAMIN C) 500 mg tablet    Coenzyme Q10 (COQ-10) 10 MG CAPS    doxycycline hyclate (Vibramycin) 100 mg capsule    flecainide (TAMBOCOR) 50 mg tablet    metoprolol succinate (TOPROL-XL) 50 mg 24 hr tablet    Multiple Vitamins-Minerals (MENS MULTIVITAMIN PLUS PO)    Omega-3 Fatty Acids (FISH OIL) 1200 MG CAPS    Zinc 50 MG CAPS    No Known Allergies        Objective     Blood pressure (!) 180/100, pulse 68, temperature 98 2 °F (36 8 °C), temperature source Tympanic, weight 120 kg (264 lb 9 6 oz)   Body mass index is 33 97 kg/m²       PHYSICAL EXAM:      General Appearance:   Alert, cooperative, no distress   HEENT:   Normocephalic, atraumatic, anicteric      Neck:  Supple, symmetrical, trachea midline   Lungs:   Clear to auscultation bilaterally; no rales, rhonchi or wheezing; respirations unlabored    Heart[de-identified]   Regular rate and rhythm; no murmur, rub, or gallop  Abdomen:   Soft, non-tender, non-distended; normal bowel sounds; no masses, no organomegaly    Genitalia:   Deferred    Rectal:   Deferred    Extremities:  No cyanosis, clubbing or edema    Pulses:  2+ and symmetric    Skin:  No jaundice, rashes, or lesions    Lymph nodes:  No palpable cervical lymphadenopathy        Lab Results:   No visits with results within 1 Day(s) from this visit  Latest known visit with results is:   Appointment on 2021   Component Date Value    Total Bilirubin 2021 0 47     Bilirubin, Direct 2021 0 20     Alkaline Phosphatase 2021 273*    AST 2021 31     ALT 2021 92*    Total Protein 2021 7 8     Albumin 2021 3 4*    GGT 2021 270*         Radiology Results:   Echo complete with contrast if indicated    Result Date: 2021  Narrative: [id number], [address] [facility] [phone] Transthoracic Echocardiogram 2D, M-mode, Doppler, and Color Doppler Study date:  23-Aug-2021 Patient: Tabby Gaming MR number: [id number] Account number: [de-identified] : 1960 Age: 64 years Gender: Male Status: Outpatient Location: Bedside Height: 73 in Weight: 274 3 lb BP: 114/ 75 mmHg Indications: Atrial Fibrillation Diagnoses: I48 0 - Atrial fibrillation Sonographer:  NII Rebolledo Primary Physician:   Odalys Gregory MD Referring Physician:  Avi Galarza MD Group:  [facility] Interpreting Physician:  Evelina Guthrie MD SUMMARY LEFT VENTRICLE: Size was normal  Systolic function was normal  Ejection fraction was estimated to be 60 %  There was mild concentric hypertrophy  RIGHT VENTRICLE: The size was normal  Systolic function was normal  LEFT ATRIUM: The atrium was mildly dilated  RIGHT ATRIUM: The atrium was mildly dilated  MITRAL VALVE: There was trace regurgitation  TRICUSPID VALVE: There was trace regurgitation  HISTORY: PRIOR HISTORY: Hypertension,HLD,Tobacco Abuse PROCEDURE: The procedure was performed at the bedside  This was a routine study  The transthoracic approach was used  The study included complete 2D imaging, M-mode, complete spectral Doppler, and color Doppler  The heart rate was 98 bpm, at the start of the study  Images were obtained from the parasternal, apical, subcostal, and suprasternal notch acoustic windows  Image quality was adequate  LEFT VENTRICLE: Size was normal  Systolic function was normal  Ejection fraction was estimated to be 60 %  There were no regional wall motion abnormalities  Wall thickness was mildly increased  There was mild concentric hypertrophy  DOPPLER: Transmitral flow pattern: atrial fibrillation  The study was not technically sufficient to allow evaluation of LV diastolic function  RIGHT VENTRICLE: The size was normal  Systolic function was normal  Wall thickness was normal  LEFT ATRIUM: The atrium was mildly dilated  RIGHT ATRIUM: The atrium was mildly dilated  MITRAL VALVE: Valve structure was normal  There was normal leaflet separation  DOPPLER: The transmitral velocity was within the normal range  There was no evidence for stenosis  There was trace regurgitation  AORTIC VALVE: The valve was trileaflet  Leaflets exhibited normal thickness and normal cuspal separation  DOPPLER: Transaortic velocity was within the normal range  There was no evidence for stenosis  There was no regurgitation  TRICUSPID VALVE: The valve structure was normal  There was normal leaflet separation  DOPPLER: The transtricuspid velocity was within the normal range   There was no evidence for stenosis  There was trace regurgitation  The tricuspid jet envelope definition was inadequate for estimation of RV systolic pressure  There are no indirect findings suggestive of moderate or severe pulmonary hypertension  PULMONIC VALVE: Leaflets exhibited normal thickness, no calcification, and normal cuspal separation  DOPPLER: The transpulmonic velocity was within the normal range  There was no regurgitation  PERICARDIUM: There was no pericardial effusion  The pericardium was normal in appearance  AORTA: The root exhibited normal size  SYSTEMIC VEINS: IVC: The inferior vena cava was normal in size and course  Respirophasic changes were normal  SYSTEM MEASUREMENT TABLES 2D %FS: 24 99 % Ao Diam: 2 93 cm Ao asc: 2 84 cm EDV(Teich): 95 3 ml EF(Teich): 49 54 % ESV(Teich): 48 09 ml IVSd: 1 22 cm LA Area: 29 05 cm2 LA Diam: 4 4 cm LVEDV MOD A4C: 125 61 ml LVEF MOD A4C: 64 28 % LVESV MOD A4C: 44 86 ml LVIDd: 4 56 cm LVIDs: 3 42 cm LVLd A4C: 8 85 cm LVLs A4C: 7 39 cm LVPWd: 1 21 cm RA Area: 21 84 cm2 RVIDd: 3 34 cm SV MOD A4C: 80 74 ml SV(Teich): 47 21 ml CW TR Vmax: 2 13 m/s TR maxP 21 mmHg MM TAPSE: 2 05 cm [employer] Accredited Echocardiography Laboratory Prepared and electronically signed by Rylee Ahmadi MD Signed 23-Aug-2021 11:29:29     XR chest 2 views    Result Date: 2021  Narrative: CHEST INDICATION:   afib  COMPARISON:  Chest radiograph from 2020  EXAM PERFORMED/VIEWS:  XR CHEST PA & LATERAL  DUAL ENERGY SUBTRACTION  FINDINGS: Cardiomediastinal silhouette appears unremarkable  No acute disease  Benign linear atelectasis or scar in the left base  No effusion or pneumothorax  Osseous structures appear within normal limits for patient age  Impression: No acute cardiopulmonary disease  Workstation performed: [id number]     US abdomen complete    Result Date: 2021  Narrative: ABDOMEN ULTRASOUND, COMPLETE INDICATION:   abnormal liver enzymes   COMPARISON: None TECHNIQUE: Real-time ultrasound of the abdomen was performed with a curvilinear transducer with both volumetric sweeps and still imaging techniques  FINDINGS: PANCREAS:  Visualized portions of the pancreas are within normal limits  AORTA AND IVC:  Visualized portions are normal for patient age  LIVER: Size:  Enlarged  The liver measures 20 9 cm in the midclavicular line  Contour:  Surface contour is smooth  Parenchyma: There is moderate diffuse increased echogenicity with smooth echotexture and acoustic beam attenuation  Most consistent with moderate hepatic steatosis  Focal fatty sparing adjacent to the gallbladder fossa  No evidence of suspicious mass  Limited imaging of the main portal vein shows it to be patent and hepatopetal  BILIARY: No gallbladder findings  No intrahepatic biliary dilatation  CBD measures 4 mm  No choledocholithiasis  KIDNEY: Right kidney measures 12 6 x 7 2 x 7 9 cm  Within normal limits  Left kidney measures 12 5 x 6 2 x 5 2 cm  Within normal limits  SPLEEN: Measures 12 6 x 10 9 x 8 6 cm  Within normal limits  ASCITES:  None  Impression: Enlarged fatty liver   Workstation performed: [id number]

## 2021-09-24 ENCOUNTER — RA CDI HCC (OUTPATIENT)
Dept: OTHER | Facility: HOSPITAL | Age: 61
End: 2021-09-24

## 2021-09-24 NOTE — PROGRESS NOTES
[facility] 75  coding opportunities       Chart reviewed, no opportunity found: CHART REVIEWED, NO OPPORTUNITY FOUND                        Patients insurance company: [employer] (Medicare Advantage and Commercial)

## 2021-09-27 ENCOUNTER — OFFICE VISIT (OUTPATIENT)
Dept: CARDIOLOGY CLINIC | Facility: CLINIC | Age: 61
End: 2021-09-27
Payer: COMMERCIAL

## 2021-09-27 VITALS
HEART RATE: 60 BPM | SYSTOLIC BLOOD PRESSURE: 168 MMHG | OXYGEN SATURATION: 97 % | HEIGHT: 74 IN | DIASTOLIC BLOOD PRESSURE: 80 MMHG | BODY MASS INDEX: 34.06 KG/M2 | WEIGHT: 265.4 LBS

## 2021-09-27 DIAGNOSIS — I10 HYPERTENSION, UNSPECIFIED TYPE: Chronic | ICD-10-CM

## 2021-09-27 DIAGNOSIS — Z72.0 TOBACCO ABUSE: ICD-10-CM

## 2021-09-27 DIAGNOSIS — E78.5 HYPERLIPIDEMIA, UNSPECIFIED HYPERLIPIDEMIA TYPE: ICD-10-CM

## 2021-09-27 DIAGNOSIS — I48.0 PAF (PAROXYSMAL ATRIAL FIBRILLATION) (HCC): Primary | ICD-10-CM

## 2021-09-27 PROCEDURE — 99215 OFFICE O/P EST HI 40 MIN: CPT | Performed by: NURSE PRACTITIONER

## 2021-09-27 PROCEDURE — 4004F PT TOBACCO SCREEN RCVD TLK: CPT | Performed by: NURSE PRACTITIONER

## 2021-09-27 PROCEDURE — 3008F BODY MASS INDEX DOCD: CPT | Performed by: NURSE PRACTITIONER

## 2021-09-27 PROCEDURE — 93000 ELECTROCARDIOGRAM COMPLETE: CPT | Performed by: NURSE PRACTITIONER

## 2021-09-27 RX ORDER — AMLODIPINE BESYLATE 5 MG/1
5 TABLET ORAL DAILY
Qty: 90 TABLET | Refills: 3 | Status: SHIPPED | OUTPATIENT
Start: 2021-09-27 | End: 2021-12-13 | Stop reason: SDUPTHER

## 2021-09-27 RX ORDER — ASPIRIN 81 MG/1
81 TABLET, CHEWABLE ORAL ONCE
Qty: 90 TABLET | Refills: 3 | Status: SHIPPED | OUTPATIENT
Start: 2021-09-27 | End: 2021-09-27 | Stop reason: CLARIF

## 2021-09-27 RX ORDER — ASPIRIN 81 MG/1
81 TABLET, CHEWABLE ORAL DAILY
Qty: 90 TABLET | Refills: 3 | Status: SHIPPED | OUTPATIENT
Start: 2021-09-27

## 2021-09-27 NOTE — PROGRESS NOTES
Cardiology Follow Up    [facility]  1960  [id number]  [address]  [facility]  [place] [address]   [phone]    1  PAF (paroxysmal atrial fibrillation) (HCC)  Stress test only, exercise    aspirin 81 mg chewable tablet   2  Hypertension, unspecified type  POCT ECG    amLODIPine (NORVASC) 5 mg tablet    DISCONTINUED: aspirin 81 mg chewable tablet   3  Hyperlipidemia, unspecified hyperlipidemia type     4  Tobacco abuse         Interval History:    [facility] was admitted to [facility] on 8/23 - 8/24/21 with new onset atrial fibrillation  He presented to the emergency room with palpitations  On presentation he was found to be in atrial fibrillation with RVR  He was treated with IV Lopressor potassium repleted  He was found to be hypotensive antihypertensives were stopped  Cardiology was consulted  Lab studies show transmitted itis statins were stopped chronic hepatitis panel was nonreactive  A abdominal ultrasound showed enlarged fatty liver  TTE showed  Left ventricular systolic function normal LVEF 60% mildly dilated by lateral atria, trace MR and trace TR  Was started on metoprolol succinate 25 mg daily  CHADS2 Vasc equal to 1 was not started on anticoagulation he was encouraged on smoking cessation  On 8/24/21 Daniel presented to the ED with palpiations  According to his phone he was in atrial fibrillation 150 BPM     He subsequently turned to the emergency room for further evaluation  Arrival to emergency room palpitations resolved  EKG showed normal sinus rhythm 71 beats per minute    On 8/25/21 Daniel called our office with complaints of palpitations  Flecainide 50mg BID stated  Benita Barroso presents to our office for a follow up visit  He admits to occasional palpitations, 4 in a row, occurring EOD      He is feeling better since he was treated for Lyme's disease  He denies SOB, CP, lightheadedness or dizziness  He continues to smoke 1/2 pack a day  HPI:  Hypertension  Hyperlipidemia  Tobacco abuse  Obesity   Patient Active Problem List   Diagnosis    Disc degeneration, lumbosacral    Hyperlipidemia    Hypertension    Tobacco abuse    Screen for colon cancer    Left chronic serous otitis media    New onset atrial fibrillation (HCC)    Hypokalemia    Transaminitis     Past Medical History:   Diagnosis Date    A-fib ([facility] [address] ) 08/24/2021    Disc degeneration, lumbosacral 1/6/2016    Hyperlipidemia     hypercholesterolemia    Hypertension     last assessed 11/22/17    Pneumonia of both lower lobes due to infectious organism 1/29/2018     Social History     Socioeconomic History    Marital status: /Civil Union     Spouse name: Not on file    Number of children: 1    Years of education: Not on file    Highest education level: Not on file   Occupational History    Occupation:    Tobacco Use    Smoking status: Current Every Day Smoker     Packs/day: 0 50     Years: 40 00     Pack years: 20 00     Types: Cigarettes    Smokeless tobacco: Never Used   Vaping Use    Vaping Use: Never used   Substance and Sexual Activity    Alcohol use: Yes     Comment: Occassional 1 x / month    Drug use: No    Sexual activity: Yes     Partners: Female     Birth control/protection: None   Other Topics Concern    Not on file   Social History Narrative    Lives with wife    Full time employment    active advance directive    1 child ; son, passed away        Does not consume caffeine     Social Determinants of Health     Financial Resource Strain:     Difficulty of Paying Living Expenses:    Food Insecurity:     Worried About Running Out of Food in the Last Year:     [address] in the Last Year:    Transportation Needs:     Lack of Transportation (Medical):      Lack of Transportation (Non-Medical):    Physical Activity:     Days of Exercise per Week:     Minutes of Exercise per Session:    Stress:     Feeling of Stress :    Social Connections:     Frequency of Communication with Friends and Family:     Frequency of Social Gatherings with Friends and Family:     Attends [employer] Services:     Active Member of Clubs or Organizations:     Attends Club or Organization Meetings:     Marital Status:    Intimate Partner Violence:     Fear of Current or Ex-Partner:     Emotionally Abused:     Physically Abused:     Sexually Abused:       Family History   Problem Relation Age of Onset    Obesity Mother     Aneurysm Father      Past Surgical History:   Procedure Laterality Date    COLONOSCOPY      HEMORRHOID SURGERY      KNEE SURGERY Bilateral     MD COLONOSCOPY FLX DX W/COLLJ SPEC WHEN PFRMD N/A 1/5/2019    Procedure: COLONOSCOPY;  Surgeon: Chay Villegas MD;  [facility];   Service: Gastroenterology    UPPER GASTROINTESTINAL ENDOSCOPY         Current Outpatient Medications:     ascorbic acid (VITAMIN C) 500 mg tablet, Take 500 mg by mouth daily, Disp: , Rfl:     Coenzyme Q10 (COQ-10) 10 MG CAPS, Take by mouth, Disp: , Rfl:     flecainide (TAMBOCOR) 50 mg tablet, Take 1 tablet (50 mg total) by mouth 2 (two) times a day, Disp: 60 tablet, Rfl: 3    metoprolol succinate (TOPROL-XL) 50 mg 24 hr tablet, Take 1 tablet (50 mg total) by mouth daily, Disp: 30 tablet, Rfl: 1    Multiple Vitamins-Minerals (MENS MULTIVITAMIN PLUS PO), Take by mouth, Disp: , Rfl:     Omega-3 Fatty Acids (FISH OIL) 1200 MG CAPS, Take by mouth, Disp: , Rfl:     Zinc 50 MG CAPS, Take by mouth, Disp: , Rfl:   No Known Allergies    Labs:  Appointment on 09/08/2021   Component Date Value    Total Bilirubin 09/08/2021 0 47     Bilirubin, Direct 09/08/2021 0 20     Alkaline Phosphatase 09/08/2021 273*    AST 09/08/2021 31     ALT 09/08/2021 92*    Total Protein 09/08/2021 7 8     Albumin 09/08/2021 3 4*    GGT 09/08/2021 270*   Appointment on 08/28/2021   Component Date Value    Total Bilirubin 08/28/2021 0 46     Bilirubin, Direct 08/28/2021 0 30*    Alkaline Phosphatase 08/28/2021 405*    AST 08/28/2021 96*    ALT 08/28/2021 163*    Total Protein 08/28/2021 7 8     Albumin 08/28/2021 2 7*    GGT 08/28/2021 357*    Iron 08/28/2021 70     Ferritin 08/28/2021 513*    Vitamin B-12 08/28/2021 959*    Folate 08/28/2021 >20 0*    Cortisol - AM 08/28/2021 23 7*    Sodium 08/28/2021 138     Potassium 08/28/2021 4 1     Chloride 08/28/2021 109*    CO2 08/28/2021 24     ANION GAP 08/28/2021 5     BUN 08/28/2021 12     Creatinine 08/28/2021 0 85     Glucose, Fasting 08/28/2021 115*    Calcium 08/28/2021 8 7     eGFR 08/28/2021 94     WBC 08/28/2021 7 46     RBC 08/28/2021 3 88     Hemoglobin 08/28/2021 11 7*    Hematocrit 08/28/2021 38 0     MCV 08/28/2021 98     MCH 08/28/2021 30 2     MCHC 08/28/2021 30 8*    RDW 08/28/2021 14 4     MPV 08/28/2021 10 1     Platelets 54/66/2272 541*    nRBC 08/28/2021 0     Neutrophils Relative 08/28/2021 70     Immat GRANS % 08/28/2021 2     Lymphocytes Relative 08/28/2021 19     Monocytes Relative 08/28/2021 6     Eosinophils Relative 08/28/2021 2     Basophils Relative 08/28/2021 1     Neutrophils Absolute 08/28/2021 5 30     Immature Grans Absolute 08/28/2021 0 11     Lymphocytes Absolute 08/28/2021 1 39     Monocytes Absolute 08/28/2021 0 45     Eosinophils Absolute 08/28/2021 0 11     Basophils Absolute 08/28/2021 0 10     Lyme total antibody 08/28/2021 668*    West Nile IgG 08/28/2021 Negative     West Nile IgM 08/28/2021 Negative     THYROID MICROSOMAL ANTIB* 08/28/2021 32     Thyroglobulin Ab 08/28/2021 <1 0     Lyme 18 kD IgG 08/28/2021 Present*    Lyme 23 kD IgG 08/28/2021 Present*    Lyme 28 kD IgG 08/28/2021 Absent     Lyme 30 kD IgG 08/28/2021 Absent     Lyme 39 kD IgG 08/28/2021 Present*    Lyme 41 kD IgG 08/28/2021 Present*    Lyme 45 kD IgG 08/28/2021 Present*    Lyme 58 kD IgG 08/28/2021 Absent     Lyme 66 kD IgG 08/28/2021 Absent     Lyme 93 kD IgG 08/28/2021 Present*    Lyme 23 kD IgM 08/28/2021 Present*    Lyme 39 kD IgM 08/28/2021 Present*    Lyme 41 kD IgM 08/28/2021 Present*    Lyme IgG WB Interp  08/28/2021 Positive*    Lyme IgM WB Interp  08/28/2021 Positive*   Admission on 08/24/2021, Discharged on 08/24/2021   Component Date Value    Ventricular Rate 08/24/2021 71     Atrial Rate 08/24/2021 71     VA Interval 08/24/2021 224     QRSD Interval 08/24/2021 80     QT Interval 08/24/2021 386     QTC Interval 08/24/2021 419     P Axis 08/24/2021 42     QRS Axis 08/24/2021 -28     T Wave [place] 08/24/2021 10    Admission on 08/23/2021, Discharged on 08/24/2021   Component Date Value    WBC 08/23/2021 11 34*    RBC 08/23/2021 3 96     Hemoglobin 08/23/2021 12 1     Hematocrit 08/23/2021 36 5     MCV 08/23/2021 92     MCH 08/23/2021 30 6     MCHC 08/23/2021 33 2     RDW 08/23/2021 13 8     MPV 08/23/2021 9 7     Platelets 57/35/4495 451*    Sodium 08/23/2021 136     Potassium 08/23/2021 3 4*    Chloride 08/23/2021 104     CO2 08/23/2021 23     ANION GAP 08/23/2021 9     BUN 08/23/2021 17     Creatinine 08/23/2021 0 89     Glucose 08/23/2021 158*    Calcium 08/23/2021 8 9     Corrected Calcium 08/23/2021 10 0     AST 08/23/2021 158*    ALT 08/23/2021 309*    Alkaline Phosphatase 08/23/2021 542*    Total Protein 08/23/2021 7 6     Albumin 08/23/2021 2 6*    Total Bilirubin 08/23/2021 1 84*    eGFR 08/23/2021 92     Magnesium 08/23/2021 2 1     Phosphorus 08/23/2021 3 3     Color, UA 08/23/2021 -     Segmented % 08/23/2021 79*    Bands % 08/23/2021 2     Lymphocytes % 08/23/2021 9*    Monocytes % 08/23/2021 4     Eosinophils, % 08/23/2021 2     Basophils % 08/23/2021 4*    Absolute Neutrophils 08/23/2021 9 19*    Lymphocytes Absolute 08/23/2021 1 02     Monocytes Absolute 08/23/2021 0 45     Eosinophils Absolute 08/23/2021 0 23     Basophils Absolute 08/23/2021 0 45*    RBC Morphology 08/23/2021 Present     Anisocytosis 08/23/2021 Present     Platelet Estimate 87/59/3505 Adequate     Artifact 08/23/2021 Present     Color, UA 08/23/2021 Orange     Clarity, UA 08/23/2021 Clear     pH, UA 08/23/2021 6 0     Leukocytes, UA 08/23/2021 Negative     Nitrite, UA 08/23/2021 Negative     Protein, UA 08/23/2021 Negative     Glucose, UA 08/23/2021 Negative     Ketones, UA 08/23/2021 Negative     Urobilinogen, UA 08/23/2021 4 0*    Bilirubin, UA 08/23/2021 Interference- unable to analyze*    Blood, UA 08/23/2021 Negative     Specific Gravity, UA 08/23/2021 1 020     TSH 3RD GENERATON 08/23/2021 1 830     Color, UA 08/23/2021 Dk Yellow     Clarity, UA 08/23/2021 Clear     Specific Gravity, UA 08/23/2021 1 022     pH, UA 08/23/2021 6 0     Leukocytes, UA 08/23/2021 Negative     Nitrite, UA 08/23/2021 Negative     Protein, UA 08/23/2021 Negative     Glucose, UA 08/23/2021 Negative     Ketones, UA 08/23/2021 Negative     Urobilinogen, UA 08/23/2021 2 0*    Bilirubin, UA 08/23/2021 Interference- unable to analyze*    Blood, UA 08/23/2021 Negative     Sodium 08/23/2021 135*    Potassium 08/23/2021 3 8     Chloride 08/23/2021 108     CO2 08/23/2021 23     ANION GAP 08/23/2021 4     BUN 08/23/2021 17     Creatinine 08/23/2021 0 85     Glucose 08/23/2021 116     Calcium 08/23/2021 8 1*    Corrected Calcium 08/23/2021 9 5     AST 08/23/2021 125*    ALT 08/23/2021 266*    Alkaline Phosphatase 08/23/2021 445*    Total Protein 08/23/2021 6 8     Albumin 08/23/2021 2 3*    Total Bilirubin 08/23/2021 1 16*    eGFR 08/23/2021 94     Magnesium 08/23/2021 2 1     Phosphorus 08/23/2021 3 5     WBC 08/23/2021 10 24*    RBC 08/23/2021 3 50*    Hemoglobin 08/23/2021 10 8*    Hematocrit 08/23/2021 32 6*    MCV 08/23/2021 93     MCH 08/23/2021 30 9     MCHC 08/23/2021 33 1     RDW 08/23/2021 14 1     MPV 08/23/2021 9 2     Platelets 11/87/0024 401*    Protime 08/23/2021 14 3     INR 08/23/2021 1 11     PTT 08/23/2021 30     Hepatitis B Surface Ag 08/23/2021 Non-reactive     Hepatitis C Ab 08/23/2021 Non-reactive     Hep B C IgM 08/23/2021 Non-reactive     Hep B Core Total Ab 08/23/2021 Non-reactive     Ventricular Rate 08/23/2021 131     Atrial Rate 08/23/2021 131     VA Interval 08/23/2021 208     QRSD Interval 08/23/2021 96     QT Interval 08/23/2021 342     QTC Interval 08/23/2021 505     P Axis 08/23/2021 79     QRS Axis 08/23/2021 -50     T Wave Axis 08/23/2021 77     Segmented % 08/23/2021 89*    Lymphocytes % 08/23/2021 6*    Monocytes % 08/23/2021 1*    Eosinophils, % 08/23/2021 3     Basophils % 08/23/2021 0     Atypical Lymphocytes % 08/23/2021 1*    Absolute Neutrophils 08/23/2021 9 11*    Lymphocytes Absolute 08/23/2021 0 61     Monocytes Absolute 08/23/2021 0 10     Eosinophils Absolute 08/23/2021 0 31     Basophils Absolute 08/23/2021 0 00     RBC Morphology 08/23/2021 Present     Anisocytosis 08/23/2021 Present     Poikilocytes 08/23/2021 Present     Polychromasia 08/23/2021 Present     Platelet Estimate 11/63/6885 Increased*    Artifact 08/23/2021 Present     WBC 08/24/2021 9 06     RBC 08/24/2021 3 66*    Hemoglobin 08/24/2021 11 1*    Hematocrit 08/24/2021 34 2*    MCV 08/24/2021 93     MCH 08/24/2021 30 3     MCHC 08/24/2021 32 5     RDW 08/24/2021 14 2     Platelets 31/65/6557 475*    MPV 08/24/2021 9 2     Sodium 08/24/2021 136     Potassium 08/24/2021 4 0     Chloride 08/24/2021 107     CO2 08/24/2021 24     ANION GAP 08/24/2021 5     BUN 08/24/2021 14     Creatinine 08/24/2021 0 78     Glucose 08/24/2021 116     Calcium 08/24/2021 8 4     Corrected Calcium 08/24/2021 9 6     AST 08/24/2021 77*    ALT 08/24/2021 219*    Alkaline Phosphatase 08/24/2021 412*    Total Protein 08/24/2021 7 2     Albumin 08/24/2021 2 5*    Total Bilirubin 08/24/2021 0 87     eGFR 08/24/2021 97     Ventricular Rate 08/23/2021 73     Atrial Rate 08/23/2021 73     [place] Interval 08/23/2021 182     QRSD Interval 08/23/2021 88     QT Interval 08/23/2021 410     QTC Interval 08/23/2021 451     P Axis 08/23/2021 57     QRS Axis 08/23/2021 -32     T Wave Axis 08/23/2021 -7    Office Visit on 08/09/2021   Component Date Value    SARS-CoV-2 08/09/2021 Negative      Imaging: CT abdomen pelvis w contrast    Result Date: 9/17/2021  Narrative: CT ABDOMEN AND PELVIS WITH IV CONTRAST INDICATION:   R74 8: Abnormal levels of other serum enzymes R63 4: Abnormal weight loss  COMPARISON:  Ultrasound performed August 23, 2020  TECHNIQUE:  CT examination of the abdomen and pelvis was performed  In addition to portal venous phase postcontrast scanning through the abdomen and pelvis, delayed phase postcontrast scanning was performed through the upper abdominal viscera  Axial, sagittal, and coronal 2D reformatted images were created from the source data and submitted for interpretation  Radiation dose length product (DLP) for this visit:  2837 36 mGy-cm   This examination, like all CT scans performed in the [facility], was performed utilizing techniques to minimize radiation dose exposure, including the use of iterative reconstruction and automated exposure control  IV Contrast:  100 mL of iohexol (OMNIPAQUE) Enteric Contrast:  Enteric contrast was not administered  FINDINGS: ABDOMEN LOWER CHEST:  No clinically significant abnormality identified in the visualized lower chest  LIVER/BILIARY TREE:  Liver is mildly enlarged diffusely decreased in density consistent with fatty change  No CT evidence of suspicious hepatic mass  Normal hepatic contours  No biliary dilatation  GALLBLADDER:  No calcified gallstones  No pericholecystic inflammatory change  SPLEEN:  Unremarkable  PANCREAS:  Unremarkable  ADRENAL GLANDS:  Unremarkable   KIDNEYS/URETERS: There is a nonobstructing 5 mm calculus at the upper pole the left kidney  No ureteral calculus or hydronephrosis  Symmetric nephrograms  There is a calyceal diverticulum in the posterior interpolar right kidney, 2 cm in size  STOMACH AND BOWEL:  Unremarkable  APPENDIX:  No findings to suggest appendicitis  ABDOMINOPELVIC CAVITY:  No ascites  No pneumoperitoneum  No lymphadenopathy  VESSELS:  Unremarkable for patient's age  PELVIS REPRODUCTIVE ORGANS:  Unremarkable for patient's age  URINARY BLADDER:  Unremarkable  ABDOMINAL WALL/INGUINAL REGIONS:  Unremarkable  OSSEOUS STRUCTURES:  No acute fracture or destructive osseous lesion  Impression: Hepatic steatosis  Calyceal diverticulum in the posterior interpolar right kidney, 2 cm  Nonobstructing 5 mm upper pole left renal calculus  Workstation performed: [id number]       Review of Systems:  Review of Systems   Cardiovascular: Positive for palpitations  All other systems reviewed and are negative  Physical Exam:  Physical Exam  Vitals reviewed  Constitutional:       Appearance: He is obese  HENT:      Head: Normocephalic  Cardiovascular:      Rate and Rhythm: Normal rate and regular rhythm  Pulses: Normal pulses  Heart sounds: Normal heart sounds  Pulmonary:      Effort: Pulmonary effort is normal       Breath sounds: Normal breath sounds  Abdominal:      General: Bowel sounds are normal       Palpations: Abdomen is soft  Musculoskeletal:         General: Normal range of motion  Cervical back: Normal range of motion and neck supple  Right lower leg: No edema  Left lower leg: No edema  Skin:     General: Skin is warm  Capillary Refill: Capillary refill takes less than 2 seconds  Neurological:      General: No focal deficit present  Mental Status: He is alert and oriented to person, place, and time     Psychiatric:         Mood and Affect: Mood normal          Behavior: Behavior normal  Discussion/Summary:  1  Paroxysmal atrial fibrillation [id number]=0 (HTN), EKG confirms continues NSR 60 BPM, 1st degree AVB, QTC 450ms, TTE showed no sig valve disease, mildly dilated LA and RA  Continue on  Metoprolol succinate 50 mg daily, flecainide 50 mg b i d  not on aspirin  Discussed risk factors of stroke,  start aspirin 81 mg daily  He admits to undergoing a sleep study 10 years ago  According to [place] he did not have sleep apnea at this time  Refusing follow-up sleep study, Exercise stress test R/O CAD  He Feels better since completed treatment for Lymes disease  2  Hypertension- /80 continue on  Metoprolol succinate 50 mg daily, start Norvasc 5 mg daily continue on 2 g sodium diet  3  Hyperlipidemia 5/22/21 , , HDL 42, LDL 98- not on stain due to Transminitis  Continue on fish oil 1200 mg daily low-fat low-cholesterol diet, continue off statin at this time, 9/08/21 ALT 92  4   Tobacco abuse- continues to smoke 1/2 pack of cigarettes a day

## 2021-10-08 ENCOUNTER — TELEPHONE (OUTPATIENT)
Dept: GASTROENTEROLOGY | Facility: CLINIC | Age: 61
End: 2021-10-08

## 2021-10-27 NOTE — PATIENT INSTRUCTIONS
Same meds, await lab mechanical fall with left hip pain. high suspicion hip fracture clinically. labs/xrays ordered. tylenol for pain. refused morphine initially. xrays done showing mechanical fall with left hip pain. high suspicion hip fracture clinically. labs/xrays ordered. tylenol for pain. refused morphine initially. xrays done showing . case discussed with pmd Dr. Flannery, would like Doug Loja on consult for ortho. team notified. admit for further management.

## 2021-11-16 ENCOUNTER — TELEPHONE (OUTPATIENT)
Dept: GASTROENTEROLOGY | Facility: AMBULARY SURGERY CENTER | Age: 61
End: 2021-11-16

## 2021-11-19 ENCOUNTER — RA CDI HCC (OUTPATIENT)
Dept: OTHER | Facility: HOSPITAL | Age: 61
End: 2021-11-19

## 2021-11-22 ENCOUNTER — HOSPITAL ENCOUNTER (OUTPATIENT)
Dept: NON INVASIVE DIAGNOSTICS | Facility: CLINIC | Age: 61
Discharge: HOME | End: 2021-11-22
Payer: COMMERCIAL

## 2021-11-22 VITALS — HEIGHT: 74 IN | BODY MASS INDEX: 34.01 KG/M2 | WEIGHT: 265 LBS

## 2021-11-22 DIAGNOSIS — I48.0 PAF (PAROXYSMAL ATRIAL FIBRILLATION) (HCC): ICD-10-CM

## 2021-11-22 LAB
MAX HR PERCENT: 79 %
MAX HR: 135 BPM
RATE PRESSURE PRODUCT: NORMAL
SL CV STRESS RECOVERY BP: NORMAL MMHG
SL CV STRESS RECOVERY HR: 89 BPM
SL CV STRESS RECOVERY O2 SAT: 97 %
SL CV STRESS STAGE REACHED: 3
STRESS ANGINA INDEX: 0
STRESS BASELINE BP: NORMAL MMHG
STRESS BASELINE HR: 67 BPM
STRESS DUKE TREADMILL SCORE: 8
STRESS O2 SAT REST: 99 %
STRESS PEAK HR: 126 BPM
STRESS PERCENT HR: 79 %
STRESS POST ESTIMATED WORKLOAD: 10.1 METS
STRESS POST EXERCISE DUR MIN: 8 MIN
STRESS POST EXERCISE DUR SEC: 26 SEC
STRESS POST O2 SAT PEAK: 99 %
STRESS POST PEAK BP: 180 MMHG
STRESS ST DEPRESSION: 0 MM
STRESS TARGET HR: 126 BPM

## 2021-11-22 PROCEDURE — 93017 CV STRESS TEST TRACING ONLY: CPT

## 2021-11-22 PROCEDURE — 93016 CV STRESS TEST SUPVJ ONLY: CPT | Performed by: INTERNAL MEDICINE

## 2021-11-22 PROCEDURE — 93018 CV STRESS TEST I&R ONLY: CPT | Performed by: INTERNAL MEDICINE

## 2021-11-23 ENCOUNTER — HOSPITAL ENCOUNTER (OUTPATIENT)
Dept: RADIOLOGY | Facility: HOSPITAL | Age: 61
Discharge: HOME | End: 2021-11-23
Payer: COMMERCIAL

## 2021-11-23 DIAGNOSIS — K76.0 HEPATIC STEATOSIS: ICD-10-CM

## 2021-11-23 DIAGNOSIS — R74.8 ELEVATED LIVER ENZYMES: ICD-10-CM

## 2021-11-23 LAB
CHEST PAIN STATEMENT: NORMAL
MAX DIASTOLIC BP: 80 MMHG
MAX HEART RATE: 126 BPM
MAX PREDICTED HEART RATE: 159 BPM
MAX. SYSTOLIC BP: 188 MMHG
PROTOCOL NAME: NORMAL
TARGET HR FORMULA: NORMAL
TEST INDICATION: NORMAL
TIME IN EXERCISE PHASE: NORMAL

## 2021-11-23 PROCEDURE — 76981 USE PARENCHYMA: CPT

## 2021-11-29 ENCOUNTER — OFFICE VISIT (OUTPATIENT)
Dept: FAMILY MEDICINE CLINIC | Facility: CLINIC | Age: 61
End: 2021-11-29
Payer: COMMERCIAL

## 2021-11-29 ENCOUNTER — APPOINTMENT (OUTPATIENT)
Dept: LAB | Facility: MEDICAL CENTER | Age: 61
End: 2021-11-29
Payer: COMMERCIAL

## 2021-11-29 VITALS
BODY MASS INDEX: 35.29 KG/M2 | HEIGHT: 74 IN | HEART RATE: 72 BPM | DIASTOLIC BLOOD PRESSURE: 78 MMHG | OXYGEN SATURATION: 98 % | SYSTOLIC BLOOD PRESSURE: 140 MMHG | WEIGHT: 275 LBS

## 2021-11-29 DIAGNOSIS — I48.0 PAROXYSMAL ATRIAL FIBRILLATION (HCC): ICD-10-CM

## 2021-11-29 DIAGNOSIS — I48.91 NEW ONSET ATRIAL FIBRILLATION (HCC): ICD-10-CM

## 2021-11-29 DIAGNOSIS — D64.9 ANEMIA, UNSPECIFIED TYPE: ICD-10-CM

## 2021-11-29 DIAGNOSIS — K76.0 HEPATIC STEATOSIS: ICD-10-CM

## 2021-11-29 DIAGNOSIS — I10 HYPERTENSION, UNSPECIFIED TYPE: ICD-10-CM

## 2021-11-29 DIAGNOSIS — R74.01 TRANSAMINITIS: ICD-10-CM

## 2021-11-29 DIAGNOSIS — R74.8 ELEVATED LIVER ENZYMES: ICD-10-CM

## 2021-11-29 DIAGNOSIS — E87.6 HYPOKALEMIA: Primary | ICD-10-CM

## 2021-11-29 PROBLEM — H65.22 LEFT CHRONIC SEROUS OTITIS MEDIA: Status: RESOLVED | Noted: 2021-05-17 | Resolved: 2021-11-29

## 2021-11-29 LAB
ALBUMIN SERPL BCP-MCNC: 4.1 G/DL (ref 3.5–5)
ALP SERPL-CCNC: 81 U/L (ref 46–116)
ALT SERPL W P-5'-P-CCNC: 37 U/L (ref 12–78)
AST SERPL W P-5'-P-CCNC: 17 U/L (ref 5–45)
BASOPHILS # BLD AUTO: 0.12 THOUSANDS/ΜL (ref 0–0.1)
BASOPHILS NFR BLD AUTO: 1 % (ref 0–1)
BILIRUB DIRECT SERPL-MCNC: 0.07 MG/DL (ref 0–0.2)
BILIRUB SERPL-MCNC: 0.41 MG/DL (ref 0.2–1)
EOSINOPHIL # BLD AUTO: 0.21 THOUSAND/ΜL (ref 0–0.61)
EOSINOPHIL NFR BLD AUTO: 2 % (ref 0–6)
ERYTHROCYTE [DISTWIDTH] IN BLOOD BY AUTOMATED COUNT: 13.1 % (ref 11.6–15.1)
HCT VFR BLD AUTO: 47.9 % (ref 36.5–49.3)
HGB BLD-MCNC: 15.7 G/DL (ref 12–17)
IGG SERPL-MCNC: 1000 MG/DL (ref 700–1600)
IMM GRANULOCYTES # BLD AUTO: 0.05 THOUSAND/UL (ref 0–0.2)
IMM GRANULOCYTES NFR BLD AUTO: 1 % (ref 0–2)
IRON SATN MFR SERPL: 29 % (ref 20–50)
IRON SERPL-MCNC: 105 UG/DL (ref 65–175)
LYMPHOCYTES # BLD AUTO: 2.37 THOUSANDS/ΜL (ref 0.6–4.47)
LYMPHOCYTES NFR BLD AUTO: 23 % (ref 14–44)
MCH RBC QN AUTO: 30.6 PG (ref 26.8–34.3)
MCHC RBC AUTO-ENTMCNC: 32.8 G/DL (ref 31.4–37.4)
MCV RBC AUTO: 93 FL (ref 82–98)
MONOCYTES # BLD AUTO: 0.7 THOUSAND/ΜL (ref 0.17–1.22)
MONOCYTES NFR BLD AUTO: 7 % (ref 4–12)
NEUTROPHILS # BLD AUTO: 7.04 THOUSANDS/ΜL (ref 1.85–7.62)
NEUTS SEG NFR BLD AUTO: 66 % (ref 43–75)
NRBC BLD AUTO-RTO: 0 /100 WBCS
PLATELET # BLD AUTO: 306 THOUSANDS/UL (ref 149–390)
PMV BLD AUTO: 9.8 FL (ref 8.9–12.7)
PROT SERPL-MCNC: 7.6 G/DL (ref 6.4–8.2)
RBC # BLD AUTO: 5.13 MILLION/UL (ref 3.88–5.62)
TIBC SERPL-MCNC: 360 UG/DL (ref 250–450)
WBC # BLD AUTO: 10.49 THOUSAND/UL (ref 4.31–10.16)

## 2021-11-29 PROCEDURE — 85025 COMPLETE CBC W/AUTO DIFF WBC: CPT

## 2021-11-29 PROCEDURE — 82784 ASSAY IGA/IGD/IGG/IGM EACH: CPT

## 2021-11-29 PROCEDURE — 99214 OFFICE O/P EST MOD 30 MIN: CPT | Performed by: FAMILY MEDICINE

## 2021-11-29 PROCEDURE — 36415 COLL VENOUS BLD VENIPUNCTURE: CPT

## 2021-11-29 PROCEDURE — 86235 NUCLEAR ANTIGEN ANTIBODY: CPT

## 2021-11-29 PROCEDURE — 86256 FLUORESCENT ANTIBODY TITER: CPT

## 2021-11-29 PROCEDURE — 3008F BODY MASS INDEX DOCD: CPT | Performed by: FAMILY MEDICINE

## 2021-11-29 PROCEDURE — 4004F PT TOBACCO SCREEN RCVD TLK: CPT | Performed by: FAMILY MEDICINE

## 2021-11-29 PROCEDURE — 83550 IRON BINDING TEST: CPT

## 2021-11-29 PROCEDURE — 83540 ASSAY OF IRON: CPT

## 2021-11-29 PROCEDURE — 80076 HEPATIC FUNCTION PANEL: CPT

## 2021-11-29 PROCEDURE — 86038 ANTINUCLEAR ANTIBODIES: CPT

## 2021-11-29 RX ORDER — METOPROLOL SUCCINATE 50 MG/1
50 TABLET, EXTENDED RELEASE ORAL DAILY
Qty: 90 TABLET | Refills: 1 | Status: SHIPPED | OUTPATIENT
Start: 2021-11-29 | End: 2021-12-14 | Stop reason: SDUPTHER

## 2021-11-30 LAB
ACTIN IGG SERPL-ACNC: 4 UNITS (ref 0–19)
MITOCHONDRIA M2 IGG SER-ACNC: <20 UNITS (ref 0–20)

## 2021-12-01 LAB — RYE IGE QN: NEGATIVE

## 2021-12-03 ENCOUNTER — OFFICE VISIT (OUTPATIENT)
Dept: GASTROENTEROLOGY | Facility: CLINIC | Age: 61
End: 2021-12-03
Payer: COMMERCIAL

## 2021-12-03 ENCOUNTER — OFFICE VISIT (OUTPATIENT)
Dept: CARDIOLOGY CLINIC | Facility: CLINIC | Age: 61
End: 2021-12-03
Payer: COMMERCIAL

## 2021-12-03 VITALS
DIASTOLIC BLOOD PRESSURE: 88 MMHG | BODY MASS INDEX: 35.24 KG/M2 | WEIGHT: 274.6 LBS | HEART RATE: 65 BPM | HEIGHT: 74 IN | SYSTOLIC BLOOD PRESSURE: 142 MMHG

## 2021-12-03 VITALS
HEART RATE: 62 BPM | HEIGHT: 74 IN | SYSTOLIC BLOOD PRESSURE: 160 MMHG | TEMPERATURE: 97.3 F | WEIGHT: 277.8 LBS | DIASTOLIC BLOOD PRESSURE: 80 MMHG | BODY MASS INDEX: 35.65 KG/M2

## 2021-12-03 DIAGNOSIS — E78.2 MIXED HYPERLIPIDEMIA: ICD-10-CM

## 2021-12-03 DIAGNOSIS — I10 PRIMARY HYPERTENSION: ICD-10-CM

## 2021-12-03 DIAGNOSIS — K76.0 HEPATIC STEATOSIS: ICD-10-CM

## 2021-12-03 DIAGNOSIS — D64.9 ANEMIA, UNSPECIFIED TYPE: ICD-10-CM

## 2021-12-03 DIAGNOSIS — D12.6 TUBULAR ADENOMA OF COLON: ICD-10-CM

## 2021-12-03 DIAGNOSIS — I48.0 PAF (PAROXYSMAL ATRIAL FIBRILLATION) (HCC): Primary | ICD-10-CM

## 2021-12-03 DIAGNOSIS — R74.8 ELEVATED LIVER ENZYMES: Primary | ICD-10-CM

## 2021-12-03 PROCEDURE — 99214 OFFICE O/P EST MOD 30 MIN: CPT | Performed by: INTERNAL MEDICINE

## 2021-12-03 PROCEDURE — 99214 OFFICE O/P EST MOD 30 MIN: CPT | Performed by: PHYSICIAN ASSISTANT

## 2021-12-03 PROCEDURE — 93000 ELECTROCARDIOGRAM COMPLETE: CPT | Performed by: INTERNAL MEDICINE

## 2021-12-07 DIAGNOSIS — I48.0 PAROXYSMAL ATRIAL FIBRILLATION (HCC): ICD-10-CM

## 2021-12-07 RX ORDER — FLECAINIDE ACETATE 50 MG/1
TABLET ORAL
Qty: 60 TABLET | Refills: 3 | Status: SHIPPED | OUTPATIENT
Start: 2021-12-07 | End: 2021-12-14 | Stop reason: SDUPTHER

## 2021-12-13 DIAGNOSIS — I10 HYPERTENSION, UNSPECIFIED TYPE: Chronic | ICD-10-CM

## 2021-12-13 RX ORDER — AMLODIPINE BESYLATE 5 MG/1
5 TABLET ORAL DAILY
Qty: 90 TABLET | Refills: 1 | Status: SHIPPED | OUTPATIENT
Start: 2021-12-13 | End: 2022-04-04 | Stop reason: SDUPTHER

## 2021-12-14 DIAGNOSIS — I48.91 NEW ONSET ATRIAL FIBRILLATION (HCC): ICD-10-CM

## 2021-12-14 DIAGNOSIS — I48.0 PAROXYSMAL ATRIAL FIBRILLATION (HCC): ICD-10-CM

## 2021-12-14 RX ORDER — FLECAINIDE ACETATE 50 MG/1
50 TABLET ORAL 2 TIMES DAILY
Qty: 180 TABLET | Refills: 3 | Status: SHIPPED | OUTPATIENT
Start: 2021-12-14

## 2021-12-14 RX ORDER — METOPROLOL SUCCINATE 50 MG/1
50 TABLET, EXTENDED RELEASE ORAL DAILY
Qty: 90 TABLET | Refills: 3 | Status: SHIPPED | OUTPATIENT
Start: 2021-12-14 | End: 2022-04-26 | Stop reason: SDUPTHER

## 2021-12-30 ENCOUNTER — APPOINTMENT (OUTPATIENT)
Dept: RADIOLOGY | Facility: CLINIC | Age: 61
End: 2021-12-30
Payer: COMMERCIAL

## 2021-12-30 ENCOUNTER — OFFICE VISIT (OUTPATIENT)
Dept: PODIATRY | Facility: CLINIC | Age: 61
End: 2021-12-30
Payer: COMMERCIAL

## 2021-12-30 VITALS
DIASTOLIC BLOOD PRESSURE: 94 MMHG | HEART RATE: 71 BPM | SYSTOLIC BLOOD PRESSURE: 174 MMHG | HEIGHT: 74 IN | WEIGHT: 280 LBS | BODY MASS INDEX: 35.94 KG/M2

## 2021-12-30 DIAGNOSIS — M67.472 GANGLION CYST OF LEFT FOOT: Primary | ICD-10-CM

## 2021-12-30 DIAGNOSIS — M79.672 LEFT FOOT PAIN: ICD-10-CM

## 2021-12-30 PROCEDURE — 73630 X-RAY EXAM OF FOOT: CPT

## 2021-12-30 PROCEDURE — 3008F BODY MASS INDEX DOCD: CPT | Performed by: PODIATRIST

## 2021-12-30 PROCEDURE — 20550 NJX 1 TENDON SHEATH/LIGAMENT: CPT | Performed by: PODIATRIST

## 2021-12-30 PROCEDURE — 99203 OFFICE O/P NEW LOW 30 MIN: CPT | Performed by: PODIATRIST

## 2021-12-30 PROCEDURE — 4004F PT TOBACCO SCREEN RCVD TLK: CPT | Performed by: PODIATRIST

## 2021-12-30 RX ORDER — TRIAMCINOLONE ACETONIDE 40 MG/ML
40 INJECTION, SUSPENSION INTRA-ARTICULAR; INTRAMUSCULAR ONCE
Status: COMPLETED | OUTPATIENT
Start: 2021-12-30 | End: 2021-12-30

## 2021-12-30 RX ORDER — LIDOCAINE HYDROCHLORIDE 10 MG/ML
1 INJECTION, SOLUTION INFILTRATION; PERINEURAL ONCE
Status: COMPLETED | OUTPATIENT
Start: 2021-12-30 | End: 2021-12-30

## 2021-12-30 RX ADMIN — TRIAMCINOLONE ACETONIDE 40 MG: 40 INJECTION, SUSPENSION INTRA-ARTICULAR; INTRAMUSCULAR at 10:18

## 2021-12-30 RX ADMIN — LIDOCAINE HYDROCHLORIDE 1 ML: 10 INJECTION, SOLUTION INFILTRATION; PERINEURAL at 10:18

## 2022-01-28 ENCOUNTER — TELEPHONE (OUTPATIENT)
Dept: CARDIOLOGY CLINIC | Facility: CLINIC | Age: 62
End: 2022-01-28

## 2022-01-28 NOTE — TELEPHONE ENCOUNTER
It appears that he has been on these medications for some time  Of the 3 listed, amlodipine can cause lower extremity swelling which can make your legs have different neurologic changes or pain  However I doubt it is that given the fact that he has been on this for some time  The other medications do not cause this  Thank you

## 2022-01-28 NOTE — TELEPHONE ENCOUNTER
P/c'd , states he has been getting these weird feelings like his skin is being rubbed  Today he felt it in his legs  Only last short period until he rest and calms himself done  And different times of the day  There is no discoloration noted  He is not sure if it is related to one of the new medications  His [employer] mobile devise shows NSR      Taking :flecainide 50 mg bid               Toprol 50 mg qd               Amlodipine 5 mg qd    Please advise

## 2022-02-02 ENCOUNTER — OFFICE VISIT (OUTPATIENT)
Dept: PODIATRY | Facility: CLINIC | Age: 62
End: 2022-02-02
Payer: COMMERCIAL

## 2022-02-02 VITALS
HEIGHT: 74 IN | DIASTOLIC BLOOD PRESSURE: 87 MMHG | SYSTOLIC BLOOD PRESSURE: 152 MMHG | WEIGHT: 279 LBS | HEART RATE: 66 BPM | BODY MASS INDEX: 35.81 KG/M2

## 2022-02-02 DIAGNOSIS — M25.571 ARTHRALGIA OF RIGHT FOOT: Primary | ICD-10-CM

## 2022-02-02 PROCEDURE — 20600 DRAIN/INJ JOINT/BURSA W/O US: CPT | Performed by: PODIATRIST

## 2022-02-02 RX ORDER — TRIAMCINOLONE ACETONIDE 40 MG/ML
40 INJECTION, SUSPENSION INTRA-ARTICULAR; INTRAMUSCULAR ONCE
Status: COMPLETED | OUTPATIENT
Start: 2022-02-02 | End: 2022-02-02

## 2022-02-02 RX ORDER — LIDOCAINE HYDROCHLORIDE 10 MG/ML
1 INJECTION, SOLUTION INFILTRATION; PERINEURAL ONCE
Status: COMPLETED | OUTPATIENT
Start: 2022-02-02 | End: 2022-02-02

## 2022-02-02 RX ADMIN — TRIAMCINOLONE ACETONIDE 40 MG: 40 INJECTION, SUSPENSION INTRA-ARTICULAR; INTRAMUSCULAR at 15:47

## 2022-02-02 RX ADMIN — LIDOCAINE HYDROCHLORIDE 1 ML: 10 INJECTION, SOLUTION INFILTRATION; PERINEURAL at 15:47

## 2022-02-02 NOTE — PROGRESS NOTES
Assessment/Plan:       Diagnoses and all orders for this visit:    Arthralgia of right foot  Comments:  questionable ganglion cyst vs acute gout vs sequela of Lymes disease  Orders:  -     triamcinolone acetonide (KENALOG-40) 40 mg/mL injection 40 mg  -     lidocaine (XYLOCAINE) 1 % injection 1 mL  -     Foot injection            Foot injection     Date/Time 2/2/2022 4:00 PM     Performed by  Katrina Schroeder DPM     Authorized by Katrina Schroeder DPM      Universal Protocol   Consent: Verbal consent obtained  Consent given by: patient  Time out: Immediately prior to procedure a "time out" was called to verify the correct patient, procedure, equipment, support staff and site/side marked as required  Patient understanding: patient states understanding of the procedure being performed  Patient identity confirmed: verbally with patient        Local anesthesia used: yes     Anesthesia   Local anesthesia used: yes  Local Anesthetic: lidocaine 1% without epinephrine (1CC)  Anesthetic total: 1 mL     Procedure Details   Procedure Notes: Skin cleaned with alcohol  Injected cyst over 4/5TMTJ right foot with 0 5cc kenalog and 1cc 1% lidocaine plain  HE did note improvement following injection  Bandaid applied  Patient has acute swelling arthropathy of his right tarsometatarsal joint it is a very similar case into the cyst over his left foot that got better with the steroid shot  Cortisone injection today did improve his symptoms  Although I do not palpated direct cyst over this area he does have history of gout as well as Lyme disease  Check in 4 weeks if symptomatic  He declined an x-ray today      Subjective:      Patient ID: Aramis Lemos is a 58 y o  male  Patient had left foot cyst injected a month ago  The lump is still there but the pain is gone  Now the right foot is red and swollen  It feel like the cyst is now in the same spot on his right foot         The following portions of the patient's history were reviewed and updated as appropriate:   He  has a past medical history of A-fib ([facility] [address] ) (08/24/2021), Disc degeneration, lumbosacral (1/6/2016), Hyperlipidemia, Hypertension, and Pneumonia of both lower lobes due to infectious organism (1/29/2018)  He   Patient Active Problem List    Diagnosis Date Noted    [facility] ) 08/23/2021    Hypokalemia 08/23/2021    Transaminitis 08/23/2021    Screen for colon cancer 12/18/2018    Tobacco abuse 11/22/2017    Disc degeneration, lumbosacral 01/06/2016    Hyperlipidemia 02/16/2015    Hypertension 02/16/2015     He  has a past surgical history that includes Knee surgery (Bilateral); Colonoscopy; Upper gastrointestinal endoscopy; Hemorrhoid surgery; and pr colonoscopy flx dx w/collj spec when pfrmd (N/A, 1/5/2019)  His family history includes Aneurysm in his father; Obesity in his mother  He  reports that he has been smoking cigarettes  He has a 20 00 pack-year smoking history  He has never used smokeless tobacco  He reports current alcohol use  He reports that he does not use drugs  Current Outpatient Medications   Medication Sig Dispense Refill    amLODIPine (NORVASC) 5 mg tablet Take 1 tablet (5 mg total) by mouth daily 90 tablet 1    ascorbic acid (VITAMIN C) 500 mg tablet Take 500 mg by mouth daily      aspirin 81 mg chewable tablet Chew 1 tablet (81 mg total) daily 90 tablet 3    Coenzyme Q10 (COQ-10) 10 MG CAPS Take by mouth      flecainide (TAMBOCOR) 50 mg tablet Take 1 tablet (50 mg total) by mouth 2 (two) times a day 180 tablet 3    metoprolol succinate (TOPROL-XL) 50 mg 24 hr tablet Take 1 tablet (50 mg total) by mouth daily 90 tablet 3    Multiple Vitamins-Minerals (MENS MULTIVITAMIN PLUS PO) Take by mouth      Omega-3 Fatty Acids (FISH OIL) 1200 MG CAPS Take by mouth      Zinc 50 MG CAPS Take by mouth       No current facility-administered medications for this visit       Current Outpatient Medications on File Prior to Visit   Medication Sig    amLODIPine (NORVASC) 5 mg tablet Take 1 tablet (5 mg total) by mouth daily    ascorbic acid (VITAMIN C) 500 mg tablet Take 500 mg by mouth daily    aspirin 81 mg chewable tablet Chew 1 tablet (81 mg total) daily    Coenzyme Q10 (COQ-10) 10 MG CAPS Take by mouth    flecainide (TAMBOCOR) 50 mg tablet Take 1 tablet (50 mg total) by mouth 2 (two) times a day    metoprolol succinate (TOPROL-XL) 50 mg 24 hr tablet Take 1 tablet (50 mg total) by mouth daily    Multiple Vitamins-Minerals (MENS MULTIVITAMIN PLUS PO) Take by mouth    Omega-3 Fatty Acids (FISH OIL) 1200 MG CAPS Take by mouth    Zinc 50 MG CAPS Take by mouth     No current facility-administered medications on file prior to visit  He has No Known Allergies       Review of Systems   Constitutional: Negative  HENT: Negative  Respiratory: Negative  Cardiovascular: Negative  Gastrointestinal: Negative  Musculoskeletal: Positive for arthralgias and joint swelling  Skin: Positive for color change  Negative for wound  Neurological: Negative for weakness and numbness  Psychiatric/Behavioral: The patient is not nervous/anxious  Objective:      /87   Pulse 66   Ht 6' 2" (1 88 m)   Wt 127 kg (279 lb)   BMI 35 82 kg/m²          Physical Exam  Cardiovascular:      Pulses:           Dorsalis pedis pulses are 2+ on the right side and 2+ on the left side  Posterior tibial pulses are 2+ on the right side and 2+ on the left side  Musculoskeletal:      Right foot: Normal range of motion  No foot drop or prominent metatarsal heads  Left foot: Normal range of motion  No foot drop or prominent metatarsal heads  Feet:    Feet:      Right foot:      Protective Sensation: 10 sites tested  10 sites sensed  Skin integrity: Erythema and warmth present  No skin breakdown  Toenail Condition: Right toenails are abnormally thick        Left foot:      Protective Sensation: 10 sites tested  10 sites sensed  Skin integrity: Skin integrity normal       Toenail Condition: Left toenails are abnormally thick

## 2022-02-15 NOTE — TELEPHONE ENCOUNTER
Addended by: PHILL JONES on: 2/15/2022 03:10 PM     Modules accepted: Orders, SmartSet     Advised  Verbally understood

## 2022-03-28 ENCOUNTER — NURSE TRIAGE (OUTPATIENT)
Dept: OTHER | Facility: OTHER | Age: 62
End: 2022-03-28

## 2022-03-28 ENCOUNTER — TELEPHONE (OUTPATIENT)
Dept: GASTROENTEROLOGY | Facility: AMBULARY SURGERY CENTER | Age: 62
End: 2022-03-28

## 2022-03-28 NOTE — TELEPHONE ENCOUNTER
Regarding: rachaelo ekg/ SVE  ----- Message from Petey Hayes sent at 3/28/2022  5:21 PM EDT -----  " I have kardiamobile ekg and it shows that I am SVE   I do not know what that means; should I be concerned?"

## 2022-03-28 NOTE — TELEPHONE ENCOUNTER
Does not need ED visit for now  Unless the cardia device says atrial fibrillation with elevated heart rate, no need for immediate concern  Can continue to monitor for now

## 2022-03-28 NOTE — TELEPHONE ENCOUNTER
Reason for Disposition   Age > 60 years (Exception: brief heartbeat symptoms that went away and now feels well)    Answer Assessment - Initial Assessment Questions  1  DESCRIPTION: "Please describe your heart rate or heartbeat that you are having" (e g , fast/slow, regular/irregular, skipped or extra beats, "palpitations")      Felt the extra beats  2  ONSET: "When did it start?" (Minutes, hours or days)       Today it came and went  3  DURATION: "How long does it last" (e g , seconds, minutes, hours)      Has come and gone but starting to disapate  4  PATTERN "Does it come and go, or has it been constant since it started?"  "Does it get worse with exertion?"   "Are you feeling it now?"      no  5  HEART RATE: "Can you tell me your heart rate?" "How many beats in 15 seconds?"  (Note: not all patients can do this)        68 via monitor  6  RECURRENT SYMPTOM: "Have you ever had this before?" If Yes, ask: "When was the last time?" and "What happened that time?"       I have had it before but the machine always says Normal Sinus  7  CAUSE: "What do you think is causing the palpitations?"      Unsure has a history of afib  8  CARDIAC HISTORY: "Do you have any history of heart disease?" (e g , heart attack, angina, bypass surgery, angioplasty, arrhythmia)       yes  10   OTHER SYMPTOMS: "Do you have any other symptoms?" (e g , dizziness, chest pain, sweating, difficulty breathing)        denies    Protocols used: HEART RATE AND HEARTBEAT QUESTIONS-ADULT-

## 2022-03-28 NOTE — TELEPHONE ENCOUNTER
Sent to Dr Brady Thrasher via [address]  Pt has a home EKG machine Kardia device reading "SVE" HR 60-70's per pt  has had 3-4 periods of feeling extra beats which is what prompted him to check this  He said the most recent episode has resided  When he has had these feelings in the past the ekg monitor was NSR  Had no other symptoms- still wondering if he should be seen in ED

## 2022-03-28 NOTE — TELEPHONE ENCOUNTER
Patient is scheduled for colonoscopy on April 9 , 2022 at [facility] with Paco Bryan MD  Patient is aware of pre-procedure prep of Miralax/ Magnesium Citrate and they will be called the day prior between 2 and 6 pm for time to report for procedure  Pre-procedure prep has been given to the patient  via E-mail on March 25 , 2022   Pt CONFIRMED HE RECEIVED EMAILED BOWEL PREP INSTRUCTIONS

## 2022-04-03 NOTE — PROGRESS NOTES
Cardiology Follow Up    [facility]  1960  [id number]  [address]  [facility]  [place]   [phone]    1  PAF (paroxysmal atrial fibrillation) ([facility] [address] )     2  Hypertension, unspecified type  amLODIPine (NORVASC) 5 mg tablet   3  Mixed hyperlipidemia     4  Tobacco abuse         Interval History:   Mr [facility] presents to our office with possible panic attacks with concerns his [facility] EKG EUGENE stating EKG with SVT  On review of the tracing EKG shows NSR with PAC's  Angela Bartlett will undergo a colonoscopy on Saturday  He denies CP or shortness of breath  This am he started thinking about work and started to feel palpitations  He admits to increase stress with his job  Angela Bartlett continues to smoke 1/2 pack of cigarettes a day  Medical History   Paroxysmal atrial fibrillation  Hypertension  Hyperlipidemia 5/22/22 ,  HDL 42, LDL 98   Lyme disease  Smoking 1/2 pack a day 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 ) 08/24/2021    Disc degeneration, lumbosacral 1/6/2016    Hyperlipidemia     hypercholesterolemia    Hypertension     last assessed 11/22/17    Pneumonia of both lower lobes due to infectious organism 1/29/2018     Social History     Socioeconomic History    Marital status: /Civil Union     Spouse name: Not on file    Number of children: 1    Years of education: Not on file    Highest education level: Not on file   Occupational History    Occupation:    Tobacco Use    Smoking status: Current Every Day Smoker     Packs/day: 0 50 Years: 40 00     Pack years: 20 00     Types: Cigarettes    Smokeless tobacco: Never Used   Vaping Use    Vaping Use: Never used   Substance and Sexual Activity    Alcohol use: Yes     Comment: Occassional 1 x / month    Drug use: No    Sexual activity: Yes     Partners: Female     Birth control/protection: None   Other Topics Concern    Not on file   Social History Narrative    Lives with wife    Full time employment    active advance directive    1 child ; son, passed away        Does not consume caffeine     Social Determinants of Health     Financial Resource Strain: Not on file   Food Insecurity: Not on file   Transportation Needs: Not on file   Physical Activity: Not on file   Stress: Not on file   Social Connections: Not on file   Intimate Partner Violence: Not on file   Housing Stability: Not on file      Family History   Problem Relation Age of Onset    Obesity Mother     Aneurysm Father      Past Surgical History:   Procedure Laterality Date    COLONOSCOPY      HEMORRHOID SURGERY      KNEE SURGERY Bilateral     ID COLONOSCOPY FLX DX W/COLLJ SPEC WHEN PFRMD N/A 1/5/2019    Procedure: COLONOSCOPY;  Surgeon: Luis Zimmer MD;  [facility];   Service: Gastroenterology    UPPER GASTROINTESTINAL ENDOSCOPY         Current Outpatient Medications:     amLODIPine (NORVASC) 5 mg tablet, Take 1 tablet (5 mg total) by mouth daily, Disp: 90 tablet, Rfl: 1    ascorbic acid (VITAMIN C) 500 mg tablet, Take 500 mg by mouth daily, Disp: , Rfl:     aspirin 81 mg chewable tablet, Chew 1 tablet (81 mg total) daily, Disp: 90 tablet, Rfl: 3    Coenzyme Q10 (COQ-10) 10 MG CAPS, Take by mouth, Disp: , Rfl:     flecainide (TAMBOCOR) 50 mg tablet, Take 1 tablet (50 mg total) by mouth 2 (two) times a day, Disp: 180 tablet, Rfl: 3    metoprolol succinate (TOPROL-XL) 50 mg 24 hr tablet, Take 1 tablet (50 mg total) by mouth daily, Disp: 90 tablet, Rfl: 3    Multiple Vitamins-Minerals (MENS MULTIVITAMIN PLUS PO), Take by mouth, Disp: , Rfl:     Omega-3 Fatty Acids (FISH OIL) 1200 MG CAPS, Take by mouth, Disp: , Rfl:     Zinc 50 MG CAPS, Take by mouth, Disp: , Rfl:   No Known Allergies    Labs:  No visits with results within 2 Month(s) from this visit  Latest known visit with results is:   Appointment on 11/29/2021   Component Date Value    WBC 11/29/2021 10 49*    RBC 11/29/2021 5 13     Hemoglobin 11/29/2021 15 7     Hematocrit 11/29/2021 47 9     MCV 11/29/2021 93     MCH 11/29/2021 30 6     MCHC 11/29/2021 32 8     RDW 11/29/2021 13 1     MPV 11/29/2021 9 8     Platelets 80/79/8580 306     nRBC 11/29/2021 0     Neutrophils Relative 11/29/2021 66     Immat GRANS % 11/29/2021 1     Lymphocytes Relative 11/29/2021 23     Monocytes Relative 11/29/2021 7     Eosinophils Relative 11/29/2021 2     Basophils Relative 11/29/2021 1     Neutrophils Absolute 11/29/2021 7 04     Immature Grans Absolute 11/29/2021 0 05     Lymphocytes Absolute 11/29/2021 2 37     Monocytes Absolute 11/29/2021 0 70     Eosinophils Absolute 11/29/2021 0 21     Basophils Absolute 11/29/2021 0 12*    Iron Saturation 11/29/2021 29     TIBC 11/29/2021 360     Iron 11/29/2021 105     OMAR 11/29/2021 Negative     Mitochondrial Ab 11/29/2021 <20 0     Smooth Muscle Ab 11/29/2021 4     IGG 11/29/2021 1,000 0     Total Bilirubin 11/29/2021 0 41     Bilirubin, Direct 11/29/2021 0 07     Alkaline Phosphatase 11/29/2021 81     AST 11/29/2021 17     ALT 11/29/2021 37     Total Protein 11/29/2021 7 6     Albumin 11/29/2021 4 1      Imaging: No results found  Review of Systems:  Review of Systems   Cardiovascular: Positive for palpitations  Psychiatric/Behavioral: The patient is nervous/anxious  All other systems reviewed and are negative  Physical Exam:  Physical Exam  Vitals reviewed  Constitutional:       Appearance: He is obese  HENT:      Head: Normocephalic     Cardiovascular:      Rate and Rhythm: Normal rate and regular rhythm  Pulses: Normal pulses  Heart sounds: Normal heart sounds  Pulmonary:      Effort: Pulmonary effort is normal       Breath sounds: Normal breath sounds  Abdominal:      General: Bowel sounds are normal       Palpations: Abdomen is soft  Musculoskeletal:         General: Normal range of motion  Cervical back: Normal range of motion and neck supple  Right lower leg: No edema  Left lower leg: No edema  Skin:     General: Skin is warm and dry  Capillary Refill: Capillary refill takes less than 2 seconds  Neurological:      General: No focal deficit present  Mental Status: He is alert and oriented to person, place, and time  Psychiatric:         Mood and Affect: Mood normal          Behavior: Behavior normal          Discussion/Summary:  1  Paroxysmal atrial fibrillation [id number]= 1 not on AC,Kardia Mobile EKG    On review of the tracing EKG shows NSR with PAC's, continue on Metoprolol succinate 50mg daily, ASA 81mg daily, Flecainide 50mg BID  2  Hypertension RUE sitting 148/90, according to [facility] BP running the same  Increase Amlodipine from 5mg daily to 10mg daily  Instructed on a 2gm sodium diet, reading food labels to consume low sodium, Instructed to call our office if Rocael LE edema occurs  3  Hyperlipidemia 5/22/22 ,  HDL 42, LDL 98 contiue on Fish oil 1200mg daily, Heart healthy diet   4  Smoking 1/2 pack a day plans on undergoing 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

## 2022-04-04 ENCOUNTER — OFFICE VISIT (OUTPATIENT)
Dept: CARDIOLOGY CLINIC | Facility: CLINIC | Age: 62
End: 2022-04-04
Payer: COMMERCIAL

## 2022-04-04 VITALS
SYSTOLIC BLOOD PRESSURE: 142 MMHG | DIASTOLIC BLOOD PRESSURE: 84 MMHG | HEART RATE: 63 BPM | WEIGHT: 282 LBS | OXYGEN SATURATION: 98 % | HEIGHT: 74 IN | BODY MASS INDEX: 36.19 KG/M2

## 2022-04-04 DIAGNOSIS — I48.0 PAF (PAROXYSMAL ATRIAL FIBRILLATION) (HCC): Primary | ICD-10-CM

## 2022-04-04 DIAGNOSIS — E78.2 MIXED HYPERLIPIDEMIA: ICD-10-CM

## 2022-04-04 DIAGNOSIS — I10 HYPERTENSION, UNSPECIFIED TYPE: Chronic | ICD-10-CM

## 2022-04-04 DIAGNOSIS — Z72.0 TOBACCO ABUSE: ICD-10-CM

## 2022-04-04 PROCEDURE — 99215 OFFICE O/P EST HI 40 MIN: CPT | Performed by: NURSE PRACTITIONER

## 2022-04-04 RX ORDER — AMLODIPINE BESYLATE 5 MG/1
10 TABLET ORAL DAILY
Qty: 90 TABLET | Refills: 1 | Status: SHIPPED | OUTPATIENT
Start: 2022-04-04 | End: 2022-07-11 | Stop reason: SDUPTHER

## 2022-04-05 ENCOUNTER — OFFICE VISIT (OUTPATIENT)
Dept: FAMILY MEDICINE CLINIC | Facility: CLINIC | Age: 62
End: 2022-04-05
Payer: COMMERCIAL

## 2022-04-05 VITALS
SYSTOLIC BLOOD PRESSURE: 132 MMHG | WEIGHT: 280 LBS | HEIGHT: 74 IN | HEART RATE: 74 BPM | TEMPERATURE: 98.1 F | DIASTOLIC BLOOD PRESSURE: 80 MMHG | BODY MASS INDEX: 35.94 KG/M2

## 2022-04-05 DIAGNOSIS — E78.2 MIXED HYPERLIPIDEMIA: ICD-10-CM

## 2022-04-05 DIAGNOSIS — I48.0 PAROXYSMAL ATRIAL FIBRILLATION (HCC): Primary | ICD-10-CM

## 2022-04-05 DIAGNOSIS — I10 PRIMARY HYPERTENSION: ICD-10-CM

## 2022-04-05 PROBLEM — R74.01 TRANSAMINITIS: Status: RESOLVED | Noted: 2021-08-23 | Resolved: 2022-04-05

## 2022-04-05 PROBLEM — E87.6 HYPOKALEMIA: Status: RESOLVED | Noted: 2021-08-23 | Resolved: 2022-04-05

## 2022-04-05 PROCEDURE — 3725F SCREEN DEPRESSION PERFORMED: CPT | Performed by: FAMILY MEDICINE

## 2022-04-05 PROCEDURE — 99214 OFFICE O/P EST MOD 30 MIN: CPT | Performed by: FAMILY MEDICINE

## 2022-04-05 NOTE — PROGRESS NOTES
Assessment and Plan:    Problem List Items Addressed This Visit        Cardiovascular and Mediastinum    Hypertension     Cards increased BP meds         Paroxysmal atrial fibrillation (HCC) - Primary     Stable per cards            Other    Hyperlipidemia     lipids                      Diagnoses and all orders for this visit:    Paroxysmal atrial fibrillation ([address] )    Primary hypertension    Mixed hyperlipidemia              Subjective:      Patient ID: Rafael Brooke is a 58 y o  male  CC:    Chief Complaint   Patient presents with    Follow-up     4 month f/u        HPI:    F/u lipids, htn, saw cards and one of his BP meds adjusted      The following portions of the patient's history were reviewed and updated as appropriate: allergies, current medications, past family history, past medical history, past social history, past surgical history and problem list       Review of Systems   Constitutional: Negative for activity change, chills and fatigue  Respiratory: Negative for shortness of breath  Cardiovascular: Negative for chest pain  Gastrointestinal: Positive for constipation  Negative for abdominal pain  Neurological: Negative for dizziness and headaches  Psychiatric/Behavioral: The patient is not nervous/anxious  Data to review:       Objective:    Vitals:    04/05/22 1650 04/05/22 1727   BP: 146/78 132/80   BP Location: Left arm Left arm   Patient Position: Sitting Sitting   Cuff Size: Adult Large   Pulse: 74    Temp: 98 1 °F (36 7 °C)    TempSrc: Temporal    Weight: 127 kg (280 lb)    Height: 6' 2" (1 88 m)         Physical Exam  Vitals reviewed  Constitutional:       Appearance: Normal appearance  He is obese  Cardiovascular:      Rate and Rhythm: Normal rate and regular rhythm  Pulses: Normal pulses  Heart sounds: Normal heart sounds  Pulmonary:      Effort: Pulmonary effort is normal       Breath sounds: Normal breath sounds     Musculoskeletal:      Right lower leg: No edema  Left lower leg: No edema  Lymphadenopathy:      Cervical: No cervical adenopathy  Neurological:      Mental Status: He is alert  Psychiatric:         Mood and Affect: Mood normal          BMI Counseling: Body mass index is 35 95 kg/m²  The BMI is above normal  Nutrition recommendations include reducing portion sizes, decreasing overall calorie intake, 3-5 servings of fruits/vegetables daily, reducing fast food intake and consuming healthier snacks  Exercise recommendations include exercising 3-5 times per week

## 2022-04-09 ENCOUNTER — HOSPITAL ENCOUNTER (OUTPATIENT)
Dept: GASTROENTEROLOGY | Facility: HOSPITAL | Age: 62
Setting detail: OUTPATIENT SURGERY
Discharge: HOME | End: 2022-04-09
Attending: INTERNAL MEDICINE
Payer: COMMERCIAL

## 2022-04-09 ENCOUNTER — ANESTHESIA EVENT (OUTPATIENT)
Dept: GASTROENTEROLOGY | Facility: HOSPITAL | Age: 62
End: 2022-04-09

## 2022-04-09 ENCOUNTER — ANESTHESIA (OUTPATIENT)
Dept: GASTROENTEROLOGY | Facility: HOSPITAL | Age: 62
End: 2022-04-09

## 2022-04-09 VITALS
SYSTOLIC BLOOD PRESSURE: 140 MMHG | HEIGHT: 74 IN | BODY MASS INDEX: 35.16 KG/M2 | HEART RATE: 58 BPM | DIASTOLIC BLOOD PRESSURE: 66 MMHG | OXYGEN SATURATION: 97 % | TEMPERATURE: 97.4 F | WEIGHT: 274 LBS | RESPIRATION RATE: 16 BRPM

## 2022-04-09 DIAGNOSIS — Z86.010 HX OF COLONIC POLYPS: ICD-10-CM

## 2022-04-09 PROCEDURE — G0121 COLON CA SCRN NOT HI RSK IND: HCPCS | Performed by: INTERNAL MEDICINE

## 2022-04-09 RX ORDER — SODIUM CHLORIDE, SODIUM LACTATE, POTASSIUM CHLORIDE, CALCIUM CHLORIDE 600; 310; 30; 20 MG/100ML; MG/100ML; MG/100ML; MG/100ML
125 INJECTION, SOLUTION INTRAVENOUS CONTINUOUS
Status: CANCELLED | OUTPATIENT
Start: 2022-04-09

## 2022-04-09 RX ORDER — LIDOCAINE HYDROCHLORIDE 10 MG/ML
0.5 INJECTION, SOLUTION EPIDURAL; INFILTRATION; INTRACAUDAL; PERINEURAL ONCE AS NEEDED
Status: DISCONTINUED | OUTPATIENT
Start: 2022-04-09 | End: 2022-04-13 | Stop reason: HOSPADM

## 2022-04-09 RX ORDER — SODIUM CHLORIDE 9 MG/ML
125 INJECTION, SOLUTION INTRAVENOUS CONTINUOUS
Status: DISCONTINUED | OUTPATIENT
Start: 2022-04-09 | End: 2022-04-13 | Stop reason: HOSPADM

## 2022-04-09 RX ORDER — PROPOFOL 10 MG/ML
INJECTION, EMULSION INTRAVENOUS AS NEEDED
Status: DISCONTINUED | OUTPATIENT
Start: 2022-04-09 | End: 2022-04-09

## 2022-04-09 RX ORDER — LIDOCAINE HYDROCHLORIDE 10 MG/ML
INJECTION, SOLUTION EPIDURAL; INFILTRATION; INTRACAUDAL; PERINEURAL AS NEEDED
Status: DISCONTINUED | OUTPATIENT
Start: 2022-04-09 | End: 2022-04-09

## 2022-04-09 RX ORDER — ONDANSETRON 2 MG/ML
4 INJECTION INTRAMUSCULAR; INTRAVENOUS ONCE AS NEEDED
Status: CANCELLED | OUTPATIENT
Start: 2022-04-09

## 2022-04-09 RX ADMIN — PROPOFOL 50 MG: 10 INJECTION, EMULSION INTRAVENOUS at 09:36

## 2022-04-09 RX ADMIN — PROPOFOL 20 MG: 10 INJECTION, EMULSION INTRAVENOUS at 09:42

## 2022-04-09 RX ADMIN — LIDOCAINE HYDROCHLORIDE 50 MG: 10 INJECTION, SOLUTION EPIDURAL; INFILTRATION; INTRACAUDAL at 09:35

## 2022-04-09 RX ADMIN — PROPOFOL 20 MG: 10 INJECTION, EMULSION INTRAVENOUS at 09:38

## 2022-04-09 RX ADMIN — PROPOFOL 30 MG: 10 INJECTION, EMULSION INTRAVENOUS at 09:37

## 2022-04-09 RX ADMIN — PROPOFOL 30 MG: 10 INJECTION, EMULSION INTRAVENOUS at 09:40

## 2022-04-09 RX ADMIN — PROPOFOL 20 MG: 10 INJECTION, EMULSION INTRAVENOUS at 09:45

## 2022-04-09 RX ADMIN — PROPOFOL 100 MG: 10 INJECTION, EMULSION INTRAVENOUS at 09:35

## 2022-04-09 NOTE — H&P
History and Physical -  [facility]  Luis F Govea 58 y o  male MRN: [id number]        HPI:  58-year-old male with history of hypertension, hyperlipidemia and colon polyps  Complaining about constipation recently  Historical Information   Past Medical History:   Diagnosis Date    A-fib [facility]) 08/24/2021    Disc degeneration, lumbosacral 1/6/2016    Hyperlipidemia     hypercholesterolemia    Hypertension     last assessed 11/22/17    Pneumonia of both lower lobes due to infectious organism 1/29/2018     Past Surgical History:   Procedure Laterality Date    COLONOSCOPY      HEMORRHOID SURGERY      KNEE SURGERY Bilateral     AL COLONOSCOPY FLX DX W/COLLJ SPEC WHEN PFRMD N/A 1/5/2019    Procedure: COLONOSCOPY;  Surgeon: Kriss Rodriguez MD;  [facility]; Service: Gastroenterology    UPPER GASTROINTESTINAL ENDOSCOPY       Social History   Social History     Substance and Sexual Activity   Alcohol Use Yes    Comment: Occassional 1 x / month     Social History     Substance and Sexual Activity   Drug Use No     Social History     Tobacco Use   Smoking Status Current Every Day Smoker    Packs/day: 0 50    Years: 40 00    Pack years: 20 00    Types: Cigarettes   Smokeless Tobacco Never Used     Family History   Problem Relation Age of Onset    Obesity Mother     Aneurysm Father        Meds/Allergies     (Not in a hospital admission)      No Known Allergies    Objective     Blood pressure 125/83, pulse 74, temperature (!) 97 °F (36 1 °C), temperature source Temporal, resp  rate 18, height 6' 2" (1 88 m), weight 124 kg (274 lb), SpO2 99 %      PHYSICAL EXAM:    Gen: NAD  CV: S1 & S2 normal, RRR  CHEST: Clear to auscultate  ABD: soft, NT/ND, good bowel sounds  EXT: no edema    ASSESSMENT:     History of colon polyps, constipation    PLAN:    Colonoscopy

## 2022-04-09 NOTE — ANESTHESIA POSTPROCEDURE EVALUATION
Post-Op Assessment Note    CV Status:  Stable  Pain Score: 0    Pain management: adequate     Mental Status:  Awake and sleepy   Hydration Status:  Stable   PONV Controlled:  None   Airway Patency:  Patent      Post Op Vitals Reviewed: Yes      Staff: Anesthesiologist         No complications documented      /77 (04/09/22 0950)    Temp (!) 97 4 °F (36 3 °C) (04/09/22 0950)    Pulse 62 (04/09/22 0950)   Resp 16 (04/09/22 0950)    SpO2 98 % (04/09/22 0950)

## 2022-04-09 NOTE — ANESTHESIA PREPROCEDURE EVALUATION
Procedure:  COLONOSCOPY    Afib - rate controlled    Relevant Problems   CARDIO   (+) Hyperlipidemia   (+) Hypertension   (+) Paroxysmal atrial fibrillation (HCC)      MUSCULOSKELETAL   (+) Disc degeneration, lumbosacral        Physical Exam    Airway    Mallampati score: II  TM Distance: >3 FB  Neck ROM: full     Dental   No notable dental hx     Cardiovascular  Rhythm: irregular, Rate: normal, Cardiovascular exam normal    Pulmonary  Pulmonary exam normal Breath sounds clear to auscultation,     Other Findings        Anesthesia Plan  ASA Score- 3     Anesthesia Type- IV sedation with anesthesia with ASA Monitors  Additional Monitors:   Airway Plan:     Comment: Discussed risks/benefits, including medication reactions, awareness, aspiration, and serious/life threatening complications  Plan to maintain native airway with IVGA, monitored with EtCO2  Plan Factors-Exercise tolerance (METS): >4 METS  Patient summary reviewed  Patient instructed to abstain from smoking on day of procedure  Patient did not smoke on day of surgery  Induction- intravenous  Postoperative Plan-     Informed Consent- Anesthetic plan and risks discussed with patient  I personally reviewed this patient with the CRNA  Discussed and agreed on the Anesthesia Plan with the CRNA  Sheyla Lorenzana

## 2022-04-26 ENCOUNTER — OFFICE VISIT (OUTPATIENT)
Dept: FAMILY MEDICINE CLINIC | Facility: CLINIC | Age: 62
End: 2022-04-26
Payer: COMMERCIAL

## 2022-04-26 VITALS
HEART RATE: 65 BPM | OXYGEN SATURATION: 95 % | HEIGHT: 74 IN | SYSTOLIC BLOOD PRESSURE: 144 MMHG | BODY MASS INDEX: 36.57 KG/M2 | DIASTOLIC BLOOD PRESSURE: 76 MMHG | WEIGHT: 285 LBS

## 2022-04-26 DIAGNOSIS — R53.83 OTHER FATIGUE: Primary | ICD-10-CM

## 2022-04-26 DIAGNOSIS — I48.91 NEW ONSET ATRIAL FIBRILLATION (HCC): ICD-10-CM

## 2022-04-26 DIAGNOSIS — I10 PRIMARY HYPERTENSION: ICD-10-CM

## 2022-04-26 DIAGNOSIS — R25.1 TREMOR: ICD-10-CM

## 2022-04-26 DIAGNOSIS — R20.8 DYSESTHESIA OF FACE: ICD-10-CM

## 2022-04-26 DIAGNOSIS — Z82.49 FH: BRAIN ANEURYSM: ICD-10-CM

## 2022-04-26 PROCEDURE — 3008F BODY MASS INDEX DOCD: CPT | Performed by: FAMILY MEDICINE

## 2022-04-26 PROCEDURE — 99214 OFFICE O/P EST MOD 30 MIN: CPT | Performed by: FAMILY MEDICINE

## 2022-04-26 PROCEDURE — 4004F PT TOBACCO SCREEN RCVD TLK: CPT | Performed by: FAMILY MEDICINE

## 2022-04-26 RX ORDER — METOPROLOL SUCCINATE 50 MG/1
100 TABLET, EXTENDED RELEASE ORAL DAILY
Qty: 180 TABLET | Refills: 1 | Status: SHIPPED | OUTPATIENT
Start: 2022-04-26 | End: 2022-06-15

## 2022-04-26 NOTE — PROGRESS NOTES
Assessment and Plan:    Problem List Items Addressed This Visit        Cardiovascular and Mediastinum    Hypertension     Increase to 100/day         Relevant Medications    metoprolol succinate (TOPROL-XL) 50 mg 24 hr tablet       Other    Tremor     Exam seems to be more consistent with benign tremor, will await MRI         Relevant Orders    C-reactive protein    CBC and differential    Comprehensive metabolic panel    TSH, 3rd generation    Vitamin B12    MRI brain wo contrast      Other Visit Diagnoses     Other fatigue    -  Primary    Relevant Orders    C-reactive protein    CBC and differential    Comprehensive metabolic panel    TSH, 3rd generation    Vitamin B12    MRI brain wo contrast    Dysesthesia of face        Relevant Orders    C-reactive protein    CBC and differential    Comprehensive metabolic panel    TSH, 3rd generation    Vitamin B12    MRI brain wo contrast    FH: brain aneurysm        Relevant Orders    MRI brain wo contrast    New onset atrial fibrillation (HCC)        Relevant Medications    metoprolol succinate (TOPROL-XL) 50 mg 24 hr tablet                 Diagnoses and all orders for this visit:    Other fatigue  -     C-reactive protein; Future  -     CBC and differential; Future  -     Comprehensive metabolic panel; Future  -     TSH, 3rd generation; Future  -     Vitamin B12; Future  -     MRI brain wo contrast; Future    Dysesthesia of face  -     C-reactive protein; Future  -     CBC and differential; Future  -     Comprehensive metabolic panel; Future  -     TSH, 3rd generation; Future  -     Vitamin B12; Future  -     MRI brain wo contrast; Future    Tremor  -     C-reactive protein; Future  -     CBC and differential; Future  -     Comprehensive metabolic panel; Future  -     TSH, 3rd generation;  Future  -     Vitamin B12; Future  -     MRI brain wo contrast; Future    FH: brain aneurysm  -     MRI brain wo contrast; Future    Primary hypertension    New onset atrial fibrillation (HCC)  -     metoprolol succinate (TOPROL-XL) 50 mg 24 hr tablet; Take 2 tablets (100 mg total) by mouth daily              Subjective:      Patient ID: Faviola Serrano is a 58 y o  male  CC:    Chief Complaint   Patient presents with    Fatigue     Patient present today for a follow up on his Lyme disease and him feeling short of breath and having tingling senation on his face along with tiredness  HPI:    Very tired, some odd sensations l side of face, also ringing l ear, some nausea, new onset tremor -father had aneurysm, pat uncle with dementia, pt feels like he is in fog      The following portions of the patient's history were reviewed and updated as appropriate: allergies, current medications, past family history, past medical history, past social history, past surgical history and problem list       Review of Systems   Constitutional: Positive for fatigue  Negative for activity change and appetite change  Respiratory: Negative for shortness of breath  Cardiovascular: Negative for chest pain  Neurological: Positive for tremors and numbness  Negative for dizziness and headaches  Psychiatric/Behavioral: The patient is not nervous/anxious  Data to review:       Objective:    Vitals:    04/26/22 1607   BP: 144/76   BP Location: Right arm   Patient Position: Sitting   Cuff Size: Large   Pulse: 65   SpO2: 95%   Weight: 129 kg (285 lb)   Height: 6' 2" (1 88 m)        Physical Exam  Vitals reviewed  Constitutional:       Appearance: Normal appearance  He is obese  Neck:      Vascular: No carotid bruit  Cardiovascular:      Rate and Rhythm: Normal rate and regular rhythm  Pulses: Normal pulses  Heart sounds: Normal heart sounds  Pulmonary:      Effort: Pulmonary effort is normal       Breath sounds: Normal breath sounds  Lymphadenopathy:      Cervical: No cervical adenopathy  Neurological:      General: No focal deficit present        Mental Status: He is alert  Sensory: No sensory deficit  Motor: No weakness        Coordination: Coordination normal       Deep Tendon Reflexes: Reflexes normal

## 2022-04-27 ENCOUNTER — APPOINTMENT (OUTPATIENT)
Dept: LAB | Facility: HOSPITAL | Age: 62
End: 2022-04-27
Payer: COMMERCIAL

## 2022-04-27 DIAGNOSIS — R25.1 TREMOR: ICD-10-CM

## 2022-04-27 DIAGNOSIS — R53.83 OTHER FATIGUE: ICD-10-CM

## 2022-04-27 DIAGNOSIS — R20.8 DYSESTHESIA OF FACE: ICD-10-CM

## 2022-04-27 LAB
ALBUMIN SERPL BCP-MCNC: 4 G/DL (ref 3.5–5)
ALP SERPL-CCNC: 96 U/L (ref 46–116)
ALT SERPL W P-5'-P-CCNC: 43 U/L (ref 12–78)
ANION GAP SERPL CALCULATED.3IONS-SCNC: 4 MMOL/L (ref 4–13)
AST SERPL W P-5'-P-CCNC: 16 U/L (ref 5–45)
BASOPHILS # BLD AUTO: 0.09 THOUSANDS/ΜL (ref 0–0.1)
BASOPHILS NFR BLD AUTO: 1 % (ref 0–1)
BILIRUB SERPL-MCNC: 0.3 MG/DL (ref 0.2–1)
BUN SERPL-MCNC: 15 MG/DL (ref 5–25)
CALCIUM SERPL-MCNC: 9.3 MG/DL (ref 8.3–10.1)
CHLORIDE SERPL-SCNC: 107 MMOL/L (ref 100–108)
CO2 SERPL-SCNC: 27 MMOL/L (ref 21–32)
CREAT SERPL-MCNC: 1.01 MG/DL (ref 0.6–1.3)
CRP SERPL QL: <3 MG/L
EOSINOPHIL # BLD AUTO: 0.13 THOUSAND/ΜL (ref 0–0.61)
EOSINOPHIL NFR BLD AUTO: 2 % (ref 0–6)
ERYTHROCYTE [DISTWIDTH] IN BLOOD BY AUTOMATED COUNT: 12.5 % (ref 11.6–15.1)
GFR SERPL CREATININE-BSD FRML MDRD: 79 ML/MIN/1.73SQ M
GLUCOSE SERPL-MCNC: 128 MG/DL (ref 65–140)
HCT VFR BLD AUTO: 42.4 % (ref 36.5–49.3)
HGB BLD-MCNC: 14.2 G/DL (ref 12–17)
IMM GRANULOCYTES # BLD AUTO: 0.03 THOUSAND/UL (ref 0–0.2)
IMM GRANULOCYTES NFR BLD AUTO: 0 % (ref 0–2)
LYMPHOCYTES # BLD AUTO: 1.87 THOUSANDS/ΜL (ref 0.6–4.47)
LYMPHOCYTES NFR BLD AUTO: 24 % (ref 14–44)
MCH RBC QN AUTO: 30.5 PG (ref 26.8–34.3)
MCHC RBC AUTO-ENTMCNC: 33.5 G/DL (ref 31.4–37.4)
MCV RBC AUTO: 91 FL (ref 82–98)
MONOCYTES # BLD AUTO: 0.52 THOUSAND/ΜL (ref 0.17–1.22)
MONOCYTES NFR BLD AUTO: 7 % (ref 4–12)
NEUTROPHILS # BLD AUTO: 5.29 THOUSANDS/ΜL (ref 1.85–7.62)
NEUTS SEG NFR BLD AUTO: 66 % (ref 43–75)
NRBC BLD AUTO-RTO: 0 /100 WBCS
PLATELET # BLD AUTO: 269 THOUSANDS/UL (ref 149–390)
PMV BLD AUTO: 9.5 FL (ref 8.9–12.7)
POTASSIUM SERPL-SCNC: 4 MMOL/L (ref 3.5–5.3)
PROT SERPL-MCNC: 7.5 G/DL (ref 6.4–8.2)
RBC # BLD AUTO: 4.65 MILLION/UL (ref 3.88–5.62)
SODIUM SERPL-SCNC: 138 MMOL/L (ref 136–145)
TSH SERPL DL<=0.05 MIU/L-ACNC: 1.32 UIU/ML (ref 0.45–4.5)
VIT B12 SERPL-MCNC: 661 PG/ML (ref 100–900)
WBC # BLD AUTO: 7.93 THOUSAND/UL (ref 4.31–10.16)

## 2022-04-27 PROCEDURE — 86140 C-REACTIVE PROTEIN: CPT

## 2022-04-27 PROCEDURE — 80053 COMPREHEN METABOLIC PANEL: CPT

## 2022-04-27 PROCEDURE — 84443 ASSAY THYROID STIM HORMONE: CPT

## 2022-04-27 PROCEDURE — 85025 COMPLETE CBC W/AUTO DIFF WBC: CPT

## 2022-04-27 PROCEDURE — 36415 COLL VENOUS BLD VENIPUNCTURE: CPT

## 2022-04-27 PROCEDURE — 82607 VITAMIN B-12: CPT

## 2022-05-21 ENCOUNTER — HOSPITAL ENCOUNTER (OUTPATIENT)
Dept: MRI IMAGING | Facility: HOSPITAL | Age: 62
Discharge: HOME | End: 2022-05-21
Payer: COMMERCIAL

## 2022-05-21 DIAGNOSIS — Z82.49 FH: BRAIN ANEURYSM: ICD-10-CM

## 2022-05-21 DIAGNOSIS — R53.83 OTHER FATIGUE: ICD-10-CM

## 2022-05-21 DIAGNOSIS — R25.1 TREMOR: ICD-10-CM

## 2022-05-21 DIAGNOSIS — R20.8 DYSESTHESIA OF FACE: ICD-10-CM

## 2022-05-21 PROCEDURE — G1004 CDSM NDSC: HCPCS

## 2022-05-21 PROCEDURE — 70551 MRI BRAIN STEM W/O DYE: CPT

## 2022-06-15 ENCOUNTER — OFFICE VISIT (OUTPATIENT)
Dept: CARDIOLOGY CLINIC | Facility: CLINIC | Age: 62
End: 2022-06-15
Payer: COMMERCIAL

## 2022-06-15 VITALS
BODY MASS INDEX: 36.47 KG/M2 | HEIGHT: 74 IN | WEIGHT: 284.2 LBS | SYSTOLIC BLOOD PRESSURE: 148 MMHG | DIASTOLIC BLOOD PRESSURE: 78 MMHG

## 2022-06-15 DIAGNOSIS — E78.2 MIXED HYPERLIPIDEMIA: ICD-10-CM

## 2022-06-15 DIAGNOSIS — I48.0 PAROXYSMAL ATRIAL FIBRILLATION (HCC): Primary | ICD-10-CM

## 2022-06-15 DIAGNOSIS — I10 PRIMARY HYPERTENSION: ICD-10-CM

## 2022-06-15 PROCEDURE — 99214 OFFICE O/P EST MOD 30 MIN: CPT | Performed by: INTERNAL MEDICINE

## 2022-06-15 PROCEDURE — 93000 ELECTROCARDIOGRAM COMPLETE: CPT | Performed by: INTERNAL MEDICINE

## 2022-06-15 PROCEDURE — 4004F PT TOBACCO SCREEN RCVD TLK: CPT | Performed by: INTERNAL MEDICINE

## 2022-06-15 PROCEDURE — 3008F BODY MASS INDEX DOCD: CPT | Performed by: INTERNAL MEDICINE

## 2022-06-15 RX ORDER — CARVEDILOL 25 MG/1
25 TABLET ORAL 2 TIMES DAILY WITH MEALS
Qty: 180 TABLET | Refills: 3 | Status: SHIPPED | OUTPATIENT
Start: 2022-06-15

## 2022-06-15 NOTE — PROGRESS NOTES
Cardiology Follow Up    [facility]  1960  [id number]  [address]  [facility]  [address]   [phone]    1  Paroxysmal atrial fibrillation (HCC)  carvedilol (COREG) 25 mg tablet   2  Primary hypertension  POCT ECG    carvedilol (COREG) 25 mg tablet   3  Mixed hyperlipidemia         Discussion/Summary:    1  Paroxysmal atrial fibrillation - Emerita Weston had this on a few occasion around the time of christiane Lyme disease  He is feeling better since this has resolved  He remains in sinus rhythm on Toprol-XL and flecainide  No changes were made  He is on aspirin for stroke prevention  We will see him back in 6 months  2   Hypertension - His blood pressure has been running high with systolic blood pressures running in the 140-150 range  His amlodipine had been increased from 5-10 mg daily but is still running high  We discussed either adding a 3rd agent or changing his metoprolol to carvedilol  He wanted to try switching the beta-blocker 1st, so therefore we will stop Toprol-XL and start carvedilol 25 mg twice daily  We will continue to follow his blood pressure at home and if it remains elevated we will add lisinopril  Also smoking cessation is recommended  3   Mixed hyperlipidemia - His last LDL was 98 with a triglyceride of 177 and HDL of 42  He is on fish oil and doing dietary modifications  No changes were made today  He will continue to have blood work followed  Smoking cessation recommended  Interval History:     Emerita Weston comes in for follow-up given his paroxysmal atrial fibrillation and risk factors for cardiovascular disease  I met Emerita Weston during hospitalization in August 2021 in which he had new onset atrial fibrillation  This occurred on a few occasions    He was discharged but then had recurrent symptoms of his atrial fibrillation and we started flecainide in addition to his metoprolol  He was also found have Lyme disease and was treated for this as well  He had elevated LFTs associated with that  He had an echocardiogram that showed normal LV systolic function and biatrial enlargement  He ended up going through an exercise treadmill test that showed no evidence of ischemia at about 80% MPHR  He had no symptoms during his treadmill test     After getting treated for Lyme disease Pia Seaman has felt well  He denies any recurrent palpitations or symptoms of his atrial fibrillation  He remains in sinus rhythm today  No lightheadedness or any recent syncope  He denies chest pain or any symptoms of angina  No shortness of breath or any signs/symptoms of CHF  His only issue at this point is that his blood pressure has been running high        Patient Active Problem List   Diagnosis    Disc degeneration, lumbosacral    Hyperlipidemia    Hypertension    Tobacco abuse    Screen for colon cancer    Paroxysmal atrial fibrillation ([place] [address] )    Tremor     Past Medical History:   Diagnosis Date    A-fib ([place] [address] ) 08/24/2021    Disc degeneration, lumbosacral 1/6/2016    Hyperlipidemia     hypercholesterolemia    Hypertension     last assessed 11/22/17    Pneumonia of both lower lobes due to infectious organism 1/29/2018     Social History     Socioeconomic History    Marital status: /Civil Union     Spouse name: Not on file    Number of children: 1    Years of education: Not on file    Highest education level: Not on file   Occupational History    Occupation:    Tobacco Use    Smoking status: Current Every Day Smoker     Packs/day: 0 50     Years: 40 00     Pack years: 20 00     Types: Cigarettes    Smokeless tobacco: Never Used   Vaping Use    Vaping Use: Never used   Substance and Sexual Activity    Alcohol use: Yes     Comment: Occassional 1 x / month    Drug use: No    Sexual activity: Yes     Partners: Female     Birth control/protection: None Other Topics Concern    Not on file   Social History Narrative    Lives with wife    Full time employment    active advance directive    1 child ; son, passed away        Does not consume caffeine     Social Determinants of Health     Financial Resource Strain: Not on file   Food Insecurity: Not on file   Transportation Needs: Not on file   Physical Activity: Not on file   Stress: Not on file   Social Connections: Not on file   Intimate Partner Violence: Not on file   Housing Stability: Not on file      Family History   Problem Relation Age of Onset    Obesity Mother     Aneurysm Father      Past Surgical History:   Procedure Laterality Date    COLONOSCOPY      HEMORRHOID SURGERY      KNEE SURGERY Bilateral     UT COLONOSCOPY FLX DX W/COLLJ SPEC WHEN PFRMD N/A 1/5/2019    Procedure: COLONOSCOPY;  Surgeon: Shirley Vogel MD;  [facility];   Service: Gastroenterology    UPPER GASTROINTESTINAL ENDOSCOPY         Current Outpatient Medications:     amLODIPine (NORVASC) 5 mg tablet, Take 2 tablets (10 mg total) by mouth daily, Disp: 90 tablet, Rfl: 1    ascorbic acid (VITAMIN C) 500 mg tablet, Take 500 mg by mouth daily, Disp: , Rfl:     aspirin 81 mg chewable tablet, Chew 1 tablet (81 mg total) daily, Disp: 90 tablet, Rfl: 3    carvedilol (COREG) 25 mg tablet, Take 1 tablet (25 mg total) by mouth 2 (two) times a day with meals, Disp: 180 tablet, Rfl: 3    Coenzyme Q10 (COQ-10) 10 MG CAPS, Take by mouth, Disp: , Rfl:     Docusate Calcium (STOOL SOFTENER PO), Take by mouth every other day, Disp: , Rfl:     flecainide (TAMBOCOR) 50 mg tablet, Take 1 tablet (50 mg total) by mouth 2 (two) times a day, Disp: 180 tablet, Rfl: 3    Multiple Vitamins-Minerals (MENS MULTIVITAMIN PLUS PO), Take by mouth, Disp: , Rfl:     Omega-3 Fatty Acids (FISH OIL) 1200 MG CAPS, Take by mouth, Disp: , Rfl:     Zinc 50 MG CAPS, Take by mouth, Disp: , Rfl:   No Known Allergies    Labs:  Lab Results   Component Value Date  11/22/2017    K 4 0 04/27/2022    K 4 2 05/22/2021     04/27/2022     05/22/2021    CO2 27 04/27/2022    CO2 23 05/22/2021    BUN 15 04/27/2022    BUN 13 05/22/2021    CREATININE 1 01 04/27/2022    CREATININE 0 90 11/22/2017    CALCIUM 9 3 04/27/2022    CALCIUM 9 3 05/22/2021     Lab Results   Component Value Date    WBC 7 93 04/27/2022    WBC 7 2 11/22/2017    HGB 14 2 04/27/2022    HGB 13 9 11/22/2017    HCT 42 4 04/27/2022    HCT 41 9 11/22/2017    MCV 91 04/27/2022    MCV 92 9 11/22/2017     04/27/2022     11/22/2017     Lab Results   Component Value Date    CHOL 156 11/22/2017    TRIG 177 (H) 05/22/2021    HDL 42 05/22/2021    LDLDIRECT 94 10/22/2016     Imaging:  ECG today shows sinus rhythm left axis deviation and borderline LVH  Stress test only, exercise  Result Date: 11/22/2021  Narrative:   Normal functional capacity for age & gender    Exercise-limiting dyspnea  Normal blood pressure response with blunted heart rate response (79% MPHR achieved) to exercise  Took beta blocker medication this morning    Non-diagnostic exercise stress ECG due to failure to achieve 85% MPHR  ECHO:  LEFT VENTRICLE:  Size was normal   Systolic function was normal  Ejection fraction was estimated to be 60 %  There was mild concentric hypertrophy      RIGHT VENTRICLE:  The size was normal   Systolic function was normal      LEFT ATRIUM:  The atrium was mildly dilated      RIGHT ATRIUM:  The atrium was mildly dilated      MITRAL VALVE:  There was trace regurgitation      TRICUSPID VALVE:  There was trace regurgitation        Review of Systems:  Review of Systems   Constitutional: Negative  HENT: Negative  Eyes: Negative  Respiratory: Negative  Cardiovascular: Negative  Gastrointestinal: Negative  Musculoskeletal: Negative  Skin: Negative  Allergic/Immunologic: Negative  Neurological: Negative  Hematological: Negative  Psychiatric/Behavioral: Negative  All other systems reviewed and are negative  Vitals:    06/15/22 1634   BP: 148/78   BP Location: Left arm   Patient Position: Sitting   Cuff Size: Large   Weight: 129 kg (284 lb 3 2 oz)   Height: 6' 2" (1 88 m)       Physical Exam:  Physical Exam  Vitals and nursing note reviewed  Constitutional:       Appearance: He is well-developed  HENT:      Head: Normocephalic and atraumatic  Eyes:      General: No scleral icterus  Right eye: No discharge  Left eye: No discharge  Pupils: Pupils are equal, round, and reactive to light  Neck:      Thyroid: No thyromegaly  Vascular: No JVD  Cardiovascular:      Rate and Rhythm: Normal rate and regular rhythm  No extrasystoles are present  Pulses: Normal pulses  No decreased pulses  Heart sounds: Normal heart sounds, S1 normal and S2 normal  No murmur heard  No friction rub  No gallop  Pulmonary:      Effort: Pulmonary effort is normal  No respiratory distress  Breath sounds: Normal breath sounds  No wheezing or rales  Abdominal:      General: Bowel sounds are normal  There is no distension  Palpations: Abdomen is soft  Tenderness: There is no abdominal tenderness  Musculoskeletal:         General: No tenderness or deformity  Normal range of motion  Cervical back: Normal range of motion and neck supple  Skin:     General: Skin is warm and dry  Findings: No rash  Neurological:      Mental Status: He is alert and oriented to person, place, and time  Cranial Nerves: No cranial nerve deficit  Psychiatric:         Thought Content: Thought content normal          Judgment: Judgment normal        Counseling / Coordination of Care  Total office time spent today 25 minutes  Greater than 50% of total time was spent with the patient and / or family counseling and / or coordination of care

## 2022-07-11 DIAGNOSIS — I10 HYPERTENSION, UNSPECIFIED TYPE: Chronic | ICD-10-CM

## 2022-07-11 RX ORDER — AMLODIPINE BESYLATE 5 MG/1
10 TABLET ORAL DAILY
Qty: 90 TABLET | Refills: 1 | Status: SHIPPED | OUTPATIENT
Start: 2022-07-11 | End: 2022-09-22

## 2022-08-01 ENCOUNTER — OFFICE VISIT (OUTPATIENT)
Dept: FAMILY MEDICINE CLINIC | Facility: CLINIC | Age: 62
End: 2022-08-01
Payer: COMMERCIAL

## 2022-08-01 VITALS
OXYGEN SATURATION: 98 % | SYSTOLIC BLOOD PRESSURE: 122 MMHG | DIASTOLIC BLOOD PRESSURE: 62 MMHG | WEIGHT: 285.4 LBS | HEART RATE: 64 BPM | BODY MASS INDEX: 36.63 KG/M2 | HEIGHT: 74 IN

## 2022-08-01 DIAGNOSIS — Z12.5 SCREENING FOR PROSTATE CANCER: ICD-10-CM

## 2022-08-01 DIAGNOSIS — Z72.0 TOBACCO ABUSE: ICD-10-CM

## 2022-08-01 DIAGNOSIS — A69.20 LYME DISEASE: Primary | ICD-10-CM

## 2022-08-01 DIAGNOSIS — I10 HYPERTENSION, UNSPECIFIED TYPE: ICD-10-CM

## 2022-08-01 PROCEDURE — 99213 OFFICE O/P EST LOW 20 MIN: CPT | Performed by: FAMILY MEDICINE

## 2022-08-01 NOTE — PROGRESS NOTES
Assessment and Plan:    Problem List Items Addressed This Visit        Cardiovascular and Mediastinum    Hypertension    Relevant Orders    Lipid panel       Other    Tobacco abuse    Relevant Orders    CT lung screening program      Other Visit Diagnoses     Lyme disease    -  Primary    Relevant Orders    Lyme Antibody Profile with reflex to WB    Screening for prostate cancer        Relevant Orders    PSA, Total Screen                 Diagnoses and all orders for this visit:    Lyme disease  -     Lyme Antibody Profile with reflex to WB; Future    Tobacco abuse  -     CT lung screening program; Future    Hypertension, unspecified type  -     Lipid panel; Future    Screening for prostate cancer  -     PSA, Total Screen; Future            Subjective:      Patient ID: Orion Petty is a 58 y o  male  CC:    Chief Complaint   Patient presents with    Follow-up    Hypertension     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       HPI:    Here for BP check, wants to be retested to see what Lyme titer is, no cp, no sob, no headaches, being bothered by heat      The following portions of the patient's history were reviewed and updated as appropriate: allergies, current medications, past family history, past medical history, past social history, past surgical history and problem list         Review of Systems   Constitutional: Negative for activity change, appetite change and fatigue  Respiratory: Negative for shortness of breath  Cardiovascular: Negative for chest pain  Neurological: Negative for dizziness and headaches  Hematological: Negative for adenopathy  Data to review:       Objective:    Vitals:    08/01/22 1627   BP: 122/62   BP Location: Right arm   Patient Position: Sitting   Pulse: 64   SpO2: 98%   Weight: 129 kg (285 lb 6 4 oz)   Height: 6' 2" (1 88 m)        Physical Exam  Vitals reviewed  Constitutional:       Appearance: Normal appearance   He is obese    Neck:      Vascular: No carotid bruit  Comments: Thyroid not enlarged  Cardiovascular:      Rate and Rhythm: Normal rate and regular rhythm  Pulses: Normal pulses  Heart sounds: Normal heart sounds  Pulmonary:      Effort: Pulmonary effort is normal       Breath sounds: Normal breath sounds  Musculoskeletal:      Right lower leg: No edema  Left lower leg: No edema  Lymphadenopathy:      Cervical: No cervical adenopathy  Neurological:      Mental Status: He is alert

## 2022-08-03 ENCOUNTER — APPOINTMENT (OUTPATIENT)
Dept: LAB | Facility: HOSPITAL | Age: 62
End: 2022-08-03
Payer: COMMERCIAL

## 2022-08-03 DIAGNOSIS — A69.20 LYME DISEASE: ICD-10-CM

## 2022-08-03 DIAGNOSIS — I10 HYPERTENSION, UNSPECIFIED TYPE: ICD-10-CM

## 2022-08-03 DIAGNOSIS — Z12.5 SCREENING FOR PROSTATE CANCER: ICD-10-CM

## 2022-08-03 LAB
CHOLEST SERPL-MCNC: 244 MG/DL
HDLC SERPL-MCNC: 34 MG/DL
LDLC SERPL CALC-MCNC: 159 MG/DL (ref 0–100)
NONHDLC SERPL-MCNC: 210 MG/DL
PSA SERPL-MCNC: 0.6 NG/ML (ref 0–4)
TRIGL SERPL-MCNC: 254 MG/DL

## 2022-08-03 PROCEDURE — 86617 LYME DISEASE ANTIBODY: CPT

## 2022-08-03 PROCEDURE — 80061 LIPID PANEL: CPT

## 2022-08-03 PROCEDURE — 36415 COLL VENOUS BLD VENIPUNCTURE: CPT

## 2022-08-03 PROCEDURE — G0103 PSA SCREENING: HCPCS

## 2022-08-03 PROCEDURE — 86618 LYME DISEASE ANTIBODY: CPT

## 2022-08-04 LAB — B BURGDOR IGG+IGM SER-ACNC: 5.6 AI

## 2022-08-05 LAB
B BURGDOR IGG PATRN SER IB-IMP: NEGATIVE
B BURGDOR IGM PATRN SER IB-IMP: NEGATIVE
B BURGDOR18KD IGG SER QL IB: ABNORMAL
B BURGDOR23KD IGG SER QL IB: ABNORMAL
B BURGDOR23KD IGM SER QL IB: PRESENT
B BURGDOR28KD IGG SER QL IB: ABNORMAL
B BURGDOR30KD IGG SER QL IB: ABNORMAL
B BURGDOR39KD IGG SER QL IB: PRESENT
B BURGDOR39KD IGM SER QL IB: ABNORMAL
B BURGDOR41KD IGG SER QL IB: PRESENT
B BURGDOR41KD IGM SER QL IB: ABNORMAL
B BURGDOR45KD IGG SER QL IB: ABNORMAL
B BURGDOR58KD IGG SER QL IB: ABNORMAL
B BURGDOR66KD IGG SER QL IB: ABNORMAL
B BURGDOR93KD IGG SER QL IB: ABNORMAL

## 2022-08-08 DIAGNOSIS — E78.2 MIXED HYPERLIPIDEMIA: Primary | ICD-10-CM

## 2022-08-08 RX ORDER — SIMVASTATIN 40 MG
40 TABLET ORAL
Qty: 90 TABLET | Refills: 1 | Status: SHIPPED | OUTPATIENT
Start: 2022-08-08

## 2022-08-24 ENCOUNTER — TELEPHONE (OUTPATIENT)
Dept: CARDIOLOGY CLINIC | Facility: CLINIC | Age: 62
End: 2022-08-24

## 2022-08-24 DIAGNOSIS — I48.0 PAROXYSMAL ATRIAL FIBRILLATION (HCC): Primary | ICD-10-CM

## 2022-08-24 NOTE — TELEPHONE ENCOUNTER
P/c stated is wearing a Kardia machine to tract HR   Pt states that last two/three weeks it has been stating he is in supraventricular rthym  Pt doesn't check BP routinely BP when he does has been ranging 130/80 HR 60's  Pt is occasionally having dizziness, denies chest pains,sob  Has been feeling extra beats    Ov 6/15/22   Amlodipine 10 mg qd   Carvedilol 25 mg one tablet bid  Flecainide 50 mg bid    Please advise

## 2022-08-24 NOTE — TELEPHONE ENCOUNTER
Called and spoke to pt and advised will do a two week zio monitor, will be mailed to his home, no prior auth needed per ben     Pt verbally understood

## 2022-08-24 NOTE — TELEPHONE ENCOUNTER
Called pt back to clarify HR's are ranging 60 63,65  He is more bothered by the feeling of extra beats, seems like it is happening more frequently  I believe that he is trying to said it saying sinus rhythm  Advised that that tracker/like an ekg  might not be correct or mis reading  Asked if could sent me the strips, pt will email strip    Mentioned that you might want to have him a monitor and he is ok with that  Let me know if you want to order monitor?     Please advise

## 2022-08-24 NOTE — TELEPHONE ENCOUNTER
I am not sure what he means by this  What is his heart rate when it states the supraventricular rhythm??  Is it still 60?? If it is it likely is miss calling this  If it is a tachyarrhythmia we can consider having him wear a monitor  Thank you

## 2022-09-20 ENCOUNTER — CLINICAL SUPPORT (OUTPATIENT)
Dept: CARDIOLOGY CLINIC | Facility: CLINIC | Age: 62
End: 2022-09-20
Payer: COMMERCIAL

## 2022-09-20 DIAGNOSIS — I48.0 PAROXYSMAL ATRIAL FIBRILLATION (HCC): ICD-10-CM

## 2022-09-20 PROCEDURE — 93248 EXT ECG>7D<15D REV&INTERPJ: CPT | Performed by: INTERNAL MEDICINE

## 2022-09-28 DIAGNOSIS — I10 HYPERTENSION, UNSPECIFIED TYPE: Chronic | ICD-10-CM

## 2022-09-28 RX ORDER — AMLODIPINE BESYLATE 5 MG/1
10 TABLET ORAL DAILY
Qty: 90 TABLET | Refills: 1 | Status: SHIPPED | OUTPATIENT
Start: 2022-09-28 | End: 2022-10-04 | Stop reason: SDUPTHER

## 2022-10-04 RX ORDER — AMLODIPINE BESYLATE 5 MG/1
10 TABLET ORAL DAILY
Qty: 180 TABLET | Refills: 1 | Status: SHIPPED | OUTPATIENT
Start: 2022-10-04

## 2022-10-15 ENCOUNTER — HOSPITAL ENCOUNTER (OUTPATIENT)
Dept: CT IMAGING | Facility: HOSPITAL | Age: 62
Discharge: HOME | End: 2022-10-15
Payer: COMMERCIAL

## 2022-10-15 DIAGNOSIS — Z72.0 TOBACCO ABUSE: ICD-10-CM

## 2022-10-15 PROCEDURE — 71271 CT THORAX LUNG CANCER SCR C-: CPT

## 2022-10-27 ENCOUNTER — VBI (OUTPATIENT)
Dept: ADMINISTRATIVE | Facility: OTHER | Age: 62
End: 2022-10-27

## 2022-11-17 ENCOUNTER — RA CDI HCC (OUTPATIENT)
Dept: OTHER | Facility: HOSPITAL | Age: 62
End: 2022-11-17

## 2022-11-17 NOTE — PROGRESS NOTES
[facility]  coding opportunities       Chart reviewed, no opportunity found: CHART REVIEWED, NO OPPORTUNITY FOUND        Patients Insurance        Commercial Insurance: [employer]

## 2022-11-21 DIAGNOSIS — I48.0 PAROXYSMAL ATRIAL FIBRILLATION (HCC): ICD-10-CM

## 2022-11-21 RX ORDER — FLECAINIDE ACETATE 50 MG/1
TABLET ORAL
Qty: 180 TABLET | Refills: 3 | Status: SHIPPED | OUTPATIENT
Start: 2022-11-21

## 2022-11-23 ENCOUNTER — APPOINTMENT (OUTPATIENT)
Dept: LAB | Facility: CLINIC | Age: 62
End: 2022-11-23

## 2022-11-23 ENCOUNTER — OFFICE VISIT (OUTPATIENT)
Dept: FAMILY MEDICINE CLINIC | Facility: CLINIC | Age: 62
End: 2022-11-23

## 2022-11-23 ENCOUNTER — APPOINTMENT (OUTPATIENT)
Dept: RADIOLOGY | Facility: CLINIC | Age: 62
End: 2022-11-23

## 2022-11-23 VITALS
DIASTOLIC BLOOD PRESSURE: 78 MMHG | HEIGHT: 74 IN | HEART RATE: 64 BPM | SYSTOLIC BLOOD PRESSURE: 144 MMHG | BODY MASS INDEX: 36.7 KG/M2 | OXYGEN SATURATION: 96 % | WEIGHT: 286 LBS

## 2022-11-23 DIAGNOSIS — M25.561 ACUTE PAIN OF BOTH KNEES: ICD-10-CM

## 2022-11-23 DIAGNOSIS — R63.0 DECREASED APPETITE: ICD-10-CM

## 2022-11-23 DIAGNOSIS — M25.561 ACUTE PAIN OF BOTH KNEES: Primary | ICD-10-CM

## 2022-11-23 DIAGNOSIS — K59.00 CONSTIPATION, UNSPECIFIED CONSTIPATION TYPE: ICD-10-CM

## 2022-11-23 DIAGNOSIS — R61 EXCESSIVE SWEATING: ICD-10-CM

## 2022-11-23 DIAGNOSIS — M25.562 ACUTE PAIN OF BOTH KNEES: ICD-10-CM

## 2022-11-23 DIAGNOSIS — M25.562 ACUTE PAIN OF BOTH KNEES: Primary | ICD-10-CM

## 2022-11-23 LAB
ALBUMIN SERPL BCP-MCNC: 3.2 G/DL (ref 3.5–5)
ALP SERPL-CCNC: 98 U/L (ref 46–116)
ALT SERPL W P-5'-P-CCNC: 32 U/L (ref 12–78)
ANION GAP SERPL CALCULATED.3IONS-SCNC: 6 MMOL/L (ref 4–13)
AST SERPL W P-5'-P-CCNC: 16 U/L (ref 5–45)
BASOPHILS # BLD AUTO: 0.1 THOUSANDS/ÂΜL (ref 0–0.1)
BASOPHILS NFR BLD AUTO: 1 % (ref 0–1)
BILIRUB SERPL-MCNC: 0.31 MG/DL (ref 0.2–1)
BUN SERPL-MCNC: 18 MG/DL (ref 5–25)
CALCIUM ALBUM COR SERPL-MCNC: 10.3 MG/DL (ref 8.3–10.1)
CALCIUM SERPL-MCNC: 9.7 MG/DL (ref 8.3–10.1)
CHLORIDE SERPL-SCNC: 108 MMOL/L (ref 96–108)
CO2 SERPL-SCNC: 26 MMOL/L (ref 21–32)
CREAT SERPL-MCNC: 1.24 MG/DL (ref 0.6–1.3)
CRP SERPL QL: 122 MG/L
EOSINOPHIL # BLD AUTO: 0.26 THOUSAND/ÂΜL (ref 0–0.61)
EOSINOPHIL NFR BLD AUTO: 3 % (ref 0–6)
ERYTHROCYTE [DISTWIDTH] IN BLOOD BY AUTOMATED COUNT: 12.5 % (ref 11.6–15.1)
GFR SERPL CREATININE-BSD FRML MDRD: 61 ML/MIN/1.73SQ M
GLUCOSE SERPL-MCNC: 90 MG/DL (ref 65–140)
HCT VFR BLD AUTO: 39.8 % (ref 36.5–49.3)
HGB BLD-MCNC: 12.7 G/DL (ref 12–17)
IMM GRANULOCYTES # BLD AUTO: 0.05 THOUSAND/UL (ref 0–0.2)
IMM GRANULOCYTES NFR BLD AUTO: 1 % (ref 0–2)
LYMPHOCYTES # BLD AUTO: 1.35 THOUSANDS/ÂΜL (ref 0.6–4.47)
LYMPHOCYTES NFR BLD AUTO: 13 % (ref 14–44)
MCH RBC QN AUTO: 29.9 PG (ref 26.8–34.3)
MCHC RBC AUTO-ENTMCNC: 31.9 G/DL (ref 31.4–37.4)
MCV RBC AUTO: 94 FL (ref 82–98)
MONOCYTES # BLD AUTO: 1.14 THOUSAND/ÂΜL (ref 0.17–1.22)
MONOCYTES NFR BLD AUTO: 11 % (ref 4–12)
NEUTROPHILS # BLD AUTO: 7.45 THOUSANDS/ÂΜL (ref 1.85–7.62)
NEUTS SEG NFR BLD AUTO: 71 % (ref 43–75)
NRBC BLD AUTO-RTO: 0 /100 WBCS
PLATELET # BLD AUTO: 396 THOUSANDS/UL (ref 149–390)
PMV BLD AUTO: 9.7 FL (ref 8.9–12.7)
POTASSIUM SERPL-SCNC: 4.3 MMOL/L (ref 3.5–5.3)
PROT SERPL-MCNC: 8.2 G/DL (ref 6.4–8.4)
RBC # BLD AUTO: 4.25 MILLION/UL (ref 3.88–5.62)
SODIUM SERPL-SCNC: 140 MMOL/L (ref 135–147)
TSH SERPL DL<=0.05 MIU/L-ACNC: 1.88 UIU/ML (ref 0.45–4.5)
WBC # BLD AUTO: 10.35 THOUSAND/UL (ref 4.31–10.16)

## 2022-11-23 NOTE — PROGRESS NOTES
Name: Michaela Ramirez      : 1960      MRN: [id number]  Encounter Provider: Keegan Casas MD  Encounter Date: 2022   Encounter department: [facility] PRIMARY [facility]    Assessment & Plan     1  Acute pain of both knees  -     XR knee 3 vw left non injury; Future; Expected date: 2022  -     XR knee 3 vw right non injury; Future; Expected date: 2022  -     C-reactive protein; Future    2  Constipation, unspecified constipation type    3  Excessive sweating  -     TSH, 3rd generation; Future  -     C-reactive protein; Future    4  Decreased appetite  -     CBC and differential; Future  -     Comprehensive metabolic panel; Future         Subjective      bilat  Knee pain, swelling above  Patella, took advil with some relief,ice didn't  Help, still struggling with constipation despite stool softener and Miralax, appetite  Down, sweating more,no change in weight,plans on retiring      Review of Systems   Constitutional: Positive for appetite change and diaphoresis  Negative for activity change and fatigue  Decreased appetite   Respiratory: Negative for shortness of breath  Cardiovascular: Negative for chest pain  Endocrine: Positive for heat intolerance  Neurological: Negative for dizziness and headaches  Hematological: Negative for adenopathy  Psychiatric/Behavioral: Negative for dysphoric mood  The patient is not nervous/anxious          Current Outpatient Medications on File Prior to Visit   Medication Sig   • amLODIPine (NORVASC) 5 mg tablet Take 2 tablets (10 mg total) by mouth daily   • ascorbic acid (VITAMIN C) 500 mg tablet Take 500 mg by mouth daily   • aspirin 81 mg chewable tablet Chew 1 tablet (81 mg total) daily   • carvedilol (COREG) 25 mg tablet Take 1 tablet (25 mg total) by mouth 2 (two) times a day with meals   • Coenzyme Q10 (COQ-10) 10 MG CAPS Take by mouth   • Docusate Calcium (STOOL SOFTENER PO) Take by mouth every other day   • flecainide (TAMBOCOR) 50 mg tablet TAKE 1 TABLET TWICE A DAY   • Multiple Vitamins-Minerals (MENS MULTIVITAMIN PLUS PO) Take by mouth   • Omega-3 Fatty Acids (FISH OIL) 1200 MG CAPS Take by mouth   • simvastatin (ZOCOR) 40 mg tablet Take 1 tablet (40 mg total) by mouth daily at bedtime   • Zinc 50 MG CAPS Take by mouth       Objective     /78 (BP Location: Left arm, Patient Position: Sitting, Cuff Size: Large)   Pulse 64   Ht 6' 2" (1 88 m)   Wt 130 kg (286 lb)   SpO2 96%   BMI 36 72 kg/m²     Physical Exam  Vitals reviewed  Constitutional:       Appearance: Normal appearance  He is obese  He is diaphoretic  Cardiovascular:      Rate and Rhythm: Normal rate and regular rhythm  Pulses: Normal pulses  Heart sounds: Normal heart sounds  Pulmonary:      Effort: Pulmonary effort is normal       Breath sounds: Normal breath sounds  Musculoskeletal:      Right lower leg: No edema  Left lower leg: No edema  Lymphadenopathy:      Cervical: No cervical adenopathy  Neurological:      Mental Status: He is alert         Odalis Aguilar MD

## 2022-11-24 DIAGNOSIS — R79.82 ELEVATED C-REACTIVE PROTEIN (CRP): Primary | ICD-10-CM

## 2022-11-26 ENCOUNTER — APPOINTMENT (OUTPATIENT)
Dept: LAB | Facility: CLINIC | Age: 62
End: 2022-11-26

## 2022-11-26 DIAGNOSIS — R79.82 ELEVATED C-REACTIVE PROTEIN (CRP): ICD-10-CM

## 2022-11-26 LAB
CK SERPL-CCNC: 94 U/L (ref 39–308)
ERYTHROCYTE [SEDIMENTATION RATE] IN BLOOD: 54 MM/HOUR (ref 0–19)
LDH SERPL-CCNC: 188 U/L (ref 81–234)

## 2022-11-26 PROCEDURE — 82550 ASSAY OF CK (CPK): CPT | Performed by: FAMILY MEDICINE

## 2022-11-27 DIAGNOSIS — M25.561 ACUTE PAIN OF BOTH KNEES: Primary | ICD-10-CM

## 2022-11-27 DIAGNOSIS — M25.562 ACUTE PAIN OF BOTH KNEES: Primary | ICD-10-CM

## 2022-11-27 DIAGNOSIS — M17.0 PRIMARY OSTEOARTHRITIS OF BOTH KNEES: ICD-10-CM

## 2022-11-28 LAB
ANA TITR SER IF: NEGATIVE {TITER}
RHEUMATOID FACT SER QL LA: NEGATIVE

## 2022-11-30 ENCOUNTER — OFFICE VISIT (OUTPATIENT)
Dept: CARDIOLOGY CLINIC | Facility: CLINIC | Age: 62
End: 2022-11-30

## 2022-11-30 VITALS
HEIGHT: 74 IN | WEIGHT: 278 LBS | SYSTOLIC BLOOD PRESSURE: 136 MMHG | HEART RATE: 65 BPM | BODY MASS INDEX: 35.68 KG/M2 | DIASTOLIC BLOOD PRESSURE: 72 MMHG

## 2022-11-30 DIAGNOSIS — E78.2 MIXED HYPERLIPIDEMIA: ICD-10-CM

## 2022-11-30 DIAGNOSIS — I10 PRIMARY HYPERTENSION: ICD-10-CM

## 2022-11-30 DIAGNOSIS — I48.0 PAROXYSMAL ATRIAL FIBRILLATION (HCC): Primary | ICD-10-CM

## 2022-11-30 NOTE — PATIENT INSTRUCTIONS
A-fib (Atrial Fibrillation)   AMBULATORY CARE:   Atrial fibrillation (A-fib)  is an irregular heartbeat  It reduces your heart's ability to pump blood through your body  A-fib may come and go, or it may be a long-term condition  A-fib can cause life-threatening blood clots, stroke, or heart failure  It is important to treat and manage A-fib to help prevent these problems  Common signs and symptoms include the following:   A heartbeat that races, pounds, or flutters    Weakness, severe tiredness, or confusion    Feeling lightheaded, sweaty, dizzy, or faint    Shortness of breath or anxiety    Chest pain or pressure    Call your local emergency number (911 in the [address] [facility]) or have someone call if:   You have any of the following signs of a heart attack:      Squeezing, pressure, or pain in your chest    You may  also have any of the following:     Discomfort or pain in your back, neck, jaw, stomach, or arm    Shortness of breath    Nausea or vomiting    Lightheadedness or a sudden cold sweat    You have any of the following signs of a stroke:      Numbness or drooping on one side of your face     Weakness in an arm or leg    Confusion or difficulty speaking    Dizziness, a severe headache, or vision loss    Call your doctor or cardiologist if:   Your arm or leg feels warm, tender, and painful  It may look swollen and red  Your heart rate is more than 110 beats per minute  You have new or worsening swelling in your legs, feet, ankles, or abdomen  You are short of breath, even at rest     You have questions or concerns about your condition or care  Treatment for A-fib:  Conditions that cause A-fib, such as thyroid disease, will be treated  You may also need any of the following:  Heart medicines  help control your heart rate or rhythm  You may need more than one medicine to treat your symptoms  Antiplatelet or blood thinner medicines  help prevent blood clots and stroke      Cardioversion  is a procedure to return your heart rate and rhythm to normal  It can be done using medicines or electric shock  A-fib ablation  is a procedure that uses energy to burn a small area of heart tissue  This creates scar tissue and prevents electrical signals that cause A-fib  You may need this procedure more than once  Ask for more information on A-fib ablation  A pacemaker  may be inserted into your heart  A pacemaker is a device that controls your heartbeat  A pacemaker may be inserted during an ablation procedure or surgery  Ask your healthcare provider for more information on pacemakers  Surgery  may be needed if other procedures do not work  During surgery your healthcare provider will make cuts in the upper part of your heart  The provider will stitch the cuts together to create scar tissue  The scar tissue will prevent electrical signals that cause A-fib  Manage A-fib:   Know your target heart rate  Learn how to check your pulse and monitor your heart rate  Know the risks if you choose to drink alcohol  Alcohol can increase your risk for A-fib or make A-fib harder to manage  Ask your healthcare provider if it is okay for you to drink any alcohol  He or she can help you set limits for the number of drinks you have in 24 hours and in a week  A drink of alcohol is 12 ounces of beer, 5 ounces of wine, or 1½ ounces of liquor  Do not smoke  Nicotine can cause heart damage and make it more difficult to manage your A-fib  Do not use e-cigarettes or smokeless tobacco in place of cigarettes or to help you quit  They still contain nicotine  Ask your healthcare provider for information if you currently smoke and need help quitting  Eat heart-healthy foods  Heart healthy foods will help keep your cholesterol low  These include fruits, vegetables, whole-grain breads, low-fat dairy products, beans, lean meats, and fish  Replace butter and margarine with heart-healthy oils such as olive oil and canola oil  Maintain a healthy weight  Ask your healthcare provider what a healthy weight is for you  Ask him or her to help you create a safe weight loss plan if you are overweight  Even a small goal of a 10% weight loss can improve your heart health  Get regular physical activity  Physical activity helps improve your heart health  Get at least 150 minutes of moderate aerobic physical activity each week  Your healthcare provider can help you create an activity plan  Manage other health conditions  This includes high blood pressure or cholesterol, sleep apnea, diabetes, and other heart conditions  Take medicine as directed and follow your treatment plan  Your healthcare provider may need to change a medicine you are taking if it is causing your A-fib  Do not  stop taking any medicine unless directed by your provider  Follow up with your doctor or cardiologist as directed: You will need regular blood tests and monitoring  Write down your questions so you remember to ask them during your visits  © Copyright [employer] 2022 Information is for End User's use only and may not be sold, redistributed or otherwise used for commercial purposes  All illustrations and images included in CareNotes® are the copyrighted property of A D A M [employer] Inc  or [facility]   The above information is an  only  It is not intended as medical advice for individual conditions or treatments  Talk to your doctor, nurse or pharmacist before following any medical regimen to see if it is safe and effective for you

## 2022-11-30 NOTE — PROGRESS NOTES
Cardiology Follow Up    [facility]  1960  [id number]  [address]  [facility]  [address]   [phone]    1  Paroxysmal atrial fibrillation (HCC)  POCT ECG      2  Primary hypertension        3  Mixed hyperlipidemia            Discussion/Summary:    1  Paroxysmal atrial fibrillation - [place] had this on a few occasion around the time of christiane Lyme disease  He is feeling better since this has resolved  He remains in sinus rhythm on carvedilol and flecainide  He is on aspirin for stroke prevention  He wore an extended ambulatory Holter since last visit that showed sinus rhythm, PACs with short runs of nonsustained SVT  No changes were made today  We will see him back in 6 months  2   Hypertension - His blood pressure was running high around our last visit  His amlodipine had been increased to 10 mg daily  We will change Toprol-XL to carvedilol 25 mg twice daily  His blood pressure has improved and on average is in the high-normal range  He will continue to work on lifestyle modifications as well and follow his blood pressure regularly at home  3   Mixed hyperlipidemia - His last LDL increased to 159 and he has an HDL in the 30s  Simvastatin 40 mg daily was added to his regimen and he will continue to have blood work follow closely  It is also recommended for him to work on dietary and lifestyle modifications  It is also recommended for him to quit smoking  Interval History:     [place] comes in for follow-up given his paroxysmal atrial fibrillation and risk factors for cardiovascular disease  I met [place] during hospitalization in August 2021 in which he had new onset atrial fibrillation  This occurred on a few occasions  He was discharged but then had recurrent symptoms of his atrial fibrillation and we started flecainide in addition to his metoprolol    He was also found have Lyme disease and was treated for this as well  He had elevated LFTs associated with that  He had an echocardiogram that showed normal LV systolic function and biatrial enlargement  He ended up going through an exercise treadmill test that showed no evidence of ischemia at about 80% MPHR  He had no symptoms during his treadmill test   At our last visit wanes blood pressure was continuing to run high  His amlodipine had been increased to 10 mg daily  We changed him from Toprol-XL to carvedilol 25 mg twice daily  Blood pressure has improved  He did then call the office in August and noted that he was having some palpitations/dizziness and he was tracking his heart rate/rhythm at home  At that point we had him wear an extended ambulatory Holter  Over 2 weeks this showed sinus rhythm with PACs that were relatively frequent than a few short runs of nonsustained SVT  After getting treated for Lyme disease Augustine Clements has felt well  He still gets intermittent palpitations but no symptoms like his atrial fibrillation  He remains in sinus rhythm today  No lightheadedness or any recent syncope  He denies chest pain or any symptoms of angina  No shortness of breath or any signs/symptoms of CHF  He overall is feeling well        Patient Active Problem List   Diagnosis   • Disc degeneration, lumbosacral   • Hyperlipidemia   • Hypertension   • Tobacco abuse   • Screen for colon cancer   • [facility]   • Tremor     Past Medical History:   Diagnosis Date   • A-fib ([facility] ) 08/24/2021   • Disc degeneration, lumbosacral 1/6/2016   • Hyperlipidemia     hypercholesterolemia   • Hypertension     last assessed 11/22/17   • Pneumonia of both lower lobes due to infectious organism 1/29/2018     Social History     Socioeconomic History   • Marital status: /Civil Union     Spouse name: Not on file   • Number of children: 1   • Years of education: Not on file   • Highest education level: Not on file   Occupational History   • Occupation:    Tobacco Use   • Smoking status: Every Day     Packs/day: 0 50     Years: 40 00     Pack years: 20 00     Types: Cigarettes   • Smokeless tobacco: Never   Vaping Use   • Vaping Use: Never used   Substance and Sexual Activity   • Alcohol use: Yes     Comment: Occassional 1 x / month   • Drug use: No   • Sexual activity: Yes     Partners: Female     Birth control/protection: None   Other Topics Concern   • Not on file   Social History Narrative    Lives with wife    Full time employment    active advance directive    1 child ; son, passed away        Does not consume caffeine     Social Determinants of Health     Financial Resource Strain: Not on file   Food Insecurity: Not on file   Transportation Needs: Not on file   Physical Activity: Not on file   Stress: Not on file   Social Connections: Not on file   Intimate Partner Violence: Not on file   Housing Stability: Not on file      Family History   Problem Relation Age of Onset   • Obesity Mother    • Aneurysm Father      Past Surgical History:   Procedure Laterality Date   • COLONOSCOPY     • HEMORRHOID SURGERY     • KNEE SURGERY Bilateral    • HI COLONOSCOPY FLX DX W/COLLJ SPEC WHEN PFRMD N/A 1/5/2019    Procedure: COLONOSCOPY;  Surgeon: Shilo Montes MD;  [facility];   Service: Gastroenterology   • UPPER GASTROINTESTINAL ENDOSCOPY         Current Outpatient Medications:   •  amLODIPine (NORVASC) 5 mg tablet, Take 2 tablets (10 mg total) by mouth daily, Disp: 180 tablet, Rfl: 1  •  ascorbic acid (VITAMIN C) 500 mg tablet, Take 500 mg by mouth daily, Disp: , Rfl:   •  aspirin 81 mg chewable tablet, Chew 1 tablet (81 mg total) daily, Disp: 90 tablet, Rfl: 3  •  carvedilol (COREG) 25 mg tablet, Take 1 tablet (25 mg total) by mouth 2 (two) times a day with meals, Disp: 180 tablet, Rfl: 3  •  Coenzyme Q10 (COQ-10) 10 MG CAPS, Take by mouth, Disp: , Rfl:   •  Docusate Calcium (STOOL SOFTENER PO), Take by mouth every other day, Disp: , Rfl:   •  flecainide (TAMBOCOR) 50 mg tablet, TAKE 1 TABLET TWICE A DAY, Disp: 180 tablet, Rfl: 3  •  Multiple Vitamins-Minerals (MENS MULTIVITAMIN PLUS PO), Take by mouth, Disp: , Rfl:   •  Omega-3 Fatty Acids (FISH OIL) 1200 MG CAPS, Take by mouth, Disp: , Rfl:   •  simvastatin (ZOCOR) 40 mg tablet, Take 1 tablet (40 mg total) by mouth daily at bedtime, Disp: 90 tablet, Rfl: 1  •  Zinc 50 MG CAPS, Take by mouth, Disp: , Rfl:   No Known Allergies    Labs:  Lab Results   Component Value Date     11/22/2017    K 4 3 11/23/2022    K 4 2 05/22/2021     11/23/2022     05/22/2021    CO2 26 11/23/2022    CO2 23 05/22/2021    BUN 18 11/23/2022    BUN 13 05/22/2021    CREATININE 1 24 11/23/2022    CREATININE 0 90 11/22/2017    CALCIUM 9 7 11/23/2022    CALCIUM 9 3 05/22/2021     Lab Results   Component Value Date    WBC 10 35 (H) 11/23/2022    WBC 7 2 11/22/2017    HGB 12 7 11/23/2022    HGB 13 9 11/22/2017    HCT 39 8 11/23/2022    HCT 41 9 11/22/2017    MCV 94 11/23/2022    MCV 92 9 11/22/2017     (H) 11/23/2022     11/22/2017     Lab Results   Component Value Date    CHOL 156 11/22/2017    TRIG 254 (H) 08/03/2022    TRIG 177 (H) 05/22/2021    HDL 34 (L) 08/03/2022    HDL 42 05/22/2021    LDLDIRECT 94 10/22/2016     Imaging:  ECG today shows sinus rhythm with occasional PACs, left anterior fascicular block and borderline LVH  Stress test only, exercise  Result Date: 11/22/2021  Narrative: •  Normal functional capacity for age & gender  •  Exercise-limiting dyspnea  Normal blood pressure response with blunted heart rate response (79% MPHR achieved) to exercise  Took beta blocker medication this morning  •  Non-diagnostic exercise stress ECG due to failure to achieve 85% MPHR  ECHO (8/23/2021):  LEFT VENTRICLE:  Size was normal   Systolic function was normal  Ejection fraction was estimated to be 60 %    There was mild concentric hypertrophy      RIGHT VENTRICLE:  The size was normal   Systolic function was normal      LEFT ATRIUM:  The atrium was mildly dilated      RIGHT ATRIUM:  The atrium was mildly dilated      MITRAL VALVE:  There was trace regurgitation      TRICUSPID VALVE:  There was trace regurgitation        Review of Systems:  Review of Systems   Constitutional: Negative  HENT: Negative  Eyes: Negative  Respiratory: Negative  Cardiovascular: Positive for palpitations  Gastrointestinal: Negative  Musculoskeletal: Positive for arthralgias  Skin: Negative  Allergic/Immunologic: Negative  Neurological: Negative  Hematological: Negative  Psychiatric/Behavioral: Negative  All other systems reviewed and are negative  Vitals:    11/30/22 1330   BP: 136/72   BP Location: Right arm   Patient Position: Sitting   Cuff Size: Large   Pulse: 65   Weight: 126 kg (278 lb)   Height: 6' 2" (1 88 m)       Physical Exam:  Physical Exam  Vitals and nursing note reviewed  Constitutional:       Appearance: He is well-developed  HENT:      Head: Normocephalic and atraumatic  Eyes:      General: No scleral icterus  Right eye: No discharge  Left eye: No discharge  Pupils: Pupils are equal, round, and reactive to light  Neck:      Thyroid: No thyromegaly  Vascular: No JVD  Cardiovascular:      Rate and Rhythm: Normal rate and regular rhythm  No extrasystoles are present  Pulses: Normal pulses  No decreased pulses  Heart sounds: Normal heart sounds, S1 normal and S2 normal  No murmur heard  No friction rub  No gallop  Pulmonary:      Effort: Pulmonary effort is normal  No respiratory distress  Breath sounds: Normal breath sounds  No wheezing or rales  Abdominal:      General: Bowel sounds are normal  There is no distension  Palpations: Abdomen is soft  Tenderness: There is no abdominal tenderness     Musculoskeletal:         General: No tenderness or deformity  Normal range of motion  Cervical back: Normal range of motion and neck supple  Skin:     General: Skin is warm and dry  Findings: No rash  Neurological:      Mental Status: He is alert and oriented to person, place, and time  Cranial Nerves: No cranial nerve deficit  Psychiatric:         Thought Content: Thought content normal          Judgment: Judgment normal        Counseling / Coordination of Care  Total office time spent today 25 minutes  Greater than 50% of total time was spent with the patient and / or family counseling and / or coordination of care

## 2022-12-07 ENCOUNTER — APPOINTMENT (EMERGENCY)
Dept: RADIOLOGY | Facility: HOSPITAL | Age: 62
End: 2022-12-07

## 2022-12-07 ENCOUNTER — HOSPITAL ENCOUNTER (EMERGENCY)
Facility: HOSPITAL | Age: 62
Discharge: HOME | End: 2022-12-07
Attending: EMERGENCY MEDICINE

## 2022-12-07 VITALS
OXYGEN SATURATION: 98 % | HEART RATE: 65 BPM | DIASTOLIC BLOOD PRESSURE: 74 MMHG | SYSTOLIC BLOOD PRESSURE: 124 MMHG | TEMPERATURE: 99 F | RESPIRATION RATE: 18 BRPM

## 2022-12-07 DIAGNOSIS — R05.9 COUGH: ICD-10-CM

## 2022-12-07 DIAGNOSIS — R42 DIZZINESS: Primary | ICD-10-CM

## 2022-12-07 DIAGNOSIS — U07.1 COVID-19: ICD-10-CM

## 2022-12-07 LAB
ALBUMIN SERPL BCP-MCNC: 3.6 G/DL (ref 3.5–5)
ALP SERPL-CCNC: 99 U/L (ref 46–116)
ALT SERPL W P-5'-P-CCNC: 35 U/L (ref 12–78)
ANION GAP SERPL CALCULATED.3IONS-SCNC: 7 MMOL/L (ref 4–13)
AST SERPL W P-5'-P-CCNC: 20 U/L (ref 5–45)
ATRIAL RATE: 60 BPM
BASOPHILS # BLD AUTO: 0.08 THOUSANDS/ÂΜL (ref 0–0.1)
BASOPHILS NFR BLD AUTO: 2 % (ref 0–1)
BILIRUB SERPL-MCNC: 0.37 MG/DL (ref 0.2–1)
BUN SERPL-MCNC: 10 MG/DL (ref 5–25)
CALCIUM SERPL-MCNC: 9.5 MG/DL (ref 8.3–10.1)
CHLORIDE SERPL-SCNC: 106 MMOL/L (ref 96–108)
CO2 SERPL-SCNC: 24 MMOL/L (ref 21–32)
CREAT SERPL-MCNC: 1.11 MG/DL (ref 0.6–1.3)
EOSINOPHIL # BLD AUTO: 0.05 THOUSAND/ÂΜL (ref 0–0.61)
EOSINOPHIL NFR BLD AUTO: 1 % (ref 0–6)
ERYTHROCYTE [DISTWIDTH] IN BLOOD BY AUTOMATED COUNT: 13.4 % (ref 11.6–15.1)
FLUAV RNA RESP QL NAA+PROBE: NEGATIVE
FLUBV RNA RESP QL NAA+PROBE: NEGATIVE
GFR SERPL CREATININE-BSD FRML MDRD: 70 ML/MIN/1.73SQ M
GLUCOSE SERPL-MCNC: 99 MG/DL (ref 65–140)
HCT VFR BLD AUTO: 38.7 % (ref 36.5–49.3)
HGB BLD-MCNC: 12.4 G/DL (ref 12–17)
IMM GRANULOCYTES # BLD AUTO: 0.02 THOUSAND/UL (ref 0–0.2)
IMM GRANULOCYTES NFR BLD AUTO: 1 % (ref 0–2)
LYMPHOCYTES # BLD AUTO: 1 THOUSANDS/ÂΜL (ref 0.6–4.47)
LYMPHOCYTES NFR BLD AUTO: 23 % (ref 14–44)
MCH RBC QN AUTO: 29.4 PG (ref 26.8–34.3)
MCHC RBC AUTO-ENTMCNC: 32 G/DL (ref 31.4–37.4)
MCV RBC AUTO: 92 FL (ref 82–98)
MONOCYTES # BLD AUTO: 0.91 THOUSAND/ÂΜL (ref 0.17–1.22)
MONOCYTES NFR BLD AUTO: 21 % (ref 4–12)
NEUTROPHILS # BLD AUTO: 2.22 THOUSANDS/ÂΜL (ref 1.85–7.62)
NEUTS SEG NFR BLD AUTO: 52 % (ref 43–75)
NRBC BLD AUTO-RTO: 0 /100 WBCS
P AXIS: 72 DEGREES
PLATELET # BLD AUTO: 283 THOUSANDS/UL (ref 149–390)
PMV BLD AUTO: 9.4 FL (ref 8.9–12.7)
POTASSIUM SERPL-SCNC: 3.6 MMOL/L (ref 3.5–5.3)
PR INTERVAL: 196 MS
PROT SERPL-MCNC: 7.7 G/DL (ref 6.4–8.4)
QRS AXIS: -38 DEGREES
QRSD INTERVAL: 98 MS
QT INTERVAL: 418 MS
QTC INTERVAL: 418 MS
RBC # BLD AUTO: 4.22 MILLION/UL (ref 3.88–5.62)
RSV RNA RESP QL NAA+PROBE: NEGATIVE
SARS-COV-2 RNA RESP QL NAA+PROBE: POSITIVE
SODIUM SERPL-SCNC: 137 MMOL/L (ref 135–147)
T WAVE AXIS: 28 DEGREES
VENTRICULAR RATE: 60 BPM
WBC # BLD AUTO: 4.28 THOUSAND/UL (ref 4.31–10.16)

## 2022-12-07 NOTE — Clinical Note
Pamela Francisco was seen and treated in our emergency department on 12/7/2022  isolation    Diagnosis: covid [address]  may return to work on return date  He may return on this date: 12/12/2022         If you have any questions or concerns, please don't hesitate to call        Mikey Hill MD    ______________________________           _______________          _______________  Hospital Representative                              Date                                Time

## 2022-12-07 NOTE — DISCHARGE INSTRUCTIONS
Continue to mask and isolate when around others  Return immediately to the hospital should your symptoms progress or worsen at any time  You develop any new symptoms

## 2022-12-09 NOTE — ED PROVIDER NOTES
History  Chief Complaint   Patient presents with   • Dizziness     Pt reports waking up in the middle of the night with dizziness, pt states "it felt like I drank a whole bottle of paulino bean"; pt checked cardiac monitor which was normal; pt reports generalized headache       History provided by:  Patient  Dizziness  Quality:  Lightheadedness  Severity:  Moderate  Onset quality:  Gradual  Duration:  7 days  Timing:  Constant  Progression:  Unchanged  Chronicity:  New  Context: physical activity and standing up    Relieved by:  Nothing  Worsened by:  Nothing  Ineffective treatments:  None tried  Associated symptoms: headaches, nausea, palpitations and weakness    Associated symptoms: no blood in stool, no chest pain, no diarrhea, no hearing loss, no shortness of breath, no syncope, no tinnitus, no vision changes and no vomiting    Associated symptoms comment:  Fever, chills, cough congestion  Risk factors: no anemia, no heart disease, no hx of stroke, no hx of vertigo, no Meniere's disease, no multiple medications and no new medications        Prior to Admission Medications   Prescriptions Last Dose Informant Patient Reported? Taking?    Coenzyme Q10 (COQ-10) 10 MG CAPS   Yes No   Sig: Take by mouth   Docusate Calcium (STOOL SOFTENER PO)   Yes No   Sig: Take by mouth every other day   Multiple Vitamins-Minerals (MENS MULTIVITAMIN PLUS PO)   Yes No   Sig: Take by mouth   Omega-3 Fatty Acids (FISH OIL) 1200 MG CAPS   Yes No   Sig: Take by mouth   Zinc 50 MG CAPS   Yes No   Sig: Take by mouth   amLODIPine (NORVASC) 5 mg tablet   No No   Sig: Take 2 tablets (10 mg total) by mouth daily   ascorbic acid (VITAMIN C) 500 mg tablet   Yes No   Sig: Take 500 mg by mouth daily   aspirin 81 mg chewable tablet   No No   Sig: Chew 1 tablet (81 mg total) daily   carvedilol (COREG) 25 mg tablet   No No   Sig: Take 1 tablet (25 mg total) by mouth 2 (two) times a day with meals   flecainide (TAMBOCOR) 50 mg tablet   No No   Sig: TAKE 1 TABLET TWICE A DAY   simvastatin (ZOCOR) 40 mg tablet   No No   Sig: Take 1 tablet (40 mg total) by mouth daily at bedtime      Facility-Administered Medications: None       Past Medical History:   Diagnosis Date   • A-fib ([facility] [address] ) 08/24/2021   • Disc degeneration, lumbosacral 1/6/2016   • Hyperlipidemia     hypercholesterolemia   • Hypertension     last assessed 11/22/17   • Pneumonia of both lower lobes due to infectious organism 1/29/2018       Past Surgical History:   Procedure Laterality Date   • COLONOSCOPY     • HEMORRHOID SURGERY     • KNEE SURGERY Bilateral    • MI COLONOSCOPY FLX DX W/COLLJ SPEC WHEN PFRMD N/A 1/5/2019    Procedure: COLONOSCOPY;  Surgeon: Teri Sweeney MD;  Location: [facility]; Service: Gastroenterology   • UPPER GASTROINTESTINAL ENDOSCOPY         Family History   Problem Relation Age of Onset   • Obesity Mother    • Aneurysm Father      I have reviewed and agree with the history as documented  E-Cigarette/Vaping   • E-Cigarette Use Never User      E-Cigarette/Vaping Substances   • Nicotine No    • THC No    • CBD No    • Flavoring No    • Other No    • Unknown No      Social History     Tobacco Use   • Smoking status: Every Day     Packs/day: 0 50     Years: 40 00     Pack years: 20 00     Types: Cigarettes   • Smokeless tobacco: Never   Vaping Use   • Vaping Use: Never used   Substance Use Topics   • Alcohol use: Yes     Comment: Occassional 1 x / month   • Drug use: No       Review of Systems   Constitutional: Negative for activity change, chills, fatigue and fever  HENT: Negative for hearing loss, sore throat, tinnitus, trouble swallowing and voice change  Eyes: Negative for pain and visual disturbance  Respiratory: Negative for choking, shortness of breath and wheezing  Cardiovascular: Positive for palpitations  Negative for chest pain, leg swelling and syncope  Gastrointestinal: Positive for nausea   Negative for abdominal distention, abdominal pain, blood in stool, diarrhea and vomiting  Endocrine: Negative for polydipsia and polyuria  Genitourinary: Negative for difficulty urinating, dysuria and flank pain  Musculoskeletal: Negative for neck stiffness  Skin: Negative for color change and rash  Neurological: Positive for dizziness, weakness and headaches  Negative for speech difficulty  Hematological: Does not bruise/bleed easily  Psychiatric/Behavioral: Negative for agitation, behavioral problems, hallucinations and suicidal ideas  Physical Exam  Physical Exam  HENT:      Head: Normocephalic and atraumatic  Eyes:      Conjunctiva/sclera: Conjunctivae normal       Pupils: Pupils are equal, round, and reactive to light  Cardiovascular:      Rate and Rhythm: Normal rate and regular rhythm  Heart sounds: No murmur heard  Pulmonary:      Effort: Pulmonary effort is normal  No respiratory distress  Breath sounds: Normal breath sounds  Abdominal:      General: Bowel sounds are normal  There is no distension  Palpations: Abdomen is soft  Tenderness: There is no abdominal tenderness  Comments: No Signs of Peritonitis    Musculoskeletal:         General: Normal range of motion  Cervical back: Normal range of motion and neck supple  Skin:     General: Skin is warm and dry  Capillary Refill: Capillary refill takes less than 2 seconds  Coloration: Skin is not pale  Findings: No rash  Neurological:      Mental Status: He is alert and oriented to person, place, and time  GCS: GCS eye subscore is 4  GCS verbal subscore is 5  GCS motor subscore is 6        Comments: Normal speech, Normal gait, No Focal neurologic deficits    Psychiatric:         Behavior: Behavior normal          Vital Signs  ED Triage Vitals [12/07/22 1153]   Temperature Pulse Respirations Blood Pressure SpO2   99 °F (37 2 °C) 65 18 124/74 98 %      Temp Source Heart Rate Source Patient Position - Orthostatic VS BP Location FiO2 (%)   Oral Monitor Sitting Left arm --      Pain Score       --           Vitals:    12/07/22 1153   BP: 124/74   Pulse: 65   Patient Position - Orthostatic VS: Sitting         Visual Acuity      ED Medications  Medications - No data to display    Diagnostic Studies  Results Reviewed     Procedure Component Value Units Date/Time    FLU/RSV/COVID - if FLU/RSV clinically relevant [[id number]]  (Abnormal) Collected: 12/07/22 1342    Lab Status: Final result Specimen: Nares from Nose Updated: 12/07/22 1437     SARS-CoV-2 Positive     INFLUENZA A PCR Negative     INFLUENZA B PCR Negative     RSV PCR Negative    Narrative:      FOR PEDIATRIC PATIENTS - copy/paste COVID Guidelines URL to browser: [email or web address]  [employer]    SARS-CoV-2 assay is a Nucleic Acid Amplification assay intended for the  qualitative detection of nucleic acid from SARS-CoV-2 in nasopharyngeal  swabs  Results are for the presumptive identification of SARS-CoV-2 RNA  Positive results are indicative of infection with SARS-CoV-2, the virus  causing COVID-19, but do not rule out bacterial infection or co-infection  with other viruses  Laboratories within the [place] and its  territories are required to report all positive results to the appropriate  public health authorities  Negative results do not preclude SARS-CoV-2  infection and should not be used as the sole basis for treatment or other  patient management decisions  Negative results must be combined with  clinical observations, patient history, and epidemiological information  This test has not been FDA cleared or approved  This test has been authorized by FDA under an Emergency Use Authorization  (EUA)   This test is only authorized for the duration of time the  declaration that circumstances exist justifying the authorization of the  emergency use of an in vitro diagnostic tests for detection of SARS-CoV-2  virus and/or diagnosis of COVID-19 infection under section 564(b)(1) of  the Act, 21 U  S C  115KPR-6(V)(0), unless the authorization is terminated  or revoked sooner  The test has been validated but independent review by FDA  and CLIA is pending  Test performed using [facility] [employer] Gene[employer]pert: This RT-PCR assay targets N2,  a region unique to SARS-CoV-2  A conserved region in the E-gene was chosen  for pan-Sarbecovirus detection which includes SARS-CoV-2  According to CMS-2020-01-R, this platform meets the definition of high-throughput technology      Comprehensive metabolic panel [[id number]] Collected: 12/07/22 1342    Lab Status: Final result Specimen: Blood from Arm, Left Updated: 12/07/22 1419     Sodium 137 mmol/L      Potassium 3 6 mmol/L      Chloride 106 mmol/L      CO2 24 mmol/L      ANION GAP 7 mmol/L      BUN 10 mg/dL      Creatinine 1 11 mg/dL      Glucose 99 mg/dL      Calcium 9 5 mg/dL      AST 20 U/L      ALT 35 U/L      Alkaline Phosphatase 99 U/L      Total Protein 7 7 g/dL      Albumin 3 6 g/dL      Total Bilirubin 0 37 mg/dL      eGFR 70 ml/min/1 73sq m     Narrative:      [place] guidelines for Chronic Kidney Disease (CKD):   •  Stage 1 with normal or high GFR (GFR > 90 mL/min/1 73 square meters)  •  Stage 2 Mild CKD (GFR = 60-89 mL/min/1 73 square meters)  •  Stage 3A Moderate CKD (GFR = 45-59 mL/min/1 73 square meters)  •  Stage 3B Moderate CKD (GFR = 30-44 mL/min/1 73 square meters)  •  Stage 4 Severe CKD (GFR = 15-29 mL/min/1 73 square meters)  •  Stage 5 End Stage CKD (GFR <15 mL/min/1 73 square meters)  Note: GFR calculation is accurate only with a steady state creatinine    CBC and differential [[id number]]  (Abnormal) Collected: 12/07/22 1342    Lab Status: Final result Specimen: Blood from Arm, Left Updated: 12/07/22 1354     WBC 4 28 Thousand/uL      RBC 4 22 Million/uL      Hemoglobin 12 4 g/dL      Hematocrit 38 7 %      MCV 92 fL      MCH 29 4 pg      MCHC 32 0 g/dL      RDW 13 4 %      MPV 9 4 fL      Platelets 489 Thousands/uL      nRBC 0 /100 WBCs      Neutrophils Relative 52 %      Immat GRANS % 1 %      Lymphocytes Relative 23 %      Monocytes Relative 21 %      Eosinophils Relative 1 %      Basophils Relative 2 %      Neutrophils Absolute 2 22 Thousands/µL      Immature Grans Absolute 0 02 Thousand/uL      Lymphocytes Absolute 1 00 Thousands/µL      Monocytes Absolute 0 91 Thousand/µL      Eosinophils Absolute 0 05 Thousand/µL      Basophils Absolute 0 08 Thousands/µL                  XR chest 2 views   Final Result by Erasto George MD (12/07 1433)      No acute cardiopulmonary disease  Findings are stable            Workstation performed: [id number]                    Procedures  Procedures         ED Course       Patient reassessed and results reviewed with patient patient understands he is COVID-positive work note provided at request   Normal room air amatory pulse ox  Patient declines Palovid  SBIRT 22yo+    Flowsheet Row Most Recent Value   SBIRT (25 yo +)    In order to provide better care to our patients, we are screening all of our patients for alcohol and drug use  Would it be okay to ask you these screening questions? No Filed at: 12/07/2022 1502                    MDM  Number of Diagnoses or Management Options  Cough: new and requires workup  COVID-19: new and requires workup  Dizziness: new and requires workup  Diagnosis management comments: Patient presents with generalized malaise weakness cough congestion fever chills  Plan to check screening labs COVID swab flu RSV chest x-ray ECG anticipate discharge with outpatient work-up negative ED work-up         Amount and/or Complexity of Data Reviewed  Clinical lab tests: ordered and reviewed  Tests in the radiology section of CPT®: reviewed and ordered  Tests in the medicine section of CPT®: reviewed and ordered  Independent visualization of images, tracings, or specimens: yes    Risk of Complications, Morbidity, and/or Mortality  Presenting problems: moderate  Diagnostic procedures: moderate  Management options: moderate    Patient Progress  Patient progress: stable      Disposition  Final diagnoses:   Dizziness   Cough   COVID-19     Time reflects when diagnosis was documented in both MDM as applicable and the Disposition within this note     Time User Action Codes Description Comment    12/7/2022  3:17 PM Zelpha Sallies Add [R42] Dizziness     12/7/2022  3:17 PM Zelpha Sallies Add [R05 9] Cough     12/7/2022  3:18 PM Zelpha Sallies Add [U07 1] COVID-19       ED Disposition     ED Disposition   Discharge    Condition   Stable    Date/Time   Wed Dec 7, 2022 [address] discharge to home/self care                 Follow-up Information     Follow up With Specialties Details Why Contact Info Additional Information    Bernadine Marino MD Family Medicine  As needed [address]  [address] [place] [id number] Emergency Department Emergency Medicine  If symptoms worsen [address] [id number]  [address] Emergency Department, [address], [address]          Discharge Medication List as of 12/7/2022  3:19 PM      CONTINUE these medications which have NOT CHANGED    Details   amLODIPine (NORVASC) 5 mg tablet Take 2 tablets (10 mg total) by mouth daily, Starting Tue 10/4/2022, Normal      ascorbic acid (VITAMIN C) 500 mg tablet Take 500 mg by mouth daily, Historical Med      aspirin 81 mg chewable tablet Chew 1 tablet (81 mg total) daily, Starting Mon 9/27/2021, Normal      carvedilol (COREG) 25 mg tablet Take 1 tablet (25 mg total) by mouth 2 (two) times a day with meals, Starting Wed 6/15/2022, Normal      Coenzyme Q10 (COQ-10) 10 MG CAPS Take by mouth, Historical Med      Docusate Calcium (STOOL SOFTENER PO) Take by mouth every other day, Historical Med      flecainide (TAMBOCOR) 50 mg tablet TAKE 1 TABLET TWICE A DAY, Normal      Multiple Vitamins-Minerals (MENS MULTIVITAMIN PLUS PO) Take by mouth, Historical Med      Omega-3 Fatty Acids (FISH OIL) 1200 MG CAPS Take by mouth, Historical Med      simvastatin (ZOCOR) 40 mg tablet Take 1 tablet (40 mg total) by mouth daily at bedtime, Starting Mon 8/8/2022, Normal      Zinc 50 MG CAPS Take by mouth, Historical Med             No discharge procedures on file      PDMP Review     None          ED Provider  Electronically Signed by           Jacquelyn Connelly MD  12/09/22 [id number]

## 2022-12-28 ENCOUNTER — OFFICE VISIT (OUTPATIENT)
Dept: FAMILY MEDICINE CLINIC | Facility: CLINIC | Age: 62
End: 2022-12-28

## 2022-12-28 ENCOUNTER — OFFICE VISIT (OUTPATIENT)
Dept: OBGYN CLINIC | Facility: MEDICAL CENTER | Age: 62
End: 2022-12-28

## 2022-12-28 VITALS
DIASTOLIC BLOOD PRESSURE: 76 MMHG | HEART RATE: 62 BPM | HEIGHT: 74 IN | BODY MASS INDEX: 35.68 KG/M2 | WEIGHT: 278 LBS | SYSTOLIC BLOOD PRESSURE: 134 MMHG

## 2022-12-28 VITALS
SYSTOLIC BLOOD PRESSURE: 128 MMHG | HEART RATE: 65 BPM | HEIGHT: 74 IN | WEIGHT: 278 LBS | BODY MASS INDEX: 35.68 KG/M2 | OXYGEN SATURATION: 97 % | DIASTOLIC BLOOD PRESSURE: 68 MMHG

## 2022-12-28 DIAGNOSIS — E78.2 MIXED HYPERLIPIDEMIA: ICD-10-CM

## 2022-12-28 DIAGNOSIS — S60.519A ABRASION OF HAND, UNSPECIFIED LATERALITY, INITIAL ENCOUNTER: ICD-10-CM

## 2022-12-28 DIAGNOSIS — R79.82 ELEVATED C-REACTIVE PROTEIN (CRP): Primary | ICD-10-CM

## 2022-12-28 DIAGNOSIS — I48.0 PAROXYSMAL ATRIAL FIBRILLATION (HCC): ICD-10-CM

## 2022-12-28 DIAGNOSIS — M17.0 BILATERAL PRIMARY OSTEOARTHRITIS OF KNEE: Primary | ICD-10-CM

## 2022-12-28 DIAGNOSIS — N52.9 ERECTILE DYSFUNCTION, UNSPECIFIED ERECTILE DYSFUNCTION TYPE: ICD-10-CM

## 2022-12-28 DIAGNOSIS — I10 PRIMARY HYPERTENSION: ICD-10-CM

## 2022-12-28 PROBLEM — R25.1 TREMOR: Status: RESOLVED | Noted: 2022-04-26 | Resolved: 2022-12-28

## 2022-12-28 RX ORDER — CARVEDILOL 25 MG/1
25 TABLET ORAL 2 TIMES DAILY WITH MEALS
Qty: 180 TABLET | Refills: 3 | Status: SHIPPED | OUTPATIENT
Start: 2022-12-28

## 2022-12-28 RX ORDER — TADALAFIL 10 MG/1
10 TABLET ORAL DAILY PRN
Qty: 10 TABLET | Refills: 5 | Status: SHIPPED | OUTPATIENT
Start: 2022-12-28

## 2022-12-28 NOTE — PROGRESS NOTES
Ortho Sports Medicine Knee Visit     Assesment:   bilateral knee osteoarthritis moderate right, severe left , osgood schlatter's     Plan:    Conservative treatment:    Ice to knee for 20 minutes at least 1-2 times daily  OTC NSAIDS prn for pain  Since patient is doing better from ibuprofen he can continue to use as needed  If symptoms flare we can offer cortisone injection  Patient likely will need total knee replacements in future  PRN  Imaging: All imaging from today was reviewed by myself and explained to the patient  Injection:    No Injection planned at this time  Surgery:     No surgery is recommended at this point, continue with conservative treatment plan as noted  History of Present Illness: The patient is a 58 y o  male whose occupation is , referred to me by their primary care physician, seen in clinic for consultation of bilateral  knee pain  Patient currently has no pain in his knee's  He states a few weeks prior he was having trouble walking due to pain anterosuperior aspect of his knee's  He even had to use walker to ambulate  Since then he took motrin last week and pain resolved  He does have osgood schlatter's, he did have patella tendon rupture on right 15 yrs ago which required attachment back to tibial tuberosity  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, lifting  Pain is improved by rest and NSAIDS  Pain is aggravated by stairs, squatting and walking  Symptoms include clicking, popping and cracking  The patient has tried rest, ice and NSAIDS  Knee Surgical History:  Surgery right knee 15 yrs ago       Past Medical, Social and Family History:  Past Medical History:   Diagnosis Date   • A-fib ([facility] [address] ) 08/24/2021   • Disc degeneration, lumbosacral 1/6/2016   • Hyperlipidemia     hypercholesterolemia   • Hypertension     last assessed 11/22/17   • Pneumonia of both lower lobes due to infectious organism 1/29/2018 Past Surgical History:   Procedure Laterality Date   • COLONOSCOPY     • HEMORRHOID SURGERY     • KNEE SURGERY Bilateral    • AR COLONOSCOPY FLX DX W/COLLJ SPEC WHEN PFRMD N/A 1/5/2019    Procedure: COLONOSCOPY;  Surgeon: Wade Iyer MD;  [facility]; Service: Gastroenterology   • UPPER GASTROINTESTINAL ENDOSCOPY       No Known Allergies  Current Outpatient Medications on File Prior to Visit   Medication Sig Dispense Refill   • amLODIPine (NORVASC) 5 mg tablet Take 2 tablets (10 mg total) by mouth daily 180 tablet 1   • ascorbic acid (VITAMIN C) 500 mg tablet Take 500 mg by mouth daily     • aspirin 81 mg chewable tablet Chew 1 tablet (81 mg total) daily 90 tablet 3   • carvedilol (COREG) 25 mg tablet Take 1 tablet (25 mg total) by mouth 2 (two) times a day with meals 180 tablet 3   • Coenzyme Q10 (COQ-10) 10 MG CAPS Take by mouth     • Docusate Calcium (STOOL SOFTENER PO) Take by mouth every other day     • flecainide (TAMBOCOR) 50 mg tablet TAKE 1 TABLET TWICE A  tablet 3   • Multiple Vitamins-Minerals (MENS MULTIVITAMIN PLUS PO) Take by mouth     • Omega-3 Fatty Acids (FISH OIL) 1200 MG CAPS Take by mouth     • simvastatin (ZOCOR) 40 mg tablet Take 1 tablet (40 mg total) by mouth daily at bedtime 90 tablet 1   • Zinc 50 MG CAPS Take by mouth       No current facility-administered medications on file prior to visit       Social History     Socioeconomic History   • Marital status: /Civil Union     Spouse name: Not on file   • Number of children: 1   • Years of education: Not on file   • Highest education level: Not on file   Occupational History   • Occupation:    Tobacco Use   • Smoking status: Every Day     Packs/day: 0 50     Years: 40 00     Pack years: 20 00     Types: Cigarettes   • Smokeless tobacco: Never   Vaping Use   • Vaping Use: Never used   Substance and Sexual Activity   • Alcohol use: Yes     Comment: Occassional 1 x / month   • Drug use: No   • Sexual activity: Yes     Partners: Female     Birth control/protection: None   Other Topics Concern   • Not on file   Social History Narrative    Lives with wife    Full time employment    active advance directive    1 child ; son, passed away        Does not consume caffeine     Social Determinants of Health     Financial Resource Strain: Not on file   Food Insecurity: Not on file   Transportation Needs: Not on file   Physical Activity: Not on file   Stress: Not on file   Social Connections: Not on file   Intimate Partner Violence: Not on file   Housing Stability: Not on file         I have reviewed the past medical, surgical, social and family history, medications and allergies as documented in the EMR  Review of systems: ROS is negative other than that noted in the HPI  Constitutional: Negative for fatigue and fever  HENT: Negative for sore throat  Respiratory: Negative for shortness of breath  Cardiovascular: Negative for chest pain  Gastrointestinal: Negative for abdominal pain  Endocrine: Negative for cold intolerance and heat intolerance  Genitourinary: Negative for flank pain  Musculoskeletal: Negative for back pain  Skin: Negative for rash  Allergic/Immunologic: Negative for immunocompromised state  Neurological: Negative for dizziness  Psychiatric/Behavioral: Negative for agitation  Physical Exam:    Blood pressure 134/76, pulse 62, height 6' 2" (1 88 m), weight 126 kg (278 lb)      General/Constitutional: NAD, well developed, well nourished  HENT: Normocephalic, atraumatic  CV: Intact distal pulses, regular rate  Resp: No respiratory distress or labored breathing  Lymphatic: No lymphadenopathy palpated  Neuro: Alert and Oriented x 3, no focal deficits  Psych: Normal mood, normal affect, normal judgement, normal behavior  Skin: Warm, dry, no rashes, no erythema       Knee Exam (focused):                  RIGHT LEFT   ROM:   0-130 0-130   Palpation: Effusion negative negative     MJL tenderness Negative Negative     LJL tenderness Negative Negative   Instability: Varus stable stable     Valgus stable stable   Special Tests: Lachman Negative Negative     Posterior drawer Negative Negative     Anterior drawer Negative Negative     Pivot shift not tested not tested     Dial not tested not tested   Patella: Palpation no tenderness no tenderness     Mobility 1/4 1/4     Apprehension Negative Negative   Other: Single leg 1/4 squat not tested not tested      LE NV Exam: +2 DP/PT pulses bilaterally  Sensation intact to light touch L2-S1 bilaterally     Bilateral hip ROM demonstrates no pain actively or passively    No calf tenderness to palpation bilaterally    Knee Imaging    X-rays of the bilateral knee were reviewed, which demonstrate moderate tricompartmental osteoarthritis, osteophytes with tibial screw fixation of tuberosity with osgood schlatter's, fragments, left knee severe tricompartmental ostearthritis with osteophytes, large osgood schlatter's fragment  I have reviewed the radiology report and agree with their impression          Scribe Attestation    I,:  Adenike Wood am acting as a scribe while in the presence of the attending physician :       I,:  Jackson Goodell, DO personally performed the services described in this documentation    as scribed in my presence :

## 2022-12-28 NOTE — PROGRESS NOTES
Name: Valeria Lockett      : 1960      MRN: [id number]  Encounter Provider: Damon Mendez MD  Encounter Date: 2022   Encounter department: [address] PRIMARY CARE    Assessment & Plan     1  Elevated C-reactive protein (CRP)  -     C-reactive protein; Future  -     Sedimentation rate, automated; Future  -     CBC and differential; Future    2  Mixed hyperlipidemia  -     Lipid panel; Future    3  Erectile dysfunction, unspecified erectile dysfunction type  -     tadalafil (CIALIS) 10 MG tablet; Take 1 tablet (10 mg total) by mouth daily as needed for erectile dysfunction    4  Paroxysmal atrial fibrillation (HCC)  -     carvedilol (COREG) 25 mg tablet; Take 1 tablet (25 mg total) by mouth 2 (two) times a day with meals    5  Primary hypertension  Assessment & Plan:  BP stable    Orders:  -     carvedilol (COREG) 25 mg tablet; Take 1 tablet (25 mg total) by mouth 2 (two) times a day with meals    6  Abrasion of hand, unspecified laterality, initial encounter  -     TDAP VACCINE GREATER THAN OR EQUAL TO 6YO IM           Subjective      Here to review lab, wbc  Was down when had  Covid, knee feels better, having issues with Ed  Due to cv  Meds, no cp, no sob    Review of Systems   Constitutional: Negative for activity change, appetite change and fatigue  Respiratory: Negative for shortness of breath  Cardiovascular: Negative for chest pain, palpitations and leg swelling  Genitourinary:        Erectile dysfunction   Musculoskeletal: Positive for arthralgias and myalgias  Neurological: Negative for dizziness and headaches         Current Outpatient Medications on File Prior to Visit   Medication Sig   • amLODIPine (NORVASC) 5 mg tablet Take 2 tablets (10 mg total) by mouth daily   • ascorbic acid (VITAMIN C) 500 mg tablet Take 500 mg by mouth daily   • aspirin 81 mg chewable tablet Chew 1 tablet (81 mg total) daily   • Coenzyme Q10 (COQ-10) 10 MG CAPS Take by mouth   • Docusate Calcium (STOOL SOFTENER PO) Take by mouth every other day   • flecainide (TAMBOCOR) 50 mg tablet TAKE 1 TABLET TWICE A DAY   • Multiple Vitamins-Minerals (MENS MULTIVITAMIN PLUS PO) Take by mouth   • Omega-3 Fatty Acids (FISH OIL) 1200 MG CAPS Take by mouth   • simvastatin (ZOCOR) 40 mg tablet Take 1 tablet (40 mg total) by mouth daily at bedtime   • Zinc 50 MG CAPS Take by mouth   • [DISCONTINUED] carvedilol (COREG) 25 mg tablet Take 1 tablet (25 mg total) by mouth 2 (two) times a day with meals       Objective     /68 (BP Location: Right arm, Patient Position: Sitting, Cuff Size: Large)   Pulse 65   Ht 6' 2" (1 88 m)   Wt 126 kg (278 lb)   SpO2 97%   BMI 35 69 kg/m²     Physical Exam  Vitals reviewed  Constitutional:       Appearance: Normal appearance  He is obese  Neck:      Vascular: No carotid bruit  Cardiovascular:      Rate and Rhythm: Normal rate and regular rhythm  Pulses: Normal pulses  Heart sounds: Normal heart sounds  Pulmonary:      Effort: Pulmonary effort is normal       Breath sounds: Normal breath sounds  Musculoskeletal:      Right lower leg: No edema  Left lower leg: No edema  Lymphadenopathy:      Cervical: No cervical adenopathy  Neurological:      Mental Status: He is alert     Psychiatric:         Mood and Affect: Mood normal        Adri Fragoso MD

## 2022-12-28 NOTE — LETTER
December 28, 2022     Edith Hi, [address]  Miguel Stringer   [phone]  [id number]    Patient: Daniel Soriano   YOB: 1960   Date of Visit: 12/28/2022       Dear Dr Altagracia Dick:    Thank you for referring Daniel Soriano to me for evaluation  Below are my notes for this consultation  If you have questions, please do not hesitate to call me  I look forward to following your patient along with you           Sincerely,        Tomás Melissa,         CC: No Recipients

## 2023-01-24 ENCOUNTER — TELEPHONE (OUTPATIENT)
Dept: CARDIOLOGY CLINIC | Facility: CLINIC | Age: 63
End: 2023-01-24

## 2023-01-24 NOTE — TELEPHONE ENCOUNTER
Pt had an episode of afib this afternoon  He felt the palpitations and checked his HR on the [place] device  The monitor read afib, then normal, then SVT  The rates were 65, 69, 67    He took his usual afternoon dose of Flecaininde and Carvedilol and is now feeling better  I stated as long as he's feeling better to continue to monitor and if symptoms return in the am to call the office and will bring in for an EKG  Pt asked me to make Dr Corinne Melia aware

## 2023-01-25 ENCOUNTER — OFFICE VISIT (OUTPATIENT)
Dept: FAMILY MEDICINE CLINIC | Facility: CLINIC | Age: 63
End: 2023-01-25

## 2023-01-25 VITALS
HEART RATE: 68 BPM | WEIGHT: 284 LBS | DIASTOLIC BLOOD PRESSURE: 72 MMHG | BODY MASS INDEX: 36.45 KG/M2 | OXYGEN SATURATION: 99 % | HEIGHT: 74 IN | SYSTOLIC BLOOD PRESSURE: 138 MMHG

## 2023-01-25 DIAGNOSIS — R12 HEARTBURN: Primary | ICD-10-CM

## 2023-01-25 DIAGNOSIS — I48.0 PAROXYSMAL ATRIAL FIBRILLATION (HCC): ICD-10-CM

## 2023-01-25 DIAGNOSIS — I10 PRIMARY HYPERTENSION: ICD-10-CM

## 2023-01-25 RX ORDER — OMEPRAZOLE 20 MG/1
20 CAPSULE, DELAYED RELEASE ORAL
Qty: 90 CAPSULE | Refills: 1 | Status: SHIPPED | OUTPATIENT
Start: 2023-01-25

## 2023-01-25 NOTE — PROGRESS NOTES
Name: Sarah Stephenson      : 1960      MRN: [id number]  Encounter Provider: Jorge A Durant MD  Encounter Date: 2023   Encounter department: [address] [place] [address] PRIMARY CARE    Assessment & Plan     {There are no diagnoses linked to this encounter   (Refresh or delete this SmartLink)}       Subjective      HPI  Review of Systems    Current Outpatient Medications on File Prior to Visit   Medication Sig   • amLODIPine (NORVASC) 5 mg tablet Take 2 tablets (10 mg total) by mouth daily   • ascorbic acid (VITAMIN C) 500 mg tablet Take 500 mg by mouth daily   • aspirin 81 mg chewable tablet Chew 1 tablet (81 mg total) daily   • carvedilol (COREG) 25 mg tablet Take 1 tablet (25 mg total) by mouth 2 (two) times a day with meals   • Coenzyme Q10 (COQ-10) 10 MG CAPS Take by mouth   • Docusate Calcium (STOOL SOFTENER PO) Take by mouth every other day   • flecainide (TAMBOCOR) 50 mg tablet TAKE 1 TABLET TWICE A DAY   • Multiple Vitamins-Minerals (MENS MULTIVITAMIN PLUS PO) Take by mouth   • Omega-3 Fatty Acids (FISH OIL) 1200 MG CAPS Take by mouth   • simvastatin (ZOCOR) 40 mg tablet Take 1 tablet (40 mg total) by mouth daily at bedtime   • tadalafil (CIALIS) 10 MG tablet Take 1 tablet (10 mg total) by mouth daily as needed for erectile dysfunction   • Zinc 50 MG CAPS Take by mouth       Objective     /72 (BP Location: Right arm, Patient Position: Sitting, Cuff Size: Large)   Pulse 68   Ht 6' 2" (1 88 m)   Wt 129 kg (284 lb)   SpO2 99%   BMI 36 46 kg/m²     Physical Exam  Jorge A Durant MD

## 2023-01-25 NOTE — PROGRESS NOTES
Name: Denver Cipro      : 1960      MRN: [id number]  Encounter Provider: Laura Saab MD  Encounter Date: 2023   Encounter department: [address] PRIMARY CARE    Assessment & Plan     1  Heartburn  -     omeprazole (PriLOSEC) 20 mg delayed release capsule; Take 1 capsule (20 mg total) by mouth daily before breakfast    2  Paroxysmal atrial fibrillation [facility])  Assessment & Plan:  Goes in and out      3  Primary hypertension  Assessment & Plan:  BP stable             Subjective      Patient visits today with concern of heartburn and anxiety over pending [facility]  Patient also had episode of which he self monitors on his cardia the converted back to Sinus rhythm  Cardiologist was made aware and noted that patient was okay as he was back in a regular rhythm  bad heartburn, unsure if  Due to stress    Review of Systems   Constitutional: Negative  HENT: Positive for tinnitus  Left ear tinnitus  Eyes: Negative  Needs reading glasses on when there is low light  Respiratory: Negative  Cardiovascular: Positive for palpitations  Gastrointestinal: Positive for constipation  Constant heart burn  Endocrine: Negative  Genitourinary: Negative  Musculoskeletal: Positive for arthralgias, back pain and joint swelling  Skin: Negative  Allergic/Immunologic: Negative  Neurological: Negative  Hematological: Negative  Psychiatric/Behavioral: The patient is nervous/anxious          Current Outpatient Medications on File Prior to Visit   Medication Sig   • amLODIPine (NORVASC) 5 mg tablet Take 2 tablets (10 mg total) by mouth daily   • ascorbic acid (VITAMIN C) 500 mg tablet Take 500 mg by mouth daily   • aspirin 81 mg chewable tablet Chew 1 tablet (81 mg total) daily   • carvedilol (COREG) 25 mg tablet Take 1 tablet (25 mg total) by mouth 2 (two) times a day with meals   • Coenzyme Q10 (COQ-10) 10 MG CAPS Take by mouth   • Docusate Calcium (STOOL SOFTENER PO) Take by mouth every other day   • flecainide (TAMBOCOR) 50 mg tablet TAKE 1 TABLET TWICE A DAY   • Multiple Vitamins-Minerals (MENS MULTIVITAMIN PLUS PO) Take by mouth   • Omega-3 Fatty Acids (FISH OIL) 1200 MG CAPS Take by mouth   • simvastatin (ZOCOR) 40 mg tablet Take 1 tablet (40 mg total) by mouth daily at bedtime   • tadalafil (CIALIS) 10 MG tablet Take 1 tablet (10 mg total) by mouth daily as needed for erectile dysfunction   • Zinc 50 MG CAPS Take by mouth       Objective     /72 (BP Location: Right arm, Patient Position: Sitting, Cuff Size: Large)   Pulse 68   Ht 6' 2" (1 88 m)   Wt 129 kg (284 lb)   SpO2 99%   BMI 36 46 kg/m²     Physical Exam  Constitutional:       Appearance: Normal appearance  HENT:      Head: Normocephalic  Ears:      Comments: Left ear tinnitus  Cardiovascular:      Rate and Rhythm: Normal rate and regular rhythm  Pulmonary:      Breath sounds: Normal breath sounds  Abdominal:      General: Bowel sounds are normal       Palpations: Abdomen is soft  Musculoskeletal:         General: Normal range of motion  Skin:     General: Skin is warm and dry  Capillary Refill: Capillary refill takes less than 2 seconds  Neurological:      General: No focal deficit present  Mental Status: He is alert  Psychiatric:         Mood and Affect: Mood normal       Comments: Pt anxious over pending [facility]         Sagar Barragan MD

## 2023-02-06 DIAGNOSIS — E78.2 MIXED HYPERLIPIDEMIA: ICD-10-CM

## 2023-02-06 RX ORDER — SIMVASTATIN 40 MG
TABLET ORAL
Qty: 90 TABLET | Refills: 3 | Status: SHIPPED | OUTPATIENT
Start: 2023-02-06

## 2023-02-28 DIAGNOSIS — I48.0 PAROXYSMAL ATRIAL FIBRILLATION (HCC): ICD-10-CM

## 2023-02-28 DIAGNOSIS — I10 PRIMARY HYPERTENSION: ICD-10-CM

## 2023-02-28 RX ORDER — CARVEDILOL 25 MG/1
25 TABLET ORAL 2 TIMES DAILY WITH MEALS
Qty: 180 TABLET | Refills: 3 | Status: SHIPPED | OUTPATIENT
Start: 2023-02-28

## 2023-03-27 ENCOUNTER — RA CDI HCC (OUTPATIENT)
Dept: OTHER | Facility: HOSPITAL | Age: 63
End: 2023-03-27

## 2023-04-03 ENCOUNTER — APPOINTMENT (OUTPATIENT)
Dept: LAB | Facility: CLINIC | Age: 63
End: 2023-04-03

## 2023-04-03 ENCOUNTER — OFFICE VISIT (OUTPATIENT)
Dept: FAMILY MEDICINE CLINIC | Facility: CLINIC | Age: 63
End: 2023-04-03

## 2023-04-03 VITALS
BODY MASS INDEX: 36.83 KG/M2 | HEIGHT: 74 IN | TEMPERATURE: 97.5 F | OXYGEN SATURATION: 96 % | HEART RATE: 78 BPM | WEIGHT: 287 LBS | RESPIRATION RATE: 20 BRPM | SYSTOLIC BLOOD PRESSURE: 136 MMHG | DIASTOLIC BLOOD PRESSURE: 78 MMHG

## 2023-04-03 DIAGNOSIS — A69.20 LYME DISEASE: ICD-10-CM

## 2023-04-03 DIAGNOSIS — R79.82 ELEVATED C-REACTIVE PROTEIN (CRP): ICD-10-CM

## 2023-04-03 DIAGNOSIS — I10 HYPERTENSION, UNSPECIFIED TYPE: Chronic | ICD-10-CM

## 2023-04-03 DIAGNOSIS — E78.2 MIXED HYPERLIPIDEMIA: ICD-10-CM

## 2023-04-03 DIAGNOSIS — I10 PRIMARY HYPERTENSION: ICD-10-CM

## 2023-04-03 DIAGNOSIS — I48.0 PAROXYSMAL ATRIAL FIBRILLATION (HCC): ICD-10-CM

## 2023-04-03 DIAGNOSIS — Z72.0 TOBACCO ABUSE: Primary | ICD-10-CM

## 2023-04-03 LAB
BASOPHILS # BLD AUTO: 0.09 THOUSANDS/ÂΜL (ref 0–0.1)
BASOPHILS NFR BLD AUTO: 1 % (ref 0–1)
CHOLEST SERPL-MCNC: 136 MG/DL
CRP SERPL QL: <3 MG/L
EOSINOPHIL # BLD AUTO: 0.33 THOUSAND/ÂΜL (ref 0–0.61)
EOSINOPHIL NFR BLD AUTO: 4 % (ref 0–6)
ERYTHROCYTE [DISTWIDTH] IN BLOOD BY AUTOMATED COUNT: 13.2 % (ref 11.6–15.1)
ERYTHROCYTE [SEDIMENTATION RATE] IN BLOOD: 25 MM/HOUR (ref 0–19)
HCT VFR BLD AUTO: 42.9 % (ref 36.5–49.3)
HDLC SERPL-MCNC: 35 MG/DL
HGB BLD-MCNC: 13.8 G/DL (ref 12–17)
IMM GRANULOCYTES # BLD AUTO: 0.03 THOUSAND/UL (ref 0–0.2)
IMM GRANULOCYTES NFR BLD AUTO: 0 % (ref 0–2)
LDLC SERPL CALC-MCNC: 67 MG/DL (ref 0–100)
LYMPHOCYTES # BLD AUTO: 2 THOUSANDS/ÂΜL (ref 0.6–4.47)
LYMPHOCYTES NFR BLD AUTO: 24 % (ref 14–44)
MCH RBC QN AUTO: 29.6 PG (ref 26.8–34.3)
MCHC RBC AUTO-ENTMCNC: 32.2 G/DL (ref 31.4–37.4)
MCV RBC AUTO: 92 FL (ref 82–98)
MONOCYTES # BLD AUTO: 0.67 THOUSAND/ÂΜL (ref 0.17–1.22)
MONOCYTES NFR BLD AUTO: 8 % (ref 4–12)
NEUTROPHILS # BLD AUTO: 5.21 THOUSANDS/ÂΜL (ref 1.85–7.62)
NEUTS SEG NFR BLD AUTO: 63 % (ref 43–75)
NONHDLC SERPL-MCNC: 101 MG/DL
NRBC BLD AUTO-RTO: 0 /100 WBCS
PLATELET # BLD AUTO: 264 THOUSANDS/UL (ref 149–390)
PMV BLD AUTO: 10.3 FL (ref 8.9–12.7)
RBC # BLD AUTO: 4.67 MILLION/UL (ref 3.88–5.62)
TRIGL SERPL-MCNC: 172 MG/DL
WBC # BLD AUTO: 8.33 THOUSAND/UL (ref 4.31–10.16)

## 2023-04-03 RX ORDER — AMLODIPINE BESYLATE 5 MG/1
10 TABLET ORAL DAILY
Qty: 180 TABLET | Refills: 1 | Status: SHIPPED | OUTPATIENT
Start: 2023-04-03

## 2023-04-03 NOTE — PROGRESS NOTES
Name: Lizy Martinez      : 1960      MRN: [id number]  Encounter Provider: Luther Montilla MD  Encounter Date: 4/3/2023   Encounter department: [facility] PRIMARY CARE    Assessment & Plan     1  Tobacco abuse  Assessment & Plan:  counselled      2  Hypertension, unspecified type  Assessment & Plan:  BP stable    Orders:  -     amLODIPine (NORVASC) 5 mg tablet; Take 2 tablets (10 mg total) by mouth daily    3  Paroxysmal atrial fibrillation (HCC)  Assessment & Plan:  Stable, sees cards      4  Primary hypertension  Assessment & Plan:  BP stable        Depression Screening and Follow-up Plan: Patient was screened for depression during today's encounter  They screened negative with a PHQ-2 score of 0  Subjective      Patient presents for routine wellness visit  Patient has concerns about blood pressure being on the low side after taking medications  Hypertension  This is a recurrent problem  The current episode started more than 1 year ago  The problem is unchanged  The problem is controlled  Associated symptoms include palpitations  Pertinent negatives include no chest pain or shortness of breath  Associated agents: smoking  Risk factors for coronary artery disease include smoking/tobacco exposure, sedentary lifestyle, male gender and obesity  Past treatments include beta blockers, calcium channel blockers, diuretics and angiotensin blockers  The current treatment provides moderate improvement  There are no compliance problems  Identifiable causes of hypertension include sleep apnea  Review of Systems   Constitutional: Negative for activity change, appetite change and fatigue  Job change, new job more physical   HENT: Positive for congestion  Negative for postnasal drip, sinus pressure and sinus pain  Congestion at night   Respiratory: Negative for cough, chest tightness and shortness of breath  Cardiovascular: Positive for palpitations  Negative for chest pain  "   Patient follows up with cardiology, baseline   Gastrointestinal: Positive for constipation  Negative for diarrhea, nausea and vomiting  Neurological: Positive for light-headedness  Negative for dizziness, weakness and numbness  Light headed when patient's blood pressure is low   Psychiatric/Behavioral: Negative for agitation and behavioral problems  Current Outpatient Medications on File Prior to Visit   Medication Sig   • ascorbic acid (VITAMIN C) 500 mg tablet Take 500 mg by mouth daily   • aspirin 81 mg chewable tablet Chew 1 tablet (81 mg total) daily   • carvedilol (COREG) 25 mg tablet Take 1 tablet (25 mg total) by mouth 2 (two) times a day with meals   • Coenzyme Q10 (COQ-10) 10 MG CAPS Take by mouth   • Docusate Calcium (STOOL SOFTENER PO) Take by mouth every other day   • flecainide (TAMBOCOR) 50 mg tablet TAKE 1 TABLET TWICE A DAY   • Multiple Vitamins-Minerals (MENS MULTIVITAMIN PLUS PO) Take by mouth   • Omega-3 Fatty Acids (FISH OIL) 1200 MG CAPS Take by mouth   • omeprazole (PriLOSEC) 20 mg delayed release capsule Take 1 capsule (20 mg total) by mouth daily before breakfast   • simvastatin (ZOCOR) 40 mg tablet TAKE 1 TABLET DAILY AT BEDTIME   • tadalafil (CIALIS) 10 MG tablet Take 1 tablet (10 mg total) by mouth daily as needed for erectile dysfunction   • Zinc 50 MG CAPS Take by mouth   • [DISCONTINUED] amLODIPine (NORVASC) 5 mg tablet Take 2 tablets (10 mg total) by mouth daily       Objective     /78 (BP Location: Left arm, Patient Position: Sitting, Cuff Size: Large)   Pulse 78   Temp 97 5 °F (36 4 °C) (Tympanic)   Resp 20   Ht 6' 2\" (1 88 m)   Wt 130 kg (287 lb)   SpO2 96%   BMI 36 85 kg/m²     Physical Exam  Vitals reviewed  Constitutional:       Appearance: Normal appearance  He is obese  HENT:      Head: Normocephalic        Right Ear: Tympanic membrane normal       Left Ear: Tympanic membrane normal       Mouth/Throat:      Mouth: Mucous membranes are moist  " Neck:      Vascular: No carotid bruit  Cardiovascular:      Rate and Rhythm: Normal rate and regular rhythm  Pulses: Normal pulses  Heart sounds: Normal heart sounds  Pulmonary:      Effort: Pulmonary effort is normal       Breath sounds: Normal breath sounds  Skin:     General: Skin is warm and dry  Neurological:      General: No focal deficit present  Mental Status: He is alert     Psychiatric:         Attention and Perception: Attention and perception normal          Mood and Affect: Mood normal          Behavior: Behavior normal        Jayson Cuba MD

## 2023-04-04 LAB — B BURGDOR IGG+IGM SER-ACNC: 5.4 AI

## 2023-04-05 LAB
B BURGDOR IGG PATRN SER IB-IMP: NEGATIVE
B BURGDOR IGM PATRN SER IB-IMP: NEGATIVE
B BURGDOR18KD IGG SER QL IB: ABNORMAL
B BURGDOR23KD IGG SER QL IB: PRESENT
B BURGDOR23KD IGM SER QL IB: PRESENT
B BURGDOR28KD IGG SER QL IB: ABNORMAL
B BURGDOR30KD IGG SER QL IB: ABNORMAL
B BURGDOR39KD IGG SER QL IB: ABNORMAL
B BURGDOR39KD IGM SER QL IB: ABNORMAL
B BURGDOR41KD IGG SER QL IB: PRESENT
B BURGDOR41KD IGM SER QL IB: ABNORMAL
B BURGDOR45KD IGG SER QL IB: ABNORMAL
B BURGDOR58KD IGG SER QL IB: ABNORMAL
B BURGDOR66KD IGG SER QL IB: ABNORMAL
B BURGDOR93KD IGG SER QL IB: ABNORMAL

## 2023-04-25 NOTE — PATIENT INSTRUCTIONS
2gm sodium low fat low cholesterol diet, eating fresh is best, fresh fruit, fresh vegetables, lean protein  Read food labels to consume low sodium   Continue with daily BP monitoring, if does not improve to 140 or less call the office   Increase Amlodipine to 10mg daily     Avoid caffeine and alcohol  No

## 2023-05-10 ENCOUNTER — OFFICE VISIT (OUTPATIENT)
Dept: CARDIOLOGY CLINIC | Facility: CLINIC | Age: 63
End: 2023-05-10

## 2023-05-10 VITALS
HEART RATE: 63 BPM | BODY MASS INDEX: 36.08 KG/M2 | WEIGHT: 281 LBS | SYSTOLIC BLOOD PRESSURE: 118 MMHG | DIASTOLIC BLOOD PRESSURE: 68 MMHG

## 2023-05-10 DIAGNOSIS — I48.0 PAROXYSMAL ATRIAL FIBRILLATION (HCC): Primary | ICD-10-CM

## 2023-05-10 DIAGNOSIS — I10 PRIMARY HYPERTENSION: ICD-10-CM

## 2023-05-10 DIAGNOSIS — E78.2 MIXED HYPERLIPIDEMIA: ICD-10-CM

## 2023-05-10 DIAGNOSIS — R94.31 ABNORMAL ECG: ICD-10-CM

## 2023-05-10 NOTE — PROGRESS NOTES
Cardiology Follow Up    [facility]  1960  [id number]  [address]  [facility]  [place] [address]   [phone]    1  Paroxysmal atrial fibrillation (HCC)  POCT ECG    NM myocardial perfusion spect (stress and/or rest)      2  Primary hypertension        3  Mixed hyperlipidemia        4  Abnormal ECG            Discussion/Summary:    1  Paroxysmal atrial fibrillation - Saige Rivero had this on a few occasion around the time of christiane Lyme disease  He is feeling better since this has resolved  He remains in sinus rhythm on carvedilol and flecainide  He is on aspirin for stroke prevention  He wore an extended ambulatory Holter since last visit that showed sinus rhythm, PACs with short runs of nonsustained SVT and questionable VT  We are getting an updated ischemic evaluation as stated below  No changes were made today  We will see him back in 6 months  2   Hypertension - His blood pressure was running high last year  His amlodipine had been increased to 10 mg daily  We will change Toprol-XL to carvedilol 25 mg twice daily  His blood pressure has improved and is now well controlled  3   Mixed hyperlipidemia - His last LDL increased to 159 and he has an HDL in the 30s  Simvastatin 40 mg daily was added to his regimen and he will continue to have blood work follow closely  His most recent LDL was 67  It is also recommended for him to work on lifestyle modifications which includes quitting smoking  4   Abnormal ECG - He has a left anterior fascicular block and incomplete right bundle branch block, with varying degrees of QRS prolongation  This certainly can be associated with a left anterior fascicular block but flecainide can do this as well  Given this we did order an updated ischemic evaluation which will include a stress nuclear study        Interval History:     Saige Rivero comes in for follow-up given his paroxysmal atrial fibrillation and risk factors for cardiovascular disease  I met Hodan Dunne during hospitalization in August 2021 in which he had new onset atrial fibrillation  This occurred on a few occasions  He was discharged but then had recurrent symptoms of his atrial fibrillation and we started flecainide in addition to his metoprolol  He was also found have Lyme disease and was treated for this as well  He had elevated LFTs associated with that  He had an echocardiogram that showed normal LV systolic function and biatrial enlargement  He ended up going through an exercise treadmill test that showed no evidence of ischemia at about 80% MPHR  He had no symptoms during his treadmill test   At our last visit wanes blood pressure was continuing to run high  His amlodipine had been increased to 10 mg daily  We changed him from Toprol-XL to carvedilol 25 mg twice daily  Blood pressure has improved  He did then call the office in August and noted that he was having some palpitations/dizziness and he was tracking his heart rate/rhythm at home  At that point we had him wear an extended ambulatory Holter  Over 2 weeks this showed sinus rhythm with PACs that were relatively frequent than a few short runs of nonsustained SVT  There was a suggestion of nonsustained run of VT, but the voltage was low and this could have represented SVT with a Weinstein C  After getting treated for Lyme disease Hodan Dunne has felt well  He still gets intermittent palpitations but no symptoms like his atrial fibrillation  He remains in sinus rhythm today  No lightheadedness or any recent syncope  He denies chest pain or any symptoms of angina  No shortness of breath or any signs/symptoms of CHF  He remains active        Patient Active Problem List   Diagnosis   • Disc degeneration, lumbosacral   • Hyperlipidemia   • Hypertension   • Tobacco abuse   • Screen for colon cancer   • Paroxysmal atrial fibrillation [facility])     Past Medical History:   Diagnosis Date   • A-fib ([address] ) 08/24/2021   • Disc degeneration, lumbosacral 1/6/2016   • Hyperlipidemia     hypercholesterolemia   • Hypertension     last assessed 11/22/17   • Pneumonia of both lower lobes due to infectious organism 1/29/2018     Social History     Socioeconomic History   • Marital status: /Civil Union     Spouse name: Not on file   • Number of children: 1   • Years of education: Not on file   • Highest education level: Not on file   Occupational History   • Occupation:    Tobacco Use   • Smoking status: Every Day     Packs/day: 0 50     Years: 40 00     Pack years: 20 00     Types: Cigarettes   • Smokeless tobacco: Never   Vaping Use   • Vaping Use: Never used   Substance and Sexual Activity   • Alcohol use: Yes     Comment: Occassional 1 x / month   • Drug use: No   • Sexual activity: Yes     Partners: Female     Birth control/protection: None   Other Topics Concern   • Not on file   Social History Narrative    Lives with wife    Full time employment    active advance directive    1 child ; son, passed away        Does not consume caffeine     Social Determinants of Health     Financial Resource Strain: Not on file   Food Insecurity: Not on file   Transportation Needs: Not on file   Physical Activity: Not on file   Stress: Not on file   Social Connections: Not on file   Intimate Partner Violence: Not on file   Housing Stability: Not on file      Family History   Problem Relation Age of Onset   • Obesity Mother    • Aneurysm Father      Past Surgical History:   Procedure Laterality Date   • COLONOSCOPY     • HEMORRHOID SURGERY     • KNEE SURGERY Bilateral    • OR COLONOSCOPY FLX DX W/COLLJ SPEC WHEN PFRMD N/A 1/5/2019    Procedure: COLONOSCOPY;  Surgeon: Rj Richardson MD;  [facility];   Service: Gastroenterology   • UPPER GASTROINTESTINAL ENDOSCOPY         Current Outpatient Medications:   •  amLODIPine (NORVASC) 5 mg tablet, Take 2 tablets (10 mg total) by mouth daily, Disp: 180 tablet, Rfl: 1  •  ascorbic acid (VITAMIN C) 500 mg tablet, Take 500 mg by mouth daily, Disp: , Rfl:   •  aspirin 81 mg chewable tablet, Chew 1 tablet (81 mg total) daily, Disp: 90 tablet, Rfl: 3  •  carvedilol (COREG) 25 mg tablet, Take 1 tablet (25 mg total) by mouth 2 (two) times a day with meals, Disp: 180 tablet, Rfl: 3  •  Coenzyme Q10 (COQ-10) 10 MG CAPS, Take by mouth, Disp: , Rfl:   •  Docusate Calcium (STOOL SOFTENER PO), Take by mouth every other day, Disp: , Rfl:   •  flecainide (TAMBOCOR) 50 mg tablet, TAKE 1 TABLET TWICE A DAY, Disp: 180 tablet, Rfl: 3  •  Multiple Vitamins-Minerals (MENS MULTIVITAMIN PLUS PO), Take by mouth, Disp: , Rfl:   •  Omega-3 Fatty Acids (FISH OIL) 1200 MG CAPS, Take by mouth, Disp: , Rfl:   •  omeprazole (PriLOSEC) 20 mg delayed release capsule, Take 1 capsule (20 mg total) by mouth daily before breakfast, Disp: 90 capsule, Rfl: 1  •  simvastatin (ZOCOR) 40 mg tablet, TAKE 1 TABLET DAILY AT BEDTIME, Disp: 90 tablet, Rfl: 3  •  tadalafil (CIALIS) 10 MG tablet, Take 1 tablet (10 mg total) by mouth daily as needed for erectile dysfunction, Disp: 10 tablet, Rfl: 5  •  Zinc 50 MG CAPS, Take by mouth, Disp: , Rfl:   No Known Allergies    Labs:  Lab Results   Component Value Date     11/22/2017    K 3 6 12/07/2022    K 4 2 05/22/2021     12/07/2022     05/22/2021    CO2 24 12/07/2022    CO2 23 05/22/2021    BUN 10 12/07/2022    BUN 13 05/22/2021    CREATININE 1 11 12/07/2022    CREATININE 0 90 11/22/2017    CALCIUM 9 5 12/07/2022    CALCIUM 9 3 05/22/2021     Lab Results   Component Value Date    WBC 8 33 04/03/2023    WBC 7 2 11/22/2017    HGB 13 8 04/03/2023    HGB 13 9 11/22/2017    HCT 42 9 04/03/2023    HCT 41 9 11/22/2017    MCV 92 04/03/2023    MCV 92 9 11/22/2017     04/03/2023     11/22/2017     Lab Results   Component Value Date    CHOL 156 11/22/2017    TRIG 172 (H) 04/03/2023    TRIG 177 (H) 05/22/2021    HDL 35 (L) 04/03/2023    HDL 42 05/22/2021    LDLDIRECT 94 10/22/2016     Imaging:  ECG today shows sinus rhythm, left anterior fascicular block, incomplete right bundle branch block and nonspecific ST changes  Stress test only, exercise  Result Date: 11/22/2021  Narrative: •  Normal functional capacity for age & gender  •  Exercise-limiting dyspnea  Normal blood pressure response with blunted heart rate response (79% MPHR achieved) to exercise  Took beta blocker medication this morning  •  Non-diagnostic exercise stress ECG due to failure to achieve 85% MPHR  ECHO (8/23/2021):  LEFT VENTRICLE:  Size was normal   Systolic function was normal  Ejection fraction was estimated to be 60 %  There was mild concentric hypertrophy      RIGHT VENTRICLE:  The size was normal   Systolic function was normal      LEFT ATRIUM:  The atrium was mildly dilated      RIGHT ATRIUM:  The atrium was mildly dilated      MITRAL VALVE:  There was trace regurgitation      TRICUSPID VALVE:  There was trace regurgitation        Review of Systems:  Review of Systems   Constitutional: Negative  HENT: Negative  Eyes: Negative  Respiratory: Negative  Cardiovascular: Positive for palpitations  Gastrointestinal: Negative  Musculoskeletal: Positive for arthralgias  Skin: Negative  Allergic/Immunologic: Negative  Neurological: Negative  Hematological: Negative  Psychiatric/Behavioral: Negative  All other systems reviewed and are negative  Vitals:    05/10/23 1636   BP: 118/68   BP Location: Right arm   Patient Position: Sitting   Cuff Size: Extra-Large   Pulse: 63   Weight: 127 kg (281 lb)     Physical Exam:  Physical Exam  Vitals and nursing note reviewed  Constitutional:       Appearance: He is well-developed  HENT:      Head: Normocephalic and atraumatic  Eyes:      General: No scleral icterus  Right eye: No discharge  Left eye: No discharge        Pupils: Pupils are equal, round, and reactive to light  Neck:      Thyroid: No thyromegaly  Vascular: No JVD  Cardiovascular:      Rate and Rhythm: Normal rate and regular rhythm  No extrasystoles are present  Pulses: Normal pulses  No decreased pulses  Heart sounds: Normal heart sounds, S1 normal and S2 normal  No murmur heard  No friction rub  No gallop  Pulmonary:      Effort: Pulmonary effort is normal  No respiratory distress  Breath sounds: Normal breath sounds  No wheezing or rales  Abdominal:      General: Bowel sounds are normal  There is no distension  Palpations: Abdomen is soft  Tenderness: There is no abdominal tenderness  Musculoskeletal:         General: No tenderness or deformity  Normal range of motion  Cervical back: Normal range of motion and neck supple  Skin:     General: Skin is warm and dry  Findings: No rash  Neurological:      Mental Status: He is alert and oriented to person, place, and time  Cranial Nerves: No cranial nerve deficit  Psychiatric:         Thought Content: Thought content normal          Judgment: Judgment normal        Counseling / Coordination of Care  Total office time spent today 25 minutes  Greater than 50% of total time was spent with the patient and / or family counseling and / or coordination of care

## 2023-05-26 ENCOUNTER — HOSPITAL ENCOUNTER (OUTPATIENT)
Dept: NON INVASIVE DIAGNOSTICS | Facility: CLINIC | Age: 63
Discharge: HOME | End: 2023-05-26

## 2023-05-26 DIAGNOSIS — I48.0 PAROXYSMAL ATRIAL FIBRILLATION (HCC): ICD-10-CM

## 2023-05-26 LAB
RATE PRESSURE PRODUCT: NORMAL
SL CV REST NUCLEAR ISOTOPE DOSE: 15.4 MCI
SL CV STRESS NUCLEAR ISOTOPE DOSE: 46.7 MCI
SL CV STRESS RECOVERY BP: NORMAL MMHG
SL CV STRESS RECOVERY HR: 74 BPM
STRESS ANGINA INDEX: 0
STRESS BASELINE BP: NORMAL MMHG
STRESS BASELINE HR: 58 BPM
STRESS O2 SAT REST: 98 %
STRESS PEAK HR: 80 BPM
STRESS POST O2 SAT PEAK: 98 %
STRESS POST PEAK BP: 132 MMHG

## 2023-05-26 RX ORDER — REGADENOSON 0.08 MG/ML
0.4 INJECTION, SOLUTION INTRAVENOUS ONCE
Status: COMPLETED | OUTPATIENT
Start: 2023-05-26 | End: 2023-05-26

## 2023-05-26 RX ADMIN — REGADENOSON 0.4 MG: 0.08 INJECTION, SOLUTION INTRAVENOUS at 14:18

## 2023-05-29 LAB
NUC STRESS EJECTION FRACTION: 52 %
RATE PRESSURE PRODUCT: NORMAL
SL CV REST NUCLEAR ISOTOPE DOSE: 15.4 MCI
SL CV STRESS NUCLEAR ISOTOPE DOSE: 46.7 MCI
SL CV STRESS RECOVERY BP: NORMAL MMHG
SL CV STRESS RECOVERY HR: 74 BPM
STRESS ANGINA INDEX: 0
STRESS BASELINE BP: NORMAL MMHG
STRESS BASELINE HR: 58 BPM
STRESS O2 SAT REST: 98 %
STRESS PEAK HR: 80 BPM
STRESS POST O2 SAT PEAK: 98 %
STRESS POST PEAK BP: 132 MMHG

## 2023-05-30 LAB
CHEST PAIN STATEMENT: NORMAL
MAX DIASTOLIC BP: 84 MMHG
MAX HEART RATE: 80 BPM
MAX PREDICTED HEART RATE: 157 BPM
MAX. SYSTOLIC BP: 144 MMHG
PROTOCOL NAME: NORMAL
REASON FOR TERMINATION: NORMAL
TARGET HR FORMULA: NORMAL
TEST INDICATION: NORMAL
TIME IN EXERCISE PHASE: NORMAL

## 2023-06-06 ENCOUNTER — TELEPHONE (OUTPATIENT)
Dept: CARDIOLOGY CLINIC | Facility: CLINIC | Age: 63
End: 2023-06-06

## 2023-06-15 ENCOUNTER — TELEPHONE (OUTPATIENT)
Dept: CARDIOLOGY CLINIC | Facility: CLINIC | Age: 63
End: 2023-06-15

## 2023-06-15 NOTE — TELEPHONE ENCOUNTER
Jovon Casey called to find out if he can get a letter stating from a cardiac standpoint he is good he needs to get his physical and won't do let him set his physical up until has a letter from cardiology     Ov 5/10/2023  nm stres test was completed    Please advise

## 2023-06-16 NOTE — TELEPHONE ENCOUNTER
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 I called him back and he was asking on the pretense that he will need a letter, because he had a friend that had afib and they needed a letter from his Dr    I asked him to get the physical scheduled for next Sat and then call back if he needs anything else from cardiology

## 2023-07-11 DIAGNOSIS — N52.9 ERECTILE DYSFUNCTION, UNSPECIFIED ERECTILE DYSFUNCTION TYPE: ICD-10-CM

## 2023-07-11 RX ORDER — TADALAFIL 10 MG/1
10 TABLET ORAL DAILY PRN
Qty: 10 TABLET | Refills: 3 | Status: SHIPPED | OUTPATIENT
Start: 2023-07-11

## 2023-07-28 ENCOUNTER — RA CDI HCC (OUTPATIENT)
Dept: OTHER | Facility: HOSPITAL | Age: 63
End: 2023-07-28

## 2023-08-04 ENCOUNTER — OFFICE VISIT (OUTPATIENT)
Dept: FAMILY MEDICINE CLINIC | Facility: CLINIC | Age: 63
End: 2023-08-04
Payer: COMMERCIAL

## 2023-08-04 VITALS
HEIGHT: 74 IN | OXYGEN SATURATION: 92 % | WEIGHT: 287 LBS | BODY MASS INDEX: 36.83 KG/M2 | HEART RATE: 66 BPM | SYSTOLIC BLOOD PRESSURE: 154 MMHG | DIASTOLIC BLOOD PRESSURE: 78 MMHG

## 2023-08-04 DIAGNOSIS — Z12.5 SCREENING FOR PROSTATE CANCER: ICD-10-CM

## 2023-08-04 DIAGNOSIS — E78.2 MIXED HYPERLIPIDEMIA: ICD-10-CM

## 2023-08-04 DIAGNOSIS — I10 HYPERTENSION, UNSPECIFIED TYPE: ICD-10-CM

## 2023-08-04 DIAGNOSIS — M25.551 PAIN OF RIGHT HIP: Primary | ICD-10-CM

## 2023-08-04 PROCEDURE — 99214 OFFICE O/P EST MOD 30 MIN: CPT | Performed by: FAMILY MEDICINE

## 2023-08-04 RX ORDER — IBUPROFEN 600 MG/1
600 TABLET ORAL EVERY 8 HOURS PRN
Qty: 30 TABLET | Refills: 1 | Status: SHIPPED | OUTPATIENT
Start: 2023-08-04

## 2023-08-04 RX ORDER — AMLODIPINE BESYLATE 5 MG/1
10 TABLET ORAL DAILY
Qty: 180 TABLET | Refills: 1 | Status: SHIPPED | OUTPATIENT
Start: 2023-08-04

## 2023-08-04 NOTE — ASSESSMENT & PLAN NOTE
Had  Xray l hip  4/20 at urgent  Care-had  Mild  l hip  Arthritis  But  Moderate-severe r  Hip arthritis

## 2023-08-04 NOTE — PROGRESS NOTES
Name: Tano Ro      : 1960      MRN: [id number]  Encounter Provider: Kathryn Rios MD  Encounter Date: 2023   Encounter department: [facility] PRIMARY CARE    Assessment & Plan     1. Pain of right hip  Assessment & Plan:  Had  Xray l hip   at urgent  Care-had  Mild  l hip  Arthritis  But  Moderate-severe r  Hip arthritis    Orders:  -     XR hip/pelv 2-3 vws right if performed; Future; Expected date: 2023    2. Hypertension, unspecified type  -     Lipid panel; Future  -     Comprehensive metabolic panel; Future  -     CBC and differential; Future  -     TSH, 3rd generation; Future    3. Mixed hyperlipidemia  -     Lipid panel; Future    4. Screening for prostate cancer  -     PSA, Total Screen; Future         Subjective      Here  For r  Hip  Pain, had  Xray  l  Hip  that showed mild  l hip arthritis , mod-severe  r but  Had  Not  Been bothered  By r  Until lately, had been focused  On back    Review of Systems   Constitutional: Positive for unexpected weight change. Negative for activity change and fatigue. 6 lb gain   Respiratory: Negative for shortness of breath. Cardiovascular: Negative for chest pain. Neurological: Negative for dizziness and headaches. Psychiatric/Behavioral: The patient is not nervous/anxious.         Current Outpatient Medications on File Prior to Visit   Medication Sig   • amLODIPine (NORVASC) 5 mg tablet Take 2 tablets (10 mg total) by mouth daily   • ascorbic acid (VITAMIN C) 500 mg tablet Take 500 mg by mouth daily   • aspirin 81 mg chewable tablet Chew 1 tablet (81 mg total) daily   • carvedilol (COREG) 25 mg tablet Take 1 tablet (25 mg total) by mouth 2 (two) times a day with meals   • Coenzyme Q10 (COQ-10) 10 MG CAPS Take by mouth   • flecainide (TAMBOCOR) 50 mg tablet TAKE 1 TABLET TWICE A DAY   • Multiple Vitamins-Minerals (MENS MULTIVITAMIN PLUS PO) Take by mouth   • Omega-3 Fatty Acids (FISH OIL) 1200 MG CAPS Take by mouth • omeprazole (PriLOSEC) 20 mg delayed release capsule Take 1 capsule (20 mg total) by mouth daily before breakfast   • simvastatin (ZOCOR) 40 mg tablet TAKE 1 TABLET DAILY AT BEDTIME   • tadalafil (CIALIS) 10 MG tablet Take 1 tablet (10 mg total) by mouth daily as needed for erectile dysfunction   • Zinc 50 MG CAPS Take by mouth   • [DISCONTINUED] Docusate Calcium (STOOL SOFTENER PO) Take by mouth every other day       Objective     /78 (BP Location: Right arm, Patient Position: Sitting, Cuff Size: Large)   Pulse 66   Ht 6' 2" (1.88 m)   Wt 130 kg (287 lb)   SpO2 92%   BMI 36.85 kg/m²     Physical Exam  Vitals reviewed. Constitutional:       Appearance: Normal appearance. He is obese. Cardiovascular:      Rate and Rhythm: Normal rate and regular rhythm. Pulses: Normal pulses. Heart sounds: Normal heart sounds. Pulmonary:      Effort: Pulmonary effort is normal.      Breath sounds: Normal breath sounds. Musculoskeletal:         General: Tenderness present. Right lower leg: No edema. Left lower leg: No edema. Comments: r hip with decreased rom   Lymphadenopathy:      Cervical: No cervical adenopathy. Neurological:      Mental Status: He is alert.        Molly Tamez MD

## 2023-08-21 DIAGNOSIS — M16.0 PRIMARY OSTEOARTHRITIS OF BOTH HIPS: Primary | ICD-10-CM

## 2023-09-27 DIAGNOSIS — Z72.0 TOBACCO ABUSE: Primary | ICD-10-CM

## 2023-10-27 ENCOUNTER — APPOINTMENT (OUTPATIENT)
Dept: RADIOLOGY | Facility: CLINIC | Age: 63
End: 2023-10-27
Payer: COMMERCIAL

## 2023-10-27 ENCOUNTER — OFFICE VISIT (OUTPATIENT)
Dept: URGENT CARE | Facility: CLINIC | Age: 63
End: 2023-10-27
Payer: COMMERCIAL

## 2023-10-27 VITALS
HEART RATE: 64 BPM | BODY MASS INDEX: 35.94 KG/M2 | TEMPERATURE: 98.1 F | WEIGHT: 280 LBS | HEIGHT: 74 IN | RESPIRATION RATE: 18 BRPM | OXYGEN SATURATION: 97 %

## 2023-10-27 DIAGNOSIS — G89.29 HEEL PAIN, CHRONIC, LEFT: Primary | ICD-10-CM

## 2023-10-27 DIAGNOSIS — M25.572 LEFT ANKLE PAIN, UNSPECIFIED CHRONICITY: ICD-10-CM

## 2023-10-27 DIAGNOSIS — M79.672 HEEL PAIN, CHRONIC, LEFT: Primary | ICD-10-CM

## 2023-10-27 PROCEDURE — 73650 X-RAY EXAM OF HEEL: CPT

## 2023-10-27 PROCEDURE — 99213 OFFICE O/P EST LOW 20 MIN: CPT

## 2023-10-27 RX ORDER — NAPROXEN 500 MG/1
500 TABLET ORAL 2 TIMES DAILY WITH MEALS
Qty: 10 TABLET | Refills: 0 | Status: SHIPPED | OUTPATIENT
Start: 2023-10-27 | End: 2023-11-02 | Stop reason: ALTCHOICE

## 2023-10-27 NOTE — LETTER
October 27, 2023     Patient: Sheryl Bender   YOB: 1960   Date of Visit: 10/27/2023       To Whom it May Concern:    Sheryl Bender was seen in my clinic on 10/27/2023. He may return to work on 10/28/23 . If you have any questions or concerns, please don't hesitate to call.          Sincerely,          Therese Shaw PA-C        CC: No Recipients

## 2023-10-27 NOTE — PROGRESS NOTES
[facility]        NAME: Romayne Cousins is a 61 y.o. male  : 1960    MRN: [id number]  DATE: 2023  TIME: 2:00 PM    Assessment and Plan   Heel pain, chronic, left [M79.672, G89.29]  1. Heel pain, chronic, left  naproxen (Naprosyn) 500 mg tablet    Ambulatory Referral to Orthopedic Surgery      2. Left ankle pain, unspecified chronicity  XR heel / calcaneus 2+ vw left          - Has crutches  - Ortho f/u    Patient Instructions   The final xray result will appear in your mychart  Ice as needed. Acetaminophen and/or ibuprofen for pain and inflammation. Follow-up with orthopedics. PCP follow-up in 3-5 days  Proceed to the ER if symptoms worsen. Chief Complaint     Chief Complaint   Patient presents with    Heel Pain     Pt presents with left heel pain and tendon pain x  x 3 days. Pt states this has happened before. He took Motrin for pain at 9:30 today. History of Present Illness       60 y/o M presents for L "heal pain" x 2 days. Atraumatic. Has been dealing with intermittent flare ups for years. "Does have arthritis thought the whole body." Used IBU with partial relief. Point tenderness to calcaneous         Review of Systems   Review of Systems   Musculoskeletal:  Positive for gait problem and joint swelling.          Current Medications       Current Outpatient Medications:     amLODIPine (NORVASC) 5 mg tablet, Take 2 tablets (10 mg total) by mouth daily, Disp: 180 tablet, Rfl: 1    ascorbic acid (VITAMIN C) 500 mg tablet, Take 500 mg by mouth daily, Disp: , Rfl:     aspirin 81 mg chewable tablet, Chew 1 tablet (81 mg total) daily, Disp: 90 tablet, Rfl: 3    carvedilol (COREG) 25 mg tablet, Take 1 tablet (25 mg total) by mouth 2 (two) times a day with meals, Disp: 180 tablet, Rfl: 3    Coenzyme Q10 (COQ-10) 10 MG CAPS, Take by mouth, Disp: , Rfl:     flecainide (TAMBOCOR) 50 mg tablet, TAKE 1 TABLET TWICE A DAY, Disp: 180 tablet, Rfl: 3    ibuprofen (MOTRIN) 600 mg tablet, Take 1 tablet (600 mg total) by mouth every 8 (eight) hours as needed for mild pain, Disp: 30 tablet, Rfl: 1    Multiple Vitamins-Minerals (MENS MULTIVITAMIN PLUS PO), Take by mouth, Disp: , Rfl:     naproxen (Naprosyn) 500 mg tablet, Take 1 tablet (500 mg total) by mouth 2 (two) times a day with meals for 5 days, Disp: 10 tablet, Rfl: 0    Omega-3 Fatty Acids (FISH OIL) 1200 MG CAPS, Take by mouth, Disp: , Rfl:     simvastatin (ZOCOR) 40 mg tablet, TAKE 1 TABLET DAILY AT BEDTIME, Disp: 90 tablet, Rfl: 3    tadalafil (CIALIS) 10 MG tablet, Take 1 tablet (10 mg total) by mouth daily as needed for erectile dysfunction, Disp: 10 tablet, Rfl: 3    Zinc 50 MG CAPS, Take by mouth, Disp: , Rfl:     omeprazole (PriLOSEC) 20 mg delayed release capsule, Take 1 capsule (20 mg total) by mouth daily before breakfast (Patient not taking: Reported on 10/27/2023), Disp: 90 capsule, Rfl: 1    Current Allergies     Allergies as of 10/27/2023    (No Known Allergies)            The following portions of the patient's history were reviewed and updated as appropriate: allergies, current medications, past family history, past medical history, past social history, past surgical history and problem list.     Past Medical History:   Diagnosis Date    A-fib ([address]) 08/24/2021    Disc degeneration, lumbosacral 1/6/2016    Hyperlipidemia     hypercholesterolemia    Hypertension     last assessed 11/22/17    Pneumonia of both lower lobes due to infectious organism 1/29/2018       Past Surgical History:   Procedure Laterality Date    COLONOSCOPY      HEMORRHOID SURGERY      KNEE SURGERY Bilateral     OR COLONOSCOPY FLX DX W/COLLJ SPEC WHEN PFRMD N/A 1/5/2019    Procedure: COLONOSCOPY;  Surgeon: Pheobe Heimlich, MD;  [facility];   Service: Gastroenterology    UPPER GASTROINTESTINAL ENDOSCOPY         Family History   Problem Relation Age of Onset    Obesity Mother     Aneurysm Father          Medications have been verified. Objective   Pulse 64   Temp 98.1 °F (36.7 °C)   Resp 18   Ht 6' 2" (1.88 m)   Wt 127 kg (280 lb)   SpO2 97%   BMI 35.95 kg/m²   No LMP for male patient. Physical Exam     Physical Exam  Vitals and nursing note reviewed. Constitutional:       General: He is not in acute distress. Appearance: He is not toxic-appearing. HENT:      Head: Normocephalic and atraumatic. Eyes:      Conjunctiva/sclera: Conjunctivae normal.   Pulmonary:      Effort: Pulmonary effort is normal.   Musculoskeletal:      Comments: DP pulse 2+  TTP on calcaneous   Pain with dorsiflexion  Gait abnormal      Skin:     Findings: No bruising or erythema. Neurological:      Mental Status: He is alert.    Psychiatric:         Mood and Affect: Mood normal.         Behavior: Behavior normal.

## 2023-11-02 ENCOUNTER — APPOINTMENT (EMERGENCY)
Dept: RADIOLOGY | Facility: HOSPITAL | Age: 63
DRG: 661 | End: 2023-11-02
Payer: COMMERCIAL

## 2023-11-02 ENCOUNTER — AMB VIDEO VISIT (OUTPATIENT)
Dept: OTHER | Facility: HOSPITAL | Age: 63
End: 2023-11-02

## 2023-11-02 ENCOUNTER — HOSPITAL ENCOUNTER (INPATIENT)
Facility: HOSPITAL | Age: 63
LOS: 1 days | Discharge: HOME | DRG: 661 | End: 2023-11-03
Attending: EMERGENCY MEDICINE | Admitting: INTERNAL MEDICINE
Payer: COMMERCIAL

## 2023-11-02 VITALS
RESPIRATION RATE: 12 BRPM | DIASTOLIC BLOOD PRESSURE: 78 MMHG | HEART RATE: 76 BPM | SYSTOLIC BLOOD PRESSURE: 145 MMHG | TEMPERATURE: 97.9 F

## 2023-11-02 DIAGNOSIS — R10.32 LLQ PAIN: Primary | ICD-10-CM

## 2023-11-02 DIAGNOSIS — N20.1 URETER, CALCULUS: ICD-10-CM

## 2023-11-02 DIAGNOSIS — N20.0 LEFT NEPHROLITHIASIS: Primary | ICD-10-CM

## 2023-11-02 DIAGNOSIS — N13.2 HYDRONEPHROSIS WITH URINARY OBSTRUCTION DUE TO URETERAL CALCULUS: ICD-10-CM

## 2023-11-02 PROBLEM — R79.89 ELEVATED SERUM CREATININE: Status: ACTIVE | Noted: 2023-11-02

## 2023-11-02 PROBLEM — Z12.11 SCREEN FOR COLON CANCER: Status: RESOLVED | Noted: 2018-12-18 | Resolved: 2023-11-02

## 2023-11-02 PROBLEM — F17.210 CIGARETTE NICOTINE DEPENDENCE WITHOUT COMPLICATION: Status: ACTIVE | Noted: 2017-11-22

## 2023-11-02 LAB
ALBUMIN SERPL BCP-MCNC: 4.2 G/DL (ref 3.5–5)
ALP SERPL-CCNC: 90 U/L (ref 34–104)
ALT SERPL W P-5'-P-CCNC: 16 U/L (ref 7–52)
ANION GAP SERPL CALCULATED.3IONS-SCNC: 7 MMOL/L
AST SERPL W P-5'-P-CCNC: 13 U/L (ref 13–39)
BACTERIA UR QL AUTO: ABNORMAL /HPF
BASOPHILS # BLD AUTO: 0.08 THOUSANDS/ÂΜL (ref 0–0.1)
BASOPHILS NFR BLD AUTO: 1 % (ref 0–1)
BILIRUB SERPL-MCNC: 0.54 MG/DL (ref 0.2–1)
BILIRUB UR QL STRIP: NEGATIVE
BUN SERPL-MCNC: 20 MG/DL (ref 5–25)
CALCIUM SERPL-MCNC: 8.7 MG/DL (ref 8.4–10.2)
CHLORIDE SERPL-SCNC: 103 MMOL/L (ref 96–108)
CLARITY UR: CLEAR
CO2 SERPL-SCNC: 26 MMOL/L (ref 21–32)
COLOR UR: YELLOW
CREAT SERPL-MCNC: 1.42 MG/DL (ref 0.6–1.3)
EOSINOPHIL # BLD AUTO: 0.1 THOUSAND/ÂΜL (ref 0–0.61)
EOSINOPHIL NFR BLD AUTO: 1 % (ref 0–6)
ERYTHROCYTE [DISTWIDTH] IN BLOOD BY AUTOMATED COUNT: 13.2 % (ref 11.6–15.1)
GFR SERPL CREATININE-BSD FRML MDRD: 52 ML/MIN/1.73SQ M
GLUCOSE SERPL-MCNC: 118 MG/DL (ref 65–140)
GLUCOSE UR STRIP-MCNC: NEGATIVE MG/DL
HCT VFR BLD AUTO: 39.8 % (ref 36.5–49.3)
HGB BLD-MCNC: 13.5 G/DL (ref 12–17)
HGB UR QL STRIP.AUTO: ABNORMAL
HYALINE CASTS #/AREA URNS LPF: ABNORMAL /LPF
IMM GRANULOCYTES # BLD AUTO: 0.1 THOUSAND/UL (ref 0–0.2)
IMM GRANULOCYTES NFR BLD AUTO: 1 % (ref 0–2)
KETONES UR STRIP-MCNC: NEGATIVE MG/DL
LEUKOCYTE ESTERASE UR QL STRIP: ABNORMAL
LIPASE SERPL-CCNC: 28 U/L (ref 11–82)
LYMPHOCYTES # BLD AUTO: 1.05 THOUSANDS/ÂΜL (ref 0.6–4.47)
LYMPHOCYTES NFR BLD AUTO: 6 % (ref 14–44)
MCH RBC QN AUTO: 30.6 PG (ref 26.8–34.3)
MCHC RBC AUTO-ENTMCNC: 33.9 G/DL (ref 31.4–37.4)
MCV RBC AUTO: 90 FL (ref 82–98)
MONOCYTES # BLD AUTO: 1.53 THOUSAND/ÂΜL (ref 0.17–1.22)
MONOCYTES NFR BLD AUTO: 9 % (ref 4–12)
MUCOUS THREADS UR QL AUTO: ABNORMAL
NEUTROPHILS # BLD AUTO: 13.97 THOUSANDS/ÂΜL (ref 1.85–7.62)
NEUTS SEG NFR BLD AUTO: 82 % (ref 43–75)
NITRITE UR QL STRIP: NEGATIVE
NON-SQ EPI CELLS URNS QL MICRO: ABNORMAL /HPF
NRBC BLD AUTO-RTO: 0 /100 WBCS
PH UR STRIP.AUTO: 6 [PH]
PLATELET # BLD AUTO: 253 THOUSANDS/UL (ref 149–390)
PMV BLD AUTO: 9.1 FL (ref 8.9–12.7)
POTASSIUM SERPL-SCNC: 4 MMOL/L (ref 3.5–5.3)
PROT SERPL-MCNC: 6.9 G/DL (ref 6.4–8.4)
PROT UR STRIP-MCNC: ABNORMAL MG/DL
RBC # BLD AUTO: 4.41 MILLION/UL (ref 3.88–5.62)
RBC #/AREA URNS AUTO: ABNORMAL /HPF
SODIUM SERPL-SCNC: 136 MMOL/L (ref 135–147)
SP GR UR STRIP.AUTO: >=1.05 (ref 1–1.03)
UROBILINOGEN UR STRIP-ACNC: <2 MG/DL
WBC # BLD AUTO: 16.83 THOUSAND/UL (ref 4.31–10.16)
WBC #/AREA URNS AUTO: ABNORMAL /HPF

## 2023-11-02 PROCEDURE — 74177 CT ABD & PELVIS W/CONTRAST: CPT

## 2023-11-02 PROCEDURE — 85025 COMPLETE CBC W/AUTO DIFF WBC: CPT

## 2023-11-02 PROCEDURE — 96361 HYDRATE IV INFUSION ADD-ON: CPT

## 2023-11-02 PROCEDURE — 87086 URINE CULTURE/COLONY COUNT: CPT | Performed by: EMERGENCY MEDICINE

## 2023-11-02 PROCEDURE — 87186 SC STD MICRODIL/AGAR DIL: CPT | Performed by: EMERGENCY MEDICINE

## 2023-11-02 PROCEDURE — ECARE PR SL URGENT CARE VIRTUAL VISIT: Performed by: PHYSICIAN ASSISTANT

## 2023-11-02 PROCEDURE — 96374 THER/PROPH/DIAG INJ IV PUSH: CPT

## 2023-11-02 PROCEDURE — 80053 COMPREHEN METABOLIC PANEL: CPT

## 2023-11-02 PROCEDURE — 36415 COLL VENOUS BLD VENIPUNCTURE: CPT

## 2023-11-02 PROCEDURE — 81001 URINALYSIS AUTO W/SCOPE: CPT | Performed by: EMERGENCY MEDICINE

## 2023-11-02 PROCEDURE — 96375 TX/PRO/DX INJ NEW DRUG ADDON: CPT

## 2023-11-02 PROCEDURE — 87077 CULTURE AEROBIC IDENTIFY: CPT | Performed by: EMERGENCY MEDICINE

## 2023-11-02 PROCEDURE — 99285 EMERGENCY DEPT VISIT HI MDM: CPT | Performed by: EMERGENCY MEDICINE

## 2023-11-02 PROCEDURE — 99284 EMERGENCY DEPT VISIT MOD MDM: CPT

## 2023-11-02 PROCEDURE — NC001 PR NO CHARGE: Performed by: EMERGENCY MEDICINE

## 2023-11-02 PROCEDURE — 83690 ASSAY OF LIPASE: CPT

## 2023-11-02 PROCEDURE — G1004 CDSM NDSC: HCPCS

## 2023-11-02 PROCEDURE — 99223 1ST HOSP IP/OBS HIGH 75: CPT | Performed by: INTERNAL MEDICINE

## 2023-11-02 RX ORDER — ASPIRIN 81 MG/1
81 TABLET, CHEWABLE ORAL DAILY
Status: DISCONTINUED | OUTPATIENT
Start: 2023-11-03 | End: 2023-11-03 | Stop reason: HOSPADM

## 2023-11-02 RX ORDER — KETOROLAC TROMETHAMINE 30 MG/ML
15 INJECTION, SOLUTION INTRAMUSCULAR; INTRAVENOUS ONCE
Status: COMPLETED | OUTPATIENT
Start: 2023-11-02 | End: 2023-11-02

## 2023-11-02 RX ORDER — AMLODIPINE BESYLATE 5 MG/1
10 TABLET ORAL DAILY
Status: DISCONTINUED | OUTPATIENT
Start: 2023-11-03 | End: 2023-11-03 | Stop reason: HOSPADM

## 2023-11-02 RX ORDER — TAMSULOSIN HYDROCHLORIDE 0.4 MG/1
0.4 CAPSULE ORAL
Status: DISCONTINUED | OUTPATIENT
Start: 2023-11-02 | End: 2023-11-03 | Stop reason: HOSPADM

## 2023-11-02 RX ORDER — ACETAMINOPHEN 325 MG/1
650 TABLET ORAL EVERY 6 HOURS PRN
Status: DISCONTINUED | OUTPATIENT
Start: 2023-11-02 | End: 2023-11-03 | Stop reason: HOSPADM

## 2023-11-02 RX ORDER — HYDROMORPHONE HCL IN WATER/PF 6 MG/30 ML
0.2 PATIENT CONTROLLED ANALGESIA SYRINGE INTRAVENOUS EVERY 4 HOURS PRN
Status: DISCONTINUED | OUTPATIENT
Start: 2023-11-02 | End: 2023-11-03 | Stop reason: HOSPADM

## 2023-11-02 RX ORDER — CARVEDILOL 12.5 MG/1
25 TABLET ORAL 2 TIMES DAILY WITH MEALS
Status: DISCONTINUED | OUTPATIENT
Start: 2023-11-03 | End: 2023-11-03 | Stop reason: HOSPADM

## 2023-11-02 RX ORDER — FLECAINIDE ACETATE 50 MG/1
50 TABLET ORAL 2 TIMES DAILY
Status: DISCONTINUED | OUTPATIENT
Start: 2023-11-03 | End: 2023-11-03 | Stop reason: HOSPADM

## 2023-11-02 RX ORDER — ONDANSETRON 2 MG/ML
4 INJECTION INTRAMUSCULAR; INTRAVENOUS EVERY 6 HOURS PRN
Status: DISCONTINUED | OUTPATIENT
Start: 2023-11-02 | End: 2023-11-03 | Stop reason: HOSPADM

## 2023-11-02 RX ORDER — PRAVASTATIN SODIUM 80 MG/1
80 TABLET ORAL
Status: DISCONTINUED | OUTPATIENT
Start: 2023-11-02 | End: 2023-11-03 | Stop reason: HOSPADM

## 2023-11-02 RX ORDER — CHLORAL HYDRATE 500 MG
1000 CAPSULE ORAL DAILY
Status: DISCONTINUED | OUTPATIENT
Start: 2023-11-03 | End: 2023-11-03 | Stop reason: HOSPADM

## 2023-11-02 RX ORDER — SODIUM CHLORIDE, SODIUM GLUCONATE, SODIUM ACETATE, POTASSIUM CHLORIDE, MAGNESIUM CHLORIDE, SODIUM PHOSPHATE, DIBASIC, AND POTASSIUM PHOSPHATE .53; .5; .37; .037; .03; .012; .00082 G/100ML; G/100ML; G/100ML; G/100ML; G/100ML; G/100ML; G/100ML
125 INJECTION, SOLUTION INTRAVENOUS CONTINUOUS
Status: DISCONTINUED | OUTPATIENT
Start: 2023-11-02 | End: 2023-11-03 | Stop reason: HOSPADM

## 2023-11-02 RX ORDER — OXYCODONE HYDROCHLORIDE 5 MG/1
5 TABLET ORAL EVERY 4 HOURS PRN
Status: DISCONTINUED | OUTPATIENT
Start: 2023-11-02 | End: 2023-11-03 | Stop reason: HOSPADM

## 2023-11-02 RX ADMIN — CEFTRIAXONE SODIUM 1000 MG: 10 INJECTION, POWDER, FOR SOLUTION INTRAVENOUS at 22:53

## 2023-11-02 RX ADMIN — KETOROLAC TROMETHAMINE 15 MG: 30 INJECTION, SOLUTION INTRAMUSCULAR; INTRAVENOUS at 19:20

## 2023-11-02 RX ADMIN — IOHEXOL 100 ML: 350 INJECTION, SOLUTION INTRAVENOUS at 20:49

## 2023-11-02 RX ADMIN — SODIUM CHLORIDE 1000 ML: 0.9 INJECTION, SOLUTION INTRAVENOUS at 19:19

## 2023-11-02 NOTE — CARE ANYWHERE EVISITS
Visit Summary for Beckie Tejal Salvatoreluz - Gender: Male - Date of Birth: [id number]  Date: [id number] - Duration: 20 minutes  Patient: Beckie Gardiner  Provider: Marty Mercado PA-C    Patient Contact Information  Address  [address] [place] [address]; [address] [place] [address]  [phone]    Visit Topics  Flu-Like Symptoms [Added By: Self - 2023-11-02]    Triage Questions   What is your current physical address in the event of a medical emergency? Answer []  Are you allergic to any medications? Answer []  Are you now or could you be pregnant? Answer []  Do you have any immune system compromise or chronic lung   disease? Answer []  Do you have any vulnerable family members in the home (infant, pregnant, cancer, elderly)? Answer []     Conversation Transcripts  [0A][0A] [Notification] You are connected with Marty Mercado PA-C, Urgent Care Specialist.[0A][Notification] Shari Smoker is located in [place]. [0A][Notification] Shari Smoker has shared health history. Juan Johnston .[0A]    Diagnosis  Left lower quadrant pain    Procedures  Value: [id number] Code: CPT-4 UNLISTED E&M SERVICE    Medications Prescribed    No prescriptions ordered    Provider Notes  [0A][0A] ED eval[0A]    Electronically signed by: Deyanira Barajas(NPI [id number])

## 2023-11-02 NOTE — PATIENT INSTRUCTIONS
Proceed to [address] [place] [address]  [address] [facility], [address] [place] [address]

## 2023-11-02 NOTE — PROGRESS NOTES
Required Documentation:  Encounter provider Vangie Freire PA-C    Provider located at [address] [place] [phone]    Identify all parties in room with patient during virtual visit:  significant other-permission granted    The patient was identified by name and date of birth. Martin Hicks was informed that this is a telemedicine visit and that the visit is being conducted through the [address] [place] [address] Dr platform. He agrees to proceed. .  My office door was closed. The patient was notified the following individuals were present in the room Verónica Ross PA-C, permission granted. He acknowledged consent and understanding of privacy and security of the video platform. The patient has agreed to participate and understands they can discontinue the visit at any time. Verification of patient location:    Patient is located at home in the following state in which I hold an active license [place]    Patient is aware this is a billable service. Reason for visit is No chief complaint on file. Subjective  HPI   Pt reports constipation starting 2 weeks ago intermittently with diarrhea. Had incontinence x 1 2 weeks ago. He saw his doctor last Friday (6 days ago) and was told to get mag citrate which relieved constipation last Friday. Today reports tenesmus. Has tried enema today with some light brown BM of formed stool. Feels there is "a knot" in the middle of his intestines now. Reports LLQ pain ranging from 1-9/10, currently 5/10 and radiates to back, feels like gas pain. Reports he is nausea and has chills now. Decreased appetite. Nothing taken for pain. No blood in the stool. Had a colonoscopy 1-2 years ago, showed diverticulosis. No Hx abdominal surgery. Denies vomiting. Denies dysuria, hematuria.      Past Medical History:   Diagnosis Date    A-fib ([facility]) 08/24/2021    Disc degeneration, lumbosacral 1/6/2016 Hyperlipidemia     hypercholesterolemia    Hypertension     last assessed 11/22/17    Pneumonia of both lower lobes due to infectious organism 1/29/2018       Past Surgical History:   Procedure Laterality Date    COLONOSCOPY      HEMORRHOID SURGERY      KNEE SURGERY Bilateral     MD COLONOSCOPY FLX DX W/COLLJ SPEC WHEN PFRMD N/A 1/5/2019    Procedure: COLONOSCOPY;  Surgeon: Minh Jensen MD;  [facility]; Service: Gastroenterology    UPPER GASTROINTESTINAL ENDOSCOPY          No Known Allergies    Review of Systems   Constitutional:  Negative for fever. HENT:  Negative for nosebleeds. Eyes:  Negative for redness. Respiratory:  Negative for shortness of breath. Cardiovascular:  Negative for chest pain. Gastrointestinal:  Positive for abdominal pain. Negative for blood in stool. Genitourinary:  Negative for hematuria. Musculoskeletal:  Negative for gait problem. Skin:  Negative for rash. Neurological:  Negative for seizures. Psychiatric/Behavioral:  Negative for behavioral problems. Video Exam    Vitals:    11/02/23 1723   BP: 145/78   Pulse: 76   Resp: 12   Temp: 97.9 °F (36.6 °C)       Physical Exam  Constitutional:       General: He is in acute distress (mild). Appearance: Normal appearance. He is not toxic-appearing. HENT:      Head: Normocephalic and atraumatic. Nose: No rhinorrhea. Mouth/Throat:      Mouth: Mucous membranes are moist.   Eyes:      Conjunctiva/sclera: Conjunctivae normal.      Comments: glasses   Cardiovascular:      Rate and Rhythm: Normal rate. Pulmonary:      Effort: Pulmonary effort is normal. No respiratory distress. Breath sounds: No wheezing (no gross audible wheeze through computer). Abdominal:      Palpations: Abdomen is soft. Tenderness: There is abdominal tenderness in the left lower quadrant. Comments: Abd tend to LLQ and L hypogastric region   Musculoskeletal:      Cervical back: Normal range of motion. Skin:     Findings: No rash (on face or neck). Neurological:      Mental Status: He is alert. Cranial Nerves: No dysarthria or facial asymmetry. Psychiatric:         Mood and Affect: Mood normal.         Behavior: Behavior normal.       Colonscopy performed 3/24/22:  FINDINGS:  The cecum appeared normal.  Multiple diverticula in the sigmoid colon  Bowel Preparation- some liquid stool with solid particles in the left colon was washed off and suctioned  Internal hemorrhoids  Visit Time  Total Visit Duration: 21 minutes      Spoke with Dr. Zahraa Troy, [facility] and informed of patient. With fluctuating diarrhea and constipation with worsening abdominal pain in LLQ concern for diverticulitis vs colitis. Less likely obstruction given neg colonoscopy and no prior abd surgeries.    Assessment/Plan:    Diagnoses and all orders for this visit:    LLQ pain  -     Transfer to other facility        Patient Instructions   Proceed to [facility] ED  [address]  [address]

## 2023-11-02 NOTE — LETTER
[address] [address]  Dept: [phone]    November 3, 2023     Patient: Yisel Brambila   YOB: 1960   Date of Visit: 11/2/2023       To Whom it May Concern:    Yisel Brambila is under my professional care. He was seen in the hospital from 11/2/2023 to 11/03/23. He may return to work on 11/8/23 without limitations. If you have any questions or concerns, please don't hesitate to call.          Sincerely,          TAMICA Eastman

## 2023-11-02 NOTE — ED PROVIDER NOTES
History  Chief Complaint   Patient presents with    Constipation     Pt c/o constipation worsening for the past week. Pt states he feels like his stool is difficult to pass. Pt states he feels like he has to have a BM and can't. Pt states he is passing gas. Pt has tried calcium citrate in the past week and enemas     72-year-old male with past medical history hypertension, hyperlipidemia, presents to ED for evaluation of left lower quadrant pain x1 week. Patient states that he has had constant left lower quadrant pain that "feels like I have to go to the bathroom, but cannot ". Patient states that over the past week, he has taken several laxatives, and has been having soft bowel movements. Last bowel movement this morning. Patient states that today when he got home from work, he felt like he had chills. Denies nausea, vomiting, chest pain, shortness of breath, urinary problems, hematuria. Patient states that he had a colonoscopy last year and was told that he had diverticulosis. Patient denies a history of abdominal surgery. Prior to Admission Medications   Prescriptions Last Dose Informant Patient Reported? Taking?    Coenzyme Q10 (COQ-10) 10 MG CAPS  Self Yes No   Sig: Take by mouth   Multiple Vitamins-Minerals (MENS MULTIVITAMIN PLUS PO)  Self Yes No   Sig: Take by mouth   Omega-3 Fatty Acids (FISH OIL) 1200 MG CAPS  Self Yes No   Sig: Take by mouth   Zinc 50 MG CAPS  Self Yes No   Sig: Take by mouth   amLODIPine (NORVASC) 5 mg tablet   No No   Sig: Take 2 tablets (10 mg total) by mouth daily   ascorbic acid (VITAMIN C) 500 mg tablet  Self Yes No   Sig: Take 500 mg by mouth daily   aspirin 81 mg chewable tablet  Self No No   Sig: Chew 1 tablet (81 mg total) daily   carvedilol (COREG) 25 mg tablet  Self No No   Sig: Take 1 tablet (25 mg total) by mouth 2 (two) times a day with meals   flecainide (TAMBOCOR) 50 mg tablet  Self No No   Sig: TAKE 1 TABLET TWICE A DAY   ibuprofen (MOTRIN) 600 mg tablet No No   Sig: Take 1 tablet (600 mg total) by mouth every 8 (eight) hours as needed for mild pain   simvastatin (ZOCOR) 40 mg tablet  Self No No   Sig: TAKE 1 TABLET DAILY AT BEDTIME   tadalafil (CIALIS) 10 MG tablet   No No   Sig: Take 1 tablet (10 mg total) by mouth daily as needed for erectile dysfunction      Facility-Administered Medications: None       Past Medical History:   Diagnosis Date    A-fib ([address]) 08/24/2021    Disc degeneration, lumbosacral 1/6/2016    Hyperlipidemia     hypercholesterolemia    Hypertension     last assessed 11/22/17    Pneumonia of both lower lobes due to infectious organism 1/29/2018       Past Surgical History:   Procedure Laterality Date    COLONOSCOPY      HEMORRHOID SURGERY      KNEE SURGERY Bilateral     NM COLONOSCOPY FLX DX W/COLLJ SPEC WHEN PFRMD N/A 1/5/2019    Procedure: COLONOSCOPY;  Surgeon: Ashwini Mendes MD;  [facility]; Service: Gastroenterology    UPPER GASTROINTESTINAL ENDOSCOPY         Family History   Problem Relation Age of Onset    Obesity Mother     Aneurysm Father      I have reviewed and agree with the history as documented. E-Cigarette/Vaping    E-Cigarette Use Never User      E-Cigarette/Vaping Substances    Nicotine No     THC No     CBD No     Flavoring No     Other No     Unknown No      Social History     Tobacco Use    Smoking status: Every Day     Packs/day: 0.50     Years: 40.00     Total pack years: 20.00     Types: Cigarettes    Smokeless tobacco: Never   Vaping Use    Vaping Use: Never used   Substance Use Topics    Alcohol use: Yes     Comment: Occassional 1 x / month    Drug use: No        Review of Systems   Constitutional:  Positive for chills. HENT:  Negative for congestion, rhinorrhea and sore throat. Respiratory:  Negative for cough and shortness of breath. Cardiovascular:  Negative for chest pain, palpitations and leg swelling. Gastrointestinal:  Positive for abdominal pain.  Negative for blood in stool, nausea and vomiting. Genitourinary:  Negative for difficulty urinating, dysuria, flank pain, frequency and hematuria. Musculoskeletal:  Negative for back pain and neck pain. Neurological:  Negative for headaches. Psychiatric/Behavioral:  Negative for behavioral problems and confusion. Physical Exam  ED Triage Vitals   Temperature Pulse Respirations Blood Pressure SpO2   11/02/23 1826 11/02/23 1826 11/02/23 1826 11/02/23 1826 11/02/23 1826   98.6 °F (37 °C) 80 18 (!) 183/94 97 %      Temp Source Heart Rate Source Patient Position - Orthostatic VS BP Location FiO2 (%)   11/02/23 1826 11/02/23 1826 11/02/23 1826 11/02/23 1826 --   Oral Monitor Lying Right arm       Pain Score       11/02/23 1919       3             Orthostatic Vital Signs  Vitals:    11/02/23 1826 11/02/23 2056 11/03/23 0432   BP: (!) 183/94 (!) 178/82 110/81   Pulse: 80 80 82   Patient Position - Orthostatic VS: Lying  Lying       Physical Exam  Vitals and nursing note reviewed. Constitutional:       General: He is not in acute distress. Appearance: Normal appearance. He is obese. He is not ill-appearing, toxic-appearing or diaphoretic. HENT:      Head: Normocephalic and atraumatic. Mouth/Throat:      Mouth: Mucous membranes are moist.      Pharynx: Oropharynx is clear. Eyes:      Conjunctiva/sclera: Conjunctivae normal.   Cardiovascular:      Rate and Rhythm: Normal rate and regular rhythm. Pulses: Normal pulses. Heart sounds: Normal heart sounds. Pulmonary:      Effort: Pulmonary effort is normal.      Breath sounds: Wheezing (diffuse expiratory wheezing b/l) present. Abdominal:      General: There is no distension. Palpations: Abdomen is soft. Tenderness: There is abdominal tenderness (mild LLQ tenderness). There is no right CVA tenderness, left CVA tenderness, guarding or rebound. Musculoskeletal:      Right lower leg: No edema. Left lower leg: No edema. Skin:     General: Skin is warm and dry. Capillary Refill: Capillary refill takes less than 2 seconds. Neurological:      Mental Status: He is alert and oriented to person, place, and time.    Psychiatric:         Mood and Affect: Mood normal.         Behavior: Behavior normal.         ED Medications  Medications   amLODIPine (NORVASC) tablet 10 mg (has no administration in time range)   aspirin chewable tablet 81 mg (has no administration in time range)   carvedilol (COREG) tablet 25 mg (has no administration in time range)   flecainide (TAMBOCOR) tablet 50 mg (has no administration in time range)   multivitamin-minerals (CENTRUM) tablet 1 tablet (has no administration in time range)   fish oil capsule 1,000 mg (has no administration in time range)   pravastatin (PRAVACHOL) tablet 80 mg (80 mg Oral Given 11/3/23 0010)   ondansetron (ZOFRAN) injection 4 mg (has no administration in time range)   acetaminophen (TYLENOL) tablet 650 mg (has no administration in time range)   multi-electrolyte (PLASMALYTE-A/ISOLYTE-S PH 7.4) IV solution (125 mL/hr Intravenous New Bag 11/3/23 0011)   ceftriaxone (ROCEPHIN) 1 g/50 mL in dextrose IVPB (has no administration in time range)   oxyCODONE (ROXICODONE) split tablet 2.5 mg (has no administration in time range)   HYDROmorphone HCl (DILAUDID) injection 0.2 mg (has no administration in time range)   oxyCODONE (ROXICODONE) IR tablet 5 mg (has no administration in time range)   tamsulosin (FLOMAX) capsule 0.4 mg (0.4 mg Oral Given 11/3/23 0011)   sodium chloride 0.9 % bolus 1,000 mL (0 mL Intravenous Stopped 11/2/23 2206)   ketorolac (TORADOL) injection 15 mg (15 mg Intravenous Given 11/2/23 1920)   iohexol (OMNIPAQUE) 350 MG/ML injection (SINGLE-DOSE) 100 mL (100 mL Intravenous Given 11/2/23 2049)   ceftriaxone (ROCEPHIN) 1 g/50 mL in dextrose IVPB (0 mg Intravenous Stopped 11/2/23 2323)       Diagnostic Studies  Results Reviewed       Procedure Component Value Units Date/Time    CBC (With Platelets) [[id number]] (Abnormal) Collected: 11/03/23 0436    Lab Status: Final result Specimen: Blood from Arm, Right Updated: 11/03/23 0702     WBC 15.66 Thousand/uL      RBC 4.12 Million/uL      Hemoglobin 12.4 g/dL      Hematocrit 38.4 %      MCV 93 fL      MCH 30.1 pg      MCHC 32.3 g/dL      RDW 13.7 %      Platelets 694 Thousands/uL      MPV 10.0 fL     Basic metabolic panel [[id number]]  (Abnormal) Collected: 11/03/23 0436    Lab Status: Final result Specimen: Blood from Arm, Right Updated: 11/03/23 0655     Sodium 136 mmol/L      Potassium 3.5 mmol/L      Chloride 104 mmol/L      CO2 22 mmol/L      ANION GAP 10 mmol/L      BUN 16 mg/dL      Creatinine 1.19 mg/dL      Glucose 108 mg/dL      Calcium 8.2 mg/dL      eGFR 64 ml/min/1.73sq m     Narrative:      [facility] guidelines for Chronic Kidney Disease (CKD):     Stage 1 with normal or high GFR (GFR > 90 mL/min/1.73 square meters)    Stage 2 Mild CKD (GFR = 60-89 mL/min/1.73 square meters)    Stage 3A Moderate CKD (GFR = 45-59 mL/min/1.73 square meters)    Stage 3B Moderate CKD (GFR = 30-44 mL/min/1.73 square meters)    Stage 4 Severe CKD (GFR = 15-29 mL/min/1.73 square meters)    Stage 5 End Stage CKD (GFR <15 mL/min/1.73 square meters)  Note: GFR calculation is accurate only with a steady state creatinine    Urine Microscopic [[id number]]  (Abnormal) Collected: 11/02/23 2142    Lab Status: Final result Specimen: Urine, Clean Catch Updated: 11/02/23 2157     RBC, UA 30-50 /hpf      WBC, UA Innumerable /hpf      Epithelial Cells None Seen /hpf      Bacteria, UA Occasional /hpf      MUCUS THREADS Occasional     Hyaline Casts, UA 10-25 /lpf     Urine culture [[id number]] Collected: 11/02/23 2142    Lab Status:  In process Specimen: Urine, Clean Catch Updated: 11/02/23 2157    UA w Reflex to Microscopic w Reflex to Culture [[id number]]  (Abnormal) Collected: 11/02/23 2142    Lab Status: Final result Specimen: Urine, Clean Catch Updated: 11/02/23 2155     Color, UA Yellow     Clarity, UA Clear     Specific Gravity, UA >=1.050     pH, UA 6.0     Leukocytes, UA Moderate     Nitrite, UA Negative     Protein, UA 50 (1+) mg/dl      Glucose, UA Negative mg/dl      Ketones, UA Negative mg/dl      Urobilinogen, UA <2.0 mg/dl      Bilirubin, UA Negative     Occult Blood, UA Moderate    Comprehensive metabolic panel [[id number]]  (Abnormal) Collected: 11/02/23 1917    Lab Status: Final result Specimen: Blood from Arm, Right Updated: 11/02/23 2008     Sodium 136 mmol/L      Potassium 4.0 mmol/L      Chloride 103 mmol/L      CO2 26 mmol/L      ANION GAP 7 mmol/L      BUN 20 mg/dL      Creatinine 1.42 mg/dL      Glucose 118 mg/dL      Calcium 8.7 mg/dL      AST 13 U/L      ALT 16 U/L      Alkaline Phosphatase 90 U/L      Total Protein 6.9 g/dL      Albumin 4.2 g/dL      Total Bilirubin 0.54 mg/dL      eGFR 52 ml/min/1.73sq m     Narrative:      [place] guidelines for Chronic Kidney Disease (CKD):     Stage 1 with normal or high GFR (GFR > 90 mL/min/1.73 square meters)    Stage 2 Mild CKD (GFR = 60-89 mL/min/1.73 square meters)    Stage 3A Moderate CKD (GFR = 45-59 mL/min/1.73 square meters)    Stage 3B Moderate CKD (GFR = 30-44 mL/min/1.73 square meters)    Stage 4 Severe CKD (GFR = 15-29 mL/min/1.73 square meters)    Stage 5 End Stage CKD (GFR <15 mL/min/1.73 square meters)  Note: GFR calculation is accurate only with a steady state creatinine    Lipase [[id number]]  (Normal) Collected: 11/02/23 1917    Lab Status: Final result Specimen: Blood from Arm, Right Updated: 11/02/23 2008     Lipase 28 u/L     CBC and differential [[id number]]  (Abnormal) Collected: 11/02/23 1917    Lab Status: Final result Specimen: Blood from Arm, Right Updated: 11/02/23 1936     WBC 16.83 Thousand/uL      RBC 4.41 Million/uL      Hemoglobin 13.5 g/dL      Hematocrit 39.8 %      MCV 90 fL      MCH 30.6 pg      MCHC 33.9 g/dL      RDW 13.2 %      MPV 9.1 fL      Platelets 063 Thousands/uL      nRBC 0 /100 WBCs      Neutrophils Relative 82 %      Immat GRANS % 1 %      Lymphocytes Relative 6 %      Monocytes Relative 9 %      Eosinophils Relative 1 %      Basophils Relative 1 %      Neutrophils Absolute 13.97 Thousands/µL      Immature Grans Absolute 0.10 Thousand/uL      Lymphocytes Absolute 1.05 Thousands/µL      Monocytes Absolute 1.53 Thousand/µL      Eosinophils Absolute 0.10 Thousand/µL      Basophils Absolute 0.08 Thousands/µL                    CT abdomen pelvis with contrast   Final Result by Gerald Benavides MD (11/02 2124)      1. Obstructing 8 mm calculus at the left ureterovesicular junction with moderate left hydroureteronephrosis. 2.  Hepatic steatosis. The study was marked in [facility] for immediate notification. Workstation performed: [id number]         FL retrograde pyelogram    (Results Pending)         Procedures  Procedures      ED Course  ED Course as of 11/03/23 0853   u Nov 02, 2023 1936 WBC(!): 16.83  Elevated   2019 Lipase: 28  Within normal limits   2131 CT abdomen pelvis with contrast  1. Obstructing 8 mm calculus at the left ureterovesicular junction with moderate left hydroureteronephrosis. 2.  Hepatic steatosis. Will collect                              SBIRT 20yo+      Flowsheet Row Most Recent Value   Initial Alcohol Screen: US AUDIT-C     1. How often do you have a drink containing alcohol? 0 Filed at: 11/02/2023 1951   2. How many drinks containing alcohol do you have on a typical day you are drinking? 0 Filed at: 11/02/2023 1951   3a. Male UNDER 65: How often do you have five or more drinks on one occasion? 0 Filed at: 11/02/2023 1951   Audit-C Score 0 Filed at: 11/02/2023 1951   DIDI: How many times in the past year have you. .. Used an illegal drug or used a prescription medication for non-medical reasons?  Never Filed at: 11/02/2023 [address] is a 61 y.o. male who presents to ED for evaluation of LLQ abd pain    Physical exam: Vitals stable. Afebrile. .No acute distress. Heart RRR. Lungs with diffuse expiratory wheezing b/l. Abd soft, nondistended with mild tenderness to palpation of LLQ. No rebound or guarding. No CVA tenderness. Differential diagnoses include: diverticulitis, constipation. I doubt appendicitis, cholecystitis, pancreatitis, SBO, nephrolithiasis    Workup includes: CBC, CMP, lipase, CT A/P. Toradol for pain. WBC 16.83. CT Abdomen pelvis showed: 1. Obstructing 8 mm calculus at the left ureterovesicular junction with moderate left hydroureteronephrosis. 2.  Hepatic steatosis. UA showed innumerable WBC. Plan to admit to [facility] for infected kidney stone. A dose of rocephin was given in the emergency department. Plan to go to OR for stone removal.   Please see ED Course for additional information. Amount and/or Complexity of Data Reviewed  Labs: ordered. Decision-making details documented in ED Course. Radiology: ordered. Decision-making details documented in ED Course. Risk  Prescription drug management. Decision regarding hospitalization.           Disposition  Final diagnoses:   Left nephrolithiasis     Time reflects when diagnosis was documented in both MDM as applicable and the Disposition within this note       Time User Action Codes Description Comment    11/2/2023  9:11 PM Zackery Case Add [R10.32] Left lower quadrant pain     11/2/2023 10:52 PM Howard Sigifredo A Add [N20.1] Ureter, calculus     11/2/2023 10:53 PM Alric Squire Modify [N20.1] Ureter, calculus     11/2/2023 10:53 PM Alric Squire Remove [R10.32] Left lower quadrant pain     11/2/2023 10:53 PM Alric Squire Add [N20.0] Left nephrolithiasis     11/2/2023 10:53 PM Alric Squire Modify [N20.1] Ureter, calculus     11/2/2023 10:53 PM Alric Squire Modify [N20.0] Left nephrolithiasis     11/2/2023 11:04 PM Levada Hermes Add [N13.2] Hydronephrosis with urinary obstruction due to ureteral calculus           ED Disposition       ED Disposition   Admit    Condition   Stable    Date/Time   Thu Nov 2, 2023 2112    Comment   Case was discussed with Dr. Milana Noland and the patient's admission status was agreed to be Admission Status: inpatient status to the service of Dr. Milana Noland. Follow-up Information    None         Current Discharge Medication List        CONTINUE these medications which have NOT CHANGED    Details   amLODIPine (NORVASC) 5 mg tablet Take 2 tablets (10 mg total) by mouth daily  Qty: 180 tablet, Refills: 1    Associated Diagnoses: Hypertension, unspecified type      ascorbic acid (VITAMIN C) 500 mg tablet Take 500 mg by mouth daily      aspirin 81 mg chewable tablet Chew 1 tablet (81 mg total) daily  Qty: 90 tablet, Refills: 3    Associated Diagnoses: PAF (paroxysmal atrial fibrillation) (HCC)      carvedilol (COREG) 25 mg tablet Take 1 tablet (25 mg total) by mouth 2 (two) times a day with meals  Qty: 180 tablet, Refills: 3    Associated Diagnoses: Paroxysmal atrial fibrillation ([address]);  Primary hypertension      Coenzyme Q10 (COQ-10) 10 MG CAPS Take by mouth      flecainide (TAMBOCOR) 50 mg tablet TAKE 1 TABLET TWICE A DAY  Qty: 180 tablet, Refills: 3    Associated Diagnoses: Paroxysmal atrial fibrillation (HCC)      ibuprofen (MOTRIN) 600 mg tablet Take 1 tablet (600 mg total) by mouth every 8 (eight) hours as needed for mild pain  Qty: 30 tablet, Refills: 1    Associated Diagnoses: Pain of right hip      Multiple Vitamins-Minerals (MENS MULTIVITAMIN PLUS PO) Take by mouth      Omega-3 Fatty Acids (FISH OIL) 1200 MG CAPS Take by mouth      simvastatin (ZOCOR) 40 mg tablet TAKE 1 TABLET DAILY AT BEDTIME  Qty: 90 tablet, Refills: 3    Associated Diagnoses: Mixed hyperlipidemia      tadalafil (CIALIS) 10 MG tablet Take 1 tablet (10 mg total) by mouth daily as needed for erectile dysfunction  Qty: 10 tablet, Refills: 3    Associated Diagnoses: Erectile dysfunction, unspecified erectile dysfunction type      Zinc 50 MG CAPS Take by mouth           No discharge procedures on file. PDMP Review         Value Time User    PDMP Reviewed  Yes 11/2/2023 11:04 PM Donald Dinh DO             ED Provider  Attending physically available and evaluated Angela Schmitt. I managed the patient along with the ED Attending.     Electronically Signed by           Karen Diaz DO  11/03/23 2718

## 2023-11-02 NOTE — ED ATTENDING ATTESTATION
11/2/2023  ITiti DO, saw and evaluated the patient. I have discussed the patient with the resident/non-physician practitioner and agree with the resident's/non-physician practitioner's findings, Plan of Care, and MDM as documented in the resident's/non-physician practitioner's note, except where noted. All available labs and Radiology studies were reviewed. I was present for key portions of any procedure(s) performed by the resident/non-physician practitioner and I was immediately available to provide assistance. At this point I agree with the current assessment done in the Emergency Department. I have conducted an independent evaluation of this patient a history and physical is as follows:      61 yom with hx diverticulosis p/w llq pain and constipation. Onset a week ago. Pain in llq on exam. No peritonitis. No other assoc sx. No n/v/d. No hematuria. No dysuria. No past surgical history.     A/P: LLQ pain  Diff dx includes:  Diverticulitis, colitis, hernia  Plan ct head, ua, IVF, pain control    ED Course         Critical Care Time  Procedures

## 2023-11-03 ENCOUNTER — APPOINTMENT (INPATIENT)
Dept: RADIOLOGY | Facility: HOSPITAL | Age: 63
DRG: 661 | End: 2023-11-03
Payer: COMMERCIAL

## 2023-11-03 ENCOUNTER — TELEPHONE (OUTPATIENT)
Dept: OTHER | Facility: HOSPITAL | Age: 63
End: 2023-11-03

## 2023-11-03 ENCOUNTER — ANESTHESIA (INPATIENT)
Dept: PERIOP | Facility: HOSPITAL | Age: 63
DRG: 661 | End: 2023-11-03
Payer: COMMERCIAL

## 2023-11-03 ENCOUNTER — ANESTHESIA EVENT (INPATIENT)
Dept: PERIOP | Facility: HOSPITAL | Age: 63
DRG: 661 | End: 2023-11-03
Payer: COMMERCIAL

## 2023-11-03 VITALS
DIASTOLIC BLOOD PRESSURE: 56 MMHG | HEART RATE: 65 BPM | TEMPERATURE: 97.8 F | RESPIRATION RATE: 18 BRPM | BODY MASS INDEX: 36.13 KG/M2 | WEIGHT: 281.53 LBS | SYSTOLIC BLOOD PRESSURE: 112 MMHG | HEIGHT: 74 IN | OXYGEN SATURATION: 94 %

## 2023-11-03 PROBLEM — N17.9 AKI (ACUTE KIDNEY INJURY) (HCC): Status: ACTIVE | Noted: 2023-11-02

## 2023-11-03 PROBLEM — D49.4 BLADDER TUMOR: Status: ACTIVE | Noted: 2023-11-03

## 2023-11-03 LAB
ANION GAP SERPL CALCULATED.3IONS-SCNC: 10 MMOL/L
BUN SERPL-MCNC: 16 MG/DL (ref 5–25)
CALCIUM SERPL-MCNC: 8.2 MG/DL (ref 8.4–10.2)
CHLORIDE SERPL-SCNC: 104 MMOL/L (ref 96–108)
CO2 SERPL-SCNC: 22 MMOL/L (ref 21–32)
CREAT SERPL-MCNC: 1.19 MG/DL (ref 0.6–1.3)
ERYTHROCYTE [DISTWIDTH] IN BLOOD BY AUTOMATED COUNT: 13.7 % (ref 11.6–15.1)
GFR SERPL CREATININE-BSD FRML MDRD: 64 ML/MIN/1.73SQ M
GLUCOSE SERPL-MCNC: 108 MG/DL (ref 65–140)
HCT VFR BLD AUTO: 38.4 % (ref 36.5–49.3)
HGB BLD-MCNC: 12.4 G/DL (ref 12–17)
MCH RBC QN AUTO: 30.1 PG (ref 26.8–34.3)
MCHC RBC AUTO-ENTMCNC: 32.3 G/DL (ref 31.4–37.4)
MCV RBC AUTO: 93 FL (ref 82–98)
PLATELET # BLD AUTO: 234 THOUSANDS/UL (ref 149–390)
PMV BLD AUTO: 10 FL (ref 8.9–12.7)
POTASSIUM SERPL-SCNC: 3.5 MMOL/L (ref 3.5–5.3)
RBC # BLD AUTO: 4.12 MILLION/UL (ref 3.88–5.62)
SODIUM SERPL-SCNC: 136 MMOL/L (ref 135–147)
WBC # BLD AUTO: 15.66 THOUSAND/UL (ref 4.31–10.16)

## 2023-11-03 PROCEDURE — C2617 STENT, NON-COR, TEM W/O DEL: HCPCS | Performed by: UROLOGY

## 2023-11-03 PROCEDURE — 74420 UROGRAPHY RTRGR +-KUB: CPT

## 2023-11-03 PROCEDURE — 0TC78ZZ EXTIRPATION OF MATTER FROM LEFT URETER, VIA NATURAL OR ARTIFICIAL OPENING ENDOSCOPIC: ICD-10-PCS | Performed by: UROLOGY

## 2023-11-03 PROCEDURE — C1769 GUIDE WIRE: HCPCS | Performed by: UROLOGY

## 2023-11-03 PROCEDURE — 80048 BASIC METABOLIC PNL TOTAL CA: CPT | Performed by: INTERNAL MEDICINE

## 2023-11-03 PROCEDURE — 99239 HOSP IP/OBS DSCHRG MGMT >30: CPT | Performed by: NURSE PRACTITIONER

## 2023-11-03 PROCEDURE — BT1F1ZZ FLUOROSCOPY OF LEFT KIDNEY, URETER AND BLADDER USING LOW OSMOLAR CONTRAST: ICD-10-PCS | Performed by: UROLOGY

## 2023-11-03 PROCEDURE — 0T778DZ DILATION OF LEFT URETER WITH INTRALUMINAL DEVICE, VIA NATURAL OR ARTIFICIAL OPENING ENDOSCOPIC: ICD-10-PCS | Performed by: UROLOGY

## 2023-11-03 PROCEDURE — 0TBB8ZZ EXCISION OF BLADDER, VIA NATURAL OR ARTIFICIAL OPENING ENDOSCOPIC: ICD-10-PCS | Performed by: UROLOGY

## 2023-11-03 PROCEDURE — 85027 COMPLETE CBC AUTOMATED: CPT | Performed by: INTERNAL MEDICINE

## 2023-11-03 PROCEDURE — 88305 TISSUE EXAM BY PATHOLOGIST: CPT | Performed by: STUDENT IN AN ORGANIZED HEALTH CARE EDUCATION/TRAINING PROGRAM

## 2023-11-03 DEVICE — INLAY OPTIMA URETERAL STENT W/O GUIDEWIRE
Type: IMPLANTABLE DEVICE | Site: BLADDER | Status: FUNCTIONAL
Brand: BARD® INLAY OPTIMA® URETERAL STENT

## 2023-11-03 RX ORDER — LIDOCAINE HYDROCHLORIDE 10 MG/ML
INJECTION, SOLUTION EPIDURAL; INFILTRATION; INTRACAUDAL; PERINEURAL AS NEEDED
Status: DISCONTINUED | OUTPATIENT
Start: 2023-11-03 | End: 2023-11-03

## 2023-11-03 RX ORDER — FENTANYL CITRATE/PF 50 MCG/ML
25 SYRINGE (ML) INJECTION
Status: DISCONTINUED | OUTPATIENT
Start: 2023-11-03 | End: 2023-11-03 | Stop reason: HOSPADM

## 2023-11-03 RX ORDER — MAGNESIUM HYDROXIDE 1200 MG/15ML
LIQUID ORAL AS NEEDED
Status: DISCONTINUED | OUTPATIENT
Start: 2023-11-03 | End: 2023-11-03 | Stop reason: HOSPADM

## 2023-11-03 RX ORDER — PROPOFOL 10 MG/ML
INJECTION, EMULSION INTRAVENOUS AS NEEDED
Status: DISCONTINUED | OUTPATIENT
Start: 2023-11-03 | End: 2023-11-03

## 2023-11-03 RX ORDER — OXYBUTYNIN CHLORIDE 5 MG/1
5 TABLET ORAL EVERY 6 HOURS PRN
Qty: 30 TABLET | Refills: 0 | Status: SHIPPED | OUTPATIENT
Start: 2023-11-03

## 2023-11-03 RX ORDER — DOCUSATE SODIUM 100 MG/1
100 CAPSULE, LIQUID FILLED ORAL 2 TIMES DAILY
Qty: 30 CAPSULE | Refills: 0 | Status: SHIPPED | OUTPATIENT
Start: 2023-11-03 | End: 2023-11-18

## 2023-11-03 RX ORDER — ACETAMINOPHEN 325 MG/1
650 TABLET ORAL EVERY 4 HOURS PRN
Qty: 30 TABLET | Refills: 0
Start: 2023-11-03

## 2023-11-03 RX ORDER — OXYCODONE HYDROCHLORIDE 5 MG/1
5 TABLET ORAL EVERY 4 HOURS PRN
Qty: 5 TABLET | Refills: 0 | Status: SHIPPED | OUTPATIENT
Start: 2023-11-03 | End: 2023-11-03

## 2023-11-03 RX ORDER — TAMSULOSIN HYDROCHLORIDE 0.4 MG/1
0.4 CAPSULE ORAL
Qty: 15 CAPSULE | Refills: 0 | Status: SHIPPED | OUTPATIENT
Start: 2023-11-03

## 2023-11-03 RX ORDER — ONDANSETRON 2 MG/ML
INJECTION INTRAMUSCULAR; INTRAVENOUS AS NEEDED
Status: DISCONTINUED | OUTPATIENT
Start: 2023-11-03 | End: 2023-11-03

## 2023-11-03 RX ORDER — HYDROMORPHONE HCL/PF 1 MG/ML
SYRINGE (ML) INJECTION AS NEEDED
Status: DISCONTINUED | OUTPATIENT
Start: 2023-11-03 | End: 2023-11-03

## 2023-11-03 RX ORDER — ONDANSETRON 2 MG/ML
4 INJECTION INTRAMUSCULAR; INTRAVENOUS ONCE AS NEEDED
Status: DISCONTINUED | OUTPATIENT
Start: 2023-11-03 | End: 2023-11-03 | Stop reason: HOSPADM

## 2023-11-03 RX ORDER — CEPHALEXIN 500 MG/1
500 CAPSULE ORAL EVERY 6 HOURS SCHEDULED
Qty: 3 CAPSULE | Refills: 0 | Status: SHIPPED | OUTPATIENT
Start: 2023-11-03 | End: 2023-11-04

## 2023-11-03 RX ORDER — PHENAZOPYRIDINE HYDROCHLORIDE 200 MG/1
200 TABLET, FILM COATED ORAL 3 TIMES DAILY PRN
Qty: 10 TABLET | Refills: 0 | Status: SHIPPED | OUTPATIENT
Start: 2023-11-03 | End: 2023-11-06

## 2023-11-03 RX ORDER — FENTANYL CITRATE 50 UG/ML
INJECTION, SOLUTION INTRAMUSCULAR; INTRAVENOUS AS NEEDED
Status: DISCONTINUED | OUTPATIENT
Start: 2023-11-03 | End: 2023-11-03

## 2023-11-03 RX ORDER — SODIUM CHLORIDE, SODIUM LACTATE, POTASSIUM CHLORIDE, CALCIUM CHLORIDE 600; 310; 30; 20 MG/100ML; MG/100ML; MG/100ML; MG/100ML
INJECTION, SOLUTION INTRAVENOUS CONTINUOUS PRN
Status: DISCONTINUED | OUTPATIENT
Start: 2023-11-03 | End: 2023-11-03

## 2023-11-03 RX ORDER — DEXAMETHASONE SODIUM PHOSPHATE 10 MG/ML
INJECTION, SOLUTION INTRAMUSCULAR; INTRAVENOUS AS NEEDED
Status: DISCONTINUED | OUTPATIENT
Start: 2023-11-03 | End: 2023-11-03

## 2023-11-03 RX ORDER — METOPROLOL TARTRATE 5 MG/5ML
INJECTION INTRAVENOUS AS NEEDED
Status: DISCONTINUED | OUTPATIENT
Start: 2023-11-03 | End: 2023-11-03

## 2023-11-03 RX ADMIN — PRAVASTATIN SODIUM 80 MG: 80 TABLET ORAL at 00:10

## 2023-11-03 RX ADMIN — Medication 1 TABLET: at 14:50

## 2023-11-03 RX ADMIN — DEXAMETHASONE SODIUM PHOSPHATE 10 MG: 10 INJECTION, SOLUTION INTRAMUSCULAR; INTRAVENOUS at 11:50

## 2023-11-03 RX ADMIN — PROPOFOL 200200 MG: 10 INJECTION, EMULSION INTRAVENOUS at 11:49

## 2023-11-03 RX ADMIN — SODIUM CHLORIDE, SODIUM GLUCONATE, SODIUM ACETATE, POTASSIUM CHLORIDE, MAGNESIUM CHLORIDE, SODIUM PHOSPHATE, DIBASIC, AND POTASSIUM PHOSPHATE 125 ML/HR: .53; .5; .37; .037; .03; .012; .00082 INJECTION, SOLUTION INTRAVENOUS at 00:11

## 2023-11-03 RX ADMIN — TAMSULOSIN HYDROCHLORIDE 0.4 MG: 0.4 CAPSULE ORAL at 00:11

## 2023-11-03 RX ADMIN — Medication 3000 MG: at 11:53

## 2023-11-03 RX ADMIN — AMLODIPINE BESYLATE 10 MG: 5 TABLET ORAL at 14:51

## 2023-11-03 RX ADMIN — LIDOCAINE HYDROCHLORIDE 50 MG: 10 INJECTION, SOLUTION EPIDURAL; INFILTRATION; INTRACAUDAL; PERINEURAL at 11:49

## 2023-11-03 RX ADMIN — SODIUM CHLORIDE, SODIUM LACTATE, POTASSIUM CHLORIDE, AND CALCIUM CHLORIDE: .6; .31; .03; .02 INJECTION, SOLUTION INTRAVENOUS at 11:45

## 2023-11-03 RX ADMIN — ONDANSETRON 4 MG: 2 INJECTION INTRAMUSCULAR; INTRAVENOUS at 11:56

## 2023-11-03 RX ADMIN — FENTANYL CITRATE 50 MCG: 50 INJECTION, SOLUTION INTRAMUSCULAR; INTRAVENOUS at 11:54

## 2023-11-03 RX ADMIN — FENTANYL CITRATE 50 MCG: 50 INJECTION, SOLUTION INTRAMUSCULAR; INTRAVENOUS at 11:49

## 2023-11-03 RX ADMIN — Medication 3000 MG: at 11:54

## 2023-11-03 RX ADMIN — HYDROMORPHONE HYDROCHLORIDE 0.5 MG: 1 INJECTION, SOLUTION INTRAMUSCULAR; INTRAVENOUS; SUBCUTANEOUS at 11:58

## 2023-11-03 RX ADMIN — METOROPROLOL TARTRATE 1 MG: 5 INJECTION, SOLUTION INTRAVENOUS at 12:09

## 2023-11-03 RX ADMIN — ASPIRIN 81 MG CHEWABLE TABLET 81 MG: 81 TABLET CHEWABLE at 14:50

## 2023-11-03 NOTE — DISCHARGE INSTR - AVS FIRST PAGE
Romayne Cousins: Your surgery went very well. Your stone was fragmented to small pieces which were then removed with a basket. .    Patient, and importantly, a very small bladder tumor was identified that did appear to be cancerous or potentially precancerous. This was removed fully and completely. Will require ongoing follow-up in order to ensure that the tumor has not recurred. Please remove your stent at home following the directions below on tuesday . In addition I will schedule you for a minor office procedure known as cystoscopy to survey for any recurrence of the bladder tumor. We will then see you back in 2 months with an x-ray and ultrasound. Please take your medications as prescribed with caution for comfort. Most importantly please drink 6-8 glasses of water per day    Please call with any questions or concerns. Jazmine Arita MD,PhD  [facility]  [phone]            WHAT IS A STENT? At the end of the procedure, your doctor may place a stent into your ureter. A stent is a thin, flexible piece of plastic that will hold open your ureter while the remaining small pieces of stone pass. This allows your kidney to drain easily and prevents you from having to “pass” these small stone pieces on your own, which could be painful. The stent is about 12 inches long and looks and feels like a thin piece of spaghetti. AFTER THE PROCEDURE  After the procedure you may experience the following symptoms. All of these are normal and should resolve within 1 or 2 days after your stent is removed.   Urinary frequency (urinating more often than usual)  Urinary urgency (the sensation that you need to urinate right away)  Painful urination (this can be pain in your bladder or in your back when  you urinate)  Blood in your urine ( a stent can irritate the lining of your bladder causing it to bleed)  Back/Flank pain, especially with urination  You will receive a prescription for narcotic pain medication after the procedure. You will also receive a prescription for tamsulosin which you will take once a day for 2 weeks to help relax your ureter and decrease stent discomfort. You will also need to purchase a stool softener (i.e. Colace) or mild laxative (i.e. Miralax) as the narcotic pain medication can make you constipated. This is important as constipation can exacerbate stent related symptoms. STENT REMOVAL  In some cases, your doctor will leave strings attached to your stent. The strings will be taped to your skin after the procedure. The strings will allow you to remove the thin flexible stent while you are at home. Normally, the stent can be removed 3-5 days after your procedure; your physician will tell you the specific date after your procedure. On the day you are supposed to remove your stent, do the following:  When you wake up in the morning, take 1-2 pain pills with food. Start your antibiotic pill the morning of schedule stent removal if prescribed  One hour later sit on the toilet or in the bath tub. Take a deep breath in and while exhaling, pull the string. Dispose of the stent in the garbage. Alternatively, you will come back for an office procedure to remove the stent by placing a small camera into your bladder to remove the stent. During the next 4-8 hours after removing your stent, you may experience additional blood in your urine, pain with urination or back/side pain. You should take the pain medication you were prescribed to help you with the pain, as well as continue the Flomax. If the pain is severe, you are vomiting, and/or have a fever > 101.4 please call the clinic.

## 2023-11-03 NOTE — ED PROVIDER NOTES
Emergency Department Sign Out Note        Sign out and transfer of care from Dr. Madhav Melissa. See Separate Emergency Department note. The patient, Angela Schmitt, was evaluated by the previous provider for abdominal pain. Workup Completed:  Labs, CT scan    ED Course / Workup Pending (followup): Urinalysis, with plan for urology consult if concerning for infection given large obstructing stone                                  ED Course as of 11/02/23 2348   Thu Nov 02, 2023   2133 S/o: 1 week LLQ abd pain, constipation. CT showing L 8mm stone w/ hydroureter-nephrosis. Pending UA to assess for infection. 2227 Urine Microscopic(!)  Urine concerning for infection, in the presence of obstructing stone will reach out to urology   2244 Pt admitted to medicine with plan for OR in the morning. Abx initiated     Procedures  Medical Decision Making  Amount and/or Complexity of Data Reviewed  Labs: ordered. Decision-making details documented in ED Course. Radiology: ordered. Risk  Prescription drug management. Decision regarding hospitalization.             Disposition  Final diagnoses:   Left nephrolithiasis     Time reflects when diagnosis was documented in both MDM as applicable and the Disposition within this note       Time User Action Codes Description Comment    11/2/2023  9:11 PM Debbie Case Add [R10.32] Left lower quadrant pain     11/2/2023 10:52 PM Femi Espinoza A Add [N20.1] Ureter, calculus     11/2/2023 10:53 PM Imtiaz Jerardo Modify [N20.1] Ureter, calculus     11/2/2023 10:53 PM Imtiaz Jerardo Remove [R10.32] Left lower quadrant pain     11/2/2023 10:53 PM Imtiaz Jerardo Add [N20.0] Left nephrolithiasis     11/2/2023 10:53 PM Imtiaz Jerardo Modify [N20.1] Ureter, calculus     11/2/2023 10:53 PM Imtiaz Jerardo Modify [N20.0] Left nephrolithiasis     11/2/2023 11:04 PM Burleigh Haste Add [N13.2] Hydronephrosis with urinary obstruction due to ureteral calculus           ED Disposition ED Disposition   Admit    Condition   Stable    Date/Time   u Nov 2, 2023 10:53 PM    Comment   Case was discussed with Dr. Angelica Dow and the patient's admission status was agreed to be Admission Status: inpatient status to the service of Dr. Angelica Dow. Follow-up Information    None       Patient's Medications   Discharge Prescriptions    No medications on file     No discharge procedures on file.        ED Provider  Electronically Signed by     Jia Pate MD  11/02/23 3513

## 2023-11-03 NOTE — ASSESSMENT & PLAN NOTE
Patient presented with left lower quadrant pain x1 week. CT abdomen/pelvis revealed 8 mm obstructing left UVJ calculus with hydronephrosis. Urology consult appreciated   S/p cystoscopy, left retrograde pyelography with fluoroscopic interpretation, left ureteroscopic with laser lithotripsy of stone, left ureteral stent placement, transurethral resection of bladder tumor  Plan to discharge with stent removal in 4 days and outpatient follow-up with urology in 6 weeks with a postoperative KUB and renal ultrasound   Urine culture in process at discharge will prescribe Keflex. Follow-up with PCP for final culture results  Start Flomax  Oxybutynin and Pyridium prescribed at discharge  Patient refusing narcotics at discharge since he is a .   Recommended using Tylenol as needed

## 2023-11-03 NOTE — ED NOTES
Patient transported to [address] [place] [address]  11/02/23 2043 Rhombic Flap Text: The defect edges were debeveled with a #15 scalpel blade.  Given the location of the defect and the proximity to free margins a rhombic flap was deemed most appropriate.  Using a sterile surgical marker, an appropriate rhombic flap was drawn incorporating the defect.    The area thus outlined was incised deep to adipose tissue with a #15 scalpel blade.  The skin margins were undermined to an appropriate distance in all directions utilizing iris scissors.

## 2023-11-03 NOTE — DISCHARGE SUMMARY
[address] [facility]  Discharge- Lucía Ram 1960, 61 y.o. male MRN: [id number]  Unit/Bed#: OR [place] Encounter: [id number]  Primary Care Provider: Manuelito Aponte MD   Date and time admitted to hospital: 11/2/2023  6:28 PM    * Hydronephrosis with urinary obstruction due to ureteral calculus  Assessment & Plan  Patient presented with left lower quadrant pain x1 week. CT abdomen/pelvis revealed 8 mm obstructing left UVJ calculus with hydronephrosis. Urology consult appreciated   S/p cystoscopy, left retrograde pyelography with fluoroscopic interpretation, left ureteroscopic with laser lithotripsy of stone, left ureteral stent placement, transurethral resection of bladder tumor  Plan to discharge with stent removal in 4 days and outpatient follow-up with urology in 6 weeks with a postoperative KUB and renal ultrasound   Urine culture in process at discharge will prescribe Keflex. Follow-up with PCP for final culture results  Start Flomax  Oxybutynin and Pyridium prescribed at discharge  Patient refusing narcotics at discharge since he is a .   Recommended using Tylenol as needed    Bladder tumor  Assessment & Plan  Incidentally noted during cystoscopy and resected  Follow-up outpatient for pathology results  Likely plan for repeat cystoscopy in 3 months    ION (acute kidney injury) ([address])  Assessment & Plan  Baseline creatinine appears 1.1-1.2, creatinine 1.42 on admission  Improved with IVFs to 1.19 today   Etiology: obstructing ureteral stone, s/p cystoscopy, left retrograde pyelography with fluoroscopic interpretation, left ureteroscopic with laser lithotripsy of stone, left ureteral stent placement, transurethral resection of bladder tumor  Avoid nephrotoxins (discussed avoiding motrin) and hypotension  F/u with PCP outpatient for BMP in one week     Paroxysmal atrial fibrillation ([address])  Assessment & Plan  Currently in sinus rhythm  Continue Coreg 25 mg twice daily  Not on systemic anticoagulation due to low [id number] score, on ASA 81 mg daily    Cigarette nicotine dependence without complication  Assessment & Plan  Counseled on need for smoking cessation. Patient declines nicotine patch at this time    Hypertension  Assessment & Plan  Blood pressure elevated on presentation secondary to pain. Now improved  Continue with amlodipine 10 mg daily and Coreg 25 mg twice daily with strict hold parameters. Medical Problems       Resolved Problems  Date Reviewed: 11/3/2023   None       Discharging Physician / Practitioner: TAMICA Montoya  PCP: Ciara Barrios MD  Admission Date:   Admission Orders (From admission, onward)       Ordered        11/02/23 2254  [address]  Once                          Discharge Date: 11/03/23    Consultations During Hospital Stay:  Urology    Procedures Performed:   11/2/2023 CT abdomen pelvis with contrast-obstructing 8 mm calculus at the left ureteral vascular junction with moderate left hydroureteronephrosis. Hepatic steatosis  S/p cystoscopy, left retrograde pyelography with fluoroscopic interpretation, left ureteroscopic with laser lithotripsy of stone, left ureteral stent placement, transurethral resection of bladder tumor    Significant Findings / Test Results:   Bladder tumor   Obstructing 8 mm calculus at the left ureterovesicular junction with moderate left hydroureteronephrosis     Incidental Findings:   Bladder tumor    Test Results Pending at Discharge (will require follow up):    Pathology for bladder tumor     Outpatient Tests Requested:  postoperative KUB and renal ultrasound in 6 weeks  We will repeat cystoscopy for bladder tumor in 3 months    Complications:  none     Reason for Admission: Left lower quadrant abdominal pain    Hospital Course:   Ascencion Pate is a 61 y.o. male patient with a past medical history of hypertension, hyperlipidemia, paroxysmal A-fib not on anticoagulation, nicotine alexa who originally presented to the hospital on 11/2/2023 due to left lower quadrant pain. The patient was found to have a an obstructing 8 mm calculus at the left ureterovesicular junction with moderate left hydroureteronephrosis. Patient was taken to the operating room and had a cystoscopy left retrograde pyelography with fluoroscopy with interpretation left ureteroscopic with laser lithotripsy of stone left ureteral stent placement. Incidentally on cystoscopy he was noted to have a bladder tumor and he had resection of the tumor. Pathology will be pending at discharge. The patient is instructed to remove the stent in 4 days and follow-up in 6 weeks for a repeat KUB and renal ultrasound. He will also have to follow-up with pathology results outpatient for the bladder tumor and likely have a repeat cystoscopy in 3 months. Please see above list of diagnoses and related plan for additional information. Condition at Discharge: good    Discharge Day Visit / Exam:   Subjective: Patient is seen at bedside after returning from the operating room. He denies any complaints at this time. Reports that he is feeling well and ready for discharge. Patient and wife at bedside are aware of bladder tumor incidental finding and resection  Vitals: Blood Pressure: 138/57 (11/03/23 1300)  Pulse: 80 (11/03/23 1300)  Temperature: 99 °F (37.2 °C) (11/03/23 1300)  Temp Source: Oral (11/03/23 0432)  Respirations: 22 (11/03/23 1300)  Height: 6' 2.02" (188 cm) (11/03/23 0432)  Weight - Scale: 128 kg (281 lb 8.4 oz) (11/03/23 0432)  SpO2: 94 % (11/03/23 1300)  Exam:   Physical Exam  Constitutional:       General: He is not in acute distress. Appearance: He is obese. He is not ill-appearing. Cardiovascular:      Rate and Rhythm: Normal rate and regular rhythm. Pulses: Normal pulses. Heart sounds: Normal heart sounds. No murmur heard. Pulmonary:      Effort: No respiratory distress.       Breath sounds: Normal breath sounds. No wheezing or rales. Abdominal:      General: Bowel sounds are normal. There is no distension. Palpations: Abdomen is soft. Tenderness: There is no abdominal tenderness. Musculoskeletal:         General: No swelling or tenderness. Skin:     General: Skin is warm and dry. Findings: No erythema or rash. Neurological:      General: No focal deficit present. Mental Status: He is alert. Mental status is at baseline. Psychiatric:         Attention and Perception: Attention normal.         Mood and Affect: Mood normal.          Discussion with Family: Updated  (wife) at bedside. Discharge instructions/Information to patient and family:   See after visit summary for information provided to patient and family. Provisions for Follow-Up Care:  See after visit summary for information related to follow-up care and any pertinent home health orders. Disposition:   Home    Planned Readmission: no     Discharge Statement:  I spent 45 minutes discharging the patient. This time was spent on the day of discharge. I had direct contact with the patient on the day of discharge. Greater than 50% of the total time was spent examining patient, answering all patient questions, arranging and discussing plan of care with patient as well as directly providing post-discharge instructions. Additional time then spent on discharge activities. Discharge Medications:  See after visit summary for reconciled discharge medications provided to patient and/or family.       **Please Note: This note may have been constructed using a voice recognition system**

## 2023-11-03 NOTE — ASSESSMENT & PLAN NOTE
Incidentally noted during cystoscopy and resected  Follow-up outpatient for pathology results  Likely plan for repeat cystoscopy in 3 months

## 2023-11-03 NOTE — QUICK NOTE
Urology on-call    Received TT about patient who presents emergency department for recent constipation left lower quadrant abdominal pain radiating to his back and nausea. WBC 16.83  Creat 1.42, baseline 1.11-1.24  Urine moderate leuks negative nitrates; positive pyuria with occasional bacteria  Urine culture pending  Afebrile, vital signs stable, nontoxic per ER report    Plan:  Admit to medical service  Medical optimization per primary team  Pain management/IV fluids per primary team  Empiric antibiotics tailored to culture  N.p.o. after midnight  OR Case requested for stent placement in a.m.   Notify urology for any decompensation overnight which may require urgent intervention

## 2023-11-03 NOTE — PLAN OF CARE
Problem: Potential for Falls  Goal: Patient will remain free of falls  Description: INTERVENTIONS:  - Educate patient/family on patient safety including physical limitations  - Instruct patient to call for assistance with activity   - Consult OT/PT to assist with strengthening/mobility   - Keep Call bell within reach  - Keep bed low and locked with side rails adjusted as appropriate  - Keep care items and personal belongings within reach  - Initiate and maintain comfort rounds  - Make Fall Risk Sign visible to staff  - Apply yellow socks and bracelet for high fall risk patients  - Consider moving patient to room near nurses station  Outcome: Progressing     Problem: PAIN - ADULT  Goal: Verbalizes/displays adequate comfort level or baseline comfort level  Description: Interventions:  - Encourage patient to monitor pain and request assistance  - Assess pain using appropriate pain scale  - Administer analgesics based on type and severity of pain and evaluate response  - Implement non-pharmacological measures as appropriate and evaluate response  - Consider cultural and social influences on pain and pain management  - Notify physician/advanced practitioner if interventions unsuccessful or patient reports new pain  Outcome: Progressing     Problem: INFECTION - ADULT  Goal: Absence or prevention of progression during hospitalization  Description: INTERVENTIONS:  - Assess and monitor for signs and symptoms of infection  - Monitor lab/diagnostic results  - Monitor all insertion sites, i.e. indwelling lines, tubes, and drains  - Monitor endotracheal if appropriate and nasal secretions for changes in amount and color  - [place] appropriate cooling/warming therapies per order  - Administer medications as ordered  - Instruct and encourage patient and family to use good hand hygiene technique  - Identify and instruct in appropriate isolation precautions for identified infection/condition  Outcome: Progressing     Problem: DISCHARGE PLANNING  Goal: Discharge to home or other facility with appropriate resources  Description: INTERVENTIONS:  - Identify barriers to discharge w/patient and caregiver  - Arrange for needed discharge resources and transportation as appropriate  - Identify discharge learning needs (meds, wound care, etc.)  - Arrange for interpretive services to assist at discharge as needed  - Refer to Case Management Department for coordinating discharge planning if the patient needs post-hospital services based on physician/advanced practitioner order or complex needs related to functional status, cognitive ability, or social support system  Outcome: Progressing     Problem: Knowledge Deficit  Goal: Patient/family/caregiver demonstrates understanding of disease process, treatment plan, medications, and discharge instructions  Description: Complete learning assessment and assess knowledge base.   Interventions:  - Provide teaching at level of understanding  - Provide teaching via preferred learning methods  Outcome: Progressing     Problem: GENITOURINARY - ADULT  Goal: Maintains or returns to baseline urinary function  Description: INTERVENTIONS:  - Assess urinary function  - Encourage oral fluids to ensure adequate hydration if ordered  - Administer IV fluids as ordered to ensure adequate hydration  - Administer ordered medications as needed  - Offer frequent toileting  - Follow urinary retention protocol if ordered  Outcome: Progressing  Goal: Absence of urinary retention  Description: INTERVENTIONS:  - Assess patient’s ability to void and empty bladder  - Monitor I/O  - Bladder scan as needed  - Discuss with physician/AP medications to alleviate retention as needed  - Discuss catheterization for long term situations as appropriate  Outcome: Progressing

## 2023-11-03 NOTE — ASSESSMENT & PLAN NOTE
Blood pressure elevated on presentation secondary to pain. Now improved  Continue with amlodipine 10 mg daily and Coreg 25 mg twice daily with strict hold parameters.

## 2023-11-03 NOTE — PLAN OF CARE
Problem: Potential for Falls  Goal: Patient will remain free of falls  Description: INTERVENTIONS:  - Educate patient/family on patient safety including physical limitations  - Instruct patient to call for assistance with activity   - Consult OT/PT to assist with strengthening/mobility   - Keep Call bell within reach  - Keep bed low and locked with side rails adjusted as appropriate  - Keep care items and personal belongings within reach  - Initiate and maintain comfort rounds  - Make Fall Risk Sign visible to staff  - Offer Toileting every 4 Hours, in advance of need  - Initiate/Maintain 4alarm  - Obtain necessary fall risk management equipment: 4  - Apply yellow socks and bracelet for high fall risk patients  - Consider moving patient to room near nurses station  Outcome: Progressing     Problem: PAIN - ADULT  Goal: Verbalizes/displays adequate comfort level or baseline comfort level  Description: Interventions:  - Encourage patient to monitor pain and request assistance  - Assess pain using appropriate pain scale  - Administer analgesics based on type and severity of pain and evaluate response  - Implement non-pharmacological measures as appropriate and evaluate response  - Consider cultural and social influences on pain and pain management  - Notify physician/advanced practitioner if interventions unsuccessful or patient reports new pain  Outcome: Progressing     Problem: INFECTION - ADULT  Goal: Absence or prevention of progression during hospitalization  Description: INTERVENTIONS:  - Assess and monitor for signs and symptoms of infection  - Monitor lab/diagnostic results  - Monitor all insertion sites, i.e. indwelling lines, tubes, and drains  - Monitor endotracheal if appropriate and nasal secretions for changes in amount and color  - [place] appropriate cooling/warming therapies per order  - Administer medications as ordered  - Instruct and encourage patient and family to use good hand hygiene technique  - Identify and instruct in appropriate isolation precautions for identified infection/condition  Outcome: Progressing     Problem: SAFETY ADULT  Goal: Patient will remain free of falls  Description: INTERVENTIONS:  - Educate patient/family on patient safety including physical limitations  - Instruct patient to call for assistance with activity   - Consult OT/PT to assist with strengthening/mobility   - Keep Call bell within reach  - Keep bed low and locked with side rails adjusted as appropriate  - Keep care items and personal belongings within reach  - Initiate and maintain comfort rounds  - Make Fall Risk Sign visible to staff  - Offer Toileting every 4 Hours, in advance of need  - Initiate/Maintain 4alarm  - Obtain necessary fall risk management equipment: 4  - Apply yellow socks and bracelet for high fall risk patients  - Consider moving patient to room near nurses station  Outcome: Progressing     Problem: DISCHARGE PLANNING  Goal: Discharge to home or other facility with appropriate resources  Description: INTERVENTIONS:  - Identify barriers to discharge w/patient and caregiver  - Arrange for needed discharge resources and transportation as appropriate  - Identify discharge learning needs (meds, wound care, etc.)  - Arrange for interpretive services to assist at discharge as needed  - Refer to Case Management Department for coordinating discharge planning if the patient needs post-hospital services based on physician/advanced practitioner order or complex needs related to functional status, cognitive ability, or social support system  Outcome: Progressing     Problem: Knowledge Deficit  Goal: Patient/family/caregiver demonstrates understanding of disease process, treatment plan, medications, and discharge instructions  Description: Complete learning assessment and assess knowledge base.   Interventions:  - Provide teaching at level of understanding  - Provide teaching via preferred learning methods  Outcome: Progressing     Problem: GENITOURINARY - ADULT  Goal: Maintains or returns to baseline urinary function  Description: INTERVENTIONS:  - Assess urinary function  - Encourage oral fluids to ensure adequate hydration if ordered  - Administer IV fluids as ordered to ensure adequate hydration  - Administer ordered medications as needed  - Offer frequent toileting  - Follow urinary retention protocol if ordered  Outcome: Progressing  Goal: Absence of urinary retention  Description: INTERVENTIONS:  - Assess patient’s ability to void and empty bladder  - Monitor I/O  - Bladder scan as needed  - Discuss with physician/AP medications to alleviate retention as needed  - Discuss catheterization for long term situations as appropriate  Outcome: Progressing

## 2023-11-03 NOTE — ASSESSMENT & PLAN NOTE
Currently in sinus rhythm  Continue Coreg 25 mg twice daily  Not on systemic anticoagulation due to low [id number] score, on ASA 81 mg daily

## 2023-11-03 NOTE — ASSESSMENT & PLAN NOTE
Patient presenting with left lower quadrant pain x1 week with worsening this evening  CT abdomen/pelvis revealing 8 mm obstructing left UVJ calculus with hydronephrosis.   Additionally noted on labs with elevated serum creatinine from baseline and UA with pyuria and occasional bacteriuria  Patient otherwise hemodynamically stable and afebrile at this time, pain better controlled following ketorolac during ED evaluation  Keep n.p.o. pending urology consultation  Continue IV fluids overnight  As needed pain and nausea control regimen ordered  Received ceftriaxone during evaluation, continue pending results of urine culture  Start Flomax  Strain all urine

## 2023-11-03 NOTE — UTILIZATION REVIEW
Initial Clinical Review    Admission: Date/Time/Statement:   Admission Orders (From admission, onward)       Ordered        11/02/23 2254  INPATIENT ADMISSION  Once                          Orders Placed This Encounter   Procedures    INPATIENT ADMISSION     Standing Status:   Standing     Number of Occurrences:   1     Order Specific Question:   Level of Care     Answer:   Med Surg [16]     Order Specific Question:   Estimated length of stay     Answer:   More than 2 Midnights     Order Specific Question:   Certification     Answer:   I certify that inpatient services are medically necessary for this patient for a duration of greater than two midnights. See H&P and MD Progress Notes for additional information about the patient's course of treatment. ED Arrival Information       Expected   11/2/2023     Arrival   11/2/2023 18:22    Acuity   Urgent              Means of arrival   Walk-In    Escorted by   Self    Service   Hospitalist    Admission type   Emergency              Arrival complaint   Flank pain             Chief Complaint   Patient presents with    Constipation     Pt c/o constipation worsening for the past week. Pt states he feels like his stool is difficult to pass. Pt states he feels like he has to have a BM and can't. Pt states he is passing gas. Pt has tried calcium citrate in the past week and enemas       Initial Presentation: 61 y.o. male w/ PMH of HTN, HLD, pAfib, tobacco use presented to the ED from home w/ LLQ pain. Pt reports LLQ pain has been constant x 1 wk described initially as dull, nonradiating, and feeling like a "constipation" sensation however has been having regular BMs with laxatives. Reports chills, nausea and worsening pain this evening that prompted him going to the ED. In the ED, dull, nonradiating, and feeling like a "constipation" sensation however has been having regular bowel movements with laxatives. UA with pyuria with occasional bacteriuria.  Creatinine 1.42, baseline 1.1-1.2. On exam, abd soft, nt, nd. BP elevated. Given Toradol w/ improvement in pain. Admit as Inpatient for evaluation and treatment of hydronephrosis w/ urinary obstruction d/t ureteral calculus, elevated serum creatinine. Plan: Urology Consult. Keep n.p.o. pending urology consultation. Continue IV fluids overnight. Pain and nausea control prn. Cont IV Ceftriaxone. F/u Ucx. Start flomax. Strain all urine. Rpt BMPin am.  Resume amlodipine 10 mg daily and Coreg 25 mg twice daily with strict hold parameters. Date: 11/03   Day 2: Cont IV abx, cont pain and nausea control prn. Mon labs, VS.    Urology Consult; 8 mm L UVJ calculus, Moderate left hydroureteronephrosis: No stone passage overnight. . Currently no pain. Plan: proceed to the OR for cystoscopy, ureteroscopy, holmium laser lithotripsy, retrograde pyelogram, insertion of left ureteral stent. OP Note:  DATE OF PROCEDURE: 11/3/2023  PROCEDURES PERFORMED:  1) Cystoscopy  2) Left retrograde pyelography with fluoroscopic interpretation  3) Left ureteroscopy with laser lithotripsy of stone  4) Left ureteral stent placement  5 transurethral resection of bladder tumor  ANESTHESIA: General    FINDINGS:  -During initial cystoscopy, the unanticipated finding of a subcentimeter papillary tumor was identified. This was located adjacent to the left ureteral orifice. It did have the endoscopic appearance of a very small early urothelial neoplasm and this was resected completely.-Solitary left distal ureteral calculus successfully treated with laser fragmentation and ureteral stent placement.     ED Triage Vitals   Temperature Pulse Respirations Blood Pressure SpO2   11/02/23 1826 11/02/23 1826 11/02/23 1826 11/02/23 1826 11/02/23 1826   98.6 °F (37 °C) 80 18 (!) 183/94 97 %      Temp Source Heart Rate Source Patient Position - Orthostatic VS BP Location FiO2 (%)   11/02/23 1826 11/02/23 1826 11/02/23 1826 11/02/23 1826 11/03/23 1239   Oral Monitor Lying Right arm 6      Pain Score       11/02/23 1919       3          Wt Readings from Last 1 Encounters:   11/03/23 128 kg (281 lb 8.4 oz)     Additional Vital Signs:   Date/Time Temp Pulse Resp BP MAP (mmHg) SpO2 FiO2 (%) O2 Flow Rate (L/min) O2 Device Cardiac (WDL) Patient Position - Orthostatic VS   11/03/23 1300 99 °F (37.2 °C) 80 22 138/57 87 94 % -- 2 L/min Nasal cannula WDL --   11/03/23 1245 -- 80 22 131/63 91 93 % -- 2 L/min Nasal cannula -- --   11/03/23 1239 99.4 °F (37.4 °C) 76 18 149/61 88 97 % 6 -- Simple mask WDL --   11/03/23 0432 98.5 °F (36.9 °C) 82 20 110/81 80 -- -- -- None (Room air) -- Lying   11/02/23 2056 -- 80 20 178/82 Abnormal  -- 98 % -- -- None (Room air) -- --     Pertinent Labs/Diagnostic Test Results:   CT abdomen pelvis with contrast   Final Result by Nathan Rudolph MD (11/02 2124)      1. Obstructing 8 mm calculus at the left ureterovesicular junction with moderate left hydroureteronephrosis. 2.  Hepatic steatosis. The study was marked in [facility] for immediate notification.       Workstation performed: [id number]         FL retrograde pyelogram    (Results Pending)         Results from last 7 days   Lab Units 11/03/23 0436 11/02/23 1917   WBC Thousand/uL 15.66* 16.83*   HEMOGLOBIN g/dL 12.4 13.5   HEMATOCRIT % 38.4 39.8   PLATELETS Thousands/uL 234 253   NEUTROS ABS Thousands/µL  --  13.97*         Results from last 7 days   Lab Units 11/03/23  0436 11/02/23 1917   SODIUM mmol/L 136 136   POTASSIUM mmol/L 3.5 4.0   CHLORIDE mmol/L 104 103   CO2 mmol/L 22 26   ANION GAP mmol/L 10 7   BUN mg/dL 16 20   CREATININE mg/dL 1.19 1.42*   EGFR ml/min/1.73sq m 64 52   CALCIUM mg/dL 8.2* 8.7     Results from last 7 days   Lab Units 11/02/23 1917   AST U/L 13   ALT U/L 16   ALK PHOS U/L 90   TOTAL PROTEIN g/dL 6.9   ALBUMIN g/dL 4.2   TOTAL BILIRUBIN mg/dL 0.54         Results from last 7 days   Lab Units 11/03/23  0436 11/02/23 1917   GLUCOSE RANDOM mg/dL 108 118 Results from last 7 days   Lab Units 11/02/23  1917   LIPASE u/L 28                 Results from last 7 days   Lab Units 11/02/23  2142   CLARITY UA  Clear   COLOR UA  Yellow   SPEC GRAV UA  >=1.050*   PH UA  6.0   GLUCOSE UA mg/dl Negative   KETONES UA mg/dl Negative   BLOOD UA  Moderate*   PROTEIN UA mg/dl 50 (1+)*   NITRITE UA  Negative   BILIRUBIN UA  Negative   UROBILINOGEN UA (BE) mg/dl <2.0   LEUKOCYTES UA  Moderate*   WBC UA /hpf Innumerable*   RBC UA /hpf 30-50*   BACTERIA UA /hpf Occasional   EPITHELIAL CELLS WET PREP /hpf None Seen   MUCUS THREADS  Occasional*           ED Treatment:   Medication Administration - No Administrations Displayed (No Start Event Found)       None          Past Medical History:   Diagnosis Date    A-fib ([address]) 08/24/2021    Disc degeneration, lumbosacral 1/6/2016    Hyperlipidemia     hypercholesterolemia    Hypertension     last assessed 11/22/17    Pneumonia of both lower lobes due to infectious organism 1/29/2018     Present on Admission:   Hypertension   Paroxysmal atrial fibrillation (HCC)   Hydronephrosis with urinary obstruction due to ureteral calculus   Elevated serum creatinine   Cigarette nicotine dependence without complication      Admitting Diagnosis: Ureter, calculus [N20.1]  Left nephrolithiasis [N20.0]  Hydronephrosis with urinary obstruction due to ureteral calculus [N13.2]  Age/Sex: 61 y.o. male  Admission Orders:  SCD  NPO      Scheduled Medications:  amLODIPine, 10 mg, Oral, Daily  aspirin, 81 mg, Oral, Daily  carvedilol, 25 mg, Oral, BID With Meals  cefTRIAXone, 1,000 mg, Intravenous, Q24H  fish oil, 1,000 mg, Oral, Daily  flecainide, 50 mg, Oral, BID  multivitamin-minerals, 1 tablet, Oral, Daily  pravastatin, 80 mg, Oral, HS  tamsulosin, 0.4 mg, Oral, Daily With Dinner      Continuous IV Infusions:  multi-electrolyte, 125 mL/hr, Intravenous, Continuous      PRN Meds:  acetaminophen, 650 mg, Oral, Q6H PRN  fentaNYL, 25 mcg, Intravenous, Q5 Min PRN  HYDROmorphone, 0.2 mg, Intravenous, Q4H PRN  ondansetron, 4 mg, Intravenous, Q6H PRN  ondansetron, 4 mg, Intravenous, Once PRN  oxyCODONE, 5 mg, Oral, Q4H PRN  oxyCODONE, 2.5 mg, Oral, Q4H PRN        IP CONSULT TO UROLOGY    Network Utilization Review Department  ATTENTION: Please call with any questions or concerns to [phone] and carefully listen to the prompts so that you are directed to the right person. All voicemails are confidential.   For Discharge needs, contact Care Management DC Support Team at [phone] opt. 2  Send all requests for admission clinical reviews, approved or denied determinations and any other requests to dedicated fax number below belonging to the campus where the patient is receiving treatment.  List of dedicated fax numbers for the Facilities:  [place] DENIALS (Administrative/Medical Necessity) [phone]   DISCHARGE SUPPORT TEAM (NETWORK) [id number] Low (Maternity/NICU/Pediatrics) [phone]   [address] [place] [address] Drive [address] [facility] [address] [facility] [phone]   [address] [facility] [address] [facility] [address] [address] [facility] [address] [facility] [address] [facility]  [address] [phone]

## 2023-11-03 NOTE — ASSESSMENT & PLAN NOTE
Baseline creatinine appears 1.1-1.2, creatinine 1.42 on admission  Likely in setting of obstructing ureteral stone, plan for management as above. Continue IV fluids overnight and repeat BMP in a.m.   Avoid nephrotoxins and hypotension

## 2023-11-03 NOTE — CONSULTS
Consult - Urology   Alondra Tobar 1960, 61 y.o. male MRN: [id number]    Unit/Bed#: LV2 874-01 Encounter: [id number]    Assessment & Plan:  8 mm L UVJ calculus  Moderate left hydroureteronephrosis  - CT scan showing Obstructing 8 mm calculus at the left ureterovesicular junction with moderate left hydroureteronephrosis. -Creatinine 1.19, yesterday 1.42. Baseline 1.1-1.2.  -WBC 15  -UA moderate leukocytes, nitrate negative. Innumerable WBCs, occasional bacteria. Denies any urinary symptoms  - Vital signs stable, afebrile  - No stone passage overnight. - Currently no pain  -Discussed with patient cystoscopy, ureteroscopy, holmium laser lithotripsy, retrograde pyelogram, insertion of left ureteral stent. Discussed risks of procedure including risk with anesthesia, bleeding, infection, damage to kidneys, ureter, bladder, inability to treat stone in setting of infection, need for delayed ureteroscopy. Discussed stent colic including frequency, urgency, hematuria, flank pain.  -Consent signed  -Proceed to the OR    Subjective:   Presley is a 70-year-old with past medical history HTN, HLD, A-fib, nicotine dependence who presented to the ED for evaluation of left lower quadrant pain. Patient reports his symptoms have been ongoing for the past week. He reports last night he had severe pain associated with nausea. He denies any fever, chills, hematuria, urinary symptoms at home. In the ED patient underwent a CT scan revealing an 8 mm left obstructing UVJ stone with hydronephrosis. Patient was admitted for further management. Urology was consulted for possible surgical intervention. Patient with no prior urologic history including no prior kidney stone. Review of Systems   Constitutional:  Negative for chills and fever. Respiratory:  Negative for cough and shortness of breath. Cardiovascular:  Negative for chest pain and palpitations. Gastrointestinal:  Negative for abdominal pain and vomiting. Genitourinary:  Negative for dysuria and hematuria. Musculoskeletal:  Negative for arthralgias and back pain. Skin:  Negative for color change and rash. Neurological:  Negative for seizures and syncope. All other systems reviewed and are negative. Objective:  Vitals: Blood pressure 110/81, pulse 82, temperature 98.5 °F (36.9 °C), temperature source Oral, resp. rate 20, height 6' 2.02" (1.88 m), weight 128 kg (281 lb 8.4 oz), SpO2 98 %. ,Body mass index is 36.13 kg/m². Physical Exam  Constitutional:       General: He is not in acute distress. Appearance: Normal appearance. He is normal weight. He is not ill-appearing or toxic-appearing. HENT:      Head: Normocephalic and atraumatic. Right Ear: External ear normal.      Left Ear: External ear normal.      Nose: Nose normal.      Mouth/Throat:      Mouth: Mucous membranes are moist.   Eyes:      General: No scleral icterus. Conjunctiva/sclera: Conjunctivae normal.   Cardiovascular:      Rate and Rhythm: Normal rate and regular rhythm. Pulses: Normal pulses. Heart sounds: Normal heart sounds. No murmur heard. No friction rub. No gallop. Pulmonary:      Effort: Pulmonary effort is normal. No respiratory distress. Breath sounds: Normal breath sounds. No stridor. No wheezing, rhonchi or rales. Abdominal:      General: Abdomen is flat. There is no distension. Palpations: Abdomen is soft. Tenderness: There is no abdominal tenderness. There is no right CVA tenderness, left CVA tenderness or guarding. Musculoskeletal:         General: Normal range of motion. Cervical back: Normal range of motion. Skin:     General: Skin is warm. Findings: No rash. Neurological:      General: No focal deficit present. Mental Status: He is alert and oriented to person, place, and time. Mental status is at baseline.    Psychiatric:         Mood and Affect: Mood normal.         Behavior: Behavior normal. Thought Content: Thought content normal.         Judgment: Judgment normal.         Imaging:    CT ABDOMEN AND PELVIS WITH IV CONTRAST     INDICATION:   LLQ abdominal pain  LLQ pain. COMPARISON: CT abdomen pelvis 9/11/2021. TECHNIQUE:  CT examination of the abdomen and pelvis was performed. Multiplanar 2D reformatted images were created from the source data. This examination, like all CT scans performed in the [facility], was performed utilizing techniques to minimize radiation dose exposure, including the use of iterative reconstruction and automated exposure control. Radiation dose length   product (DLP) for this visit:  2062. 35 mGy-cm     IV Contrast:  100 mL of iohexol (OMNIPAQUE)  Enteric Contrast:  Enteric contrast was not administered. FINDINGS:     ABDOMEN     LOWER CHEST:  No clinically significant abnormality identified in the visualized lower chest.     LIVER/BILIARY TREE: Hepatic steatosis. GALLBLADDER:  No calcified gallstones. No pericholecystic inflammatory change. SPLEEN:  Unremarkable. PANCREAS:  Unremarkable. ADRENAL GLANDS:  Unremarkable. KIDNEYS/URETERS: There is an 8 mm obstructing calculus at the left ureterovesicular junction with moderate hydroureteronephrosis. Left nephrogram is delayed and there is mild perinephric and periureteral fat stranding. No right-sided hydronephrosis. Stable right renal cyst.. STOMACH AND BOWEL: Normal course and caliber of the stomach and duodenum. No dilated loops of bowel. Colonic diverticulosis without evidence of acute diverticulitis. APPENDIX: A normal appendix was visualized. ABDOMINOPELVIC CAVITY:  No ascites. No pneumoperitoneum. Mild periportal lymphadenopathy is decreased in size from the previous exam.     VESSELS: Atherosclerotic changes are present. No evidence of aneurysm. PELVIS     REPRODUCTIVE ORGANS:  Unremarkable for patient's age. URINARY BLADDER:  Unremarkable. ABDOMINAL WALL/INGUINAL REGIONS: Small fat-containing umbilical hernia. OSSEOUS STRUCTURES:  No acute fracture or destructive osseous lesion. Degenerative changes throughout the spine. IMPRESSION:     1. Obstructing 8 mm calculus at the left ureterovesicular junction with moderate left hydroureteronephrosis. 2.  Hepatic steatosis. The study was marked in [facility] for immediate notification.      Workstation performed: [id number]    Labs:  Recent Labs     11/02/23 1917 11/03/23  0436   WBC 16.83* 15.66*     Recent Labs     11/02/23 1917 11/03/23  0436   HGB 13.5 12.4       Recent Labs     11/02/23 1917 11/03/23  0436   CREATININE 1.42* 1.19       History:  Social History     Socioeconomic History    Marital status: /Civil Union     Spouse name: None    Number of children: 1    Years of education: None    Highest education level: None   Occupational History    Occupation:    Tobacco Use    Smoking status: Every Day     Packs/day: 0.50     Years: 40.00     Total pack years: 20.00     Types: Cigarettes    Smokeless tobacco: Never   Vaping Use    Vaping Use: Never used   Substance and Sexual Activity    Alcohol use: Yes     Comment: Occassional 1 x / month    Drug use: No    Sexual activity: Yes     Partners: Female     Birth control/protection: None   Other Topics Concern    None   Social History Narrative    Lives with wife    Full time employment    active advance directive    1 child ; son, passed away        Does not consume caffeine     Social Determinants of Health     Financial Resource Strain: Not on file   Food Insecurity: Not on file   Transportation Needs: Not on file   Physical Activity: Not on file   Stress: Not on file   Social Connections: Not on file   Intimate Partner Violence: Not on file   Housing Stability: Not on file       Past Medical History:   Diagnosis Date    A-fib ([address]) 08/24/2021    Disc degeneration, lumbosacral 1/6/2016    Hyperlipidemia hypercholesterolemia    Hypertension     last assessed 11/22/17    Pneumonia of both lower lobes due to infectious organism 1/29/2018     Past Surgical History:   Procedure Laterality Date    COLONOSCOPY      HEMORRHOID SURGERY      KNEE SURGERY Bilateral     VT COLONOSCOPY FLX DX W/COLLJ SPEC WHEN PFRMD N/A 1/5/2019    Procedure: COLONOSCOPY;  Surgeon: Asher Miller MD;  [facility];   Service: Gastroenterology    UPPER GASTROINTESTINAL ENDOSCOPY       Family History   Problem Relation Age of Onset    Obesity Mother     Aneurysm Father        Darin Brennann [place]  Date: 11/3/2023 Time: 10:02 AM

## 2023-11-03 NOTE — DISCHARGE INSTRUCTIONS
Return to the emergency department if your abdominal pain worsens or changes in nature, or if you develop intractable vomiting, fevers, chills, or generally feel worse.

## 2023-11-03 NOTE — INCIDENTAL FINDINGS
The following findings require follow up:  Radiographic finding   Finding: Bladder tumor   Follow up required:  Follow-up with urology for pathology and possible repeat cystoscopy   Follow up should be done within 7-10daysday(s)    Please notify the following clinician to assist with the follow up:   Dr. Virginia Palmer; Dr. Parker Simmonds

## 2023-11-03 NOTE — CASE MANAGEMENT
Case Management Assessment & Discharge Planning Note    Patient name Paulina Champion  Location [address] [facility] MRN [id number]  : 1960 Date 11/3/2023       Current Admission Date: 2023  Current Admission Diagnosis:Hydronephrosis with urinary obstruction due to ureteral calculus   Patient Active Problem List    Diagnosis Date Noted    Hydronephrosis with urinary obstruction due to ureteral calculus 2023    Elevated serum creatinine 2023    Pain of right hip 2023    Paroxysmal atrial fibrillation ([address]) 2021    Cigarette nicotine dependence without complication     Disc degeneration, lumbosacral 2016    Hyperlipidemia 2015    Hypertension 2015      LOS (days): 1  Geometric Mean LOS (GMLOS) (days):   Days to GMLOS:     OBJECTIVE:    Risk of Unplanned Readmission Score: 7.27         Current admission status: Inpatient       Preferred Pharmacy:   [address] [place] [address], [address] [place] [id number]  Phone: [phone] Fax: [address] [place] [address],Third Floor, [address] [place] [address],[address]  [address] [place] [address],[address]  [address] [place] [address]  Phone: [phone] Fax: [phone]    Primary Care Provider: Mignon Hearn MD    Primary Insurance: [address] [place] [address]  Secondary Insurance:     ASSESSMENT:  Active Health Care Proxies    There are no active Health Care Proxies on file. Readmission Root Cause  30 Day Readmission: No    Patient Information  Admitted from[de-identified] Home  Mental Status: Alert  During Assessment patient was accompanied by: Spouse  Assessment information provided by[de-identified] Spouse  Primary Caregiver: Self  Support Systems: Spouse/significant other  [facility]: [address] [place] [address] do you live in?: [address] entry access options.  Select all that apply.: Stairs  Number of steps to enter home.: 4  Do the steps have railings?: No  Type of Current Residence: [place]  In the last 12 months, was there a time when you were not able to pay the mortgage or rent on time?: No  In the last 12 months, was there a time when you did not have a steady place to sleep or slept in a shelter (including now)?: No  Homeless/housing insecurity resource given?: N/A  Living Arrangements: Lives w/ Spouse/significant other  Is patient a ?: Yes  Is patient active with [facility])?: No    Activities of Daily Living Prior to Admission  Functional Status: Independent  Completes ADLs independently?: Yes  Ambulates independently?: Yes  Does patient use assisted devices?: No  Does patient currently own DME?: No  Does patient have a history of Outpatient Therapy (PT/OT)?: Yes  Does the patient have a history of Short-Term Rehab?: No  Does patient have a history of HHC?: No  Does patient currently have [address]?: No    Patient Information Continued  Income Source: Employed  Does patient have prescription coverage?: Yes  Within the past 12 months, you worried that your food would run out before you got the money to buy more.: Never true  Within the past 12 months, the food you bought just didn't last and you didn't have money to get more.: Never true  Food insecurity resource given?: N/A  Does patient receive dialysis treatments?: No  Does patient have a history of substance abuse?: No  Does patient have a history of Mental Health Diagnosis?: No    Means of Transportation  Means of Transport to Appts[de-identified] Drives Self  In the past 12 months, has lack of transportation kept you from medical appointments or from getting medications?: No  In the past 12 months, has lack of transportation kept you from meetings, work, or from getting things needed for daily living?: No  Was application for public transport provided?: N/A    DISCHARGE DETAILS:    Discharge planning discussed with[de-identified] Pt's wife via phone call due to pt being at a procedure.   Freedom of Choice: Yes  Comments - Freedom of Choice: No rehab needs anticipated. CM contacted family/caregiver?: Yes  Were Treatment Team discharge recommendations reviewed with patient/caregiver?: Yes  Did patient/caregiver verbalize understanding of patient care needs?: Yes  Were patient/caregiver advised of the risks associated with not following Treatment Team discharge recommendations?: Yes    Contacts  Patient Contacts: wife Carline Loss  Relationship to Patient[de-identified] Family  Contact Method: Phone  Phone Number: [phone]  Reason/Outcome: Continuity of Care, Emergency Contact, Discharge [facility]         Is the patient interested in [facility] at discharge?: No    DME Referral Provided  Referral made for DME?: No    Other Referral/Resources/Interventions Provided:  Referral Comments: No referrals made at this time. Pt independent prior to admission. Additional Comments: CM contacted pt's wife, Kyleigh Dumont ([phone]) via phone call and introduced self and role. Kyleigh Dumont reports pt is currently employed and fully independent prior to admission. Kyleigh Dumont reports pt resides with her in 1-story home with 4STE. Kyleigh Hancocksemaj denied any CM needs at this time. CM will continue to follow for discharge planning needs throughout this hospitalization.

## 2023-11-03 NOTE — ASSESSMENT & PLAN NOTE
Baseline creatinine appears 1.1-1.2, creatinine 1.42 on admission  Improved with IVFs to 1.19 today   Etiology: obstructing ureteral stone, s/p cystoscopy, left retrograde pyelography with fluoroscopic interpretation, left ureteroscopic with laser lithotripsy of stone, left ureteral stent placement, transurethral resection of bladder tumor  Avoid nephrotoxins (discussed avoiding motrin) and hypotension  F/u with PCP outpatient for BMP in one week

## 2023-11-03 NOTE — ANESTHESIA POSTPROCEDURE EVALUATION
Post-Op Assessment Note    CV Status:  Stable  Pain Score: 0    Pain management: adequate     Mental Status:  Alert and awake   Hydration Status:  Euvolemic   PONV Controlled:  None   Airway Patency:  Patent      Post Op Vitals Reviewed: Yes      Staff: Anesthesiologist, CRNA   Comments: report given to RN; VSS; 6L/min facemask for transport        No notable events documented.     BP   149/61   Temp 99.4 °F (37.4 °C) (11/03/23 1239)    Pulse  83   Resp   18   SpO2   96

## 2023-11-03 NOTE — ANESTHESIA PREPROCEDURE EVALUATION
Procedure:  CYSTOSCOPY RETROGRADE PYELOGRAM WITH INSERTION STENT URETERAL (Left: Bladder)    Relevant Problems   ANESTHESIA (within normal limits)      CARDIO   (+) Hyperlipidemia   (+) Hypertension   (+) Paroxysmal atrial fibrillation (HCC)      ENDO (within normal limits)      GI/HEPATIC (within normal limits)      /RENAL   (+) Hydronephrosis with urinary obstruction due to ureteral calculus      HEMATOLOGY (within normal limits)      MUSCULOSKELETAL   (+) Disc degeneration, lumbosacral      NEURO/PSYCH (within normal limits)      Other   (+) Cigarette nicotine dependence without complication   (+) Elevated serum creatinine      CTAP 11/2/2023:  1. Obstructing 8 mm calculus at the left ureterovesicular junction with moderate left hydroureteronephrosis. 2.  Hepatic steatosis. Nuclear Stress 5/26/2023:    Stress ECG: No ST deviation is noted. The ECG was not diagnostic due to pharmacological (vasodilator) stress. Perfusion: There are no perfusion defects. Stress Function: Left ventricular function post-stress is normal. Stress ejection fraction is 52 %. Stress Combined Conclusion: The ECG and SPECT imaging portions of the stress study are concordant with no evidence of stress induced myocardial ischemia. Left ventricular perfusion is normal.    Resting ECG: The ECG shows normal sinus rhythm. TTE 8/2021:  LEFT VENTRICLE:  Size was normal.  Systolic function was normal. Ejection fraction was estimated to be 60 %. There was mild concentric hypertrophy. RIGHT VENTRICLE:  The size was normal.  Systolic function was normal.     LEFT ATRIUM:  The atrium was mildly dilated. RIGHT ATRIUM:  The atrium was mildly dilated. MITRAL VALVE:  There was trace regurgitation. TRICUSPID VALVE:  There was trace regurgitation.   Lab Results   Component Value Date    WBC 15.66 (H) 11/03/2023    HGB 12.4 11/03/2023    HCT 38.4 11/03/2023    MCV 93 11/03/2023     11/03/2023     Lab Results Component Value Date    SODIUM 136 11/03/2023    K 3.5 11/03/2023     11/03/2023    CO2 22 11/03/2023    BUN 16 11/03/2023    CREATININE 1.19 11/03/2023    GLUC 108 11/03/2023    CALCIUM 8.2 (L) 11/03/2023     Lab Results   Component Value Date    INR 1.11 08/23/2021    PROTIME 14.3 08/23/2021     No results found for: "HGBA1C"       Physical Exam    Airway    Mallampati score: II  TM Distance: >3 FB  Neck ROM: full     Dental   No notable dental hx     Cardiovascular  Cardiovascular exam normal    Pulmonary  Pulmonary exam normal     Other Findings        Anesthesia Plan  ASA Score- 3     Anesthesia Type- general with ASA Monitors. Additional Monitors:     Airway Plan: ETT and LMA. Comment: Discussed with patient plan for anesthesia, as well as risks/benefits, including likely chance of PONV and sore throat, as well as rare possibilities of dental/oropharyngeal/ocular injuries, aspiration, and severe/life-threatening surgical and anesthetic emergencies. Patient expressed understanding and agreement. .       Plan Factors-Exercise tolerance (METS): >4 METS. Chart reviewed. EKG reviewed. Imaging results reviewed. Existing labs reviewed. Patient summary reviewed. Induction- intravenous. Postoperative Plan-     Informed Consent- Anesthetic plan and risks discussed with patient. I personally reviewed this patient with the CRNA. Discussed and agreed on the Anesthesia Plan with the CRNA. aJcob Rodriguez

## 2023-11-03 NOTE — ASSESSMENT & PLAN NOTE
Blood pressure elevated on presentation, potentially secondary to pain from obstructing stone/situation  Resume amlodipine 10 mg daily and Coreg 25 mg twice daily with strict hold parameters.   Pain control regimen ordered as above for obstructing stone  Monitor blood pressure per protocol

## 2023-11-03 NOTE — H&P
[address] [facility]  H&P  Name: Von Engle 61 y.o. male I MRN: [id number]  Unit/Bed#: ED 20 I Date of Admission: 11/2/2023   Date of Service: 11/2/2023 I Hospital Day: 0      Assessment/Plan   * Hydronephrosis with urinary obstruction due to ureteral calculus  Assessment & Plan  Patient presenting with left lower quadrant pain x1 week with worsening this evening  CT abdomen/pelvis revealing 8 mm obstructing left UVJ calculus with hydronephrosis. Additionally noted on labs with elevated serum creatinine from baseline and UA with pyuria and occasional bacteriuria  Patient otherwise hemodynamically stable and afebrile at this time, pain better controlled following ketorolac during ED evaluation  Keep n.p.o. pending urology consultation  Continue IV fluids overnight  As needed pain and nausea control regimen ordered  Received ceftriaxone during evaluation, continue pending results of urine culture  Start Flomax  Strain all urine    Elevated serum creatinine  Assessment & Plan  Baseline creatinine appears 1.1-1.2, creatinine 1.42 on admission  Likely in setting of obstructing ureteral stone, plan for management as above. Continue IV fluids overnight and repeat BMP in a.m. Avoid nephrotoxins and hypotension    Paroxysmal atrial fibrillation (HCC)  Assessment & Plan  Currently in sinus rhythm  Continue Coreg 25 mg twice daily  Not on systemic anticoagulation due to low [id number] score, on ASA 81 mg daily    Cigarette nicotine dependence without complication  Assessment & Plan  Counseled on need for smoking cessation. Patient declines nicotine patch at this time    Hypertension  Assessment & Plan  Blood pressure elevated on presentation, potentially secondary to pain from obstructing stone/situation  Resume amlodipine 10 mg daily and Coreg 25 mg twice daily with strict hold parameters.   Pain control regimen ordered as above for obstructing stone  Monitor blood pressure per protocol             VTE Prophylaxis: Pharmacologic VTE Prophylaxis contraindicated due to preprocedure   / sequential compression device   Code Status: Level 1 - Full Code  POLST: POLST form is not discussed and not completed at this time. Discussion with family: Wife at bedside    Anticipated Length of Stay:  Patient will be admitted on an Inpatient basis with an anticipated length of stay of greater than 2 midnights. Justification for Hospital Stay: Please see detailed plans noted above. Chief Complaint:     Left side abdominal pain  History of Present Illness:  Jermain Vincent is a 61 y.o. male who has a past medical history seen for hypertension, hyperlipidemia, paroxysmal atrial fibrillation not on systemic anticoagulation (on ASA 81 mg daily), and nicotine dependence of approximately 1/2 pack cigarettes per day presenting with left lower quadrant pain. He states this has been constant for the past 1 week described as initially as dull, nonradiating, and feeling like a "constipation" sensation however has been having regular bowel movements with laxatives. This evening after returning from work he felt as though he had chills with some nausea without vomiting and worsening of his pain which prompted presentation. Denied fever, dysuria, hematuria, or other systemic symptoms. During ED evaluation he was given ketorolac with improvement in pain. Underwent a CT abdomen/pelvis which revealed an 8 mm left obstructing UVJ stone with associated hydronephrosis, and UA with pyuria with occasional bacteriuria for which ceftriaxone was provided. Given obstructing stone and concern for infection he is admitted for further management including urology consultation and potential intervention along with further antibiotics as appropriate. Currently he is sitting up in bed in no acute distress, stating he feels his pain is controlled at this time, continues to deny current fevers or nausea.     Review of Systems:    Constitutional:  Denies fever but reports chills  Eyes:  Denies change in visual acuity   HENT:  Denies nasal congestion or sore throat   Respiratory:  Denies cough or shortness of breath   Cardiovascular:  Denies chest pain or edema   GI:  Denies vomiting, bloody stools or diarrhea but reported abdominal pain and nausea  :  Denies dysuria   Musculoskeletal:  Denies back pain or joint pain   Integument:  Denies rash   Neurologic:  Denies headache, focal weakness or sensory changes   Endocrine:  Denies polyuria or polydipsia   Lymphatic:  Denies swollen glands   Psychiatric:  Denies depression or anxiety     Past Medical and Surgical History:   Past Medical History:   Diagnosis Date    A-fib ([address]) 08/24/2021    Disc degeneration, lumbosacral 1/6/2016    Hyperlipidemia     hypercholesterolemia    Hypertension     last assessed 11/22/17    Pneumonia of both lower lobes due to infectious organism 1/29/2018     Past Surgical History:   Procedure Laterality Date    COLONOSCOPY      HEMORRHOID SURGERY      KNEE SURGERY Bilateral     OR COLONOSCOPY FLX DX W/COLLJ SPEC WHEN PFRMD N/A 1/5/2019    Procedure: COLONOSCOPY;  Surgeon: Nikki Johnson MD;  [facility];   Service: Gastroenterology    UPPER GASTROINTESTINAL ENDOSCOPY         Meds/Allergies:    Current Facility-Administered Medications:     acetaminophen (TYLENOL) tablet 650 mg, 650 mg, Oral, Q6H PRN, Adenike Noble DO    [START ON 11/3/2023] amLODIPine (NORVASC) tablet 10 mg, 10 mg, Oral, Daily, Adenike Noble DO    [START ON 11/3/2023] aspirin chewable tablet 81 mg, 81 mg, Oral, Daily, Adenike Noble DO    [START ON 11/3/2023] carvedilol (COREG) tablet 25 mg, 25 mg, Oral, BID With Meals, Adenike Noble DO    [START ON 11/3/2023] ceftriaxone (ROCEPHIN) 1 g/50 mL in dextrose IVPB, 1,000 mg, Intravenous, Q24H, Adenike Noble DO    [START ON 11/3/2023] fish oil capsule 1,000 mg, 1,000 mg, Oral, Daily, Adenike Noble DO    [START ON 11/3/2023] flecainide (TAMBOCOR) tablet 50 mg, 50 mg, Oral, BID, Jim Herrera DO    HYDROmorphone HCl (DILAUDID) injection 0.2 mg, 0.2 mg, Intravenous, Q4H PRN, Jim Herrera DO    multi-electrolyte (PLASMALYTE-A/ISOLYTE-S PH 7.4) IV solution, 125 mL/hr, Intravenous, Continuous, Jim Herrera DO    [START ON 11/3/2023] multivitamin-minerals (CENTRUM) tablet 1 tablet, 1 tablet, Oral, Daily, Jim Herrera DO    ondansetron (ZOFRAN) injection 4 mg, 4 mg, Intravenous, Q6H PRN, Jim Herrera DO    oxyCODONE (ROXICODONE) IR tablet 5 mg, 5 mg, Oral, Q4H PRN, Jim Herrera DO    oxyCODONE (ROXICODONE) split tablet 2.5 mg, 2.5 mg, Oral, Q4H PRN, Jim Herrera DO    pravastatin (PRAVACHOL) tablet 80 mg, 80 mg, Oral, HS, Jim Herrera DO    tamsulosin (FLOMAX) capsule 0.4 mg, 0.4 mg, Oral, Daily With Dinner, Jim Herrera DO    Current Outpatient Medications:     amLODIPine (NORVASC) 5 mg tablet, Take 2 tablets (10 mg total) by mouth daily, Disp: 180 tablet, Rfl: 1    ascorbic acid (VITAMIN C) 500 mg tablet, Take 500 mg by mouth daily, Disp: , Rfl:     aspirin 81 mg chewable tablet, Chew 1 tablet (81 mg total) daily, Disp: 90 tablet, Rfl: 3    carvedilol (COREG) 25 mg tablet, Take 1 tablet (25 mg total) by mouth 2 (two) times a day with meals, Disp: 180 tablet, Rfl: 3    Coenzyme Q10 (COQ-10) 10 MG CAPS, Take by mouth, Disp: , Rfl:     flecainide (TAMBOCOR) 50 mg tablet, TAKE 1 TABLET TWICE A DAY, Disp: 180 tablet, Rfl: 3    ibuprofen (MOTRIN) 600 mg tablet, Take 1 tablet (600 mg total) by mouth every 8 (eight) hours as needed for mild pain, Disp: 30 tablet, Rfl: 1    Multiple Vitamins-Minerals (MENS MULTIVITAMIN PLUS PO), Take by mouth, Disp: , Rfl:     Omega-3 Fatty Acids (FISH OIL) 1200 MG CAPS, Take by mouth, Disp: , Rfl:     simvastatin (ZOCOR) 40 mg tablet, TAKE 1 TABLET DAILY AT BEDTIME, Disp: 90 tablet, Rfl: 3    tadalafil (CIALIS) 10 MG tablet, Take 1 tablet (10 mg total) by mouth daily as needed for erectile dysfunction, Disp: 10 tablet, Rfl: 3    Zinc 50 MG CAPS, Take by mouth, Disp: , Rfl:       Allergies: No Known Allergies  History:  Marital Status: /Civil Union     Substance Use History:   Social History     Substance and Sexual Activity   Alcohol Use Yes    Comment: Occassional 1 x / month     Social History     Tobacco Use   Smoking Status Every Day    Packs/day: 0.50    Years: 40.00    Total pack years: 20.00    Types: Cigarettes   Smokeless Tobacco Never     Social History     Substance and Sexual Activity   Drug Use No       Family History:  Family History   Problem Relation Age of Onset    Obesity Mother     Aneurysm Father        Physical Exam:     Vitals:   Blood Pressure: (!) 178/82 (11/02/23 2056)  Pulse: 80 (11/02/23 2056)  Temperature: 98.6 °F (37 °C) (11/02/23 1826)  Temp Source: Oral (11/02/23 1826)  Respirations: 20 (11/02/23 2056)  SpO2: 98 % (11/02/23 2056)    Constitutional:  Well developed, well nourished, no acute distress, non-toxic appearance   Eyes:  PERRL, conjunctiva normal   HENT:  Atraumatic, external ears normal, nose normal, oropharynx moist, no pharyngeal exudates. Neck- normal range of motion, no tenderness, supple   Respiratory:  No respiratory distress, normal breath sounds, no rales, no wheezing   Cardiovascular:  Normal rate, normal rhythm, no murmurs, no gallops, no rubs   GI:  Soft, nondistended, normal bowel sounds, nontender, no organomegaly, no mass, no rebound, no guarding   :  No costovertebral angle tenderness   Musculoskeletal:  No edema, no tenderness, no deformities. Back- no tenderness  Integument:  Well hydrated, no rash   Lymphatic:  No lymphadenopathy noted   Neurologic:  Alert &awake, communicative, CN 2-12 normal, normal motor function, normal sensory function, no focal deficits noted   Psychiatric:  Speech and behavior appropriate       Lab Results: I have personally reviewed pertinent reports.       Results from last 7 days   Lab Units 11/02/23 1917 WBC Thousand/uL 16.83*   HEMOGLOBIN g/dL 13.5   HEMATOCRIT % 39.8   PLATELETS Thousands/uL 253   NEUTROS PCT % 82*   LYMPHS PCT % 6*   MONOS PCT % 9   EOS PCT % 1     Results from last 7 days   Lab Units 11/02/23  1917   POTASSIUM mmol/L 4.0   CHLORIDE mmol/L 103   CO2 mmol/L 26   BUN mg/dL 20   CREATININE mg/dL 1.42*   CALCIUM mg/dL 8.7   ALK PHOS U/L 90   ALT U/L 16   AST U/L 13           Imaging: I have personally reviewed pertinent reports. CT abdomen pelvis with contrast    Result Date: 11/2/2023  Narrative: CT ABDOMEN AND PELVIS WITH IV CONTRAST INDICATION:   LLQ abdominal pain LLQ pain. COMPARISON: CT abdomen pelvis 9/11/2021. TECHNIQUE:  CT examination of the abdomen and pelvis was performed. Multiplanar 2D reformatted images were created from the source data. This examination, like all CT scans performed in the [facility], was performed utilizing techniques to minimize radiation dose exposure, including the use of iterative reconstruction and automated exposure control. Radiation dose length product (DLP) for this visit:  2062. 35 mGy-cm IV Contrast:  100 mL of iohexol (OMNIPAQUE) Enteric Contrast:  Enteric contrast was not administered. FINDINGS: ABDOMEN LOWER CHEST:  No clinically significant abnormality identified in the visualized lower chest. LIVER/BILIARY TREE: Hepatic steatosis. GALLBLADDER:  No calcified gallstones. No pericholecystic inflammatory change. SPLEEN:  Unremarkable. PANCREAS:  Unremarkable. ADRENAL GLANDS:  Unremarkable. KIDNEYS/URETERS: There is an 8 mm obstructing calculus at the left ureterovesicular junction with moderate hydroureteronephrosis. Left nephrogram is delayed and there is mild perinephric and periureteral fat stranding. No right-sided hydronephrosis. Stable right renal cyst.. STOMACH AND BOWEL: Normal course and caliber of the stomach and duodenum. No dilated loops of bowel. Colonic diverticulosis without evidence of acute diverticulitis.  APPENDIX: A normal appendix was visualized. ABDOMINOPELVIC CAVITY:  No ascites. No pneumoperitoneum. Mild periportal lymphadenopathy is decreased in size from the previous exam. VESSELS: Atherosclerotic changes are present. No evidence of aneurysm. PELVIS REPRODUCTIVE ORGANS:  Unremarkable for patient's age. URINARY BLADDER:  Unremarkable. ABDOMINAL WALL/INGUINAL REGIONS: Small fat-containing umbilical hernia. OSSEOUS STRUCTURES:  No acute fracture or destructive osseous lesion. Degenerative changes throughout the spine. Impression: 1. Obstructing 8 mm calculus at the left ureterovesicular junction with moderate left hydroureteronephrosis. 2.  Hepatic steatosis. The study was marked in [facility] for immediate notification. Workstation performed: [id number]     XR heel / calcaneus 2+ vw left    Result Date: 10/28/2023  Narrative: LEFT HEEL INDICATION:   M25.572: Pain in left ankle and joints of left foot. COMPARISON: 12/30/2021 VIEWS:  XR HEEL / CALCANEUS 2+ VW LEFT Images: 2 FINDINGS: There is no acute fracture or dislocation. A large retrocalcaneal spur is present. No lytic or blastic osseous lesion. Soft tissues are unremarkable. Impression: No acute osseous abnormality. Large retrocalcaneal spur is present. Workstation performed: [id number]         ** Please Note: Dragon 360 Dictation voice to text software was used in the creation of this document.  **

## 2023-11-03 NOTE — OP NOTE
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, resected completely    PROCEDURES PERFORMED:  1) Cystoscopy  2) Left retrograde pyelography with fluoroscopic interpretation  3) Left ureteroscopy with laser lithotripsy of stone  4) Left ureteral stent placement  5 transurethral resection of bladder tumor    SURGEON:  Jacque Garza MD    NOTE:  There were no qualified teaching residents to assist with this case    ANESTHESIA: General     COMPLICATIONS:   None    ANTIBIOTICS:  Ancef    INTRAOPERATIVE THROMBOEMBOLISM PROPHYLAXIS:  Pneumatic compression stockings     FINDINGS:  -During initial cystoscopy, the unanticipated finding of a subcentimeter papillary tumor was identified. This was located adjacent to the left ureteral orifice. It did have the endoscopic appearance of a very small early urothelial neoplasm and this was resected completely.-Solitary left distal ureteral calculus successfully treated with laser fragmentation and ureteral stent placement. INDICATIONS FOR PROCEDURE:  Elsie Noonan is an 61 y.o. old male with Left ureteral nephrolithiasis. After discussing the options, the patient elected to undergo ureteroscopy and ureteral stent placement. We discussed the procedure in detail, the alternatives, and the risks, and they signed informed consent to proceed. PROCEDURE IN DETAIL:   The patient was identified and brought to the OR. Antibiotic prophylaxis and DVT prophylaxis were administered. They were placed in the comfortable dorsal lithotomy position with care to pad all pressure points. They were prepped and draped in the usual sterile fashion using hibiclens.   A surgical time out was performed with all in the room in agreement with the correct patient, procedure, indications, and laterality. A 21-Lithuanian rigid cystoscope was used to enter the bladder. A papillary tumor was visualized adjacent to the left ureteral orifice. This did have the appearance of papillary urothelial carcinoma. I do feel that a transurethral resection was indicated and can be carried out with minimal morbidity or additional recovery time. I utilized a cold cup device to resect the lesion and subsequently obtained ablation/hemostasis using the laser with tissue ablation settings. Bladder was observed with multiple cycles of filling and emptying hemostasis is excellent. The tissue was submitted for pathology. The Left ureteral orifice was identified and a 5 Fr open ended catheter was placed into the ureteral orifice  . A retrograde pyelogram was performed with the findings as described above. A Sensor wire was advanced up to the kidney under fluoroscopic guidance. Leaving this safety wire in place, the bladder was drained. A   7.5 Lithuanian semi-rigid ureteroscope was advanced up the ureter under vision . The stone was encountered in the distal ureter. The stone was not noted to be impacted. A holmium laser fiber was passed through the ureteroscope and laser lithotripsy was commenced at settings of 0.7 J and 7 Hz. The stones were fragmented to very small pieces. The ureteroscope was backed down the ureter under vision and there were no residual fragments and the ureter was noted to be intact with no injury and mild edema where the stone was located. A JJ stent was then passed up the wire  under fluoroscopic guidance into the kidney with a good curl noted in the kidney and in the bladder. An externalized tethering string was left in place and secured to the skin. The bladder was drained. The patient was placed back supine, awakened from general anesthesia and brought to recovery room in stable condition.       ESTIMATED BLOOD LOSS:  Minimal      SPECIMENS:   Order Name Source Comment Collection Info Order Time   TISSUE EXAM Urinary Bladder  Collected By: Sharron Alvarado MD 11/3/2023 12:05 PM     Release to patient through [email or web address]   Immediate             IMPLANTS:   Implant Name Type Inv. Item Serial No.  Lot No. LRB No. Used Action   STENT URETERAL 6 FR 26CM INLAY OPTIMA - [id number] Stent STENT URETERAL 6 FR 26CM INLAY OPTIMA  [address] [place] [address] [id number] 1 Implanted        COMPLICATIONS: None    DISPOSITION: PACU    PLAN:  The patient was instructed to remove their stent on postoperative day 4. They will then follow up in 6 weeks with a postoperative KUB and renal ultrasound. I did speak with the patient's wife and the primary team regarding the findings of the bladder tumor.   I will await the pathology and likely schedule him for a cystoscopy in the closest office for him at 3 months

## 2023-11-03 NOTE — ASSESSMENT & PLAN NOTE
Currently in sinus rhythm  Continue Coreg 25 mg twice daily  Not on systemic anticoagulation due to low [id number] score, on ASA 81 mg daily

## 2023-11-05 LAB — BACTERIA UR CULT: ABNORMAL

## 2023-11-05 NOTE — UTILIZATION REVIEW
NOTIFICATION OF ADMISSION DISCHARGE   This is a Notification of Discharge from [address] [facility]. Please be advised that this patient has been discharge from our facility. Below you will find the admission and discharge date and time including the patient’s disposition. UTILIZATION REVIEW CONTACT:  Chiquis Almonte  Utilization   Network Utilization Review Department  Phone: [phone] x carefully listen to the prompts. All voicemails are confidential.  Email: [email or web address]     ADMISSION INFORMATION  PRESENTATION DATE: 11/2/2023  6:28 PM  OBERVATION ADMISSION DATE:   INPATIENT ADMISSION DATE: 11/2/23 10:54 PM   DISCHARGE DATE: 11/3/2023  5:10 PM   DISPOSITION:Home/Self Care    Network Utilization Review Department  ATTENTION: Please call with any questions or concerns to [phone] and carefully listen to the prompts so that you are directed to the right person. All voicemails are confidential.   For Discharge needs, contact Care Management DC Support Team at [phone] opt. 2  Send all requests for admission clinical reviews, approved or denied determinations and any other requests to dedicated fax number below belonging to the campus where the patient is receiving treatment.  List of dedicated fax numbers for the Facilities:  [place] DENIALS (Administrative/Medical Necessity) [phone]   DISCHARGE SUPPORT TEAM (Network) [phone] [address] [facility] (Maternity/NICU/Pediatrics) [phone]   [address] [facility] [address] [phone] [address] [facility] [phone]   [address] [place] [address]  [address]  [phone]

## 2023-11-06 ENCOUNTER — TRANSITIONAL CARE MANAGEMENT (OUTPATIENT)
Dept: FAMILY MEDICINE CLINIC | Facility: CLINIC | Age: 63
End: 2023-11-06

## 2023-11-06 ENCOUNTER — TELEPHONE (OUTPATIENT)
Dept: UROLOGY | Facility: CLINIC | Age: 63
End: 2023-11-06

## 2023-11-06 DIAGNOSIS — N13.2 HYDRONEPHROSIS WITH URINARY OBSTRUCTION DUE TO URETERAL CALCULUS: Primary | ICD-10-CM

## 2023-11-06 PROCEDURE — 88305 TISSUE EXAM BY PATHOLOGIST: CPT | Performed by: STUDENT IN AN ORGANIZED HEALTH CARE EDUCATION/TRAINING PROGRAM

## 2023-11-06 NOTE — TELEPHONE ENCOUNTER
PLAN:  The patient was instructed to remove their stent on postoperative day 4. They will then follow up in 6 weeks with a postoperative KUB and renal ultrasound. I did speak with the patient's wife and the primary team regarding the findings of the bladder tumor. I will await the pathology and likely schedule him for a cystoscopy in the closest office for him at 3 months    Post Op Note    Lucía Ram is a 61 y.o. male s/p [address], ureteroscopy, holmium laser (Left: Bladder)       TRANSURETHRAL RESECTION OF BLADDER TUMOR (TURBT) (Bladder)  performed 11/3/23. Lucía Ram is a patient of Dr. Dr. Troy Hardy and is seen at the [facility] office. How would you rate your pain on a scale from 1 to 10, 10 being the worst pain ever? 0  Have you had a fever? No  Have your bowel movements been regular? No advised patient to add in miralax or prune juice. Tried miralax last night with no BM. Will check on constipation with call tomorrow   Do you have any difficulty urinating? No    Do you have any other questions or concerns that I can address at this time?   -Went over potential/expected post op symptoms   -discussed ER precautions  -Confirmed post op appts and locations. Provided CS number and he will call to schedule imaging 1 week prior  -Went over medication   -went over stent removal tomorrow.  Will call patient approximately tomorrow to ensure removal.

## 2023-11-07 NOTE — TELEPHONE ENCOUNTER
Patient complaining of night sweats and dizziness. Is feeling slightly better now. Is on abx for his stent removal as well as tamsulosin. Did take BP at home and it is 140's/70's. No fever.  Stent removed this morning without issues     Please review and advise if any action should be taken

## 2023-11-07 NOTE — TELEPHONE ENCOUNTER
Pt called requesting to speak with clinical member he spoke with yesterday,he did remove stent but has additional questions.

## 2023-11-07 NOTE — TELEPHONE ENCOUNTER
Conservative measures and ER precautions. Continue antibiotics. Please ensure patient is continuing to feel better tomorrow. If not, may need US.  Thanks

## 2023-11-08 NOTE — TELEPHONE ENCOUNTER
Called and spoke with patient at this time. He reports he is feeling better, doesn't seem to have a fever. He knows to call back if symptoms returns, otherwise plan to continue abx and follow up as scheduled. He verbalized understanding.

## 2023-11-14 DIAGNOSIS — I48.0 PAROXYSMAL ATRIAL FIBRILLATION (HCC): ICD-10-CM

## 2023-11-14 RX ORDER — FLECAINIDE ACETATE 50 MG/1
TABLET ORAL
Qty: 180 TABLET | Refills: 3 | Status: SHIPPED | OUTPATIENT
Start: 2023-11-14

## 2023-12-02 ENCOUNTER — HOSPITAL ENCOUNTER (OUTPATIENT)
Dept: ULTRASOUND IMAGING | Facility: HOSPITAL | Age: 63
Discharge: HOME | End: 2023-12-02
Payer: COMMERCIAL

## 2023-12-02 ENCOUNTER — HOSPITAL ENCOUNTER (OUTPATIENT)
Dept: RADIOLOGY | Facility: HOSPITAL | Age: 63
Discharge: HOME | End: 2023-12-02
Payer: COMMERCIAL

## 2023-12-02 DIAGNOSIS — N13.2 HYDRONEPHROSIS WITH URINARY OBSTRUCTION DUE TO URETERAL CALCULUS: ICD-10-CM

## 2023-12-02 PROCEDURE — 76770 US EXAM ABDO BACK WALL COMP: CPT

## 2023-12-02 PROCEDURE — 74018 RADEX ABDOMEN 1 VIEW: CPT

## 2023-12-06 ENCOUNTER — TELEPHONE (OUTPATIENT)
Dept: CARDIOLOGY CLINIC | Facility: CLINIC | Age: 63
End: 2023-12-06

## 2023-12-06 NOTE — TELEPHONE ENCOUNTER
Luca Tabor called with concerns he felt a shock today in the center of his chest while driving that lasted for a second. He has felt fine since and denies any SOB, chest pain. He did a scan on the [employer] mobile xiomara and it is reading NSR. He also stated he has been stressed and drank a Yolanda frappuccino this morning. I did schedule a 6 month f/u for February, but Luca Tabor very concerned something might be wrong with his heart and would like your opinion.

## 2023-12-08 ENCOUNTER — OFFICE VISIT (OUTPATIENT)
Dept: FAMILY MEDICINE CLINIC | Facility: CLINIC | Age: 63
End: 2023-12-08
Payer: COMMERCIAL

## 2023-12-08 VITALS
HEART RATE: 63 BPM | SYSTOLIC BLOOD PRESSURE: 130 MMHG | WEIGHT: 283.13 LBS | TEMPERATURE: 97.4 F | HEIGHT: 74 IN | OXYGEN SATURATION: 97 % | BODY MASS INDEX: 36.34 KG/M2 | DIASTOLIC BLOOD PRESSURE: 72 MMHG

## 2023-12-08 DIAGNOSIS — I10 HYPERTENSION, UNSPECIFIED TYPE: Primary | ICD-10-CM

## 2023-12-08 DIAGNOSIS — E78.2 MIXED HYPERLIPIDEMIA: ICD-10-CM

## 2023-12-08 DIAGNOSIS — D49.4 BLADDER TUMOR: ICD-10-CM

## 2023-12-08 DIAGNOSIS — N20.0 NEPHROLITHIASIS: ICD-10-CM

## 2023-12-08 PROBLEM — N17.9 AKI (ACUTE KIDNEY INJURY) (HCC): Status: RESOLVED | Noted: 2023-11-02 | Resolved: 2023-12-08

## 2023-12-08 PROBLEM — N13.2 HYDRONEPHROSIS WITH URINARY OBSTRUCTION DUE TO URETERAL CALCULUS: Status: RESOLVED | Noted: 2023-11-02 | Resolved: 2023-12-08

## 2023-12-08 PROCEDURE — 99214 OFFICE O/P EST MOD 30 MIN: CPT | Performed by: FAMILY MEDICINE

## 2023-12-08 NOTE — PATIENT INSTRUCTIONS
Await  lab, continue  meds  Obesity   AMBULATORY CARE:   Obesity  means your body mass index (BMI) is greater than 30. Your healthcare provider will use your age, height, and weight to measure your BMI. The risks of obesity include  many health problems, including injuries or physical disability. Diabetes (high blood sugar level)    High blood pressure or high cholesterol    Heart disease or heart failure    Stroke    Gallbladder or liver disease    Cancer of the colon, breast, prostate, liver, or kidney    Sleep apnea    Arthritis or gout    Screening  is done to check for health conditions before you have signs or symptoms. If you are 28to 79years old, your blood sugar level may be checked every 3 years for signs of prediabetes or diabetes. Your healthcare provider will check your blood pressure at each visit. High blood pressure can lead to a stroke or other problems. Your provider may check for signs of heart disease, cancer, or other health problems. Seek care immediately if:   You have a severe headache, confusion, or difficulty speaking. You have weakness on one side of your body. You have chest pain, sweating, or shortness of breath. Call your doctor if:   You have symptoms of gallbladder or liver disease, such as pain in your upper abdomen. You have knee or hip pain and discomfort while walking. You have symptoms of diabetes, such as intense hunger and thirst, and frequent urination. You have symptoms of sleep apnea, such as snoring or daytime sleepiness. You have questions or concerns about your condition or care. Treatment for obesity  focuses on helping you lose weight to improve your health. Even a small decrease in BMI can reduce the risk for many health problems. Your healthcare provider will help you set a weight-loss goal.  Lifestyle changes  are the first step in treating obesity. These include making healthy food choices and getting regular physical activity.  Your healthcare provider may suggest a weight-loss program that involves coaching, education, and therapy. Medicine  may help you lose weight when it is used with a healthy foods and physical activity. Surgery  can help you lose weight if you have obesity along with other health problems. Several types of weight-loss surgery are available. Ask your healthcare provider for more information. Tips for safe weight loss:   Set small, realistic goals. An example of a small goal is to walk for 20 minutes 5 days a week. Anther goal is to lose 5% of your body weight. Ask for support. Tell friends, family members, and coworkers about your goals. Ask someone to lose weight with you. You may also want to join a weight-loss support group. Identify foods or triggers that may cause you to overeat. Remove tempting high-calorie foods from your home and workplace. Place a bowl of fresh fruit on your kitchen counter. If stress causes you to eat, find other ways to cope with stress. A counselor or therapist may be able to help you. Track your daily calories and activity. Write down what you eat and drink. Also write down how many minutes of physical activity you do each day. Track your weekly weight. Weigh yourself in the morning, before you eat or drink anything but after you use the bathroom. Use the same scale, in the same place, and in similar clothing each time. Only weigh yourself 1 to 2 times each week, or as directed. You may become discouraged if you weigh yourself every day. Eating changes: You will need to eat 500 to 1,000 fewer calories each day than you currently eat to lose 1 to 2 pounds a week. The following changes will help you cut calories:  Eat smaller portions. Use small plates, no larger than 9 inches in diameter. Fill your plate half full of fruits and vegetables. Measure your food using measuring cups until you know what a serving size looks like.          Eat 3 meals and 1 or 2 snacks each day. Plan your meals in advance. Marie Son and eat at home most of the time. Eat slowly. Do not skip meals. Skipping meals can lead to overeating later in the day. This can make it harder for you to lose weight. Talk with a dietitian to help you make a meal plan and schedule that is right for you. Eat fruits and vegetables at every meal.  They are low in calories and high in fiber, which makes you feel full. Do not add butter, margarine, or cream sauce to vegetables. Use herbs to season steamed vegetables. Eat less fat and fewer fried foods. Eat more baked or grilled chicken and fish. These protein sources are lower in calories and fat than red meat. Limit fast food. Dress your salads with olive oil and vinegar instead of bottled dressing. Limit the amount of sugar you eat. Do not drink sugary beverages. Limit alcohol. Activity changes:  Physical activity is good for your body in many ways. It helps you burn calories and build strong muscles. It decreases stress and depression, and improves your mood. It can also help you sleep better. Talk to your healthcare provider before you begin an exercise program.  Exercise for at least 30 minutes 5 days a week. Start slowly. Set aside time each day for physical activity that you enjoy and that is convenient for you. It is best to do both weight training and an activity that increases your heart rate, such as walking, bicycling, or swimming. Find ways to be more active. Do yard work and housecleaning. Walk up the stairs instead of using elevators. Spend your leisure time going to events that require walking, such as outdoor festivals or fairs. This extra physical activity can help you lose weight and keep it off. Follow up with your doctor as directed: You may need to meet with a dietitian. Write down your questions so you remember to ask them during your visits.   © Copyright [employer] 2023 Information is for End User's use only and may not be sold, redistributed or otherwise used for commercial purposes. The above information is an  only. It is not intended as medical advice for individual conditions or treatments. Talk to your doctor, nurse or pharmacist before following any medical regimen to see if it is safe and effective for you. Weight Management   AMBULATORY CARE:   Why it is important to manage your weight:  Being overweight increases your risk of health conditions such as heart disease, high blood pressure, type 2 diabetes, and certain types of cancer. It can also increase your risk for osteoarthritis, sleep apnea, and other respiratory problems. Aim for a slow, steady weight loss. Even a small amount of weight loss can lower your risk of health problems. Risks of being overweight:  Extra weight can cause many health problems, including the following:  Diabetes (high blood sugar level)    High blood pressure or high cholesterol    Heart disease    Stroke    Gallbladder or liver disease    Cancer of the colon, breast, prostate, liver, or kidney    Sleep apnea    Arthritis or gout    Screening  is done to check for health conditions before you have signs or symptoms. If you are 28to 79years old, your blood sugar level may be checked every 3 years for signs of prediabetes or diabetes. Your healthcare provider will check your blood pressure at each visit. High blood pressure can lead to a stroke or other problems. Your provider may check for signs of heart disease, cancer, or other health problems. How to lose weight safely:  A safe and healthy way to lose weight is to eat fewer calories and get regular exercise. You can lose up about 1 pound a week by decreasing the number of calories you eat by 500 calories each day. You can decrease calories by eating smaller portion sizes or by cutting out high-calorie foods. Read labels to find out how many calories are in the foods you eat.          You can also burn calories with exercise such as walking, swimming, or biking. You will be more likely to keep weight off if you make these changes part of your lifestyle. Exercise at least 30 minutes per day on most days of the week. You can also fit in more physical activity by taking the stairs instead of the elevator or parking farther away from stores. Ask your healthcare provider about the best exercise plan for you. Healthy meal plan for weight management:  A healthy meal plan includes a variety of foods, contains fewer calories, and helps you stay healthy. A healthy meal plan includes the following:     Eat whole-grain foods more often. A healthy meal plan should contain fiber. Fiber is the part of grains, fruits, and vegetables that is not broken down by your body. Whole-grain foods are healthy and provide extra fiber in your diet. Some examples of whole-grain foods are whole-wheat breads and pastas, oatmeal, brown rice, and bulgur. Eat a variety of vegetables every day. Include dark, leafy greens such as spinach, kale, jaya greens, and mustard greens. Eat yellow and orange vegetables such as carrots, sweet potatoes, and winter squash. Eat a variety of fruits every day. Choose fresh or canned fruit (canned in its own juice or light syrup) instead of juice. Fruit juice has very little or no fiber. Eat low-fat dairy foods. Drink fat-free (skim) milk or 1% milk. Eat fat-free yogurt and low-fat cottage cheese. Try low-fat cheeses such as mozzarella and other reduced-fat cheeses. Choose meat and other protein foods that are low in fat. Choose beans or other legumes such as split peas or lentils. Choose fish, skinless poultry (chicken or turkey), or lean cuts of red meat (beef or pork). Before you cook meat or poultry, cut off any visible fat. Use less fat and oil. Try baking foods instead of frying them. Add less fat, such as margarine, sour cream, regular salad dressing and mayonnaise to foods.  Eat fewer high-fat foods. Some examples of high-fat foods include french fries, doughnuts, ice cream, and cakes. Eat fewer sweets. Limit foods and drinks that are high in sugar. This includes candy, cookies, regular soda, and sweetened drinks. Ways to decrease calories:   Eat smaller portions. Use a small plate with smaller servings. Do not eat second helpings. When you eat at a restaurant, ask for a box and place half of your meal in the box before you eat. Share an entrée with someone else. Replace high-calorie snacks with healthy, low-calorie snacks. Choose fresh fruit, vegetables, fat-free rice cakes, or air-popped popcorn instead of potato chips, nuts, or chocolate. Choose water or calorie-free drinks instead of soda or sweetened drinks. Do not shop for groceries when you are hungry. You may be more likely to make unhealthy food choices. Take a grocery list of healthy foods and shop after you have eaten. Eat regular meals. Do not skip meals. Skipping meals can lead to overeating later in the day. This can make it harder for you to lose weight. Eat a healthy snack in place of a meal if you do not have time to eat a regular meal. Talk with a dietitian to help you create a meal plan and schedule that is right for you. Other things to consider as you try to lose weight:   Be aware of situations that may give you the urge to overeat, such as eating while watching television. Find ways to avoid these situations. For example, read a book, go for a walk, or do crafts. Meet with a weight loss support group or friends who are also trying to lose weight. This may help you stay motivated to continue working on your weight loss goals. © Copyright [employer] 2023 Information is for End User's use only and may not be sold, redistributed or otherwise used for commercial purposes. The above information is an  only.  It is not intended as medical advice for individual conditions or treatments. Talk to your doctor, nurse or pharmacist before following any medical regimen to see if it is safe and effective for you.

## 2023-12-08 NOTE — PROGRESS NOTES
Name: Tee Reyes      : 1960      MRN: [id number]  Encounter Provider: Josesito Syed MD  Encounter Date: 2023   Encounter department: [facility] PRIMARY CARE    Assessment & Plan     1. Hypertension, unspecified type  -     Comprehensive metabolic panel; Future; Expected date: 2024    2. Mixed hyperlipidemia  Assessment & Plan:  Lab due      Orders:  -     Lipid panel; Future; Expected date: 2024    3. 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 Nephrolithiasis  Assessment & Plan:  No back pain since removal kidney stone      5. BMI 36.0-36.9,adult           Subjective      Patient presents with: Follow-up: Pt states he is following up after having his kidney stone removed. back pain is  better. Pt is declining the flu vaccine. gt Butts is here for follow-up of his blood pressure and his lipids he says his back pain is totally better now that his kidney stones been removed. 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 His follow-up with his urologist in February he plans to retire  and do work around the house and just relax      Review of Systems   Constitutional:  Negative for activity change, appetite change and fatigue. Respiratory:  Negative for shortness of breath. Cardiovascular:  Negative for chest pain. Musculoskeletal:  Negative for back pain. Neurological:  Negative for dizziness, light-headedness and headaches. Psychiatric/Behavioral:  The patient is not nervous/anxious.         Current Outpatient Medications on File Prior to Visit   Medication Sig    amLODIPine (NORVASC) 5 mg tablet Take 2 tablets (10 mg total) by mouth daily    ascorbic acid (VITAMIN C) 500 mg tablet Take 500 mg by mouth daily    aspirin 81 mg chewable tablet Chew 1 tablet (81 mg total) daily    carvedilol (COREG) 25 mg tablet Take 1 tablet (25 mg total) by mouth 2 (two) times a day with meals    Coenzyme Q10 (COQ-10) 10 MG CAPS Take by mouth    flecainide (TAMBOCOR) 50 mg tablet TAKE 1 TABLET TWICE A DAY    Multiple Vitamins-Minerals (MENS MULTIVITAMIN PLUS PO) Take by mouth    Omega-3 Fatty Acids (FISH OIL) 1200 MG CAPS Take by mouth    simvastatin (ZOCOR) 40 mg tablet TAKE 1 TABLET DAILY AT BEDTIME    tadalafil (CIALIS) 10 MG tablet Take 1 tablet (10 mg total) by mouth daily as needed for erectile dysfunction    Zinc 50 MG CAPS Take by mouth    acetaminophen (TYLENOL) 325 mg tablet Take 2 tablets (650 mg total) by mouth every 4 (four) hours as needed for mild pain    diclofenac sodium (VOLTAREN) 50 mg EC tablet Take 1 tablet (50 mg total) by mouth 3 (three) times a day for 7 days    docusate sodium (COLACE) 100 mg capsule Take 1 capsule (100 mg total) by mouth 2 (two) times a day for 15 days    oxybutynin (DITROPAN) 5 mg tablet Take 1 tablet (5 mg total) by mouth every 6 (six) hours as needed (bladder spasms)    tamsulosin (FLOMAX) 0.4 mg Take 1 capsule (0.4 mg total) by mouth daily with dinner       Objective     /72 (BP Location: Left arm, Patient Position: Sitting, Cuff Size: Standard)   Pulse 63   Temp (!) 97.4 °F (36.3 °C) (Temporal)   Ht 6' 2" (1.88 m)   Wt 128 kg (283 lb 2 oz)   SpO2 97%   BMI 36.35 kg/m²     Physical Exam  Vitals reviewed. Constitutional:       Appearance: Normal appearance. He is obese. Neck:      Vascular: No carotid bruit. Cardiovascular:      Rate and Rhythm: Normal rate and regular rhythm. Pulses: Normal pulses. Heart sounds: Normal heart sounds. Pulmonary:      Effort: Pulmonary effort is normal.      Breath sounds: Normal breath sounds. Musculoskeletal:      Right lower leg: No edema. Left lower leg: No edema. Lymphadenopathy:      Cervical: No cervical adenopathy. Neurological:      Mental Status: He is alert. Psychiatric:         Mood and Affect: Mood normal.       Oralia Lazar MD  BMI Counseling:  Body mass index is 36.35 kg/m². The BMI is above normal. Nutrition recommendations include reducing portion sizes, decreasing overall calorie intake, 3-5 servings of fruits/vegetables daily, reducing fast food intake, and consuming healthier snacks. Exercise recommendations include exercising 3-5 times per week.

## 2023-12-22 ENCOUNTER — OFFICE VISIT (OUTPATIENT)
Dept: UROLOGY | Facility: CLINIC | Age: 63
End: 2023-12-22
Payer: COMMERCIAL

## 2023-12-22 VITALS
HEART RATE: 76 BPM | SYSTOLIC BLOOD PRESSURE: 132 MMHG | OXYGEN SATURATION: 96 % | BODY MASS INDEX: 36.7 KG/M2 | HEIGHT: 74 IN | DIASTOLIC BLOOD PRESSURE: 68 MMHG | WEIGHT: 286 LBS

## 2023-12-22 DIAGNOSIS — N39.8 UROTHELIAL LESION: Primary | ICD-10-CM

## 2023-12-22 PROCEDURE — 99213 OFFICE O/P EST LOW 20 MIN: CPT | Performed by: PHYSICIAN ASSISTANT

## 2023-12-26 NOTE — PROGRESS NOTES
UROLOGY PROGRESS NOTE   Patient Identifiers: Daniel Gardiner (MRN [id number])  Date of Service: 12/26/2023    Subjective:   63-year-old man history of kidney stones.  He had a left ureteral stone and small papillary bladder tumor November 3.  Here for follow-up.  His ultrasound shows no stone or hydronephrosis.  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    Reason for visit: Kidney stone and bladder polyp follow-up    Objective:     VITALS:    Vitals:    12/22/23 1505   BP: 132/68   Pulse: 76   SpO2: 96%     AUA SYMPTOM SCORE      Flowsheet Row Most Recent Value   AUA SYMPTOM SCORE    How often have you had a sensation of not emptying your bladder completely after you finished urinating? 0 (P)    How often have you had to urinate again less than two hours after you finished urinating? 1 (P)    How often have you found you stopped and started again several times when you urinate? 0 (P)    How often have you found it difficult to postpone urination? 0 (P)    How often have you had a weak urinary stream? 0 (P)    How often have you had to push or strain to begin urination? 0 (P)    How many times did you most typically get up to urinate from the time you went to bed at night until the time you got up in the morning? 1 (P)    Quality of Life: If you were to spend the rest of your life with your urinary condition just the way it is now, how would you feel about that? 0 (P)    AUA SYMPTOM SCORE 2 (P)               LABS:  Lab Results   Component Value Date    HGB 12.4 11/03/2023    HCT 38.4 11/03/2023    WBC 15.66 (H) 11/03/2023     11/03/2023   ]    Lab Results   Component Value Date     11/22/2017    K 3.5 11/03/2023     11/03/2023    CO2 22 11/03/2023    BUN 16 11/03/2023    CREATININE 1.19 11/03/2023    CALCIUM 8.2 (L) 11/03/2023   ]        INPATIENT MEDS:    Current Outpatient Medications:     amLODIPine (NORVASC) 5 mg tablet, Take 2 tablets (10 mg total) by  mouth daily, Disp: 180 tablet, Rfl: 1    ascorbic acid (VITAMIN C) 500 mg tablet, Take 500 mg by mouth daily, Disp: , Rfl:     aspirin 81 mg chewable tablet, Chew 1 tablet (81 mg total) daily, Disp: 90 tablet, Rfl: 3    carvedilol (COREG) 25 mg tablet, Take 1 tablet (25 mg total) by mouth 2 (two) times a day with meals, Disp: 180 tablet, Rfl: 3    Coenzyme Q10 (COQ-10) 10 MG CAPS, Take by mouth, Disp: , Rfl:     flecainide (TAMBOCOR) 50 mg tablet, TAKE 1 TABLET TWICE A DAY, Disp: 180 tablet, Rfl: 3    Multiple Vitamins-Minerals (MENS MULTIVITAMIN PLUS PO), Take by mouth, Disp: , Rfl:     Omega-3 Fatty Acids (FISH OIL) 1200 MG CAPS, Take by mouth, Disp: , Rfl:     simvastatin (ZOCOR) 40 mg tablet, TAKE 1 TABLET DAILY AT BEDTIME, Disp: 90 tablet, Rfl: 3    tadalafil (CIALIS) 10 MG tablet, Take 1 tablet (10 mg total) by mouth daily as needed for erectile dysfunction, Disp: 10 tablet, Rfl: 3    Zinc 50 MG CAPS, Take by mouth, Disp: , Rfl:     acetaminophen (TYLENOL) 325 mg tablet, Take 2 tablets (650 mg total) by mouth every 4 (four) hours as needed for mild pain (Patient not taking: Reported on 12/22/2023), Disp: 30 tablet, Rfl: 0    diclofenac sodium (VOLTAREN) 50 mg EC tablet, Take 1 tablet (50 mg total) by mouth 3 (three) times a day for 7 days, Disp: 21 tablet, Rfl: 0    docusate sodium (COLACE) 100 mg capsule, Take 1 capsule (100 mg total) by mouth 2 (two) times a day for 15 days, Disp: 30 capsule, Rfl: 0    Omeprazole 20 MG TBDD, , Disp: , Rfl:     oxybutynin (DITROPAN) 5 mg tablet, Take 1 tablet (5 mg total) by mouth every 6 (six) hours as needed (bladder spasms) (Patient not taking: Reported on 12/22/2023), Disp: 30 tablet, Rfl: 0    tamsulosin (FLOMAX) 0.4 mg, Take 1 capsule (0.4 mg total) by mouth daily with dinner (Patient not taking: Reported on 12/22/2023), Disp: 15 capsule, Rfl: 0      Physical Exam:   /68 (BP Location: Left arm, Patient Position: Sitting, Cuff Size: Standard)   Pulse 76   Ht 6'  "2\" (1.88 m)   Wt 130 kg (286 lb)   SpO2 96%   BMI 36.72 kg/m²   GEN: no acute distress    RESP: breathing comfortably with no accessory muscle use    ABD: soft, non-tender, non-distended   INCISION:    EXT: no significant peripheral edema     RADIOLOGY:   IMPRESSION:     1. No evidence of shadowing renal calculus. No hydronephrosis.     2. No significant postvoid volume.    Assessment:   #1.  Nephrolithiasis  #2.  Bladder polyp    Plan:   -Recommend 4-month follow-up with urine and cytology prior to visit  -Will determine whether follow-up cystoscopy is required  -  -          "

## 2024-01-10 DIAGNOSIS — N52.9 ERECTILE DYSFUNCTION, UNSPECIFIED ERECTILE DYSFUNCTION TYPE: ICD-10-CM

## 2024-01-10 RX ORDER — TADALAFIL 10 MG/1
10 TABLET ORAL DAILY PRN
Qty: 10 TABLET | Refills: 0 | Status: SHIPPED | OUTPATIENT
Start: 2024-01-10

## 2024-01-10 NOTE — TELEPHONE ENCOUNTER
Reason for call:   [x] Refill   [] Prior Auth  [] Other:     Office:   [x] PCP/Provider - Dre  [] Specialty/Provider -     Medication :tadalafil    Dose/Frequency: 10 mg / 1 tablet daily as needed    Quantity: 10    Pharmacy: [id number]    Does the patient have enough for 3 days?   [x] Yes   [] No - Send as HP to POD

## 2024-01-16 ENCOUNTER — TELEPHONE (OUTPATIENT)
Age: 64
End: 2024-01-16

## 2024-01-16 NOTE — TELEPHONE ENCOUNTER
Patient would like to  lab orders. He will be having labs done outside of network.   Please call patient when available. [phone]    Thank you

## 2024-01-30 DIAGNOSIS — E78.2 MIXED HYPERLIPIDEMIA: ICD-10-CM

## 2024-01-30 RX ORDER — SIMVASTATIN 40 MG
TABLET ORAL
Qty: 90 TABLET | Refills: 1 | Status: SHIPPED | OUTPATIENT
Start: 2024-01-30

## 2024-02-09 ENCOUNTER — OFFICE VISIT (OUTPATIENT)
Dept: CARDIOLOGY CLINIC | Facility: CLINIC | Age: 64
End: 2024-02-09
Payer: COMMERCIAL

## 2024-02-09 ENCOUNTER — DOCUMENTATION (OUTPATIENT)
Dept: CARDIOLOGY CLINIC | Facility: CLINIC | Age: 64
End: 2024-02-09

## 2024-02-09 VITALS
HEART RATE: 65 BPM | BODY MASS INDEX: 37.49 KG/M2 | HEIGHT: 74 IN | DIASTOLIC BLOOD PRESSURE: 62 MMHG | SYSTOLIC BLOOD PRESSURE: 120 MMHG | WEIGHT: 292.1 LBS

## 2024-02-09 DIAGNOSIS — E78.2 MIXED HYPERLIPIDEMIA: ICD-10-CM

## 2024-02-09 DIAGNOSIS — I48.0 PAROXYSMAL ATRIAL FIBRILLATION (HCC): Primary | ICD-10-CM

## 2024-02-09 DIAGNOSIS — I10 PRIMARY HYPERTENSION: ICD-10-CM

## 2024-02-09 PROCEDURE — 93000 ELECTROCARDIOGRAM COMPLETE: CPT | Performed by: INTERNAL MEDICINE

## 2024-02-09 PROCEDURE — 99214 OFFICE O/P EST MOD 30 MIN: CPT | Performed by: INTERNAL MEDICINE

## 2024-02-09 RX ORDER — AMLODIPINE BESYLATE 5 MG/1
10 TABLET ORAL DAILY
Start: 2024-02-09

## 2024-02-09 NOTE — PROGRESS NOTES
GM can you please check to see if this patient needs prior auth for a 2 weeks zio tx.    Thank you!!!

## 2024-02-09 NOTE — PROGRESS NOTES
Cardiology Follow Up    Daniel Gardiner  1960  [id number]  [facility] [place]  [address]  [address]  [place] [address] [phone]    1. Paroxysmal atrial fibrillation (HCC)  POCT ECG    AMB extended holter monitor      2. Primary hypertension  amLODIPine (NORVASC) 5 mg tablet      3. Mixed hyperlipidemia            Discussion/Summary:    1.  Paroxysmal atrial fibrillation - Daniel had this on a few occasion around the time of christiane Lyme disease.  He is feeling better since this has resolved.  He remains in sinus rhythm on carvedilol and flecainide.  He is on aspirin for stroke prevention.  About a year and a half ago he wore an extended ambulatory Holter since last visit that showed sinus rhythm, PACs with short runs of nonsustained SVT and questionable VT. he had a normal ischemic evaluation in May.  Since he is having more symptoms of palpitations and his atrial runs, I am going to have him another extended ambulatory Holter.  If there is a high burden of this I would then increase the flecainide to 100 mg twice daily.  No changes were made to his medical therapy.  If he has recurrent atrial fibrillation since he is near 65 and now, I would recommend full dose anticoagulation.    2.  Hypertension - His blood pressure was running high last year.  His amlodipine had been increased to 10 mg daily.  We will change Toprol-XL to carvedilol 25 mg twice daily.  His blood pressure has improved and even gets lightheaded upon standing.  I am going to back off the amlodipine, back to 5 mg daily.  He should periodically his blood pressure at home.    3.  Mixed hyperlipidemia - His last LDL increased to 159 and he has an HDL in the 30s.  Simvastatin 40 mg daily was added to his regimen and he will continue to have blood work follow closely.  His most recent LDL was 91.  It is also recommended for him to work on lifestyle  modifications which includes quitting smoking.    4.  Abnormal ECG - He has a left anterior fascicular block and incomplete right bundle branch block, with varying degrees of QRS prolongation.  This certainly can be associated with a left anterior fascicular block but flecainide can do this as well.  As stated we are obtaining an extended ambulatory Holter again.      Interval History:     Daniel comes in for follow-up given his paroxysmal atrial fibrillation and risk factors for cardiovascular disease.  I met Daniel during hospitalization in August 2021 in which he had new onset atrial fibrillation.  This occurred on a few occasions.  He was discharged but then had recurrent symptoms of his atrial fibrillation and we started flecainide in addition to his metoprolol.  He was also found have Lyme disease and was treated for this as well.  He had elevated LFTs associated with that.    He had an echocardiogram that showed normal LV systolic function and biatrial enlargement.  He ended up going through an exercise treadmill test that showed no evidence of ischemia at about 80% MPHR.  He had no symptoms during his treadmill test.  At our last visit yaron blood pressure was continuing to run high.  His amlodipine had been increased to 10 mg daily.  We changed him from Toprol-XL to carvedilol 25 mg twice daily.  Blood pressure has improved.  He did then call the office in August and noted that he was having some palpitations/dizziness and he was tracking his heart rate/rhythm at home.  At that point we had him wear an extended ambulatory Holter.  Over 2 weeks this showed sinus rhythm with PACs that were relatively frequent than a few short runs of nonsustained SVT.  There was a suggestion of nonsustained run of VT, but the voltage was low and this could have represented SVT with aberrancy.  After our last visit we did have him go through an ischemic evaluation, and he had a normal stress nuclear study without any perfusion  defects.    Waiting for the most part is felt well since last visit.  The only thing bothersome to him is intermittent palpitations and what sounds like feelings of nonsustained atrial runs.  He gets them now on a daily basis.  They do not limit his activity or causing him too much distress, but he is worried about these.  He denies any sustained feelings of a tachyarrhythmia or symptoms of his atrial fibrillation like he had a couple years back.  He denies chest pain or any symptoms of angina.  He denies shortness of breath or any signs/symptoms of CHF.  He is active without any symptoms with exertion.  His palpitations are random.  He does get some intermittent lightheadedness if he stands up too quickly.      Patient Active Problem List   Diagnosis    Disc degeneration, lumbosacral    Hyperlipidemia    Hypertension    Cigarette nicotine dependence without complication    Paroxysmal atrial fibrillation (HCC)    Pain of right hip    Bladder tumor    Nephrolithiasis     Past Medical History:   Diagnosis Date    A-fib (HCC) 08/24/2021    Disc degeneration, lumbosacral 1/6/2016    Hyperlipidemia     hypercholesterolemia    Hypertension     last assessed 11/22/17    Pneumonia of both lower lobes due to infectious organism 1/29/2018     Social History     Socioeconomic History    Marital status: /Civil Union     Spouse name: Not on file    Number of children: 1    Years of education: Not on file    Highest education level: Not on file   Occupational History    Occupation:    Tobacco Use    Smoking status: Every Day     Current packs/day: 0.50     Average packs/day: 0.5 packs/day for 40.0 years (20.0 ttl pk-yrs)     Types: Cigarettes    Smokeless tobacco: Never   Vaping Use    Vaping status: Never Used   Substance and Sexual Activity    Alcohol use: Yes     Comment: Occassional 1 x / month    Drug use: No    Sexual activity: Yes     Partners: Female     Birth control/protection: None   Other Topics  Concern    Not on file   Social History Narrative    Lives with wife    Full time employment    active advance directive    1 child ; son, passed away        Does not consume caffeine     Social Determinants of Health     Financial Resource Strain: Not on file   Food Insecurity: No Food Insecurity (11/3/2023)    Hunger Vital Sign     Worried About Running Out of Food in the Last Year: Never true     Ran Out of Food in the Last Year: Never true   Transportation Needs: No Transportation Needs (11/3/2023)    PRAPARE - Transportation     Lack of Transportation (Medical): No     Lack of Transportation (Non-Medical): No   Physical Activity: Not on file   Stress: Not on file   Social Connections: Not on file   Intimate Partner Violence: Not on file   Housing Stability: Unknown (11/3/2023)    Housing Stability Vital Sign     Unable to Pay for Housing in the Last Year: No     Number of Places Lived in the Last Year: Not on file     Unstable Housing in the Last Year: No      Family History   Problem Relation Age of Onset    Obesity Mother     Aneurysm Father      Past Surgical History:   Procedure Laterality Date    COLONOSCOPY      FL RETROGRADE PYELOGRAM  11/3/2023    HEMORRHOID SURGERY      KNEE SURGERY Bilateral     AR COLONOSCOPY FLX DX W/COLLJ SPEC WHEN PFRMD N/A 1/5/2019    Procedure: COLONOSCOPY;  Surgeon: Lane Bonner MD;  [facility]: [facility];  Service: Gastroenterology    AR CYSTO BLADDER W/URETERAL CATHETERIZATION Left 11/3/2023    Procedure: CYSTOSCOPY RETROGRADE PYELOGRAM WITH INSERTION STENT URETERAL, ureteroscopy, holmium laser;  Surgeon: Donald Jenkins MD;  Location: [facility];  Service: Urology    TRANSURETHRAL RESECTION OF BLADDER TUMOR N/A 11/3/2023    Procedure: TRANSURETHRAL RESECTION OF BLADDER TUMOR (TURBT);  Surgeon: Donald Jenkins MD;  Location: [facility];  Service: Urology    UPPER GASTROINTESTINAL ENDOSCOPY         Current Outpatient Medications:     amLODIPine (NORVASC) 5 mg tablet,  Take 2 tablets (10 mg total) by mouth daily, Disp: , Rfl:     ascorbic acid (VITAMIN C) 500 mg tablet, Take 500 mg by mouth daily, Disp: , Rfl:     aspirin 81 mg chewable tablet, Chew 1 tablet (81 mg total) daily, Disp: 90 tablet, Rfl: 3    carvedilol (COREG) 25 mg tablet, Take 1 tablet (25 mg total) by mouth 2 (two) times a day with meals, Disp: 180 tablet, Rfl: 3    Coenzyme Q10 (COQ-10) 10 MG CAPS, Take by mouth, Disp: , Rfl:     flecainide (TAMBOCOR) 50 mg tablet, TAKE 1 TABLET TWICE A DAY, Disp: 180 tablet, Rfl: 3    Multiple Vitamins-Minerals (MENS MULTIVITAMIN PLUS PO), Take by mouth, Disp: , Rfl:     Omega-3 Fatty Acids (FISH OIL) 1200 MG CAPS, Take by mouth, Disp: , Rfl:     simvastatin (ZOCOR) 40 mg tablet, TAKE 1 TABLET DAILY AT BEDTIME, Disp: 90 tablet, Rfl: 1    tadalafil (CIALIS) 10 MG tablet, Take 1 tablet (10 mg total) by mouth daily as needed for erectile dysfunction, Disp: 10 tablet, Rfl: 0    Zinc 50 MG CAPS, Take by mouth, Disp: , Rfl:     acetaminophen (TYLENOL) 325 mg tablet, Take 2 tablets (650 mg total) by mouth every 4 (four) hours as needed for mild pain (Patient not taking: Reported on 12/22/2023), Disp: 30 tablet, Rfl: 0    diclofenac sodium (VOLTAREN) 50 mg EC tablet, Take 1 tablet (50 mg total) by mouth 3 (three) times a day for 7 days, Disp: 21 tablet, Rfl: 0    docusate sodium (COLACE) 100 mg capsule, Take 1 capsule (100 mg total) by mouth 2 (two) times a day for 15 days, Disp: 30 capsule, Rfl: 0    Omeprazole 20 MG TBDD, , Disp: , Rfl:     oxybutynin (DITROPAN) 5 mg tablet, Take 1 tablet (5 mg total) by mouth every 6 (six) hours as needed (bladder spasms) (Patient not taking: Reported on 12/22/2023), Disp: 30 tablet, Rfl: 0    tamsulosin (FLOMAX) 0.4 mg, Take 1 capsule (0.4 mg total) by mouth daily with dinner (Patient not taking: Reported on 12/22/2023), Disp: 15 capsule, Rfl: 0  No Known Allergies    Labs:  Lab Results   Component Value Date     11/22/2017    K 3.5 11/03/2023     K 4.5 08/19/2023     11/03/2023     08/19/2023    CO2 22 11/03/2023    CO2 26 08/19/2023    BUN 16 11/03/2023    BUN 13 08/19/2023    CREATININE 1.19 11/03/2023    CREATININE 1.07 08/19/2023    CALCIUM 8.2 (L) 11/03/2023    CALCIUM 9.3 08/19/2023     Lab Results   Component Value Date    WBC 15.66 (H) 11/03/2023    WBC 7.2 11/22/2017    HGB 12.4 11/03/2023    HGB 13.9 11/22/2017    HCT 38.4 11/03/2023    HCT 41.9 11/22/2017    MCV 93 11/03/2023    MCV 92.9 11/22/2017     11/03/2023     11/22/2017     Lab Results   Component Value Date    CHOL 156 11/22/2017    TRIG 172 (H) 04/03/2023    TRIG 177 (H) 05/22/2021    HDL 35 (L) 04/03/2023    HDL 42 05/22/2021    LDLDIRECT 94 10/22/2016     Imaging:  ECG today shows sinus rhythm with a bifascicular block, left anterior fascicular block and a right bundle branch block.    STRESS NUCLEAR (5/26/2023):    Stress ECG: No ST deviation is noted. The ECG was not diagnostic due to pharmacological (vasodilator) stress.    Perfusion: There are no perfusion defects.    Stress Function: Left ventricular function post-stress is normal. Stress ejection fraction is 52 %.    Stress Combined Conclusion: The ECG and SPECT imaging portions of the stress study are concordant with no evidence of stress induced myocardial ischemia. Left ventricular perfusion is normal.    Resting ECG: The ECG shows normal sinus rhythm.       Stress test only, exercise  Result Date: 11/22/2021  Narrative:   Normal functional capacity for age & gender.   Exercise-limiting dyspnea. Normal blood pressure response with blunted heart rate response (79% MPHR achieved) to exercise. Took beta blocker medication this morning.   Non-diagnostic exercise stress ECG due to failure to achieve 85% MPHR.       ECHO (8/23/2021):  LEFT VENTRICLE:  Size was normal.  Systolic function was normal. Ejection fraction was estimated to be 60 %.  There was mild concentric hypertrophy.     RIGHT  "VENTRICLE:  The size was normal.  Systolic function was normal.     LEFT ATRIUM:  The atrium was mildly dilated.     RIGHT ATRIUM:  The atrium was mildly dilated.     MITRAL VALVE:  There was trace regurgitation.     TRICUSPID VALVE:  There was trace regurgitation.       Review of Systems:  Review of Systems   Constitutional: Negative.    HENT: Negative.     Eyes: Negative.    Respiratory: Negative.     Cardiovascular:  Positive for palpitations.   Gastrointestinal: Negative.    Musculoskeletal:  Positive for arthralgias and back pain.   Skin: Negative.    Allergic/Immunologic: Negative.    Neurological:  Positive for light-headedness.   Hematological: Negative.    Psychiatric/Behavioral: Negative.     All other systems reviewed and are negative.    Vitals:    02/09/24 0959   BP: 120/62   BP Location: Left arm   Patient Position: Sitting   Cuff Size: Large   Pulse: 65   Weight: 132 kg (292 lb 1.6 oz)   Height: 6' 2\" (1.88 m)     Physical Exam  Vitals and nursing note reviewed.   Constitutional:       Appearance: He is well-developed.   HENT:      Head: Normocephalic and atraumatic.   Eyes:      General: No scleral icterus.        Right eye: No discharge.         Left eye: No discharge.      Pupils: Pupils are equal, round, and reactive to light.   Neck:      Thyroid: No thyromegaly.      Vascular: No JVD.   Cardiovascular:      Rate and Rhythm: Normal rate and regular rhythm. No extrasystoles are present.     Pulses: Normal pulses. No decreased pulses.      Heart sounds: Normal heart sounds, S1 normal and S2 normal. No murmur heard.     No friction rub. No gallop.   Pulmonary:      Effort: Pulmonary effort is normal. No respiratory distress.      Breath sounds: Normal breath sounds. No wheezing or rales.   Abdominal:      General: Bowel sounds are normal. There is no distension.      Palpations: Abdomen is soft.      Tenderness: There is no abdominal tenderness.   Musculoskeletal:         General: No tenderness or " deformity. Normal range of motion.      Cervical back: Normal range of motion and neck supple.   Skin:     General: Skin is warm and dry.      Findings: No rash.   Neurological:      Mental Status: He is alert and oriented to person, place, and time.      Cranial Nerves: No cranial nerve deficit.   Psychiatric:         Thought Content: Thought content normal.         Judgment: Judgment normal.       Counseling / Coordination of Care  Total office time spent today 25 minutes.  Greater than 50% of total time was spent with the patient and / or family counseling and / or coordination of care.

## 2024-02-09 NOTE — PROGRESS NOTES
Per [employer] online precert resource list, auth is not required for a 1W ZIO/82579 XT or 2W ZIO/68061 XT.

## 2024-02-12 DIAGNOSIS — I48.0 PAROXYSMAL ATRIAL FIBRILLATION (HCC): ICD-10-CM

## 2024-02-12 DIAGNOSIS — I10 PRIMARY HYPERTENSION: ICD-10-CM

## 2024-02-12 RX ORDER — CARVEDILOL 25 MG/1
25 TABLET ORAL 2 TIMES DAILY WITH MEALS
Qty: 180 TABLET | Refills: 3 | Status: SHIPPED | OUTPATIENT
Start: 2024-02-12

## 2024-02-14 NOTE — TELEPHONE ENCOUNTER
Spoke with pt he is aware Quality 130: Documentation Of Current Medications In The Medical Record: Current Medications Documented Quality 431: Preventive Care And Screening: Unhealthy Alcohol Use - Screening: Patient not screened for unhealthy alcohol use using a systematic screening method Detail Level: Detailed Quality 226: Preventive Care And Screening: Tobacco Use: Screening And Cessation Intervention: Patient screened for tobacco use and is an ex/non-smoker

## 2024-02-23 DIAGNOSIS — N52.9 ERECTILE DYSFUNCTION, UNSPECIFIED ERECTILE DYSFUNCTION TYPE: ICD-10-CM

## 2024-02-23 RX ORDER — TADALAFIL 10 MG/1
10 TABLET ORAL DAILY PRN
Qty: 10 TABLET | Refills: 3 | Status: SHIPPED | OUTPATIENT
Start: 2024-02-23

## 2024-02-23 NOTE — TELEPHONE ENCOUNTER
Reason for call:   [x] Refill   [] Prior Auth  [] Other:     Office:   [x] PCP/Provider - Linh Erickson MD   [] Specialty/Provider -     Medication: tadalafil (CIALIS) 10 MG tablet     Dose/Frequency:     10 mg, Oral, Daily PRN       Quantity: 10    Pharmacy:  [id number] - [place] [address]     Does the patient have enough for 3 days?   [x] Yes   [] No - Send as HP to POD

## 2024-03-07 DIAGNOSIS — I10 PRIMARY HYPERTENSION: ICD-10-CM

## 2024-03-07 RX ORDER — AMLODIPINE BESYLATE 5 MG/1
10 TABLET ORAL DAILY
Qty: 180 TABLET | Refills: 3 | Status: SHIPPED | OUTPATIENT
Start: 2024-03-07

## 2024-03-13 ENCOUNTER — CLINICAL SUPPORT (OUTPATIENT)
Dept: CARDIOLOGY CLINIC | Facility: CLINIC | Age: 64
End: 2024-03-13
Payer: COMMERCIAL

## 2024-03-13 DIAGNOSIS — I48.0 PAROXYSMAL ATRIAL FIBRILLATION (HCC): ICD-10-CM

## 2024-03-13 PROCEDURE — 93248 EXT ECG>7D<15D REV&INTERPJ: CPT | Performed by: INTERNAL MEDICINE

## 2024-04-27 ENCOUNTER — HOSPITAL ENCOUNTER (OUTPATIENT)
Facility: HOSPITAL | Age: 64
Setting detail: OBSERVATION
Discharge: HOME | End: 2024-04-29
Attending: EMERGENCY MEDICINE | Admitting: HOSPITALIST
Payer: COMMERCIAL

## 2024-04-27 ENCOUNTER — APPOINTMENT (EMERGENCY)
Dept: RADIOLOGY | Facility: HOSPITAL | Age: 64
End: 2024-04-27
Payer: COMMERCIAL

## 2024-04-27 DIAGNOSIS — I10 PRIMARY HYPERTENSION: ICD-10-CM

## 2024-04-27 DIAGNOSIS — I48.0 PAROXYSMAL A-FIB (HCC): Primary | ICD-10-CM

## 2024-04-27 DIAGNOSIS — I48.3 TYPICAL ATRIAL FLUTTER (HCC): ICD-10-CM

## 2024-04-27 LAB
ALBUMIN SERPL BCP-MCNC: 4.3 G/DL (ref 3.5–5)
ALP SERPL-CCNC: 76 U/L (ref 34–104)
ALT SERPL W P-5'-P-CCNC: 25 U/L (ref 7–52)
ANION GAP SERPL CALCULATED.3IONS-SCNC: 10 MMOL/L (ref 4–13)
AST SERPL W P-5'-P-CCNC: 19 U/L (ref 13–39)
BASOPHILS # BLD AUTO: 0.1 THOUSANDS/ÂΜL (ref 0–0.1)
BASOPHILS NFR BLD AUTO: 1 % (ref 0–1)
BILIRUB SERPL-MCNC: 0.35 MG/DL (ref 0.2–1)
BUN SERPL-MCNC: 22 MG/DL (ref 5–25)
CALCIUM SERPL-MCNC: 9.1 MG/DL (ref 8.4–10.2)
CHLORIDE SERPL-SCNC: 106 MMOL/L (ref 96–108)
CO2 SERPL-SCNC: 22 MMOL/L (ref 21–32)
CREAT SERPL-MCNC: 1.05 MG/DL (ref 0.6–1.3)
EOSINOPHIL # BLD AUTO: 0.26 THOUSAND/ÂΜL (ref 0–0.61)
EOSINOPHIL NFR BLD AUTO: 3 % (ref 0–6)
ERYTHROCYTE [DISTWIDTH] IN BLOOD BY AUTOMATED COUNT: 12.7 % (ref 11.6–15.1)
GFR SERPL CREATININE-BSD FRML MDRD: 74 ML/MIN/1.73SQ M
GLUCOSE SERPL-MCNC: 127 MG/DL (ref 65–140)
HCT VFR BLD AUTO: 42.9 % (ref 36.5–49.3)
HGB BLD-MCNC: 14.2 G/DL (ref 12–17)
IMM GRANULOCYTES # BLD AUTO: 0.03 THOUSAND/UL (ref 0–0.2)
IMM GRANULOCYTES NFR BLD AUTO: 0 % (ref 0–2)
LYMPHOCYTES # BLD AUTO: 2.37 THOUSANDS/ÂΜL (ref 0.6–4.47)
LYMPHOCYTES NFR BLD AUTO: 24 % (ref 14–44)
MAGNESIUM SERPL-MCNC: 1.9 MG/DL (ref 1.9–2.7)
MCH RBC QN AUTO: 30.3 PG (ref 26.8–34.3)
MCHC RBC AUTO-ENTMCNC: 33.1 G/DL (ref 31.4–37.4)
MCV RBC AUTO: 92 FL (ref 82–98)
MONOCYTES # BLD AUTO: 0.89 THOUSAND/ÂΜL (ref 0.17–1.22)
MONOCYTES NFR BLD AUTO: 9 % (ref 4–12)
NEUTROPHILS # BLD AUTO: 6.41 THOUSANDS/ÂΜL (ref 1.85–7.62)
NEUTS SEG NFR BLD AUTO: 63 % (ref 43–75)
NRBC BLD AUTO-RTO: 0 /100 WBCS
PHOSPHATE SERPL-MCNC: 4 MG/DL (ref 2.3–4.1)
PLATELET # BLD AUTO: 260 THOUSANDS/UL (ref 149–390)
PMV BLD AUTO: 9.5 FL (ref 8.9–12.7)
POTASSIUM SERPL-SCNC: 4 MMOL/L (ref 3.5–5.3)
PROT SERPL-MCNC: 7 G/DL (ref 6.4–8.4)
RBC # BLD AUTO: 4.69 MILLION/UL (ref 3.88–5.62)
SODIUM SERPL-SCNC: 138 MMOL/L (ref 135–147)
TSH SERPL DL<=0.05 MIU/L-ACNC: 2.64 UIU/ML (ref 0.45–4.5)
TSH SERPL DL<=0.05 MIU/L-ACNC: 2.8 UIU/ML (ref 0.45–4.5)
WBC # BLD AUTO: 10.06 THOUSAND/UL (ref 4.31–10.16)

## 2024-04-27 PROCEDURE — 36415 COLL VENOUS BLD VENIPUNCTURE: CPT

## 2024-04-27 PROCEDURE — 80053 COMPREHEN METABOLIC PANEL: CPT

## 2024-04-27 PROCEDURE — 99285 EMERGENCY DEPT VISIT HI MDM: CPT | Performed by: EMERGENCY MEDICINE

## 2024-04-27 PROCEDURE — 83735 ASSAY OF MAGNESIUM: CPT

## 2024-04-27 PROCEDURE — 99222 1ST HOSP IP/OBS MODERATE 55: CPT | Performed by: HOSPITALIST

## 2024-04-27 PROCEDURE — 84100 ASSAY OF PHOSPHORUS: CPT

## 2024-04-27 PROCEDURE — 96366 THER/PROPH/DIAG IV INF ADDON: CPT

## 2024-04-27 PROCEDURE — 84443 ASSAY THYROID STIM HORMONE: CPT | Performed by: HOSPITALIST

## 2024-04-27 PROCEDURE — 99285 EMERGENCY DEPT VISIT HI MDM: CPT

## 2024-04-27 PROCEDURE — 84443 ASSAY THYROID STIM HORMONE: CPT

## 2024-04-27 PROCEDURE — 71046 X-RAY EXAM CHEST 2 VIEWS: CPT

## 2024-04-27 PROCEDURE — 85025 COMPLETE CBC W/AUTO DIFF WBC: CPT

## 2024-04-27 PROCEDURE — 96365 THER/PROPH/DIAG IV INF INIT: CPT

## 2024-04-27 PROCEDURE — 93005 ELECTROCARDIOGRAM TRACING: CPT

## 2024-04-27 PROCEDURE — 99214 OFFICE O/P EST MOD 30 MIN: CPT | Performed by: INTERNAL MEDICINE

## 2024-04-27 RX ORDER — PRAVASTATIN SODIUM 80 MG/1
80 TABLET ORAL
Status: DISCONTINUED | OUTPATIENT
Start: 2024-04-27 | End: 2024-04-29 | Stop reason: HOSPADM

## 2024-04-27 RX ORDER — ASCORBIC ACID 500 MG
500 TABLET ORAL DAILY
Status: DISCONTINUED | OUTPATIENT
Start: 2024-04-27 | End: 2024-04-29 | Stop reason: HOSPADM

## 2024-04-27 RX ORDER — CHLORAL HYDRATE 500 MG
1000 CAPSULE ORAL DAILY
Status: DISCONTINUED | OUTPATIENT
Start: 2024-04-27 | End: 2024-04-29 | Stop reason: HOSPADM

## 2024-04-27 RX ORDER — AMLODIPINE BESYLATE 5 MG/1
5 TABLET ORAL DAILY
Status: DISCONTINUED | OUTPATIENT
Start: 2024-04-27 | End: 2024-04-29 | Stop reason: HOSPADM

## 2024-04-27 RX ORDER — ASPIRIN 81 MG/1
81 TABLET, CHEWABLE ORAL DAILY
Status: DISCONTINUED | OUTPATIENT
Start: 2024-04-27 | End: 2024-04-28

## 2024-04-27 RX ORDER — ONDANSETRON 2 MG/ML
4 INJECTION INTRAMUSCULAR; INTRAVENOUS EVERY 6 HOURS PRN
Status: DISCONTINUED | OUTPATIENT
Start: 2024-04-27 | End: 2024-04-29 | Stop reason: HOSPADM

## 2024-04-27 RX ORDER — FLECAINIDE ACETATE 100 MG/1
100 TABLET ORAL EVERY 12 HOURS SCHEDULED
Status: DISCONTINUED | OUTPATIENT
Start: 2024-04-27 | End: 2024-04-28

## 2024-04-27 RX ORDER — ACETAMINOPHEN 325 MG/1
650 TABLET ORAL EVERY 6 HOURS PRN
Status: DISCONTINUED | OUTPATIENT
Start: 2024-04-27 | End: 2024-04-29 | Stop reason: HOSPADM

## 2024-04-27 RX ORDER — NICOTINE 21 MG/24HR
1 PATCH, TRANSDERMAL 24 HOURS TRANSDERMAL DAILY
Status: DISCONTINUED | OUTPATIENT
Start: 2024-04-27 | End: 2024-04-29 | Stop reason: HOSPADM

## 2024-04-27 RX ORDER — ENOXAPARIN SODIUM 150 MG/ML
1 INJECTION SUBCUTANEOUS EVERY 12 HOURS SCHEDULED
Status: DISCONTINUED | OUTPATIENT
Start: 2024-04-27 | End: 2024-04-28

## 2024-04-27 RX ORDER — SODIUM CHLORIDE, SODIUM GLUCONATE, SODIUM ACETATE, POTASSIUM CHLORIDE, MAGNESIUM CHLORIDE, SODIUM PHOSPHATE, DIBASIC, AND POTASSIUM PHOSPHATE .53; .5; .37; .037; .03; .012; .00082 G/100ML; G/100ML; G/100ML; G/100ML; G/100ML; G/100ML; G/100ML
1000 INJECTION, SOLUTION INTRAVENOUS ONCE
Status: COMPLETED | OUTPATIENT
Start: 2024-04-27 | End: 2024-04-27

## 2024-04-27 RX ORDER — CARVEDILOL 25 MG/1
25 TABLET ORAL 2 TIMES DAILY WITH MEALS
Status: DISCONTINUED | OUTPATIENT
Start: 2024-04-27 | End: 2024-04-29 | Stop reason: HOSPADM

## 2024-04-27 RX ADMIN — FLECAINIDE ACETATE 100 MG: 100 TABLET ORAL at 17:03

## 2024-04-27 RX ADMIN — ENOXAPARIN SODIUM 135 MG: 150 INJECTION SUBCUTANEOUS at 21:03

## 2024-04-27 RX ADMIN — SODIUM CHLORIDE, SODIUM GLUCONATE, SODIUM ACETATE, POTASSIUM CHLORIDE, MAGNESIUM CHLORIDE, SODIUM PHOSPHATE, DIBASIC, AND POTASSIUM PHOSPHATE 1000 ML: .53; .5; .37; .037; .03; .012; .00082 INJECTION, SOLUTION INTRAVENOUS at 11:35

## 2024-04-27 RX ADMIN — CARVEDILOL 25 MG: 25 TABLET, FILM COATED ORAL at 17:03

## 2024-04-27 RX ADMIN — OXYCODONE HYDROCHLORIDE AND ACETAMINOPHEN 500 MG: 500 TABLET ORAL at 15:49

## 2024-04-27 RX ADMIN — PRAVASTATIN SODIUM 80 MG: 80 TABLET ORAL at 17:03

## 2024-04-27 RX ADMIN — OMEGA-3 FATTY ACIDS CAP 1000 MG 1000 MG: 1000 CAP at 17:02

## 2024-04-27 RX ADMIN — ASPIRIN 81 MG CHEWABLE TABLET 81 MG: 81 TABLET CHEWABLE at 15:49

## 2024-04-27 RX ADMIN — AMLODIPINE BESYLATE 5 MG: 5 TABLET ORAL at 15:49

## 2024-04-27 RX ADMIN — Medication 1 TABLET: at 15:49

## 2024-04-27 NOTE — ASSESSMENT & PLAN NOTE
Continue home coreg 25 BID & amlodipine 5 mg QD ( was decreased to 5 mg recent cardio office visit)

## 2024-04-27 NOTE — ED PROVIDER NOTES
History  Chief Complaint   Patient presents with    Atrial Fibrillation     Patient reports that he has been in afib since midnight and he was having palpitations that have since subsided. Denies CP.     HPI  MDM  64-year-old male history of hypertension, paroxysmal A-fib, on flecainide and carvedilol, no anticoagulants, presents for palpitations since last night.  Denies chest pain, shortness of breath.  States that symptoms have improved after he took his medications this morning but continues to have some palpitations.  No PE risk factors, no history of clots or currently does not have any lower extremity pain or swelling, no recent travel or surgeries.  Denies recent viral illness, abdominal pain, urinary symptoms, diarrhea, rectal bleeding    Initial presentation pt is irregular rhythm, otherwise hemodynamically stable    On exam   General: VSS, NAD, awake, alert. Talking normally.   Head: Normocephalic, atraumatic, nontender.  Eyes: EOM-No subconjunctival hemorrhages.  ENT: Nose atraumatic. MMM  No malocclusion. No stridor. Normal phonation. No drooling. Normal swallowing.   Neck: Trachea midline. No JVD.  CV: RRR.   Lungs: CTAB No tachypnea. No paradoxical motion.  Abd: +BS, soft, NT/ND. No guarding/rigidity.   MSK: Full ROM throughout. No lower extremity edema.   Skin: Dry, intact.   Neuro: AAOx3, GCS 15, CN II-XII grossly intact. Motor/sensory grossly intact.  Psychiatric/Behavioral: mood/affect normal; behavior normal; thought content normal; judgement normal   Exam: deferred      Ddx: Paroxysmal A-fib, will have to rule out other intrinsic, organic causes.    Plan: Patient was evaluated with CBC, CMP, electrolytes, TSH, EKG, chest x-ray.  Cardiology was consulted and admitted to medicine for further evaluation and management          Prior to Admission Medications   Prescriptions Last Dose Informant Patient Reported? Taking?   Coenzyme Q10 (COQ-10) 10 MG CAPS  Self Yes No   Sig: Take by mouth   Multiple  Vitamins-Minerals (MENS MULTIVITAMIN PLUS PO)  Self Yes No   Sig: Take by mouth   Omega-3 Fatty Acids (FISH OIL) 1200 MG CAPS  Self Yes No   Sig: Take by mouth   Omeprazole 20 MG TBDD  Self Yes No   Patient not taking: Reported on 12/22/2023   Zinc 50 MG CAPS  Self Yes No   Sig: Take by mouth   acetaminophen (TYLENOL) 325 mg tablet  Self No No   Sig: Take 2 tablets (650 mg total) by mouth every 4 (four) hours as needed for mild pain   Patient not taking: Reported on 12/22/2023   amLODIPine (NORVASC) 5 mg tablet   No No   Sig: TAKE 2 TABLETS DAILY   ascorbic acid (VITAMIN C) 500 mg tablet  Self Yes No   Sig: Take 500 mg by mouth daily   aspirin 81 mg chewable tablet  Self No No   Sig: Chew 1 tablet (81 mg total) daily   carvedilol (COREG) 25 mg tablet   No No   Sig: TAKE 1 TABLET TWICE A DAY WITH MEALS   diclofenac sodium (VOLTAREN) 50 mg EC tablet   No No   Sig: Take 1 tablet (50 mg total) by mouth 3 (three) times a day for 7 days   docusate sodium (COLACE) 100 mg capsule   No No   Sig: Take 1 capsule (100 mg total) by mouth 2 (two) times a day for 15 days   flecainide (TAMBOCOR) 50 mg tablet  Self No No   Sig: TAKE 1 TABLET TWICE A DAY   oxybutynin (DITROPAN) 5 mg tablet  Self No No   Sig: Take 1 tablet (5 mg total) by mouth every 6 (six) hours as needed (bladder spasms)   Patient not taking: Reported on 12/22/2023   simvastatin (ZOCOR) 40 mg tablet  Self No No   Sig: TAKE 1 TABLET DAILY AT BEDTIME   tadalafil (CIALIS) 10 MG tablet   No No   Sig: Take 1 tablet (10 mg total) by mouth daily as needed for erectile dysfunction   tamsulosin (FLOMAX) 0.4 mg  Self No No   Sig: Take 1 capsule (0.4 mg total) by mouth daily with dinner   Patient not taking: Reported on 12/22/2023      Facility-Administered Medications: None       Past Medical History:   Diagnosis Date    A-fib ([facility]) 08/24/2021    Disc degeneration, lumbosacral 1/6/2016    Hyperlipidemia     hypercholesterolemia    Hypertension     last assessed 11/22/17     Pneumonia of both lower lobes due to infectious organism 1/29/2018       Past Surgical History:   Procedure Laterality Date    COLONOSCOPY      FL RETROGRADE PYELOGRAM  11/3/2023    HEMORRHOID SURGERY      KNEE SURGERY Bilateral     MN COLONOSCOPY FLX DX W/COLLJ SPEC WHEN PFRMD N/A 1/5/2019    Procedure: COLONOSCOPY;  Surgeon: Lane Bonner MD;  [facility];  Service: Gastroenterology    MN CYSTO BLADDER W/URETERAL CATHETERIZATION Left 11/3/2023    Procedure: CYSTOSCOPY RETROGRADE PYELOGRAM WITH INSERTION STENT URETERAL, ureteroscopy, holmium laser;  Surgeon: Donald Jenkins MD;  Location[facility];  Service: Urology    TRANSURETHRAL RESECTION OF BLADDER TUMOR N/A 11/3/2023    Procedure: TRANSURETHRAL RESECTION OF BLADDER TUMOR (TURBT);  Surgeon: Donald Jenkins MD;  [facility];  Service: Urology    UPPER GASTROINTESTINAL ENDOSCOPY         Family History   Problem Relation Age of Onset    Obesity Mother     Aneurysm Father      I have reviewed and agree with the history as documented.    E-Cigarette/Vaping    E-Cigarette Use Never User      E-Cigarette/Vaping Substances    Nicotine No     THC No     CBD No     Flavoring No     Other No     Unknown No      Social History     Tobacco Use    Smoking status: Every Day     Current packs/day: 0.50     Average packs/day: 0.5 packs/day for 40.0 years (20.0 ttl pk-yrs)     Types: Cigarettes    Smokeless tobacco: Never   Vaping Use    Vaping status: Never Used   Substance Use Topics    Alcohol use: Yes     Comment: Occassional 1 x / month    Drug use: No        Review of Systems    Physical Exam  ED Triage Vitals [04/27/24 0850]   Temperature Pulse Respirations Blood Pressure SpO2   97.7 °F (36.5 °C) 64 18 133/75 96 %      Temp Source Heart Rate Source Patient Position - Orthostatic VS BP Location FiO2 (%)   Tympanic Monitor Sitting Left arm --      Pain Score       No Pain             Orthostatic Vital Signs  Vitals:    04/28/24 0800 04/28/24  1528 04/28/24 1930 04/29/24 0634   BP: 148/77 122/75 128/66 122/75   Pulse: 90 63 (!) 54 68   Patient Position - Orthostatic VS:           Physical Exam    ED Medications  Medications   multi-electrolyte (ISOLYTE-S PH 7.4) bolus 1,000 mL (0 mL Intravenous Stopped 4/27/24 1703)       Diagnostic Studies  Results Reviewed       Procedure Component Value Units Date/Time    TSH, 3rd generation [[id number]]  (Normal) Collected: 04/27/24 0933    Lab Status: Final result Specimen: Blood from Arm, Left Updated: 04/27/24 1942     TSH 3RD GENERATON 2.638 uIU/mL     TSH [[id number]]  (Normal) Collected: 04/27/24 0933    Lab Status: Final result Specimen: Blood from Arm, Left Updated: 04/27/24 1023     TSH 3RD GENERATON 2.803 uIU/mL     Comprehensive metabolic panel [[id number]] Collected: 04/27/24 0933    Lab Status: Final result Specimen: Blood from Arm, Left Updated: 04/27/24 1009     Sodium 138 mmol/L      Potassium 4.0 mmol/L      Chloride 106 mmol/L      CO2 22 mmol/L      ANION GAP 10 mmol/L      BUN 22 mg/dL      Creatinine 1.05 mg/dL      Glucose 127 mg/dL      Calcium 9.1 mg/dL      AST 19 U/L      ALT 25 U/L      Alkaline Phosphatase 76 U/L      Total Protein 7.0 g/dL      Albumin 4.3 g/dL      Total Bilirubin 0.35 mg/dL      eGFR 74 ml/min/1.73sq m     Narrative:      National Kid[employer] Disease Foundation guidelines for Chronic Kidney Disease (CKD):     Stage 1 with normal or high GFR (GFR > 90 mL/min/1.73 square meters)    Stage 2 Mild CKD (GFR = 60-89 mL/min/1.73 square meters)    Stage 3A Moderate CKD (GFR = 45-59 mL/min/1.73 square meters)    Stage 3B Moderate CKD (GFR = 30-44 mL/min/1.73 square meters)    Stage 4 Severe CKD (GFR = 15-29 mL/min/1.73 square meters)    Stage 5 End Stage CKD (GFR <15 mL/min/1.73 square meters)  Note: GFR calculation is accurate only with a steady state creatinine    Magnesium [[id number]]  (Normal) Collected: 04/27/24 0933    Lab Status: Final result Specimen: Blood from Arm, Left Updated:  04/27/24 1009     Magnesium 1.9 mg/dL     Phosphorus [[id number]]  (Normal) Collected: 04/27/24 0933    Lab Status: Final result Specimen: Blood from Arm, Left Updated: 04/27/24 1009     Phosphorus 4.0 mg/dL     CBC and differential [[id number]] Collected: 04/27/24 0933    Lab Status: Final result Specimen: Blood from Arm, Left Updated: 04/27/24 0941     WBC 10.06 Thousand/uL      RBC 4.69 Million/uL      Hemoglobin 14.2 g/dL      Hematocrit 42.9 %      MCV 92 fL      MCH 30.3 pg      MCHC 33.1 g/dL      RDW 12.7 %      MPV 9.5 fL      Platelets 260 Thousands/uL      nRBC 0 /100 WBCs      Segmented % 63 %      Immature Grans % 0 %      Lymphocytes % 24 %      Monocytes % 9 %      Eosinophils Relative 3 %      Basophils Relative 1 %      Absolute Neutrophils 6.41 Thousands/µL      Absolute Immature Grans 0.03 Thousand/uL      Absolute Lymphocytes 2.37 Thousands/µL      Absolute Monocytes 0.89 Thousand/µL      Eosinophils Absolute 0.26 Thousand/µL      Basophils Absolute 0.10 Thousands/µL                    XR chest 2 views   Final Result by Paulette Salas MD (04/27 1131)      No acute cardiopulmonary disease.               Workstation performed: [id number]               Procedures  Procedures      ED Course  ED Course as of 05/07/24 1143   Sat Apr 27, 2024   1044 Workup normal, consulted cardiology   1044 Cardiology will see him    1146 64-year-old male history of hypertension, paroxysmal A-fib, on flecainide and carvedilol, no anticoagulants, presents for palpitations since last night.  Denies chest pain, shortness of breath.  States that symptoms have improved after he took his medications this morning but continues to have some palpitations.  No PE risk factors, no history of clots or currently does not have any lower extremity pain or swelling, no recent travel or surgeries.  Denies recent viral illness, abdominal pain, urinary symptoms, diarrhea, rectal bleeding   1148 Dynamically stable, irregular rhythm,  currently in A-fib, rate controlled.   1230 Repeat EKG stable with A-fib and right bundle branch block, cardiology at bedside                             SBIRT 22yo+      Flowsheet Row Most Recent Value   Initial Alcohol Screen: US AUDIT-C     1. How often do you have a drink containing alcohol? 0 Filed at: 04/27/2024 0912   2. How many drinks containing alcohol do you have on a typical day you are drinking?  0 Filed at: 04/27/2024 0912   3a. Male UNDER 65: How often do you have five or more drinks on one occasion? 0 Filed at: 04/27/2024 0912   3b. FEMALE Any Age, or MALE 65+: How often do you have 4 or more drinks on one occassion? 0 Filed at: 04/27/2024 0912   Audit-C Score 0 Filed at: 04/27/2024 0912   DIDI: How many times in the past year have you...    Used an illegal drug or used a prescription medication for non-medical reasons? Never Filed at: 04/27/2024 0912                  Medical Decision Making  Amount and/or Complexity of Data Reviewed  Labs: ordered.  Radiology: ordered.    Risk  Prescription drug management.  Decision regarding hospitalization.          Disposition  Final diagnoses:   Paroxysmal A-fib (HCC)     Time reflects when diagnosis was documented in both MDM as applicable and the Disposition within this note       Time User Action Codes Description Comment    4/27/2024 11:27 AM Olivia Escobar Add [I48.0] Paroxysmal A-fib (HCC)     4/28/2024 11:36 AM Monae Lindsey Add [I48.3] Typical atrial flutter (HCC)     4/29/2024 11:24 AM Cris Lopez Add [I10] Primary hypertension           ED Disposition       ED Disposition   Admit    Condition   Stable    Date/Time   Sat Apr 27, 2024 1306    Comment   --             Follow-up Information       Follow up With Specialties Details Why Contact Info    Subhash Kay MD Cardiology Follow up on 5/8/2024 Our schedulers will contact you with further information regarding your afib ablation which will be performed at the [facility] ([address] [facility] [address]  [phone]              Discharge Medication List as of 4/29/2024 12:19 PM        START taking these medications    Details   apixaban (Eliquis) 5 mg Take 1 tablet (5 mg total) by mouth 2 (two) times a day, Starting Sat 4/27/2024, Until Mon 5/27/2024, Normal           CONTINUE these medications which have CHANGED    Details   amLODIPine (NORVASC) 5 mg tablet Take 1 tablet (5 mg total) by mouth daily, Starting Tue 4/30/2024, Normal           CONTINUE these medications which have NOT CHANGED    Details   acetaminophen (TYLENOL) 325 mg tablet Take 2 tablets (650 mg total) by mouth every 4 (four) hours as needed for mild pain, Starting Fri 11/3/2023, No Print      ascorbic acid (VITAMIN C) 500 mg tablet Take 500 mg by mouth daily, Historical Med      carvedilol (COREG) 25 mg tablet TAKE 1 TABLET TWICE A DAY WITH MEALS, Starting Mon 2/12/2024, Normal      Coenzyme Q10 (COQ-10) 10 MG CAPS Take by mouth, Historical Med      Multiple Vitamins-Minerals (MENS MULTIVITAMIN PLUS PO) Take by mouth, Historical Med      Omega-3 Fatty Acids (FISH OIL) 1200 MG CAPS Take by mouth, Historical Med      simvastatin (ZOCOR) 40 mg tablet TAKE 1 TABLET DAILY AT BEDTIME, Normal      tadalafil (CIALIS) 10 MG tablet Take 1 tablet (10 mg total) by mouth daily as needed for erectile dysfunction, Starting Fri 2/23/2024, Normal      Zinc 50 MG CAPS Take by mouth, Historical Med           STOP taking these medications       aspirin 81 mg chewable tablet Comments:   Reason for Stopping:         diclofenac sodium (VOLTAREN) 50 mg EC tablet Comments:   Reason for Stopping:         docusate sodium (COLACE) 100 mg capsule Comments:   Reason for Stopping:         flecainide (TAMBOCOR) 50 mg tablet Comments:   Reason for Stopping:         Omeprazole 20 MG TBDD Comments:   Reason for Stopping:         oxybutynin (DITROPAN) 5 mg tablet Comments:   Reason for Stopping:         tamsulosin  (FLOMAX) 0.4 mg Comments:   Reason for Stopping:             Outpatient Discharge Orders   Discharge Diet       PDMP Review         Value Time User    PDMP Reviewed  Yes 11/2/2023 11:04 PM Ryder Bustillo DO             ED Provider  Attending physically available and evaluated Daniel Gardiner. I managed the patient along with the ED Attending.    Electronically Signed by           Olivia Escobar DO  05/07/24 1144

## 2024-04-27 NOTE — ASSESSMENT & PLAN NOTE
Presents with symptomatic afib  H/o symptomatic afib in apst, being treated with fleicanide, coreg. Not on OAC due to low [id number] score, only on baby aspirin  Given recurrence -> cardiology consulted & they recommend increasing fleicanide to 100 mg BID, check 2 D Echo & now start him on OAC as this was being considered OP too as his age getting closer to 65  Check TSH, echo  Fleicanide 100 mg BID, start full dose lovenox while price checking eliquis  Monitor on tele

## 2024-04-27 NOTE — ED ATTENDING ATTESTATION
4/27/2024  I, Denzel Stephenson MD, saw and evaluated the patient. I have discussed the patient with the resident/non-physician practitioner and agree with the resident's/non-physician practitioner's findings, Plan of Care, and MDM as documented in the resident's/non-physician practitioner's note, except where noted. All available labs and Radiology studies were reviewed.  I was present for key portions of any procedure(s) performed by the resident/non-physician practitioner and I was immediately available to provide assistance.       At this point I agree with the current assessment done in the Emergency Department.  I have conducted an independent evaluation of this patient a history and physical is as follows:  History of atrial fibrillation paroxysmal  On flecainide  Patient is on aspirin but no other anticoagulation  No complaint of chest pain states patient started yesterday  Rate is controlled he has no shortness of breath no fever no chills no leg swelling  Exam well-appearing no acute distress vital signs are stable afebrile neck no JVD lungs clear heart irregular regular rate controlled abdomen soft and nontender extremities normal no calf tenderness no leg edema  Impression atrial fibrillation  ED Course         Critical Care Time  Procedures

## 2024-04-27 NOTE — H&P
[facility]  H&P  Name: Daniel Gardiner 64 y.o. male I MRN: [id number]  Unit/Bed#: ED 29 I Date of Admission: 4/27/2024   Date of Service: 4/27/2024 I Hospital Day: 0      Assessment/Plan   * Paroxysmal atrial fibrillation (HCC)  Assessment & Plan  Presents with symptomatic afib  H/o symptomatic afib in apst, being treated with fleicanide, coreg. Not on OAC due to low [id number] score, only on baby aspirin  Given recurrence -> cardiology consulted & they recommend increasing fleicanide to 100 mg BID, check 2 D Echo & now start him on OAC as this was being considered OP too as his age getting closer to 65  Check TSH, echo  Fleicanide 100 mg BID, start full dose lovenox while price checking eliquis  Monitor on tele    Bladder tumor  Assessment & Plan  Ongoing f/u with urology    Cigarette nicotine dependence without complication  Assessment & Plan  Smokes 1/2 PPD, discussed cessation    Hypertension  Assessment & Plan  Continue home coreg 25 BID & amlodipine 5 mg QD ( was decreased to 5 mg recent cardio office visit)    Hyperlipidemia  Assessment & Plan  Continue statin         VTE Pharmacologic Prophylaxis:   Moderate Risk (Score 3-4) - Pharmacological DVT Prophylaxis Ordered: enoxaparin (Lovenox).  Code Status: Level 1 - Full Code   Discussion with family:  patient.     Anticipated Length of Stay: Patient will be admitted on an observation basis with an anticipated length of stay of less than 2 midnights secondary to adjust meds for Afib.    Total Time Spent on Date of Encounter in care of patient: 60 mins. This time was spent on one or more of the following: performing physical exam; counseling and coordination of care; obtaining or reviewing history; documenting in the medical record; reviewing/ordering tests, medications or procedures; communicating with other healthcare professionals and discussing with patient's family/caregivers.    Chief Complaint:  palpitations    History of Present Illness:  Daniel Gardiner is a 64 y.o. male with a PMH of paro Afib, HTN, obesity, nicotine dependence, nephrolithiasis, bladder tumor who presents with c/o palpitations that started since midnight -> would be worse with ambulation, little better at rest. He did take 1 extra dose of fleicanide as advised by his cardiologist & took am dose early but symptoms persisted hence he came in. No recent sickness, taking meds regularly, smokes 1/2 PPD, did drink 2 beers yesterday with friend which is not usual for him. No recent long trips, been tested for sleep apnea in past & doesn't have it    Review of Systems:  Review of Systems   Constitutional:  Negative for activity change, appetite change, chills and fever.   Respiratory:  Negative for cough, shortness of breath and wheezing.    Cardiovascular:  Positive for palpitations. Negative for chest pain and leg swelling.   Gastrointestinal:  Negative for diarrhea, nausea and vomiting.   Genitourinary:  Negative for difficulty urinating and dysuria.   Neurological:  Negative for dizziness.   All other systems reviewed and are negative.      Past Medical and Surgical History:   Past Medical History:   Diagnosis Date    A-fib (HCC) 08/24/2021    Disc degeneration, lumbosacral 1/6/2016    Hyperlipidemia     hypercholesterolemia    Hypertension     last assessed 11/22/17    Pneumonia of both lower lobes due to infectious organism 1/29/2018       Past Surgical History:   Procedure Laterality Date    COLONOSCOPY      FL RETROGRADE PYELOGRAM  11/3/2023    HEMORRHOID SURGERY      KNEE SURGERY Bilateral     RI COLONOSCOPY FLX DX W/COLLJ SPEC WHEN PFRMD N/A 1/5/2019    Procedure: COLONOSCOPY;  Surgeon: Lane Bonner MD;  Location: [facility];  Service: Gastroenterology    RI CYSTO BLADDER W/URETERAL CATHETERIZATION Left 11/3/2023    Procedure: CYSTOSCOPY RETROGRADE PYELOGRAM WITH INSERTION STENT URETERAL, ureteroscopy, holmium laser;  Surgeon:  Donald Jenkins MD;  Location: [facility];  Service: Urology    TRANSURETHRAL RESECTION OF BLADDER TUMOR N/A 11/3/2023    Procedure: TRANSURETHRAL RESECTION OF BLADDER TUMOR (TURBT);  Surgeon: Donald Jenkins MD;  Location: [facility];  Service: Urology    UPPER GASTROINTESTINAL ENDOSCOPY         Meds/Allergies:  Prior to Admission medications    Medication Sig Start Date End Date Taking? Authorizing Provider   apixaban (Eliquis) 5 mg Take 1 tablet (5 mg total) by mouth 2 (two) times a day 4/27/24 5/27/24 Yes Morenita Mendoza MD   acetaminophen (TYLENOL) 325 mg tablet Take 2 tablets (650 mg total) by mouth every 4 (four) hours as needed for mild pain  Patient not taking: Reported on 12/22/2023 11/3/23   Donald Jenkins MD   amLODIPine (NORVASC) 5 mg tablet TAKE 2 TABLETS DAILY 3/7/24   Roly Martinez MD   ascorbic acid (VITAMIN C) 500 mg tablet Take 500 mg by mouth daily    Historical Provider, MD   aspirin 81 mg chewable tablet Chew 1 tablet (81 mg total) daily 9/27/21   TAMICA Hauser   carvedilol (COREG) 25 mg tablet TAKE 1 TABLET TWICE A DAY WITH MEALS 2/12/24   Roly Martinez MD   Coenzyme Q10 (COQ-10) 10 MG CAPS Take by mouth    Historical Provider, MD   diclofenac sodium (VOLTAREN) 50 mg EC tablet Take 1 tablet (50 mg total) by mouth 3 (three) times a day for 7 days 11/3/23 11/10/23  Donald Jenkins MD   docusate sodium (COLACE) 100 mg capsule Take 1 capsule (100 mg total) by mouth 2 (two) times a day for 15 days 11/3/23 11/18/23  Donald Jenkins MD   flecainide (TAMBOCOR) 50 mg tablet TAKE 1 TABLET TWICE A DAY 11/14/23   Roly Martinez MD   Multiple Vitamins-Minerals (MENS MULTIVITAMIN PLUS PO) Take by mouth    Historical Provider, MD   Omega-3 Fatty Acids (FISH OIL) 1200 MG CAPS Take by mouth    Historical Provider, MD   Omeprazole 20 MG TBDD     Historical Provider, MD   oxybutynin (DITROPAN) 5 mg tablet Take 1 tablet (5 mg total) by mouth every 6 (six) hours as needed  (bladder spasms)  Patient not taking: Reported on 12/22/2023 11/3/23   Donald Jenkins MD   simvastatin (ZOCOR) 40 mg tablet TAKE 1 TABLET DAILY AT BEDTIME 1/30/24   Linh Erickson MD   tadalafil (CIALIS) 10 MG tablet Take 1 tablet (10 mg total) by mouth daily as needed for erectile dysfunction 2/23/24   Linh Erickson MD   tamsulosin (FLOMAX) 0.4 mg Take 1 capsule (0.4 mg total) by mouth daily with dinner  Patient not taking: Reported on 12/22/2023 11/3/23   Donald Jenkins MD   Zinc 50 MG CAPS Take by mouth    Historical Provider, MD     I have reviewed home medications with a medical source (PCP, Pharmacy, other).    Allergies: No Known Allergies    Social History:  Marital Status: /Civil Union   Occupation: used to drive trucks, now retired  Patient Pre-hospital Living Situation: Home  Patient Pre-hospital Level of Mobility: walks  Patient Pre-hospital Diet Restrictions: none  Substance Use History:   Social History     Substance and Sexual Activity   Alcohol Use Yes    Comment: Occassional 1 x / month     Social History     Tobacco Use   Smoking Status Every Day    Current packs/day: 0.50    Average packs/day: 0.5 packs/day for 40.0 years (20.0 ttl pk-yrs)    Types: Cigarettes   Smokeless Tobacco Never     Social History     Substance and Sexual Activity   Drug Use No       Family History:  Family History   Problem Relation Age of Onset    Obesity Mother     Aneurysm Father        Physical Exam:     Vitals:   Blood Pressure: 151/69 (04/27/24 1230)  Pulse: 86 (04/27/24 1230)  Temperature: 97.7 °F (36.5 °C) (04/27/24 0850)  Temp Source: Tympanic (04/27/24 0850)  Respirations: 18 (04/27/24 1230)  SpO2: 98 % (04/27/24 1230)    Physical Exam  Vitals reviewed.   HENT:      Head: Normocephalic and atraumatic.   Cardiovascular:      Rate and Rhythm: Normal rate and regular rhythm.      Heart sounds: Normal heart sounds.   Pulmonary:      Effort: Pulmonary effort is normal. No respiratory distress.       Breath sounds: Normal breath sounds.   Abdominal:      General: There is no distension.      Palpations: Abdomen is soft.   Musculoskeletal:      Right lower leg: No edema.      Left lower leg: Edema present.   Neurological:      Mental Status: He is alert and oriented to person, place, and time.          Additional Data:     Lab Results:  Results from last 7 days   Lab Units 04/27/24  0933   WBC Thousand/uL 10.06   HEMOGLOBIN g/dL 14.2   HEMATOCRIT % 42.9   PLATELETS Thousands/uL 260   SEGS PCT % 63   LYMPHO PCT % 24   MONO PCT % 9   EOS PCT % 3     Results from last 7 days   Lab Units 04/27/24  0933   SODIUM mmol/L 138   POTASSIUM mmol/L 4.0   CHLORIDE mmol/L 106   CO2 mmol/L 22   BUN mg/dL 22   CREATININE mg/dL 1.05   ANION GAP mmol/L 10   CALCIUM mg/dL 9.1   ALBUMIN g/dL 4.3   TOTAL BILIRUBIN mg/dL 0.35   ALK PHOS U/L 76   ALT U/L 25   AST U/L 19   GLUCOSE RANDOM mg/dL 127                       Lines/Drains:  Invasive Devices       Peripheral Intravenous Line  Duration             Peripheral IV 04/27/24 Left;Proximal;Ventral (anterior) Forearm <1 day                        Imaging: Reviewed radiology reports from this admission including: chest xray  XR chest 2 views   Final Result by Paulette Salas MD (04/27 1131)      No acute cardiopulmonary disease.               Workstation performed: [id number]             EKG and Other Studies Reviewed on Admission:   EKG: Atrial fibrillation. HR 60-70s.    ** Please Note: This note has been constructed using a voice recognition system. **

## 2024-04-27 NOTE — CONSULTS
Consultation - General Cardiology Team 1  Daniel Gardiner 64 y.o. male MRN: [id number]  Unit/Bed#: ED 29 Encounter: [id number]      Inpatient consult to Cardiology  Consult performed by: Marissa Dailey DO  Consult ordered by: Denzel Stephenson MD        PCP: Linh Erickson MD   Outpatient Cardiologist: Dr. Roly Martinez    History of Present Illness   Physician Requesting Consult: Morenita Mendoza MD  Reason for Consult / Principal Problem: Atrial fibrillation    HPI: Daniel Gardiner is a 64 y.o. year old male with a history of paroxysmal atrial fibrillation, hypertension, obesity, nicotine dependence, nephrolithiasis, bladder tumor who presented to the emergency department due to palpitations that started around midnight.  Palpitations are worse with ambulation and lying on his left side.  Better with rest and lying on his right side.  He has had no shortness of breath, lower extremity edema, orthopnea, PND associated with these palpitations.  He states he was working [place] his cabin with some of his friends the other day and had 2 beers yesterday prior to his palpitations starting.  Currently smokes a half a pack a day.  He takes all of his medications as prescribed.  No recent sick contacts.  Patient took an extra flecainide last night and this morning without resolution of symptoms    On arrival patient was afebrile, heart rate 64, respiratory rate 18, /75 and saturating 96% on room air.Initial EKG demonstrated coarse atrial fibrillation with a rate of 89, right bundle branch block, QTc 498.  Labs demonstrated unremarkable CMP and CBC.  TSH was within normal limits.  Chest x-ray demonstrated no acute cardiopulmonary disease.    Patient was recently evaluated by Dr. Martinez on 2/9/2024 and he was noted to be having more palpitations.  An extended ambulatory Holter monitor was ordered at that time.  The plan was that if patient had a high burden of PACs, atrial fibrillation then flecainide would be  increased to 100 mg daily.  Given that he is now near age 65 the plan was also to start anticoagulation if patient had recurrent atrial fibrillation.      Review of Systems   Constitutional:  Negative for chills and fever.   HENT:  Negative for ear pain and sore throat.    Eyes:  Negative for pain and visual disturbance.   Respiratory:  Negative for cough and shortness of breath.    Cardiovascular:  Positive for palpitations. Negative for chest pain.   Gastrointestinal:  Negative for abdominal pain and vomiting.   Genitourinary:  Negative for dysuria and hematuria.   Musculoskeletal:  Negative for arthralgias and back pain.   Skin:  Negative for color change and rash.   Neurological:  Negative for seizures and syncope.   All other systems reviewed and are negative.      Historical Information   Past Medical History:   Diagnosis Date    A-fib (HCC) 08/24/2021    Disc degeneration, lumbosacral 1/6/2016    Hyperlipidemia     hypercholesterolemia    Hypertension     last assessed 11/22/17    Pneumonia of both lower lobes due to infectious organism 1/29/2018     Past Surgical History:   Procedure Laterality Date    COLONOSCOPY      FL RETROGRADE PYELOGRAM  11/3/2023    HEMORRHOID SURGERY      KNEE SURGERY Bilateral     KY COLONOSCOPY FLX DX W/COLLJ SPEC WHEN PFRMD N/A 1/5/2019    Procedure: COLONOSCOPY;  Surgeon: Lane Bonner MD;  Location: [facility];  Service: Gastroenterology    KY CYSTO BLADDER W/URETERAL CATHETERIZATION Left 11/3/2023    Procedure: CYSTOSCOPY RETROGRADE PYELOGRAM WITH INSERTION STENT URETERAL, ureteroscopy, holmium laser;  Surgeon: Donald Jenkins MD;  Location: [facility];  Service: Urology    TRANSURETHRAL RESECTION OF BLADDER TUMOR N/A 11/3/2023    Procedure: TRANSURETHRAL RESECTION OF BLADDER TUMOR (TURBT);  Surgeon: Donald Jenkins MD;  Location[facility];  Service: Urology    UPPER GASTROINTESTINAL ENDOSCOPY       Social History     Substance and Sexual Activity   Alcohol Use Yes     Comment: Occassional 1 x / month     Social History     Substance and Sexual Activity   Drug Use No     Social History     Tobacco Use   Smoking Status Every Day    Current packs/day: 0.50    Average packs/day: 0.5 packs/day for 40.0 years (20.0 ttl pk-yrs)    Types: Cigarettes   Smokeless Tobacco Never     Family History:   Family History   Problem Relation Age of Onset    Obesity Mother     Aneurysm Father        Meds/Allergies   Hospital Medications:   Current Facility-Administered Medications   Medication Dose Route Frequency    acetaminophen (TYLENOL) tablet 650 mg  650 mg Oral Q6H PRN    amLODIPine (NORVASC) tablet 5 mg  5 mg Oral Daily    ascorbic acid (VITAMIN C) tablet 500 mg  500 mg Oral Daily    aspirin chewable tablet 81 mg  81 mg Oral Daily    carvedilol (COREG) tablet 25 mg  25 mg Oral BID With Meals    enoxaparin (LOVENOX) subcutaneous injection 135 mg  1 mg/kg Subcutaneous Q12H [facility]    fish oil capsule 1,000 mg  1,000 mg Oral Daily    flecainide (TAMBOCOR) tablet 100 mg  100 mg Oral Q12H [facility]    multivitamin-minerals (CENTRUM) tablet 1 tablet  1 tablet Oral Daily    nicotine (NICODERM CQ) 14 mg/24hr TD 24 hr patch 1 patch  1 patch Transdermal Daily    ondansetron (ZOFRAN) injection 4 mg  4 mg Intravenous Q6H PRN    pravastatin (PRAVACHOL) tablet 80 mg  80 mg Oral Daily With Dinner     Home Medications: (Not in a hospital admission)      No Known Allergies    Objective   Vitals: Blood pressure 169/75, pulse 90, temperature 97.7 °F (36.5 °C), temperature source Tympanic, resp. rate 18, SpO2 97%.  Orthostatic Blood Pressures      Flowsheet Row Most Recent Value   Blood Pressure 169/75 filed at 04/27/2024 1400   Patient Position - Orthostatic VS Sitting filed at 04/27/2024 1400              Invasive Devices       Peripheral Intravenous Line  Duration             Peripheral IV 04/27/24 Left;Proximal;Ventral (anterior) Forearm <1 day                    Physical Exam    GEN: Daniel Gardiner appears  "well, alert and oriented x 3, pleasant and cooperative   HEENT:  Normocephalic, atraumatic, anicteric, moist mucous membranes  NECK: No JVD or carotid bruits   HEART: Irregularly irregular rate and rhythm, normal S1 and S2, no murmurs, clicks, gallops or rubs   LUNGS: Clear to auscultation bilaterally; no wheezes, rales, or rhonchi; respiration nonlabored   ABDOMEN:  Normoactive bowel sounds, soft, no tenderness, no distention  EXTREMITIES: peripheral pulses palpable; no edema  NEURO: no gross focal findings; cranial nerves grossly intact   SKIN:  Dry, intact, warm to touch    Lab Results: I have personally reviewed pertinent lab results.    CBC with diff:   Results from last 7 days   Lab Units 04/27/24  0933   WBC Thousand/uL 10.06   RBC Million/uL 4.69   HEMOGLOBIN g/dL 14.2   HEMATOCRIT % 42.9   MCV fL 92   MCH pg 30.3   MCHC g/dL 33.1   RDW % 12.7   MPV fL 9.5   PLATELETS Thousands/uL 260     CMP:   Results from last 7 days   Lab Units 04/27/24  0933   SODIUM mmol/L 138   CHLORIDE mmol/L 106   CO2 mmol/L 22   BUN mg/dL 22   CREATININE mg/dL 1.05   CALCIUM mg/dL 9.1   AST U/L 19   ALT U/L 25   ALK PHOS U/L 76   EGFR ml/min/1.73sq m 74     HS Troponin: No results found for: \"HSTNI\", \"HSTNI0\", \"HSTNI2\", \"HSTNI4\"  BNP:   Results from last 7 days   Lab Units 04/27/24  0933   POTASSIUM mmol/L 4.0   CHLORIDE mmol/L 106   CO2 mmol/L 22   BUN mg/dL 22   CREATININE mg/dL 1.05   CALCIUM mg/dL 9.1   EGFR ml/min/1.73sq m 74     Coags:     TSH:   Results from last 7 days   Lab Units 04/27/24  0933   TSH 3RD GENERATON uIU/mL 2.803     Magnesium:   Results from last 7 days   Lab Units 04/27/24  0933   MAGNESIUM mg/dL 1.9     Lipid Profile:         Results from last 7 days   Lab Units 04/27/24  0933   POTASSIUM mmol/L 4.0   CO2 mmol/L 22   CHLORIDE mmol/L 106   BUN mg/dL 22   CREATININE mg/dL 1.05     Results from last 7 days   Lab Units 04/27/24  0933   HEMOGLOBIN g/dL 14.2   HEMATOCRIT % 42.9   PLATELETS Thousands/uL 260       "           Imaging: I have personally reviewed pertinent reports.   and I have personally reviewed pertinent films in PACS    Telemetry:   Rate controlled atrial fibrillation with rate in the 80s to 90s      Previous STRESS TEST:  Results for orders placed during the hospital encounter of 21    Stress test only, exercise    Interpretation Summary    Normal functional capacity for age & gender.    Exercise-limiting dyspnea. Normal blood pressure response with blunted heart rate response (79% MPHR achieved) to exercise. Took beta blocker medication this morning.    Non-diagnostic exercise stress ECG due to failure to achieve 85% MPHR.     No results found for this or any previous visit.    Results for orders placed during the hospital encounter of 23    NM myocardial perfusion spect (rx stress and/or rest)    Interpretation Summary    Stress ECG: No ST deviation is noted. The ECG was not diagnostic due to pharmacological (vasodilator) stress.    Perfusion: There are no perfusion defects.    Stress Function: Left ventricular function post-stress is normal. Stress ejection fraction is 52 %.    Stress Combined Conclusion: The ECG and SPECT imaging portions of the stress study are concordant with no evidence of stress induced myocardial ischemia. Left ventricular perfusion is normal.    Resting ECG: The ECG shows normal sinus rhythm.      Previous Cath/PCI:  No results found for this or any previous visit.      ECHO:  Results for orders placed during the hospital encounter of 21    Echo complete with contrast if indicated    Narrative  [address] [address] [phone]    Transthoracic Echocardiogram  2D, M-mode, Doppler, and Color Doppler    Study date:  23-Aug-2021    Patient: DEONDRE REYES  MR number: [id number]  Account number: [id number]  : 1960  Age: 61 years  Gender: Male  Status: Outpatient  Location: Bedside  Height: 73  in  Weight: 274.3 lb  BP: 114/ 75 mmHg    Indications: Atrial Fibrillation    Diagnoses: I48.0 - Atrial fibrillation    Sonographer:  NII Sierra  Primary Physician:  Linh Erickson MD  Referring Physician:  Luis Eduardo Bosch MD  Group:  [facility]  Interpreting Physician:  Aroldo Greenwood MD    SUMMARY    LEFT VENTRICLE:  Size was normal.  Systolic function was normal. Ejection fraction was estimated to be 60 %.  There was mild concentric hypertrophy.    RIGHT VENTRICLE:  The size was normal.  Systolic function was normal.    LEFT ATRIUM:  The atrium was mildly dilated.    RIGHT ATRIUM:  The atrium was mildly dilated.    MITRAL VALVE:  There was trace regurgitation.    TRICUSPID VALVE:  There was trace regurgitation.    HISTORY: PRIOR HISTORY: Hypertension,HLD,Tobacco Abuse    PROCEDURE: The procedure was performed at the bedside. This was a routine study. The transthoracic approach was used. The study included complete 2D imaging, M-mode, complete spectral Doppler, and color Doppler. The heart rate was 98 bpm,  at the start of the study. Images were obtained from the parasternal, apical, subcostal, and suprasternal notch acoustic windows. Image quality was adequate.    LEFT VENTRICLE: Size was normal. Systolic function was normal. Ejection fraction was estimated to be 60 %. There were no regional wall motion abnormalities. Wall thickness was mildly increased. There was mild concentric hypertrophy.  DOPPLER: Transmitral flow pattern: atrial fibrillation. The study was not technically sufficient to allow evaluation of LV diastolic function.    RIGHT VENTRICLE: The size was normal. Systolic function was normal. Wall thickness was normal.    LEFT ATRIUM: The atrium was mildly dilated.    RIGHT ATRIUM: The atrium was mildly dilated.    MITRAL VALVE: Valve structure was normal. There was normal leaflet separation. DOPPLER: The transmitral velocity was within the normal range.  There was no evidence for stenosis. There was trace regurgitation.    AORTIC VALVE: The valve was trileaflet. Leaflets exhibited normal thickness and normal cuspal separation. DOPPLER: Transaortic velocity was within the normal range. There was no evidence for stenosis. There was no regurgitation.    TRICUSPID VALVE: The valve structure was normal. There was normal leaflet separation. DOPPLER: The transtricuspid velocity was within the normal range. There was no evidence for stenosis. There was trace regurgitation. The tricuspid jet  envelope definition was inadequate for estimation of RV systolic pressure. There are no indirect findings suggestive of moderate or severe pulmonary hypertension.    PULMONIC VALVE: Leaflets exhibited normal thickness, no calcification, and normal cuspal separation. DOPPLER: The transpulmonic velocity was within the normal range. There was no regurgitation.    PERICARDIUM: There was no pericardial effusion. The pericardium was normal in appearance.    AORTA: The root exhibited normal size.    SYSTEMIC VEINS: IVC: The inferior vena cava was normal in size and course. Respirophasic changes were normal.    SYSTEM MEASUREMENT TABLES    2D  %FS: 24.99 %  Ao Diam: 2.93 cm  Ao asc: 2.84 cm  EDV(Teich): 95.3 ml  EF(Teich): 49.54 %  ESV(Teich): 48.09 ml  IVSd: 1.22 cm  LA Area: 29.05 cm2  LA Diam: 4.4 cm  LVEDV MOD A4C: 125.61 ml  LVEF MOD A4C: 64.28 %  LVESV MOD A4C: 44.86 ml  LVIDd: 4.56 cm  LVIDs: 3.42 cm  LVLd A4C: 8.85 cm  LVLs A4C: 7.39 cm  LVPWd: 1.21 cm  RA Area: 21.84 cm2  RVIDd: 3.34 cm  SV MOD A4C: 80.74 ml  SV(Teich): 47.21 ml    CW  TR Vmax: 2.13 m/s  TR maxP.21 mmHg    MM  TAPSE: 2.05 cm    [employer] Accredited Echocardiography Laboratory    Prepared and electronically signed by    Aroldo Greenwood MD  Signed 23-Aug-2021 11:29:29    No results found for this or any previous visit.      HOLTER  Ambulatory extended monitor 3/13/2024  Patient had a min HR of 49  bpm, max HR of 160 bpm, and avg HR of 60 bpm.  Predominant underlying rhythm was Sinus Rhythm. First Degree AV Block was  present. Bundle Branch Block/IVCD was present. QRS morphology changes were  present throughout recording.1 run of Ventricular Tachycardia occurred lasting 4  beats with a max rate of 118 bpm (avg 111 bpm). 35 Supraventricular Tachycardia  runs occurred, the run with the fastest interval lasting 5 beats with a max rate of  160 bpm, the longest lasting 20.4 secs with an avg rate of 104 bpm.  Supraventricular Tachycardia and Run of VEs were detected within +/- 45 seconds  of symptomatic patient event(s). Isolated SVEs were rare (<1.0%), SVE Couplets  were rare (<1.0%), and SVE Triplets were rare (<1.0%). Isolated VEs were rare  (<1.0%, 2961), VE Couplets were rare (<1.0%, 66), and VE Triplets were rare  (<1.0%, 5). Ventricular Trigeminy was present.        Assessment/Plan     Assessment:    Paroxysmal atrial fibrillation  - Patient is very symptomatic when in atrial fibrillation  - Even though patient is rate controlled will attempt rhythm control due to symptomatic he is  - Home medications include flecainide 50 mg twice daily, carvedilol 25 mg twice daily  - Patient does not have history of structural heart disease and most recent [facility] scan in 2023 demonstrated no perfusion defects  - Most recent echo was in 2021 and demonstrated an EF of 60% with no wall motion abnormalities, mild concentric LVH   - [id number] currently 1 for hypertension     RBBB with LAFB   - Incomplete right bundle branch block and left anterior fascicular block was noted previously in the outpatient setting with various degrees of QRS prolongation  QRS prolongation can be seen in the setting of left anterior fascicular block the flecainide can also prolong the QRS  - Recommend outpatient treadmill stress test after increasing flecainide dose in order to monitor for flecainide toxicity    Hypertension  - Well-controlled on  current regimen  - Continue amlodipine 5 mg daily, Coreg 25 mg twice daily    Hyperlipidemia  - Continue simvastatin 80 mg daily    Bladder polyp  - Follows with urology  - 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    Family history of intracranial aneurysm  - Patient states his father had a ruptured intracranial aneurysm in his 50s  - While this is not a contraindication to starting the patient on anticoagulation would recommend screening patient for intracerebral aneurysms    Plan:  - Increase flecainide to 100 mg twice daily  - Obtain updated echo  - Start oral anticoagulation with DOAC, which ever is preferred by his insurance company  - I had a long discussion with the patient about possible options for managing his A-fib if the flecainide does not convert him back to sinus rhythm including: ANKIT cardioversion with anticoagulation uninterrupted for 4 weeks after that, ablation, pure rate control.  Patient was agreeable to starting with increased dose of flecainide and monitoring overnight to make sure that his rate is well-controlled prior to discharge    Case discussed and reviewed with Dr. Lindsey and is pending their agreement of the assessment and plan.     Thank you for involving us in the care of your patient.  ==========================================================================================    ** Please Note: Fluency DirectDictation voice to text software may have been used in the creation of this document. **

## 2024-04-28 ENCOUNTER — APPOINTMENT (OUTPATIENT)
Dept: NON INVASIVE DIAGNOSTICS | Facility: HOSPITAL | Age: 64
End: 2024-04-28
Payer: COMMERCIAL

## 2024-04-28 LAB
AORTIC VALVE MEAN VELOCITY: 9.1 M/S
APICAL FOUR CHAMBER EJECTION FRACTION: 58 %
ATRIAL RATE: 104 BPM
ATRIAL RATE: 234 BPM
AV AREA BY CONTINUOUS VTI: 1.8 CM2
AV AREA PEAK VELOCITY: 1.8 CM2
AV LVOT MEAN GRADIENT: 1 MMHG
AV LVOT PEAK GRADIENT: 2 MMHG
AV MEAN GRADIENT: 3 MMHG
AV PEAK GRADIENT: 5 MMHG
AV VALVE AREA: 1.79 CM2
AV VELOCITY RATIO: 0.63
BSA FOR ECHO PROCEDURE: 2.55 M2
DOP CALC AO PEAK VEL: 1.09 M/S
DOP CALC AO VTI: 21.42 CM
DOP CALC LVOT AREA: 2.83 CM2
DOP CALC LVOT CARDIAC INDEX: 1.33 L/MIN/M2
DOP CALC LVOT CARDIAC OUTPUT: 3.4 L/MIN
DOP CALC LVOT DIAMETER: 1.9 CM
DOP CALC LVOT PEAK VEL VTI: 13.56 CM
DOP CALC LVOT PEAK VEL: 0.69 M/S
DOP CALC LVOT STROKE INDEX: 15.7 ML/M2
DOP CALC LVOT STROKE VOLUME: 38.43
FRACTIONAL SHORTENING: 29 (ref 28–44)
INTERVENTRICULAR SEPTUM IN DIASTOLE (PARASTERNAL SHORT AXIS VIEW): 1.5 CM
INTERVENTRICULAR SEPTUM: 1.5 CM (ref 0.6–1.1)
LAAS-AP2: 23 CM2
LAAS-AP4: 24.6 CM2
LEFT ATRIUM AREA SYSTOLE SINGLE PLANE A4C: 24.4 CM2
LEFT ATRIUM SIZE: 3.5 CM
LEFT ATRIUM VOLUME (MOD BIPLANE): 80 ML
LEFT ATRIUM VOLUME INDEX (MOD BIPLANE): 31.4 ML/M2
LEFT INTERNAL DIMENSION IN SYSTOLE: 3.6 CM (ref 2.1–4)
LEFT VENTRICULAR INTERNAL DIMENSION IN DIASTOLE: 5.1 CM (ref 3.5–6)
LEFT VENTRICULAR POSTERIOR WALL IN END DIASTOLE: 1.4 CM
LEFT VENTRICULAR STROKE VOLUME: 69 ML
LVSV (TEICH): 69 ML
P AXIS: -75 DEGREES
QRS AXIS: -69 DEGREES
QRS AXIS: -71 DEGREES
QRSD INTERVAL: 150 MS
QRSD INTERVAL: 150 MS
QT INTERVAL: 408 MS
QT INTERVAL: 410 MS
QTC INTERVAL: 488 MS
QTC INTERVAL: 498 MS
RIGHT ATRIUM AREA SYSTOLE A4C: 22.3 CM2
RIGHT VENTRICLE ID DIMENSION: 3.9 CM
SL CV LEFT ATRIUM LENGTH A2C: 5.5 CM
SL CV LV EF: 58
SL CV PED ECHO LEFT VENTRICLE DIASTOLIC VOLUME (MOD BIPLANE) 2D: 123 ML
SL CV PED ECHO LEFT VENTRICLE SYSTOLIC VOLUME (MOD BIPLANE) 2D: 54 ML
T WAVE AXIS: -2 DEGREES
T WAVE AXIS: 13 DEGREES
TR MAX PG: 20 MMHG
TR PEAK VELOCITY: 2.3 M/S
TRICUSPID ANNULAR PLANE SYSTOLIC EXCURSION: 1.3 CM
TRICUSPID VALVE PEAK REGURGITATION VELOCITY: 2.25 M/S
VENTRICULAR RATE: 86 BPM
VENTRICULAR RATE: 89 BPM

## 2024-04-28 PROCEDURE — 99232 SBSQ HOSP IP/OBS MODERATE 35: CPT | Performed by: NURSE PRACTITIONER

## 2024-04-28 PROCEDURE — 93010 ELECTROCARDIOGRAM REPORT: CPT | Performed by: INTERNAL MEDICINE

## 2024-04-28 PROCEDURE — 93306 TTE W/DOPPLER COMPLETE: CPT

## 2024-04-28 PROCEDURE — 93306 TTE W/DOPPLER COMPLETE: CPT | Performed by: INTERNAL MEDICINE

## 2024-04-28 RX ADMIN — CARVEDILOL 25 MG: 25 TABLET, FILM COATED ORAL at 17:12

## 2024-04-28 RX ADMIN — OXYCODONE HYDROCHLORIDE AND ACETAMINOPHEN 500 MG: 500 TABLET ORAL at 08:27

## 2024-04-28 RX ADMIN — CARVEDILOL 25 MG: 25 TABLET, FILM COATED ORAL at 08:27

## 2024-04-28 RX ADMIN — PRAVASTATIN SODIUM 80 MG: 80 TABLET ORAL at 17:12

## 2024-04-28 RX ADMIN — APIXABAN 5 MG: 5 TABLET, FILM COATED ORAL at 17:12

## 2024-04-28 RX ADMIN — Medication 1 TABLET: at 08:27

## 2024-04-28 RX ADMIN — OMEGA-3 FATTY ACIDS CAP 1000 MG 1000 MG: 1000 CAP at 08:27

## 2024-04-28 RX ADMIN — ASPIRIN 81 MG CHEWABLE TABLET 81 MG: 81 TABLET CHEWABLE at 08:27

## 2024-04-28 RX ADMIN — AMLODIPINE BESYLATE 5 MG: 5 TABLET ORAL at 08:27

## 2024-04-28 RX ADMIN — FLECAINIDE ACETATE 100 MG: 100 TABLET ORAL at 05:24

## 2024-04-28 RX ADMIN — ENOXAPARIN SODIUM 135 MG: 150 INJECTION SUBCUTANEOUS at 08:28

## 2024-04-28 NOTE — ASSESSMENT & PLAN NOTE
Presents with symptomatic afib  H/o symptomatic afib in apst, being treated with fleicanide, coreg. Not on OAC due to low [id number] score, only on baby aspirin  Given recurrence -> cardiology consulted & they recommend increasing flecainide to 100 mg BID on admission however given widening QRS with fast heart rate . Cardiology have discontinued flecainide    They have consulted EP for further evaluation tomorrow Monday for flutter ablation since symptomatic  Would not recommend start of amiodarone and not candidate for dofetilide  This was reportedly discussed with Dr Castro   2 D Echo pending   OK to start eliquis this evening sp sq lovenox   Discontinue baby aspirin   Continue Coreg 25mg bid   Check TSH- normal  Monitor on tele

## 2024-04-28 NOTE — PROGRESS NOTES
[facility]  Progress Note  Name: Daniel Gardiner I  MRN: [id number]  Unit/Bed#: CW2 213-01 I Date of Admission: 4/27/2024   Date of Service: 4/28/2024 I Hospital Day: 0    Assessment/Plan   * Paroxysmal atrial fibrillation (HCC)  Assessment & Plan  Presents with symptomatic afib  H/o symptomatic afib in apst, being treated with fleicanide, coreg. Not on OAC due to low [id number] score, only on baby aspirin  Given recurrence -> cardiology consulted & they recommend increasing flecainide to 100 mg BID on admission however given widening QRS with fast heart rate . Cardiology have discontinued flecainide    They have consulted EP for further evaluation tomorrow Monday for flutter ablation since symptomatic  Would not recommend start of amiodarone and not candidate for dofetilide  This was reportedly discussed with Dr Castro   2 D Echo pending   OK to start eliquis this evening sp sq lovenox   Discontinue baby aspirin   Continue Coreg 25mg bid   Check TSH- normal  Monitor on tele    Bladder tumor  Assessment & Plan  Ongoing f/u with urology    Cigarette nicotine dependence without complication  Assessment & Plan  Smokes 1/2 PPD, discussed cessation    Hypertension  Assessment & Plan  Continue home coreg 25 BID & amlodipine 5 mg QD ( was decreased to 5 mg recent cardio office visit)    Hyperlipidemia  Assessment & Plan  Continue statin           VTE Pharmacologic Prophylaxis:   High Risk (Score >/= 5) - Pharmacological DVT Prophylaxis Ordered: apixaban (Eliquis). Sequential Compression Devices Ordered. Received lovenox this am transition to po eliquis tonight     Mobility:   IFEANYI-BERRY Achieved: 1: Laying in bed  Not evaluated per cw2     Patient Centered Rounds: I performed bedside rounds with nursing staff today.   Discussions with Specialists or Other Care Team Provider: nursing and cardiology     Education and Discussions with Family / Patient: Patient declined call to contact  person.     Total Time Spent on Date of Encounter in care of patient: 40 mins. This time was spent on one or more of the following: performing physical exam; counseling and coordination of care; obtaining or reviewing history; documenting in the medical record; reviewing/ordering tests, medications or procedures; communicating with other healthcare professionals and discussing with patient's family/caregivers.    Current Length of Stay: 0 day(s)  Current Patient Status: Observation   Certification Statement: The patient will continue to require additional inpatient hospital stay due to pending further monitoring   Discharge Plan: Anticipate discharge in 24-48 hrs to home.    Code Status: Level 1 - Full Code    Subjective:   Pt is doing better reports does not feel the palpitations. No n/v/d some sob when heart fast. Not at this time reports novocaine like sensation in mouth after meds this am     Objective:     Vitals:   Temp (24hrs), Av.8 °F (36.6 °C), Min:97.8 °F (36.6 °C), Max:97.8 °F (36.6 °C)    Temp:  [97.8 °F (36.6 °C)] 97.8 °F (36.6 °C)  HR:  [43-95] 63  Resp:  [12-18] 16  BP: (111-148)/(45-77) 122/75  SpO2:  [91 %-96 %] 94 %  Body mass index is 37.49 kg/m².     Input and Output Summary (last 24 hours):     Intake/Output Summary (Last 24 hours) at 2024 1611  Last data filed at 2024 1215  Gross per 24 hour   Intake 1060 ml   Output --   Net 1060 ml       Physical Exam:   Physical Exam  Constitutional:       General: He is not in acute distress.     Appearance: He is obese. He is not ill-appearing, toxic-appearing or diaphoretic.   HENT:      Mouth/Throat:      Pharynx: No oropharyngeal exudate.   Eyes:      General:         Right eye: No discharge.   Cardiovascular:      Rate and Rhythm: Normal rate. Rhythm irregular.      Heart sounds: No murmur heard.     No friction rub. No gallop.   Pulmonary:      Effort: No respiratory distress.      Breath sounds: No stridor. No wheezing, rhonchi or  rales.   Chest:      Chest wall: No tenderness.   Abdominal:      General: There is no distension.      Palpations: There is no mass.      Tenderness: There is no abdominal tenderness. There is no guarding or rebound.      Hernia: No hernia is present.   Musculoskeletal:         General: No swelling, tenderness, deformity or signs of injury.      Right lower leg: No edema.      Left lower leg: No edema.   Skin:     Coloration: Skin is not jaundiced or pale.      Findings: No bruising, erythema, lesion or rash.   Neurological:      Mental Status: He is oriented to person, place, and time.   Psychiatric:         Behavior: Behavior normal.          Additional Data:     Labs:  Results from last 7 days   Lab Units 04/27/24  0933   WBC Thousand/uL 10.06   HEMOGLOBIN g/dL 14.2   HEMATOCRIT % 42.9   PLATELETS Thousands/uL 260   SEGS PCT % 63   LYMPHO PCT % 24   MONO PCT % 9   EOS PCT % 3     Results from last 7 days   Lab Units 04/27/24  0933   SODIUM mmol/L 138   POTASSIUM mmol/L 4.0   CHLORIDE mmol/L 106   CO2 mmol/L 22   BUN mg/dL 22   CREATININE mg/dL 1.05   ANION GAP mmol/L 10   CALCIUM mg/dL 9.1   ALBUMIN g/dL 4.3   TOTAL BILIRUBIN mg/dL 0.35   ALK PHOS U/L 76   ALT U/L 25   AST U/L 19   GLUCOSE RANDOM mg/dL 127                       Lines/Drains:  Invasive Devices       Peripheral Intravenous Line  Duration             Peripheral IV 04/27/24 Left;Proximal;Ventral (anterior) Forearm 1 day                      Telemetry:  Telemetry Orders (From admission, onward)               24 Hour Telemetry Monitoring  Continuous x 24 Hours (Telem)        Question:  Reason for 24 Hour Telemetry  Answer:  Arrhythmias requiring acute medical intervention / PPM or ICD malfunction                     Telemetry Reviewed: Atrial fibrillation. HR averaging 70's  Indication for Continued Telemetry Use: Arrthymias requiring medical therapy             Imaging: Reviewed radiology reports from this admission including: chest xray    Recent  Cultures (last 7 days):         Last 24 Hours Medication List:   Current Facility-Administered Medications   Medication Dose Route Frequency Provider Last Rate    acetaminophen  650 mg Oral Q6H PRN Morenita Mendoza MD      amLODIPine  5 mg Oral Daily Morenita Mendoza MD      apixaban  5 mg Oral BID TAMICA Moreira      ascorbic acid  500 mg Oral Daily Morenita Mendoza MD      carvedilol  25 mg Oral BID With Meals Morenita Mendoza MD      fish oil  1,000 mg Oral Daily Morenita Mendoza MD      multivitamin-minerals  1 tablet Oral Daily Morenita Mendoza MD      nicotine  1 patch Transdermal Daily Morenita Mendoza MD      ondansetron  4 mg Intravenous Q6H PRN Morenita Mendoza MD      pravastatin  80 mg Oral Daily With Dinner Morenita Mendoza MD          Today, Patient Was Seen By: TAMICA Moreira    **Please Note: This note may have been constructed using a voice recognition system.**

## 2024-04-28 NOTE — CASE MANAGEMENT
Case Management Assessment & Discharge Planning Note    Patient name Daniel Gardiner  Location 2 213/CW2 213-01 MRN [id number]  : 1960 Date 2024       Current Admission Date: 2024  Current Admission Diagnosis:Paroxysmal atrial fibrillation (HCC)   Patient Active Problem List    Diagnosis Date Noted    Nephrolithiasis 2023    Bladder tumor 2023    Pain of right hip 2023    Paroxysmal atrial fibrillation (HCC) 2021    Cigarette nicotine dependence without complication 2017    Disc degeneration, lumbosacral 2016    Hyperlipidemia 2015    Hypertension 2015      LOS (days): 0  Geometric Mean LOS (GMLOS) (days):   Days to GMLOS:     OBJECTIVE:           Current admission status: Observation  Referral Reason: Other    Preferred Pharmacy:   [id number] [place] - [address]  [address] [address]  Phone: [phone] Fax: [phone]    [facility] [address] [address]  Phone: [phone] Fax: [phone]    [id number] - [place] - [address]  [address] [address]  Phone: [phone] Fax: [phone]    Primary Care Provider: Linh Erickson MD    Primary Insurance: [employer]  Secondary Insurance:     ASSESSMENT:  Active Health Care Proxies    There are no active Health Care Proxies on file.        Readmission Root Cause  30 Day Readmission: No    Patient Information  Admitted from:: Home  Mental Status: Alert  During Assessment patient was accompanied by: Not accompanied during assessment  Assessment information provided by:: Patient  Primary Caregiver: Self  Support Systems: Spouse/significant other  County of Residence: [place]  What city do you live in?: [place]  Home entry access options. Select all that apply.: Stairs  Number of steps to enter home.: 4  Type  of Current Residence: [place] (Comment) [place])  Living Arrangements: Lives w/ Spouse/significant other    Activities of Daily Living Prior to Admission  Functional Status: Independent  Completes ADLs independently?: Yes  Ambulates independently?: Yes  Does patient use assisted devices?: Yes  Assisted Devices (DME) used: Straight Cane  Does patient currently own DME?: Yes  What DME does the patient currently own?: Straight Cane  Does patient have a history of Outpatient Therapy (PT/OT)?: Yes  Does the patient have a history of Short-Term Rehab?: No  Does patient have a history of HHC?: No  Does patient currently have HHC?: No    Patient Information Continued  Income Source: Pension/FPC  Does patient have prescription coverage?: Yes  Does patient receive dialysis treatments?: No  Does patient have a history of substance abuse?: No  Does patient have a history of Mental Health Diagnosis?: No     Means of Transportation  Means of Transport to Appts:: Drives Self      Social Determinants of Health (SDOH)      Flowsheet Row Most Recent Value   Housing Stability    In the last 12 months, was there a time when you were not able to pay the mortgage or rent on time? N   In the last 12 months, how many places have you lived? 1   In the last 12 months, was there a time when you did not have a steady place to sleep or slept in a shelter (including now)? N   Transportation Needs    In the past 12 months, has lack of transportation kept you from medical appointments or from getting medications? no   In the past 12 months, has lack of transportation kept you from meetings, work, or from getting things needed for daily living? No   Food Insecurity    Within the past 12 months, you worried that your food would run out before you got the money to buy more. Never true   Within the past 12 months, the food you bought just didn't last and you didn't have money to get more. Never true   Utilities    In the past 12 months has the  electric, gas, oil, or water company threatened to shut off services in your home? No            DISCHARGE DETAILS:    Discharge planning discussed with:: Patient  Freedom of Choice: Yes     Other Referral/Resources/Interventions Provided:  Interventions: Prescription Price Check  Referral Comments: CM was c/s for eliquis price check.  CM called [facility] and was told that pt has a $30 co-pay per month.  CM updated provider with price.  CM met with pt at bedside to complete Open and also informed pt of the price of the Eliquis, pt is agreeable.  Pt states his wife will transport him home at d/c.    Treatment Team Recommendation: Home  Discharge Destination Plan:: Home  Transport at Discharge : Family

## 2024-04-28 NOTE — UTILIZATION REVIEW
Initial Clinical Review    Admission: Date/Time/Statement:   Admission Orders (From admission, onward)       Ordered        04/27/24 1312  Place in Observation  Once                          Orders Placed This Encounter   Procedures    Place in Observation     Standing Status:   Standing     Number of Occurrences:   1     Order Specific Question:   Level of Care     Answer:   Med Surg [16]     ED Arrival Information       Expected   -    Arrival   4/27/2024 08:47    Acuity   Urgent              Means of arrival   Walk-In    Escorted by   Family Member    Service   Hospitalist    Admission type   Emergency              Arrival complaint   fib problem             Chief Complaint   Patient presents with    Atrial Fibrillation     Patient reports that he has been in afib since midnight and he was having palpitations that have since subsided. Denies CP.       Initial Presentation: 64 y.o. male to ED via walk-in from home  Present to ED with c/o palpitations that started since midnight -> would be worse with ambulation, little better at rest. He did take 1 extra dose of fleicanide as advised by his cardiologist & took am dose early but symptoms persisted hence he came in.   PMHX: paro Afib, HTN, obesity, nicotine dependence, nephrolithiasis, bladder tumor ;  smokes 1/2 PPD   Admitted to OBS with DX: Paroxysmal atrial fibrillation   PLAN: tele monitoring; f/u echo; f/u TSH; Fleicanide 100 mg BID, start full dose lovenox while price checking eliquis . Continue home coreg 25 BID & amlodipine 5 mg QD ( was decreased to 5 mg recent cardio office visit)       Anticipated Length of Stay/Certification Statement: Patient will be admitted on an observation basis with an anticipated length of stay of less than 2 midnights secondary to adjust meds for Afib.         ED Triage Vitals [04/27/24 0850]   Temperature Pulse Respirations Blood Pressure SpO2   97.7 °F (36.5 °C) 64 18 133/75 96 %      Temp Source Heart Rate Source Patient  Position - Orthostatic VS BP Location FiO2 (%)   Tympanic Monitor Sitting Left arm --      Pain Score       No Pain          Wt Readings from Last 1 Encounters:   04/28/24 132 kg (292 lb)     Additional Vital Signs:   Date/Time Temp Pulse Resp BP MAP (mmHg) SpO2 O2 Device Patient Position - Orthostatic VS   04/28/24 0800 -- 90 -- 148/77 -- -- -- --   04/28/24 07:17:08 -- 43 Abnormal  16 148/77 101 95 % -- --   04/28/24 04:33:35 -- 57 -- 111/57 75 94 % None (Room air) Lying   04/28/24 01:35:30 97.8 °F (36.6 °C) 52 Abnormal  12 117/57 77 93 % -- --   04/27/24 22:38:16 -- 61 -- 118/45 Abnormal  69 91 % None (Room air) Lying   04/27/24 2059 97.8 °F (36.6 °C) -- -- -- -- 91 % -- --   04/27/24 20:27:27 97.8 °F (36.6 °C) -- -- 113/52 72 -- -- Sitting   04/27/24 1914 -- 95 18 121/70 -- 96 % None (Room air) --   04/27/24 1600 -- 96 19 119/65 86 94 % None (Room air) Sitting   04/27/24 1500 -- 94 19 133/65 93 96 % None (Room air) Sitting   04/27/24 1400 -- 90 18 169/75 103 97 % None (Room air) Sitting   04/27/24 1230 -- 86 18 151/69 99 98 % None (Room air) Sitting   04/27/24 1142 -- 88 17 116/67 83 97 % None (Room air) Sitting   04/27/24 0945 -- 86 18 114/56 81 96 % None (Room air) Sitting   04/27/24 0913 -- -- -- -- -- -- None (Room air) --   04/27/24 0850 97.7 °F (36.5 °C) 64 18 133/75 -- 96 % None (Room air) Sitting       EKG: Atrial fibrillation. HR 60-70s.       Pertinent Labs/Diagnostic Test Results:   XR chest 2 views   Final Result by Paulette Salas MD (04/27 1131)      No acute cardiopulmonary disease.               Workstation performed: [id number]              Results from last 7 days   Lab Units 04/27/24  0933   WBC Thousand/uL 10.06   HEMOGLOBIN g/dL 14.2   HEMATOCRIT % 42.9   PLATELETS Thousands/uL 260   TOTAL NEUT ABS Thousands/µL 6.41        Results from last 7 days   Lab Units 04/27/24  0933   SODIUM mmol/L 138   POTASSIUM mmol/L 4.0   CHLORIDE mmol/L 106   CO2 mmol/L 22   ANION GAP mmol/L 10   BUN mg/dL  22   CREATININE mg/dL 1.05   EGFR ml/min/1.73sq m 74   CALCIUM mg/dL 9.1   MAGNESIUM mg/dL 1.9   PHOSPHORUS mg/dL 4.0     Results from last 7 days   Lab Units 04/27/24  0933   AST U/L 19   ALT U/L 25   ALK PHOS U/L 76   TOTAL PROTEIN g/dL 7.0   ALBUMIN g/dL 4.3   TOTAL BILIRUBIN mg/dL 0.35        Results from last 7 days   Lab Units 04/27/24  0933   GLUCOSE RANDOM mg/dL 127        Results from last 7 days   Lab Units 04/27/24  0933   TSH 3RD GENERATON uIU/mL 2.638  2.803          ED Treatment:   Medication Administration from 04/27/2024 0847 to 04/27/2024 2015         Date/Time Order Dose Route Action     04/27/2024 1135 EDT multi-electrolyte (ISOLYTE-S PH 7.4) bolus 1,000 mL 1,000 mL Intravenous New Bag     04/27/2024 1549 EDT amLODIPine (NORVASC) tablet 5 mg 5 mg Oral Given     04/27/2024 1549 EDT ascorbic acid (VITAMIN C) tablet 500 mg 500 mg Oral Given     04/27/2024 1549 EDT aspirin chewable tablet 81 mg 81 mg Oral Given     04/27/2024 1703 EDT carvedilol (COREG) tablet 25 mg 25 mg Oral Given     04/27/2024 1703 EDT flecainide (TAMBOCOR) tablet 100 mg 100 mg Oral Given     04/27/2024 1551 EDT flecainide (TAMBOCOR) tablet 100 mg 0 mg Oral Hold     04/27/2024 1549 EDT multivitamin-minerals (CENTRUM) tablet 1 tablet 1 tablet Oral Given     04/27/2024 1702 EDT fish oil capsule 1,000 mg 1,000 mg Oral Given     04/27/2024 1703 EDT pravastatin (PRAVACHOL) tablet 80 mg 80 mg Oral Given            Present on Admission:   Hyperlipidemia   Hypertension   Paroxysmal atrial fibrillation (HCC)   Bladder tumor   Cigarette nicotine dependence without complication      Admitting Diagnosis: A-fib (HCC) [I48.91]  Paroxysmal A-fib (HCC) [I48.0]    Age/Sex: 64 y.o. male    Admission Orders: SCDs; tele monitoring; mineral restriction - 2 gm sodium    Scheduled Medications:  amLODIPine, 5 mg, Oral, Daily  apixaban, 5 mg, Oral, BID  ascorbic acid, 500 mg, Oral, Daily  aspirin, 81 mg, Oral, Daily  carvedilol, 25 mg, Oral, BID With  Meals  fish oil, 1,000 mg, Oral, Daily  multivitamin-minerals, 1 tablet, Oral, Daily  nicotine, 1 patch, Transdermal, Daily  pravastatin, 80 mg, Oral, Daily With Dinner      Continuous IV Infusions: None       PRN Meds:  acetaminophen, 650 mg, Oral, Q6H PRN  ondansetron, 4 mg, Intravenous, Q6H PRN        IP CONSULT TO CARDIOLOGY  IP CONSULT TO CASE MANAGEMENT  IP CONSULT TO ELECTROPHYSIOLOGY    Network Utilization Review Department  ATTENTION: Please call with any questions or concerns to [phone] and carefully listen to the prompts so that you are directed to the right person. All voicemails are confidential.   For Discharge needs, contact Care Management DC Support Team at [phone] opt. [phone]  Send all requests for admission clinical reviews, approved or denied determinations and any other requests to dedicated fax number below belonging to the [place] where the patient is receiving treatment. List of dedicated fax numbers for the Facilities:  FACILITY NAME UR FAX NUMBER   ADMISSION DENIALS (Administrative/Medical Necessity) [phone]   DISCHARGE SUPPORT TEAM (NETWORK) [phone]   PARENT CHILD HEALTH (Maternity/NICU/Pediatrics) [phone]   [facility] [phone]   [facility] [phone]   [facility] [phone]   [facility] [phone]   [facility] [phone]   [facility] [phone]   [facility] [phone]   [facility] [phone]   [facility] [phone]   [facility] [phone]   [facility] [phone]   [facility] [phone]

## 2024-04-29 ENCOUNTER — PREP FOR PROCEDURE (OUTPATIENT)
Dept: CARDIOLOGY CLINIC | Facility: CLINIC | Age: 64
End: 2024-04-29

## 2024-04-29 ENCOUNTER — TELEPHONE (OUTPATIENT)
Dept: CARDIOLOGY CLINIC | Facility: CLINIC | Age: 64
End: 2024-04-29

## 2024-04-29 VITALS
OXYGEN SATURATION: 95 % | HEIGHT: 74 IN | TEMPERATURE: 97.8 F | HEART RATE: 68 BPM | RESPIRATION RATE: 16 BRPM | BODY MASS INDEX: 37.47 KG/M2 | SYSTOLIC BLOOD PRESSURE: 122 MMHG | WEIGHT: 292 LBS | DIASTOLIC BLOOD PRESSURE: 75 MMHG

## 2024-04-29 DIAGNOSIS — I48.0 PAROXYSMAL ATRIAL FIBRILLATION (HCC): Primary | ICD-10-CM

## 2024-04-29 PROCEDURE — 99239 HOSP IP/OBS DSCHRG MGMT >30: CPT | Performed by: PHYSICIAN ASSISTANT

## 2024-04-29 PROCEDURE — NC001 PR NO CHARGE: Performed by: PHYSICIAN ASSISTANT

## 2024-04-29 PROCEDURE — 99215 OFFICE O/P EST HI 40 MIN: CPT | Performed by: INTERNAL MEDICINE

## 2024-04-29 RX ORDER — AMLODIPINE BESYLATE 5 MG/1
5 TABLET ORAL DAILY
Qty: 30 TABLET | Refills: 0 | Status: SHIPPED | OUTPATIENT
Start: 2024-04-30

## 2024-04-29 RX ADMIN — APIXABAN 5 MG: 5 TABLET, FILM COATED ORAL at 08:10

## 2024-04-29 RX ADMIN — OMEGA-3 FATTY ACIDS CAP 1000 MG 1000 MG: 1000 CAP at 08:10

## 2024-04-29 RX ADMIN — AMLODIPINE BESYLATE 5 MG: 5 TABLET ORAL at 08:10

## 2024-04-29 RX ADMIN — CARVEDILOL 25 MG: 25 TABLET, FILM COATED ORAL at 08:10

## 2024-04-29 RX ADMIN — Medication 1 TABLET: at 08:10

## 2024-04-29 RX ADMIN — OXYCODONE HYDROCHLORIDE AND ACETAMINOPHEN 500 MG: 500 TABLET ORAL at 08:10

## 2024-04-29 NOTE — DISCHARGE INSTR - AVS FIRST PAGE
You've been scheduled for your afib/flutter ablation on May 8th, 2024 (05/08/2024)    Please take your Eliquis blood thinner the morning of procedure.    Do not eat or drink after midnight the night before the procedure.

## 2024-04-29 NOTE — PROGRESS NOTES
[facility]  Progress Note  Name: Daniel Gardiner I  MRN: [id number]  Unit/Bed#: CW2 213-01 I Date of Admission: 4/27/2024   Date of Service: 4/29/2024 I Hospital Day: 0    Assessment/Plan   * Paroxysmal atrial fibrillation (HCC)  Assessment & Plan  Presents with symptomatic afib  H/o symptomatic afib in apst, being treated with fleicanide, coreg. Not on OAC due to low [id number] score, only on baby aspirin  Given recurrence - cardiology consulted & they recommend increasing flecainide to 100 mg BID on admission however given widening QRS with fast heart rate . Cardiology has discontinued flecainide   EP for further evaluation for flutter ablation since symptomatic - possibly Tues/Wed  Would not recommend start of amiodarone and not candidate for dofetilide  This was reportedly discussed with Dr Castro   2D Echo pending   Resume Eliquis  Discontinue baby aspirin   Continue Coreg 25mg bid   TSH- normal  Monitor on tele    Bladder tumor  Assessment & Plan  Ongoing f/u with urology    Cigarette nicotine dependence without complication  Assessment & Plan  Smokes 1/2 PPD, discussed cessation    Hypertension  Assessment & Plan  Continue home coreg 25 BID & amlodipine 5 mg QD (was decreased to 5 mg recent cardio office visit)    Hyperlipidemia  Assessment & Plan  Continue statin           VTE Pharmacologic Prophylaxis: VTE Score: 3 Moderate Risk (Score 3-4) - Pharmacological DVT Prophylaxis Ordered: apixaban (Eliquis).    Mobility:   JH-HLM Achieved: 7: Walk 25 feet or more  JH-HLM Goal NOT achieved. Continue with multidisciplinary rounding and encourage appropriate mobility to improve upon JH-HLM goals.    Patient Centered Rounds: I performed bedside rounds with nursing staff today.   Discussions with Specialists or Other Care Team Provider: Appreciate most recent Cardiology recommendations    Education and Discussions with Family / Patient: Updated  (wife) at  bedside.    Total Time Spent on Date of Encounter in care of patient: 35 mins. This time was spent on one or more of the following: performing physical exam; counseling and coordination of care; obtaining or reviewing history; documenting in the medical record; reviewing/ordering tests, medications or procedures; communicating with other healthcare professionals and discussing with patient's family/caregivers.    Current Length of Stay: 0 day(s)  Current Patient Status: Observation   Certification Statement: The patient will continue to require additional inpatient hospital stay due to pending EP evaluation and recommendations/possible CV  Discharge Plan: Anticipate discharge in 48-72 hrs to home.    Code Status: Level 1 - Full Code    Subjective:   Patient reports some sensation of palpitations intermittently and weakness with activity. Denies any issues with SOB, sleeping, eating or bathroom habits.     Objective:     Vitals:   No data recorded.    HR:  [54-68] 68  BP: (122-128)/(66-75) 122/75  SpO2:  [94 %-95 %] 95 %  Body mass index is 37.49 kg/m².     Input and Output Summary (last 24 hours):     Intake/Output Summary (Last 24 hours) at 4/29/2024 0906  Last data filed at 4/28/2024 1215  Gross per 24 hour   Intake 480 ml   Output --   Net 480 ml       Physical Exam:   Physical Exam  Vitals and nursing note reviewed.   Constitutional:       General: He is not in acute distress.     Appearance: Normal appearance. He is well-developed.   HENT:      Head: Normocephalic and atraumatic.   Eyes:      General: No scleral icterus.     Conjunctiva/sclera: Conjunctivae normal.   Cardiovascular:      Rate and Rhythm: Normal rate. Rhythm irregularly irregular.      Heart sounds: No murmur heard.  Pulmonary:      Effort: Pulmonary effort is normal.      Breath sounds: No wheezing, rhonchi or rales.   Abdominal:      General: There is no distension.      Palpations: Abdomen is soft.   Skin:     General: Skin is warm and dry.    Neurological:      Mental Status: He is alert. Mental status is at baseline.   Psychiatric:         Mood and Affect: Mood normal.        Additional Data:     Labs:  Results from last 7 days   Lab Units 24  0933   WBC Thousand/uL 10.06   HEMOGLOBIN g/dL 14.2   HEMATOCRIT % 42.9   PLATELETS Thousands/uL 260   SEGS PCT % 63   LYMPHO PCT % 24   MONO PCT % 9   EOS PCT % 3     Results from last 7 days   Lab Units 24  0933   SODIUM mmol/L 138   POTASSIUM mmol/L 4.0   CHLORIDE mmol/L 106   CO2 mmol/L 22   BUN mg/dL 22   CREATININE mg/dL 1.05   ANION GAP mmol/L 10   CALCIUM mg/dL 9.1   ALBUMIN g/dL 4.3   TOTAL BILIRUBIN mg/dL 0.35   ALK PHOS U/L 76   ALT U/L 25   AST U/L 19   GLUCOSE RANDOM mg/dL 127                       Lines/Drains:  Invasive Devices       Peripheral Intravenous Line  Duration             Peripheral IV 24 Left;Proximal;Ventral (anterior) Forearm 1 day                      Telemetry:  Telemetry Orders (From admission, onward)               24 Hour Telemetry Monitoring  Continuous x 24 Hours (Telem)           Question:  Reason for 24 Hour Telemetry  Answer:  Arrhythmias requiring acute medical intervention / PPM or ICD malfunction                     Telemetry Reviewed: Atrial fibrillation. HR averaging 70s  Indication for Continued Telemetry Use: Arrthymias requiring medical therapy             Imaging: Reviewed radiology reports from this admission including: chest xray    Recent Cultures (last 7 days):         Last 24 Hours Medication List:   Current Facility-Administered Medications   Medication Dose Route Frequency Provider Last Rate    acetaminophen  650 mg Oral Q6H PRN Morenita Mendoza MD      amLODIPine  5 mg Oral Daily Morenita Mendoza MD      apixaban  5 mg Oral BID TAMICA Moreira      ascorbic acid  500 mg Oral Daily Morenita Mendoza MD      carvedilol  25 mg Oral BID With Meals Morenita Mendoza MD      fish oil  1,000 mg Oral Daily oMrenita Mendoza MD       multivitamin-minerals  1 tablet Oral Daily Morenita Mendoza MD      nicotine  1 patch Transdermal Daily Morenita Mendoza MD      ondansetron  4 mg Intravenous Q6H PRN Morenita Mendoza MD      pravastatin  80 mg Oral Daily With Dinner Morenita Mendoza MD          Today, Patient Was Seen By: Cris Lopez PA-C    **Please Note: This note may have been constructed using a voice recognition system.**

## 2024-04-29 NOTE — TELEPHONE ENCOUNTER
Called patient and LVM in regard procedure to be scheduled, to be done on 5/8/24 per Dr Kay.    Fabby, this other patient will be also for PFA to be done on 5/8/24 per Dr Kay.  Thank you.

## 2024-04-29 NOTE — ASSESSMENT & PLAN NOTE
Presents with symptomatic afib  H/o symptomatic afib in apst, being treated with fleicanide, coreg. Not on OAC due to low [id number] score, only on baby aspirin  Given recurrence - cardiology consulted & they recommend increasing flecainide to 100 mg BID on admission however given widening QRS with fast heart rate . Cardiology has discontinued flecainide   EP for further evaluation for flutter ablation since symptomatic   Patient will have this performed outpatient as rates and symptoms are currently stable and he is requesting Dr. Kay, possibly 5/8  Would not recommend start of amiodarone and not candidate for dofetilide  This was reportedly discussed with Dr Castro   2D Echo pending   Resume Eliquis  Discontinue baby aspirin   Continue Coreg 25mg bid   TSH- normal  Monitor on tele

## 2024-04-29 NOTE — ASSESSMENT & PLAN NOTE
Continue home coreg 25 BID & amlodipine 5 mg QD (was decreased to 5 mg recent cardio office visit)

## 2024-04-29 NOTE — CONSULTS
[facility]  Consult: Electrophysiology  Date of Service: 4/29/2024  Hospital Day: 0  Name: Daniel Gardiner I  MRN: [id number]  Unit/Bed#: CW2 213-01      Inpatient consult to Electrophysiology  Consult performed by: Olivier Cain PA-C  Consult ordered by: Monae Lindsey MD        Physician Requesting Consult: Hussein Begum DO  Reason for Consult / Principal Problem: Paroxysmal Atrial Fibrillation    Assessment/Plan   Paroxysmal Atrial Fibrillation  Reports worsening palpitations, prompting ED visit 04/27  Afib hx:  Diagnosed 08/23/21 with ED presentation for afib w/ RVR (patient feeling palpitations); discharged home on Toprol XL 50mg daily  Had poor rate control on toprol XL alone and was initiated on flecainide 50mg BID 08/25/21  Flecainide increased to 100mg BID given ongoing palpitations and runs of SVT noted on 03/2024 zio  03/13/24 Zio:  Predominantly NSR  Min HR 49bpm  1 run NSVT 4 beats at 118bpm  35 NSVT runs (fastest 5 beats at 160bpm; longest 20 seconds at 104bpm)  Ablation: None prior  Cardioversion: None prior   Rate control: carvedilol 25mg BID   Antiarrythmic: Fecainide 100mg BID (dc'd 2/2 QRS prolongation; previously on 50mg BID, dose increased 2/2 palpitations and SVT seen on zio)  AC: [id number] - 1 (HTN) (next year will be 2 given HTN and age); no on AC as outpatient   Initiated on Eliquis 5mg BID 04/27 while in hospital  Device: None  ECHO: 04/28/24:  EF 58%, LA size WNL, no significant valvular disease  05/26/2023 NM stress w/o St deviation  TSH and LFT's WNL on 04/27/24 lab work  HTN  HLD  Tobacco abuse  Bladder tumor    Discussion/Plan:    Patient had recurrent afib with complaints of palpitations while on flecainide 100mg BID. Widening of his QRS was noted upon hospital presentation and as such the medication was discontinued. QTC is borderline on latest EKG, as such dofetilide or sotalol would not be ideal therapy candidates. Amiodarone would  "not be the preferred long-term therapy given his young age.     Given the above he may benefit from ablation in the future.     He is currently in rate controlled afib/flutter in the 60's on tele on his home-dose carvedilol. ECHO yesterday showed preserved EF. He reports his symptoms have resolved at this time and states he feels \"normal,\" currently.    Discussed option of remaining inpatient and awaiting lab availably for inpatient ablation vs future scheduled outpatient ablation. Patient reports he would prefer future outpatient ablation given his symptoms have improved and he would prefer to avoid prolonged hospitalization.     The patient reports he would like Dr. Cullen's to perform his procedure. Our scheduling team will contact the patient to help set up his procedure.     Continue eliquis 5mg BID for AC and home coreg at current dosing.    Strict return precautions were discussed with the patient and his wife at the bedside. They agree with the plan as above.    No further EP recommendations at this time, we will sign off. Please feel free to reach out with future question/concern.     History of Present Illness   HPI: Daniel Gardiner is a 64 y.o. year old male with a PMH of PAF, Htn, HLD, bladder tumor, and tobacco abuse.    He presents to the [facility] ED w/ complaints of worsening palpitations. These palpitations are worse when ambulating or lying on his left side. When he rests and lies on his right side they get better. He affirms taking all his medications as prescribed, and took an extra flecainide dose 04/26, without his symptoms improving. Otherwise he denies chest pain, SOB, dizzy/lightheadedness, syncope/presyncope, palpitation, and leg swelling, with the remainder of the ROS negative as below.    Today he reports feeling greatly improved. His palpitations have resolved and he states that he feels \"normal\" currently. He otherwise denies chest pain, palpitation, SOB, orthopnea, " dizzy/lightheadedness, syncope/pre syncope, or leg swelling.    TELE: Rate controlled atrial fibrillation/flutter w/ ventricular rate in the 60's.    EKG: Rate controlled Afib w/ RBBB, LAFB, , QTCb 482, w/ ventricular rate 86        Historical Information   Past Medical History:  08/24/2021: A-fib (HCC)  1/6/2016: Disc degeneration, lumbosacral  No date: Hyperlipidemia      Comment:  hypercholesterolemia  No date: Hypertension      Comment:  last assessed 11/22/17 1/29/2018: Pneumonia of both lower lobes due to infectious organism Past Surgical History:  No date: COLONOSCOPY  11/3/2023: FL RETROGRADE PYELOGRAM  No date: HEMORRHOID SURGERY  No date: KNEE SURGERY; Bilateral  1/5/2019: MN COLONOSCOPY FLX DX W/COLLJ SPEC WHEN PFRMD; N/A      Comment:  Procedure: COLONOSCOPY;  Surgeon: Lane Bonner MD;                 Location[facility];  Service: Gastroenterology  11/3/2023: MN CYSTO BLADDER W/URETERAL CATHETERIZATION; Left      Comment:  Procedure: CYSTOSCOPY RETROGRADE PYELOGRAM WITH                INSERTION STENT URETERAL, ureteroscopy, holmium laser;                 Surgeon: Donald Jenkins MD;  Location: [facility];                Service: Urology  11/3/2023: TRANSURETHRAL RESECTION OF BLADDER TUMOR; N/A      Comment:  Procedure: TRANSURETHRAL RESECTION OF BLADDER TUMOR                (TURBT);  Surgeon: Donald Jenkins MD;  Location: [facility];  Service: Urology  No date: UPPER GASTROINTESTINAL ENDOSCOPY   Current Outpatient Medications   Medication Instructions    acetaminophen (TYLENOL) 650 mg, Oral, Every 4 hours PRN    amLODIPine (NORVASC) 10 mg, Oral, Daily    apixaban (ELIQUIS) 5 mg, Oral, 2 times daily    ascorbic acid (VITAMIN C) 500 mg, Oral, Daily    aspirin 81 mg, Oral, Daily    carvedilol (COREG) 25 mg, 2 times daily with meals    Coenzyme Q10 (COQ-10) 10 MG CAPS Oral    diclofenac sodium (VOLTAREN) 50 mg, Oral, 3 times daily    docusate sodium (COLACE) 100 mg, Oral,  2 times daily    flecainide (TAMBOCOR) 50 mg tablet TAKE 1 TABLET TWICE A DAY    Multiple Vitamins-Minerals (MENS MULTIVITAMIN PLUS PO) Oral    Omega-3 Fatty Acids (FISH OIL) 1200 MG CAPS Oral    Omeprazole 20 MG TBDD     oxybutynin (DITROPAN) 5 mg, Oral, Every 6 hours PRN    simvastatin (ZOCOR) 40 mg tablet TAKE 1 TABLET DAILY AT BEDTIME    tadalafil (CIALIS) 10 mg, Oral, Daily PRN    tamsulosin (FLOMAX) 0.4 mg, Oral, Daily with dinner    Zinc 50 MG CAPS Oral    No Known Allergies   Social History     Tobacco Use    Smoking status: Every Day     Current packs/day: 0.50     Average packs/day: 0.5 packs/day for 40.0 years (20.0 ttl pk-yrs)     Types: Cigarettes    Smokeless tobacco: Never   Vaping Use    Vaping status: Never Used   Substance Use Topics    Alcohol use: Yes     Comment: Occassional 1 x / month    Drug use: No    Family History   Problem Relation Age of Onset    Obesity Mother     Aneurysm Father             Objective                            Vitals I/O    Most Recent Min/Max in 24hrs   Temp 97.8 °F (36.6 °C) No data recorded   Pulse 68 Pulse  Min: 54  Max: 68   Resp 16 No data recorded   /75 BP  Min: 122/75  Max: 128/66   O2 Sat 95 % SpO2  Min: 94 %  Max: 95 %      Intake/Output Summary (Last 24 hours) at 4/29/2024 1057  Last data filed at 4/29/2024 0900  Gross per 24 hour   Intake 960 ml   Output --   Net 960 ml                        Physical Exam   Physical Exam  Constitutional:       General: He is not in acute distress.     Appearance: Normal appearance.   HENT:      Head: Normocephalic and atraumatic.   Cardiovascular:      Rate and Rhythm: Normal rate. Rhythm irregular.      Pulses: Normal pulses.      Heart sounds: Normal heart sounds.   Pulmonary:      Effort: Pulmonary effort is normal.      Breath sounds: Normal breath sounds.   Abdominal:      General: Abdomen is flat. Bowel sounds are normal.      Palpations: Abdomen is soft.   Musculoskeletal:      Right lower leg: No edema.       Left lower leg: No edema.   Skin:     General: Skin is warm and dry.      Capillary Refill: Capillary refill takes less than 2 seconds.   Neurological:      Mental Status: He is alert and oriented to person, place, and time.            Labs:    CBC  No recent results    BMP  No recent results      Coags  No recent results     Additional Electrolytes  No recent results         Blood Gas  No recent results  No recent results LFTs  No recent results      Infectious  No recent results  Glucose  No recent results          SIGNATURE: Olivier Cain PA-C

## 2024-04-29 NOTE — ASSESSMENT & PLAN NOTE
Presents with symptomatic afib  H/o symptomatic afib in apst, being treated with fleicanide, coreg. Not on OAC due to low [id number] score, only on baby aspirin  Given recurrence - cardiology consulted & they recommend increasing flecainide to 100 mg BID on admission however given widening QRS with fast heart rate . Cardiology has discontinued flecainide   EP for further evaluation for flutter ablation since symptomatic - possibly Tues/Wed  Would not recommend start of amiodarone and not candidate for dofetilide  This was reportedly discussed with Dr Castro   2D Echo pending   Resume Eliquis  Discontinue baby aspirin   Continue Coreg 25mg bid   TSH- normal  Monitor on tele

## 2024-04-29 NOTE — TELEPHONE ENCOUNTER
Patient scheduled for ANKIT/A fib-PFA at [facility] on  5/8/24   with Dr Kay.    Patient aware of general instructions, labs test required.     Meds holds:   Omega 3 hold for a week.

## 2024-04-29 NOTE — DISCHARGE SUMMARY
[facility]  Discharge- Daniel Gardiner 1960, 64 y.o. male MRN: [id number]  Unit/Bed#: CW2 213-01 Encounter: [id number]  Primary Care Provider: Linh Erickson MD   Date and time admitted to hospital: 4/27/2024  8:56 AM    * Paroxysmal atrial fibrillation (HCC)  Assessment & Plan  Presents with symptomatic afib  H/o symptomatic afib in apst, being treated with fleicanide, coreg. Not on OAC due to low [id number] score, only on baby aspirin  Given recurrence - cardiology consulted & they recommend increasing flecainide to 100 mg BID on admission however given widening QRS with fast heart rate . Cardiology has discontinued flecainide   EP for further evaluation for flutter ablation since symptomatic   Patient will have this performed outpatient as rates and symptoms are currently stable and he is requesting Dr. Kay, possibly 5/8  Would not recommend start of amiodarone and not candidate for dofetilide  This was reportedly discussed with Dr Castro   2D Echo pending   Resume Eliquis  Discontinue baby aspirin   Continue Coreg 25mg bid   TSH- normal  Monitor on tele    Bladder tumor  Assessment & Plan  Ongoing f/u with urology    Cigarette nicotine dependence without complication  Assessment & Plan  Smokes 1/2 PPD, discussed cessation    Hypertension  Assessment & Plan  Continue home coreg 25 BID & amlodipine 5 mg QD (was decreased to 5 mg recent cardio office visit)    Hyperlipidemia  Assessment & Plan  Continue statin      Medical Problems       Resolved Problems  Date Reviewed: 4/29/2024   None       Discharging Physician / Practitioner: Cris Lopez PA-C  PCP: Linh Erickson MD  Admission Date:   Admission Orders (From admission, onward)       Ordered        04/27/24 1312  Place in Observation  Once                          Discharge Date: 04/29/24    Consultations During Hospital Stay:  Cardiology    Procedures Performed:   None    Significant Findings / Test Results:   CXR  4/27: No acute cardiopulmonary disease.     Incidental Findings:   None     Test Results Pending at Discharge (will require follow up):   None     Outpatient Tests Requested:  Cardiac ablation    Complications:  None    Reason for Admission: Aflutter    Hospital Course:   Daniel Gardiner is a 64 y.o. male patient with a past medical history of paroxysmal atrial fibrillation, hypertension, obesity, nicotine dependence, nephrolithiasis, bladder tumor who originally presented to the hospital on 4/27/2024 due to complaints of palpitations.  These are exacerbated with ambulation.  He did take an extra dose of flecainide as advised by his cardiologist but symptoms persisted prompting him to come to the ED.   Cardiology evaluated patient.  Given symptoms, it was anticipated that patient would undergo ablation.  EP was consulted and flecainide was ultimately discontinued given widening QRS.  He will be scheduled for an outpatient ablation with Dr. Kay as symptoms are currently stable and rates are normal.  Hemodynamically stable at time of discharge and appropriate for outpatient follow-up.      Please see above list of diagnoses and related plan for additional information.     Condition at Discharge: stable    Discharge Day Visit / Exam:   * Please refer to separate progress note for these details *    Discussion with Family: Updated  (wife) at bedside.    Discharge instructions/Information to patient and family:   See after visit summary for information provided to patient and family.      Provisions for Follow-Up Care:  See after visit summary for information related to follow-up care and any pertinent home health orders.      Mobility at time of Discharge:   JH-HLM Achieved: 7: Walk 25 feet or more  HLM Goal achieved. Continue to encourage appropriate mobility.     Disposition:   Home    Planned Readmission: Cardiac ablation     Discharge Statement:  I spent 65 minutes discharging the patient. This  time was spent on the day of discharge. I had direct contact with the patient on the day of discharge. Greater than 50% of the total time was spent examining patient, answering all patient questions, arranging and discussing plan of care with patient as well as directly providing post-discharge instructions.  Additional time then spent on discharge activities.    Discharge Medications:  See after visit summary for reconciled discharge medications provided to patient and/or family.      **Please Note: This note may have been constructed using a voice recognition system**

## 2024-05-06 ENCOUNTER — TRANSITIONAL CARE MANAGEMENT (OUTPATIENT)
Dept: FAMILY MEDICINE CLINIC | Facility: CLINIC | Age: 64
End: 2024-05-06

## 2024-05-08 ENCOUNTER — APPOINTMENT (OUTPATIENT)
Dept: NON INVASIVE DIAGNOSTICS | Facility: HOSPITAL | Age: 64
End: 2024-05-08
Attending: INTERNAL MEDICINE
Payer: COMMERCIAL

## 2024-05-08 ENCOUNTER — ANESTHESIA EVENT (OUTPATIENT)
Dept: NON INVASIVE DIAGNOSTICS | Facility: HOSPITAL | Age: 64
End: 2024-05-08
Payer: COMMERCIAL

## 2024-05-08 ENCOUNTER — ANESTHESIA (OUTPATIENT)
Dept: NON INVASIVE DIAGNOSTICS | Facility: HOSPITAL | Age: 64
End: 2024-05-08
Payer: COMMERCIAL

## 2024-05-08 ENCOUNTER — HOSPITAL ENCOUNTER (OUTPATIENT)
Facility: HOSPITAL | Age: 64
Setting detail: OUTPATIENT SURGERY
Discharge: HOME | End: 2024-05-09
Attending: INTERNAL MEDICINE | Admitting: INTERNAL MEDICINE
Payer: COMMERCIAL

## 2024-05-08 DIAGNOSIS — I48.0 PAROXYSMAL ATRIAL FIBRILLATION (HCC): ICD-10-CM

## 2024-05-08 DIAGNOSIS — Z98.890 S/P ABLATION OF ATRIAL FIBRILLATION: Primary | ICD-10-CM

## 2024-05-08 DIAGNOSIS — Z86.79 S/P ABLATION OF ATRIAL FIBRILLATION: Primary | ICD-10-CM

## 2024-05-08 LAB
ANION GAP SERPL CALCULATED.3IONS-SCNC: 9 MMOL/L (ref 4–13)
ATRIAL RATE: 286 BPM
ATRIAL RATE: 69 BPM
BASOPHILS # BLD AUTO: 0.1 THOUSANDS/ÂΜL (ref 0–0.1)
BASOPHILS NFR BLD AUTO: 1 % (ref 0–1)
BUN SERPL-MCNC: 14 MG/DL (ref 5–25)
CALCIUM SERPL-MCNC: 9.4 MG/DL (ref 8.4–10.2)
CHLORIDE SERPL-SCNC: 107 MMOL/L (ref 96–108)
CO2 SERPL-SCNC: 23 MMOL/L (ref 21–32)
CREAT SERPL-MCNC: 1.13 MG/DL (ref 0.6–1.3)
EOSINOPHIL # BLD AUTO: 0.24 THOUSAND/ÂΜL (ref 0–0.61)
EOSINOPHIL NFR BLD AUTO: 3 % (ref 0–6)
ERYTHROCYTE [DISTWIDTH] IN BLOOD BY AUTOMATED COUNT: 12.6 % (ref 11.6–15.1)
GFR SERPL CREATININE-BSD FRML MDRD: 68 ML/MIN/1.73SQ M
GLUCOSE P FAST SERPL-MCNC: 127 MG/DL (ref 65–99)
GLUCOSE SERPL-MCNC: 127 MG/DL (ref 65–140)
HCT VFR BLD AUTO: 43 % (ref 36.5–49.3)
HGB BLD-MCNC: 14.4 G/DL (ref 12–17)
IMM GRANULOCYTES # BLD AUTO: 0.02 THOUSAND/UL (ref 0–0.2)
IMM GRANULOCYTES NFR BLD AUTO: 0 % (ref 0–2)
INR PPP: 1.31 (ref 0.84–1.19)
KCT BLD-ACNC: 295 SEC (ref 89–137)
KCT BLD-ACNC: 302 SEC (ref 89–137)
KCT BLD-ACNC: 315 SEC (ref 89–137)
KCT BLD-ACNC: 321 SEC (ref 89–137)
LYMPHOCYTES # BLD AUTO: 2.06 THOUSANDS/ÂΜL (ref 0.6–4.47)
LYMPHOCYTES NFR BLD AUTO: 24 % (ref 14–44)
MCH RBC QN AUTO: 30.8 PG (ref 26.8–34.3)
MCHC RBC AUTO-ENTMCNC: 33.5 G/DL (ref 31.4–37.4)
MCV RBC AUTO: 92 FL (ref 82–98)
MONOCYTES # BLD AUTO: 0.74 THOUSAND/ÂΜL (ref 0.17–1.22)
MONOCYTES NFR BLD AUTO: 9 % (ref 4–12)
NEUTROPHILS # BLD AUTO: 5.28 THOUSANDS/ÂΜL (ref 1.85–7.62)
NEUTS SEG NFR BLD AUTO: 63 % (ref 43–75)
NRBC BLD AUTO-RTO: 0 /100 WBCS
P AXIS: -88 DEGREES
P AXIS: 59 DEGREES
PLATELET # BLD AUTO: 258 THOUSANDS/UL (ref 149–390)
PMV BLD AUTO: 9.7 FL (ref 8.9–12.7)
POTASSIUM SERPL-SCNC: 3.8 MMOL/L (ref 3.5–5.3)
PR INTERVAL: 217 MS
PROTHROMBIN TIME: 16.2 SECONDS (ref 11.6–14.5)
QRS AXIS: -76 DEGREES
QRS AXIS: 26 DEGREES
QRSD INTERVAL: 140 MS
QRSD INTERVAL: 150 MS
QT INTERVAL: 418 MS
QT INTERVAL: 471 MS
QTC INTERVAL: 505 MS
QTC INTERVAL: 514 MS
RBC # BLD AUTO: 4.68 MILLION/UL (ref 3.88–5.62)
SODIUM SERPL-SCNC: 139 MMOL/L (ref 135–147)
SPECIMEN SOURCE: ABNORMAL
T WAVE AXIS: -12 DEGREES
T WAVE AXIS: 52 DEGREES
VENTRICULAR RATE: 69 BPM
VENTRICULAR RATE: 91 BPM
WBC # BLD AUTO: 8.44 THOUSAND/UL (ref 4.31–10.16)

## 2024-05-08 PROCEDURE — 93656 COMPRE EP EVAL ABLTJ ATR FIB: CPT | Performed by: INTERNAL MEDICINE

## 2024-05-08 PROCEDURE — C1731 CATH, EP, 20 OR MORE ELEC: HCPCS | Performed by: INTERNAL MEDICINE

## 2024-05-08 PROCEDURE — C1894 INTRO/SHEATH, NON-LASER: HCPCS | Performed by: INTERNAL MEDICINE

## 2024-05-08 PROCEDURE — C1733 CATH, EP, OTHR THAN COOL-TIP: HCPCS | Performed by: INTERNAL MEDICINE

## 2024-05-08 PROCEDURE — C1769 GUIDE WIRE: HCPCS | Performed by: INTERNAL MEDICINE

## 2024-05-08 PROCEDURE — C1759 CATH, INTRA ECHOCARDIOGRAPHY: HCPCS | Performed by: INTERNAL MEDICINE

## 2024-05-08 PROCEDURE — C1760 CLOSURE DEV, VASC: HCPCS | Performed by: INTERNAL MEDICINE

## 2024-05-08 PROCEDURE — C1730 CATH, EP, 19 OR FEW ELECT: HCPCS | Performed by: INTERNAL MEDICINE

## 2024-05-08 PROCEDURE — 93655 ICAR CATH ABLTJ DSCRT ARRHYT: CPT | Performed by: INTERNAL MEDICINE

## 2024-05-08 PROCEDURE — C1766 INTRO/SHEATH,STRBLE,NON-PEEL: HCPCS | Performed by: INTERNAL MEDICINE

## 2024-05-08 PROCEDURE — 93005 ELECTROCARDIOGRAM TRACING: CPT

## 2024-05-08 PROCEDURE — NC001 PR NO CHARGE: Performed by: PHYSICIAN ASSISTANT

## 2024-05-08 PROCEDURE — 93312 ECHO TRANSESOPHAGEAL: CPT | Performed by: INTERNAL MEDICINE

## 2024-05-08 PROCEDURE — 85025 COMPLETE CBC W/AUTO DIFF WBC: CPT | Performed by: PHYSICIAN ASSISTANT

## 2024-05-08 PROCEDURE — 93325 DOPPLER ECHO COLOR FLOW MAPG: CPT | Performed by: INTERNAL MEDICINE

## 2024-05-08 PROCEDURE — 93010 ELECTROCARDIOGRAM REPORT: CPT | Performed by: INTERNAL MEDICINE

## 2024-05-08 PROCEDURE — 85610 PROTHROMBIN TIME: CPT | Performed by: PHYSICIAN ASSISTANT

## 2024-05-08 PROCEDURE — 80048 BASIC METABOLIC PNL TOTAL CA: CPT | Performed by: PHYSICIAN ASSISTANT

## 2024-05-08 PROCEDURE — 93321 DOPPLER ECHO F-UP/LMTD STD: CPT | Performed by: INTERNAL MEDICINE

## 2024-05-08 PROCEDURE — C1893 INTRO/SHEATH, FIXED,NON-PEEL: HCPCS | Performed by: INTERNAL MEDICINE

## 2024-05-08 PROCEDURE — 85347 COAGULATION TIME ACTIVATED: CPT

## 2024-05-08 PROCEDURE — 93312 ECHO TRANSESOPHAGEAL: CPT

## 2024-05-08 DEVICE — PERCLOSE™ PROSTYLE™ SUTURE-MEDIATED CLOSURE AND REPAIR SYSTEM
Type: IMPLANTABLE DEVICE | Site: GROIN | Status: FUNCTIONAL
Brand: PERCLOSE™ PROSTYLE™

## 2024-05-08 DEVICE — DVC VASC CLSR VASCADE MVP 6-12FR: Type: IMPLANTABLE DEVICE | Site: GROIN | Status: FUNCTIONAL

## 2024-05-08 RX ORDER — ROCURONIUM BROMIDE 10 MG/ML
INJECTION, SOLUTION INTRAVENOUS AS NEEDED
Status: DISCONTINUED | OUTPATIENT
Start: 2024-05-08 | End: 2024-05-08

## 2024-05-08 RX ORDER — SODIUM CHLORIDE 9 MG/ML
INJECTION, SOLUTION INTRAVENOUS CONTINUOUS PRN
Status: DISCONTINUED | OUTPATIENT
Start: 2024-05-08 | End: 2024-05-08

## 2024-05-08 RX ORDER — SODIUM CHLORIDE 9 MG/ML
20 INJECTION, SOLUTION INTRAVENOUS ONCE
Status: COMPLETED | OUTPATIENT
Start: 2024-05-08 | End: 2024-05-08

## 2024-05-08 RX ORDER — LIDOCAINE HYDROCHLORIDE 10 MG/ML
INJECTION, SOLUTION EPIDURAL; INFILTRATION; INTRACAUDAL; PERINEURAL AS NEEDED
Status: DISCONTINUED | OUTPATIENT
Start: 2024-05-08 | End: 2024-05-08

## 2024-05-08 RX ORDER — ACETAMINOPHEN 325 MG/1
650 TABLET ORAL EVERY 4 HOURS PRN
Status: DISCONTINUED | OUTPATIENT
Start: 2024-05-08 | End: 2024-05-09 | Stop reason: HOSPADM

## 2024-05-08 RX ORDER — GLYCOPYRROLATE 0.2 MG/ML
INJECTION INTRAMUSCULAR; INTRAVENOUS AS NEEDED
Status: DISCONTINUED | OUTPATIENT
Start: 2024-05-08 | End: 2024-05-08

## 2024-05-08 RX ORDER — PANTOPRAZOLE SODIUM 40 MG/1
40 TABLET, DELAYED RELEASE ORAL DAILY
Status: CANCELLED | OUTPATIENT
Start: 2024-05-08

## 2024-05-08 RX ORDER — SODIUM CHLORIDE 9 MG/ML
100 INJECTION, SOLUTION INTRAVENOUS CONTINUOUS
Status: DISCONTINUED | OUTPATIENT
Start: 2024-05-08 | End: 2024-05-09

## 2024-05-08 RX ORDER — HEPARIN SODIUM 10000 [USP'U]/100ML
INJECTION, SOLUTION INTRAVENOUS
Status: DISCONTINUED | OUTPATIENT
Start: 2024-05-08 | End: 2024-05-08 | Stop reason: HOSPADM

## 2024-05-08 RX ORDER — PROPOFOL 10 MG/ML
INJECTION, EMULSION INTRAVENOUS AS NEEDED
Status: DISCONTINUED | OUTPATIENT
Start: 2024-05-08 | End: 2024-05-08

## 2024-05-08 RX ORDER — CARVEDILOL 25 MG/1
25 TABLET ORAL 2 TIMES DAILY WITH MEALS
Status: DISCONTINUED | OUTPATIENT
Start: 2024-05-08 | End: 2024-05-09 | Stop reason: HOSPADM

## 2024-05-08 RX ORDER — PRAVASTATIN SODIUM 80 MG/1
80 TABLET ORAL
Status: DISCONTINUED | OUTPATIENT
Start: 2024-05-08 | End: 2024-05-09 | Stop reason: HOSPADM

## 2024-05-08 RX ORDER — HEPARIN SODIUM 1000 [USP'U]/ML
INJECTION, SOLUTION INTRAVENOUS; SUBCUTANEOUS CODE/TRAUMA/SEDATION MEDICATION
Status: DISCONTINUED | OUTPATIENT
Start: 2024-05-08 | End: 2024-05-08 | Stop reason: HOSPADM

## 2024-05-08 RX ORDER — FENTANYL CITRATE 50 UG/ML
INJECTION, SOLUTION INTRAMUSCULAR; INTRAVENOUS AS NEEDED
Status: DISCONTINUED | OUTPATIENT
Start: 2024-05-08 | End: 2024-05-08

## 2024-05-08 RX ORDER — CEFAZOLIN SODIUM 2 G/50ML
SOLUTION INTRAVENOUS AS NEEDED
Status: DISCONTINUED | OUTPATIENT
Start: 2024-05-08 | End: 2024-05-08

## 2024-05-08 RX ORDER — PANTOPRAZOLE SODIUM 40 MG/10ML
40 INJECTION, POWDER, LYOPHILIZED, FOR SOLUTION INTRAVENOUS ONCE
Status: DISCONTINUED | OUTPATIENT
Start: 2024-05-08 | End: 2024-05-08 | Stop reason: HOSPADM

## 2024-05-08 RX ORDER — FENTANYL CITRATE/PF 50 MCG/ML
25 SYRINGE (ML) INJECTION
Status: DISCONTINUED | OUTPATIENT
Start: 2024-05-08 | End: 2024-05-08 | Stop reason: HOSPADM

## 2024-05-08 RX ORDER — HYDROMORPHONE HCL IN WATER/PF 6 MG/30 ML
0.2 PATIENT CONTROLLED ANALGESIA SYRINGE INTRAVENOUS
Status: DISCONTINUED | OUTPATIENT
Start: 2024-05-08 | End: 2024-05-08 | Stop reason: HOSPADM

## 2024-05-08 RX ORDER — ONDANSETRON 2 MG/ML
4 INJECTION INTRAMUSCULAR; INTRAVENOUS ONCE AS NEEDED
Status: DISCONTINUED | OUTPATIENT
Start: 2024-05-08 | End: 2024-05-08 | Stop reason: HOSPADM

## 2024-05-08 RX ORDER — ONDANSETRON 2 MG/ML
INJECTION INTRAMUSCULAR; INTRAVENOUS AS NEEDED
Status: DISCONTINUED | OUTPATIENT
Start: 2024-05-08 | End: 2024-05-08

## 2024-05-08 RX ORDER — PHENYLEPHRINE HCL IN 0.9% NACL 1 MG/10 ML
SYRINGE (ML) INTRAVENOUS AS NEEDED
Status: DISCONTINUED | OUTPATIENT
Start: 2024-05-08 | End: 2024-05-08

## 2024-05-08 RX ORDER — PROTAMINE SULFATE 10 MG/ML
INJECTION, SOLUTION INTRAVENOUS AS NEEDED
Status: DISCONTINUED | OUTPATIENT
Start: 2024-05-08 | End: 2024-05-08

## 2024-05-08 RX ORDER — DEXAMETHASONE SODIUM PHOSPHATE 10 MG/ML
INJECTION, SOLUTION INTRAMUSCULAR; INTRAVENOUS AS NEEDED
Status: DISCONTINUED | OUTPATIENT
Start: 2024-05-08 | End: 2024-05-08

## 2024-05-08 RX ADMIN — PROTAMINE SULFATE 10 MG: 10 INJECTION, SOLUTION INTRAVENOUS at 16:46

## 2024-05-08 RX ADMIN — SODIUM CHLORIDE 20 ML/HR: 0.9 INJECTION, SOLUTION INTRAVENOUS at 11:43

## 2024-05-08 RX ADMIN — SODIUM CHLORIDE: 0.9 INJECTION, SOLUTION INTRAVENOUS at 16:44

## 2024-05-08 RX ADMIN — APIXABAN 5 MG: 5 TABLET, FILM COATED ORAL at 21:01

## 2024-05-08 RX ADMIN — PROPOFOL 50 MG: 10 INJECTION, EMULSION INTRAVENOUS at 16:08

## 2024-05-08 RX ADMIN — PROTAMINE SULFATE 20 MG: 10 INJECTION, SOLUTION INTRAVENOUS at 16:44

## 2024-05-08 RX ADMIN — PROPOFOL 50 MG: 10 INJECTION, EMULSION INTRAVENOUS at 14:50

## 2024-05-08 RX ADMIN — FENTANYL CITRATE 50 MCG: 50 INJECTION INTRAMUSCULAR; INTRAVENOUS at 14:38

## 2024-05-08 RX ADMIN — ROCURONIUM BROMIDE 50 MG: 10 INJECTION, SOLUTION INTRAVENOUS at 14:39

## 2024-05-08 RX ADMIN — GLYCOPYRROLATE 0.2 MG: 0.2 INJECTION, SOLUTION INTRAMUSCULAR; INTRAVENOUS at 14:54

## 2024-05-08 RX ADMIN — GLYCOPYRROLATE 0.2 MG: 0.2 INJECTION, SOLUTION INTRAMUSCULAR; INTRAVENOUS at 16:06

## 2024-05-08 RX ADMIN — Medication 5 MG: at 15:03

## 2024-05-08 RX ADMIN — ROCURONIUM BROMIDE 20 MG: 10 INJECTION, SOLUTION INTRAVENOUS at 14:54

## 2024-05-08 RX ADMIN — SUGAMMADEX 400 MG: 100 INJECTION, SOLUTION INTRAVENOUS at 16:44

## 2024-05-08 RX ADMIN — PROTAMINE SULFATE 20 MG: 10 INJECTION, SOLUTION INTRAVENOUS at 16:43

## 2024-05-08 RX ADMIN — PHENYLEPHRINE HYDROCHLORIDE 40 MCG/MIN: 10 INJECTION INTRAVENOUS at 14:55

## 2024-05-08 RX ADMIN — SODIUM CHLORIDE: 0.9 INJECTION, SOLUTION INTRAVENOUS at 14:28

## 2024-05-08 RX ADMIN — LIDOCAINE HYDROCHLORIDE 50 MG: 10 INJECTION, SOLUTION EPIDURAL; INFILTRATION; INTRACAUDAL; PERINEURAL at 14:38

## 2024-05-08 RX ADMIN — ROCURONIUM BROMIDE 20 MG: 10 INJECTION, SOLUTION INTRAVENOUS at 16:06

## 2024-05-08 RX ADMIN — ONDANSETRON 4 MG: 2 INJECTION INTRAMUSCULAR; INTRAVENOUS at 16:36

## 2024-05-08 RX ADMIN — ROCURONIUM BROMIDE 10 MG: 10 INJECTION, SOLUTION INTRAVENOUS at 16:34

## 2024-05-08 RX ADMIN — Medication 200 MCG: at 14:55

## 2024-05-08 RX ADMIN — FENTANYL CITRATE 50 MCG: 50 INJECTION INTRAMUSCULAR; INTRAVENOUS at 17:03

## 2024-05-08 RX ADMIN — PROPOFOL 200 MG: 10 INJECTION, EMULSION INTRAVENOUS at 14:38

## 2024-05-08 RX ADMIN — FENTANYL CITRATE 50 MCG: 50 INJECTION INTRAMUSCULAR; INTRAVENOUS at 14:50

## 2024-05-08 RX ADMIN — Medication 10 MG: at 16:01

## 2024-05-08 RX ADMIN — CEFAZOLIN SODIUM 2000 MG: 2 SOLUTION INTRAVENOUS at 14:52

## 2024-05-08 RX ADMIN — PRAVASTATIN SODIUM 80 MG: 80 TABLET ORAL at 19:35

## 2024-05-08 RX ADMIN — DEXAMETHASONE SODIUM PHOSPHATE 10 MG: 10 INJECTION, SOLUTION INTRAMUSCULAR; INTRAVENOUS at 14:39

## 2024-05-08 RX ADMIN — Medication 200 MCG: at 15:02

## 2024-05-08 RX ADMIN — Medication 10 MG: at 16:26

## 2024-05-08 RX ADMIN — CARVEDILOL 25 MG: 25 TABLET, FILM COATED ORAL at 21:01

## 2024-05-08 RX ADMIN — FENTANYL CITRATE 50 MCG: 50 INJECTION INTRAMUSCULAR; INTRAVENOUS at 16:58

## 2024-05-08 NOTE — ANESTHESIA PREPROCEDURE EVALUATION
Procedure:  Cardiac eps/afib ablation (Chest)    Relevant Problems   CARDIO   (+) Hyperlipidemia   (+) Hypertension   (+) Paroxysmal atrial fibrillation (HCC)      /RENAL   (+) Nephrolithiasis      MUSCULOSKELETAL   (+) Disc degeneration, lumbosacral      TTE 4/28/24  LVEF 58%, RV nml, mild MR, mild TR    Lab Results   Component Value Date    WBC 8.44 05/08/2024    HGB 14.4 05/08/2024    HCT 43.0 05/08/2024    MCV 92 05/08/2024     05/08/2024     Lab Results   Component Value Date     11/22/2017    K 3.8 05/08/2024    CO2 23 05/08/2024     05/08/2024    BUN 14 05/08/2024    CREATININE 1.13 05/08/2024     Lab Results   Component Value Date    INR 1.31 (H) 05/08/2024    INR 1.11 08/23/2021    PROTIME 16.2 (H) 05/08/2024    PROTIME 14.3 08/23/2021     Lab Results   Component Value Date    PTT 30 08/23/2021         Physical Exam    Airway    Mallampati score: II  TM Distance: >3 FB  Neck ROM: full     Dental       Cardiovascular      Pulmonary      Other Findings        Anesthesia Plan  ASA Score- 3     Anesthesia Type- general with ASA Monitors.         Additional Monitors: arterial line.    Airway Plan: ETT.           Plan Factors-Exercise tolerance (METS): >4 METS.    Chart reviewed.   Existing labs reviewed. Patient summary reviewed.                  Induction- intravenous.    Postoperative Plan- . Planned trial extubation    Informed Consent- Anesthetic plan and risks discussed with patient.  I personally reviewed this patient with the CRNA. Discussed and agreed on the Anesthesia Plan with the CRNA..                
No

## 2024-05-08 NOTE — H&P
H&P Exam - Electrophysiology - Cardiology   Daniel Gardiner 64 y.o. male MRN: [id number]  Unit/Bed#: [facility] CATH LAB ROOM Encounter: [id number]    Assessment/Plan     Assessment:  Persistent Afib/atrial flutter  -- previously had PAF diagnosed in 2021  -- maintained on flecainide and carvedilol and did well for several years  -- flecainide increased to 100 mg bid recently, now with QRS widening and discontinued  -- now newly persistent Afib, recent hospitalization in April  -- [id number] = 1 (HTN), started on Eliquis  -- presents in typical atrial flutter today  HTN  HLD  Bladder tumor    Plan:  -- NPO for Afib ablation today  -- ANKIT to R/O ASUNCION thrombus  -- last dose of Eliquis was this morning, recently started medication and has been compliant  -- admit to med/surg telemetry   -- bedrest post procedure x 4 hours  -- resume Eliquis 5 mg bid  -- continue carvedilol  -- monitor overnight, possible D/C home in am      History of Present Illness   HPI:  Daniel Gardiner is a 64 y.o. male with a history of persistent Afib, atrial flutter, Lyme disease, HTN, HLD, bladder tumor, and tobacco abuse who presents to [facility] for Afib ablation.     He was diagnosed with paroxysmal atrial fibrillation in August 2021.  He was treated with flecainide and carvedilol and did well for several years. ZioPatch in March 2024 showed no Afib and brief SVT. Flecainide was increased to 100 mg bid.     In April 2024, he presented with persistent A-fib.  This was associated with worsening palpitations. He was noted to have QRS widening on flecainide and flecainide was discontinued. He was started on Eliquis and rate controlled. He was evaluated by EP and outpatient Afib ablation was recommended.     He presents today in typical atrial flutter.  He continues to feel fatigued and has occasional palpitations.  In general he is feeling okay.  He has been compliant with Eliquis with no missed doses.  She denies chest pain, shortness of  breath, lightheadedness, dizziness, orthopnea, or PND.  He is n.p.o. for the procedure today.    EKG: atrial flutter with variable block HR 93 bpm    Echo 4/28/2024:    Left Ventricle: Left ventricular cavity size is normal. Wall thickness is mildly increased. The left ventricular ejection fraction is 58% by 3D quantification. Systolic function is normal. Although no diagnostic regional wall motion abnormality was identified, this possibility cannot be completely excluded on the basis of this study.    Right Ventricle: Right ventricular cavity size is normal. Systolic function is normal.    Right Atrium: The atrium is dilated.    Mitral Valve: There is mild regurgitation.    Tricuspid Valve: There is mild regurgitation.    Stress Test 5/26/2024:    Stress ECG: No ST deviation is noted. The ECG was not diagnostic due to pharmacological (vasodilator) stress.    Perfusion: There are no perfusion defects.    Stress Function: Left ventricular function post-stress is normal. Stress ejection fraction is 52 %.    Stress Combined Conclusion: The ECG and SPECT imaging portions of the stress study are concordant with no evidence of stress induced myocardial ischemia. Left ventricular perfusion is normal.    Resting ECG: The ECG shows normal sinus rhythm.    Review of Systems   Constitutional:  Positive for fatigue.   HENT: Negative.     Eyes: Negative.    Respiratory:  Positive for shortness of breath.    Cardiovascular: Negative.    Gastrointestinal: Negative.    Endocrine: Negative.    Genitourinary: Negative.    Musculoskeletal: Negative.    Skin: Negative.    Allergic/Immunologic: Negative.    Neurological: Negative.    Hematological: Negative.    Psychiatric/Behavioral: Negative.       ROS as noted above, otherwise 12 point review of systems was performed and is negative.     Historical Information   Past Medical History:   Diagnosis Date    A-fib ([facility]) 08/24/2021    Disc degeneration, lumbosacral 1/6/2016     Hyperlipidemia     hypercholesterolemia    Hypertension     last assessed 11/22/17    Pneumonia of both lower lobes due to infectious organism 1/29/2018     Past Surgical History:   Procedure Laterality Date    COLONOSCOPY      FL RETROGRADE PYELOGRAM  11/3/2023    HEMORRHOID SURGERY      KNEE SURGERY Bilateral     CA COLONOSCOPY FLX DX W/COLLJ SPEC WHEN PFRMD N/A 1/5/2019    Procedure: COLONOSCOPY;  Surgeon: Lane Bonner MD;  Location: [facility];  Service: Gastroenterology    CA CYSTO BLADDER W/URETERAL CATHETERIZATION Left 11/3/2023    Procedure: CYSTOSCOPY RETROGRADE PYELOGRAM WITH INSERTION STENT URETERAL, ureteroscopy, holmium laser;  Surgeon: Donald Jenkins MD;  Location: [facility] MAIN OR;  Service: Urology    TRANSURETHRAL RESECTION OF BLADDER TUMOR N/A 11/3/2023    Procedure: TRANSURETHRAL RESECTION OF BLADDER TUMOR (TURBT);  Surgeon: Donald Jenkins MD;  Location: [facility] MAIN OR;  Service: Urology    UPPER GASTROINTESTINAL ENDOSCOPY       Family History:   Family History   Problem Relation Age of Onset    Obesity Mother     Aneurysm Father        Social History   Social History     Substance and Sexual Activity   Alcohol Use Yes    Comment: Occassional 1 x / month     Social History     Substance and Sexual Activity   Drug Use No     Social History     Tobacco Use   Smoking Status Every Day    Current packs/day: 0.50    Average packs/day: 0.5 packs/day for 40.0 years (20.0 ttl pk-yrs)    Types: Cigarettes   Smokeless Tobacco Never       Meds/Allergies   all medications and allergies reviewed  Home Medications:   Medications Prior to Admission   Medication    amLODIPine (NORVASC) 5 mg tablet    apixaban (Eliquis) 5 mg    carvedilol (COREG) 25 mg tablet    Multiple Vitamins-Minerals (MENS MULTIVITAMIN PLUS PO)    simvastatin (ZOCOR) 40 mg tablet    acetaminophen (TYLENOL) 325 mg tablet    ascorbic acid (VITAMIN C) 500 mg tablet    Coenzyme Q10 (COQ-10) 10 MG CAPS    Omega-3 Fatty Acids (FISH OIL) 1200 MG  "CAPS    tadalafil (CIALIS) 10 MG tablet    Zinc 50 MG CAPS       No Known Allergies    Objective   Vitals: Blood pressure 125/69, pulse (!) 110, temperature 97.8 °F (36.6 °C), temperature source Oral, resp. rate 16, height 6' 2\" (1.88 m), weight 127 kg (280 lb), SpO2 95%.  Orthostatic Blood Pressures      Flowsheet Row Most Recent Value   Blood Pressure 125/69 filed at 05/08/2024 1445   Patient Position - Orthostatic VS Lying filed at 05/08/2024 1157              Intake/Output Summary (Last 24 hours) at 5/8/2024 1512  Last data filed at 5/8/2024 1452  Gross per 24 hour   Intake 50 ml   Output --   Net 50 ml       Invasive Devices       Peripheral Intravenous Line  Duration             Peripheral IV 05/08/24 Distal;Dorsal (posterior);Left Forearm <1 day    Peripheral IV 05/08/24 Distal;Dorsal (posterior);Right Forearm <1 day              Airway  Duration             ETT  Oral;Cuffed 8 mm <1 day                    Physical Exam   GEN: NAD, alert and oriented, well appearing  SKIN: dry without significant lesions or rashes  HEENT: NCAT  NECK: No JVD or carotid bruits appreciated  CARDIOVASCULAR: irregularly irregular, normal S1, S2 without murmurs, rubs, or gallops appreciated  LUNGS: Clear to auscultation bilaterally without wheezes, rhonchi, or rales  ABDOMEN: Soft, nontender, nondistended  EXTREMITIES/VASCULAR: perfused without clubbing, cyanosis, or edema b/l  PSYCH: Normal mood and affect  NEURO: CN ll-Xll grossly intact    Lab Results: I have personally reviewed pertinent lab results.    Results from last 7 days   Lab Units 05/08/24  1138   WBC Thousand/uL 8.44   HEMOGLOBIN g/dL 14.4   HEMATOCRIT % 43.0   PLATELETS Thousands/uL 258     Results from last 7 days   Lab Units 05/08/24  1138   POTASSIUM mmol/L 3.8   CHLORIDE mmol/L 107   CO2 mmol/L 23   BUN mg/dL 14   CREATININE mg/dL 1.13   CALCIUM mg/dL 9.4     Results from last 7 days   Lab Units 05/08/24  1138   INR  1.31*             Imaging: I have personally " reviewed pertinent reports.    Results for orders placed during the hospital encounter of 21    Echo complete with contrast if indicated    Narrative  [place] [phone]    Transthoracic Echocardiogram  2D, M-mode, Doppler, and Color Doppler    Study date:  23-Aug-2021    Patient: DEONDRE REYES  MR number: [id number]  Account number: [id number]  : 1960  Age: 61 years  Gender: Male  Status: Outpatient  Location: Bedside  Height: 73 in  Weight: 274.3 lb  BP: 114/ 75 mmHg    Indications: Atrial Fibrillation    Diagnoses: I48.0 - Atrial fibrillation    Sonographer:  NII Sierra  Primary Physician:  Linh Erickson MD  Referring Physician:  Luis Eduardo Bosch MD  Group:  [facility]  Interpreting Physician:  Aroldo Greenwood MD    SUMMARY    LEFT VENTRICLE:  Size was normal.  Systolic function was normal. Ejection fraction was estimated to be 60 %.  There was mild concentric hypertrophy.    RIGHT VENTRICLE:  The size was normal.  Systolic function was normal.    LEFT ATRIUM:  The atrium was mildly dilated.    RIGHT ATRIUM:  The atrium was mildly dilated.    MITRAL VALVE:  There was trace regurgitation.    TRICUSPID VALVE:  There was trace regurgitation.    HISTORY: PRIOR HISTORY: Hypertension,HLD,Tobacco Abuse    PROCEDURE: The procedure was performed at the bedside. This was a routine study. The transthoracic approach was used. The study included complete 2D imaging, M-mode, complete spectral Doppler, and color Doppler. The heart rate was 98 bpm,  at the start of the study. Images were obtained from the parasternal, apical, subcostal, and suprasternal notch acoustic windows. Image quality was adequate.    LEFT VENTRICLE: Size was normal. Systolic function was normal. Ejection fraction was estimated to be 60 %. There were no regional wall motion abnormalities. Wall thickness was mildly  increased. There was mild concentric hypertrophy.  DOPPLER: Transmitral flow pattern: atrial fibrillation. The study was not technically sufficient to allow evaluation of LV diastolic function.    RIGHT VENTRICLE: The size was normal. Systolic function was normal. Wall thickness was normal.    LEFT ATRIUM: The atrium was mildly dilated.    RIGHT ATRIUM: The atrium was mildly dilated.    MITRAL VALVE: Valve structure was normal. There was normal leaflet separation. DOPPLER: The transmitral velocity was within the normal range. There was no evidence for stenosis. There was trace regurgitation.    AORTIC VALVE: The valve was trileaflet. Leaflets exhibited normal thickness and normal cuspal separation. DOPPLER: Transaortic velocity was within the normal range. There was no evidence for stenosis. There was no regurgitation.    TRICUSPID VALVE: The valve structure was normal. There was normal leaflet separation. DOPPLER: The transtricuspid velocity was within the normal range. There was no evidence for stenosis. There was trace regurgitation. The tricuspid jet  envelope definition was inadequate for estimation of RV systolic pressure. There are no indirect findings suggestive of moderate or severe pulmonary hypertension.    PULMONIC VALVE: Leaflets exhibited normal thickness, no calcification, and normal cuspal separation. DOPPLER: The transpulmonic velocity was within the normal range. There was no regurgitation.    PERICARDIUM: There was no pericardial effusion. The pericardium was normal in appearance.    AORTA: The root exhibited normal size.    SYSTEMIC VEINS: IVC: The inferior vena cava was normal in size and course. Respirophasic changes were normal.    SYSTEM MEASUREMENT TABLES    2D  %FS: 24.99 %  Ao Diam: 2.93 cm  Ao asc: 2.84 cm  EDV(Teich): 95.3 ml  EF(Teich): 49.54 %  ESV(Teich): 48.09 ml  IVSd: 1.22 cm  LA Area: 29.05 cm2  LA Diam: 4.4 cm  LVEDV MOD A4C: 125.61 ml  LVEF MOD A4C: 64.28 %  LVESV MOD A4C: 44.86  ml  LVIDd: 4.56 cm  LVIDs: 3.42 cm  LVLd A4C: 8.85 cm  LVLs A4C: 7.39 cm  LVPWd: 1.21 cm  RA Area: 21.84 cm2  RVIDd: 3.34 cm  SV MOD A4C: 80.74 ml  SV(Teich): 47.21 ml    CW  TR Vmax: 2.13 m/s  TR maxP.21 mmHg    MM  TAPSE: 2.05 cm    [facility]    Prepared and electronically signed by    Arlodo Greenwood MD  Signed 23-Aug-2021 11:29:29      Code Status: Level 1 - Full Code

## 2024-05-08 NOTE — Clinical Note
EP catheter inserted at the coronary sinus. Where skin visible -- no rashes, no atypical discoloration, no jaundice present , normal turgor

## 2024-05-08 NOTE — ANESTHESIA POSTPROCEDURE EVALUATION
Post-Op Assessment Note    CV Status:  Stable  Pain Score: 0    Pain management: adequate    Multimodal analgesia used between 6 hours prior to anesthesia start to PACU discharge    Mental Status:  Alert   Hydration Status:  Stable   PONV Controlled:  None   Airway Patency:  Patent  Two or more mitigation strategies used for obstructive sleep apnea   Post Op Vitals Reviewed: Yes    No anethesia notable event occurred.    Staff: OLIVIA               /65 (05/08/24 1704)    Temp 97.6 °F (36.4 °C) (05/08/24 1704)    Pulse 70 (05/08/24 1704)   Resp 16 (05/08/24 1704)    SpO2 95 % (05/08/24 1704)

## 2024-05-08 NOTE — DISCHARGE SUMMARY
Discharge Summary - Daniel Gardiner 64 y.o. male MRN: [id number]    Unit/Bed#: CW2 216-01 Encounter: [id number]      Admission Date: 5/8/2024   Discharge Date: 5/9/2024    Discharge Diagnosis:   Persistent Afib/atrial flutter s/p Afib ablation (PFA PVI, RF CTI) 5/8/2024 by Dr. Kay  -- previously had PAF diagnosed in 2021  -- maintained on flecainide and carvedilol and did well for several years  -- flecainide increased to 100 mg bid recently, now with QRS widening and discontinued  -- now newly persistent Afib, recent hospitalization in April  -- [id number] = 1 (HTN), started on Eliquis  -- presents in typical atrial flutter today  HTN  HLD  Bladder tumor    Procedures Performed:     Successful Afib ablation 5/8/2024 by Dr. Kay  1. Atrial fibrillation ablation using Pulsed Field ablation  2. Cavotricuspid Isthmus Right atrial flutter ablation  3. 3-D map with NAVX   4. Intracardiac Echocardiography ICE.  5. Left atrial pacing and recording.     Orders Placed This Encounter   Procedures    Cardiac ep lab eps/ablations     Consultants: none    HPI:  Daniel Gardiner is a 64 y.o. male with a history of persistent Afib, atrial flutter, Lyme disease, HTN, HLD, bladder tumor, and tobacco use who presented to [facility] for Afib ablation.     He was previously maintained on flecainide and carvedilol for PAF. He was recently hospitalized with newly persistent Afib. Flecainide was discontinued due to QRS widening. He was rate controlled and Afib ablation was recommended as an outpatient.       Hospital Course: Daniel Gardiner presented in typical atrial flutter. He underwent successful Afib ablation on 5/8/2024 by Dr. Kay. The procedure consisted of pulse field PVI and RF CTI ablation. Post procedure, he was continued on Eliquis and carvedilol. He was monitored overnight for observation.     He remained in sinus rhythm overnight with no episodes of recurrent A-fib.  The following morning, groin  "sites were stable no hematoma.  Groin had been closed with Perclose and Vascade.  He was feeling well with no chest pain, shortness of breath, palpitations, lightheadedness, or dizziness.  Labs were stable.    Discharge instructions were reviewed with the patient in detail.  He will not lift more than 10 pounds for 1 week.  He will have a CMP in 1 week following pulsed field ablation.  He will follow-up with EP in 4 to 6 weeks.  He was stable for discharge home on 5/9/2024.    Discharge Instructions:  Continue Eliquis 5 mg bid  Continue carvedilol  No PPI needed  CMP in 1 week (s/p PFA)  F/U with EP in 4-6 weeks    Physical exam:  /66   Pulse 75   Temp 98.5 °F (36.9 °C)   Resp 16   Ht 6' 2\" (1.88 m)   Wt 127 kg (280 lb)   SpO2 93%   BMI 35.95 kg/m²   GEN: NAD, alert and oriented x 3, well appearing  SKIN: dry without significant lesions or rashes  HEENT: NCAT  NECK: No JVD appreciated  CARDIOVASCULAR: RRR, normal S1, S2 without murmurs, rubs, or gallops appreciated  LUNGS: Clear to auscultation bilaterally without wheezes, rhonchi, or rales  ABDOMEN: Soft, nontender, nondistended. Groins soft bilaterally. No bleeding, bruits or hematoma.   EXTREMITIES/VASCULAR: perfused without clubbing, cyanosis, or LE edema b/l  PSYCH: Normal mood and affect  NEURO: CN ll-Xll grossly intact    Discharge Medications:  See after visit summary for reconciled discharge medications provided to patient and family.    Medications Prior to Admission   Medication    amLODIPine (NORVASC) 5 mg tablet    apixaban (Eliquis) 5 mg    carvedilol (COREG) 25 mg tablet    Multiple Vitamins-Minerals (MENS MULTIVITAMIN PLUS PO)    simvastatin (ZOCOR) 40 mg tablet    acetaminophen (TYLENOL) 325 mg tablet    ascorbic acid (VITAMIN C) 500 mg tablet    Coenzyme Q10 (COQ-10) 10 MG CAPS    Omega-3 Fatty Acids (FISH OIL) 1200 MG CAPS    tadalafil (CIALIS) 10 MG tablet    Zinc 50 MG CAPS       Pertininet Labs/diagnostics:  CBC with diff: "   Results from last 7 days   Lab Units 05/09/24  0442 05/08/24  1138   WBC Thousand/uL 10.29* 8.44   HEMOGLOBIN g/dL 12.5 14.4   HEMATOCRIT % 39.0 43.0   MCV fL 96 92   PLATELETS Thousands/uL 243 258   RBC Million/uL 4.05 4.68   MCH pg 30.9 30.8   MCHC g/dL 32.1 33.5   RDW % 13.2 12.6   MPV fL 10.5 9.7   NRBC AUTO /100 WBCs  --  0       BMP:  Results from last 7 days   Lab Units 05/09/24  0442 05/08/24  1138   POTASSIUM mmol/L 4.2 3.8   CHLORIDE mmol/L 104 107   CO2 mmol/L 20* 23   BUN mg/dL 18 14   CREATININE mg/dL 1.12 1.13   CALCIUM mg/dL 8.5 9.4       Magnesium:       Coags:   Results from last 7 days   Lab Units 05/09/24  0442 05/08/24  1138   INR  1.23* 1.31*       Complications: none    Condition at Discharge: good     Discharge instructions/Information to patient and family:   See after visit summary for information provided to patient and family.      Provisions for Follow-Up Care:  See after visit summary for information related to follow-up care and any pertinent home health orders.      Disposition: Home    Planned Readmission: No    Discharge Statement   I spent 45 minutes minutes discharging the patient. This time was spent on the day of discharge. I had direct contact with the patient on the day of discharge. Additional documentation is required if more than 30 minutes were spent on discharge. Evaluating the incision, discussing discharge instructions and restrictions, arranging follow up appointments, discussing medications.

## 2024-05-09 VITALS
OXYGEN SATURATION: 93 % | SYSTOLIC BLOOD PRESSURE: 128 MMHG | HEART RATE: 75 BPM | RESPIRATION RATE: 16 BRPM | DIASTOLIC BLOOD PRESSURE: 66 MMHG | WEIGHT: 280 LBS | BODY MASS INDEX: 35.94 KG/M2 | HEIGHT: 74 IN | TEMPERATURE: 98.5 F

## 2024-05-09 PROBLEM — Z86.79 S/P ABLATION OF ATRIAL FIBRILLATION: Status: ACTIVE | Noted: 2024-05-09

## 2024-05-09 PROBLEM — Z98.890 S/P ABLATION OF ATRIAL FIBRILLATION: Status: ACTIVE | Noted: 2024-05-09

## 2024-05-09 LAB
ANION GAP SERPL CALCULATED.3IONS-SCNC: 14 MMOL/L (ref 4–13)
ATRIAL RATE: 86 BPM
BUN SERPL-MCNC: 18 MG/DL (ref 5–25)
CALCIUM SERPL-MCNC: 8.5 MG/DL (ref 8.4–10.2)
CHLORIDE SERPL-SCNC: 104 MMOL/L (ref 96–108)
CO2 SERPL-SCNC: 20 MMOL/L (ref 21–32)
CREAT SERPL-MCNC: 1.12 MG/DL (ref 0.6–1.3)
ERYTHROCYTE [DISTWIDTH] IN BLOOD BY AUTOMATED COUNT: 13.2 % (ref 11.6–15.1)
GFR SERPL CREATININE-BSD FRML MDRD: 69 ML/MIN/1.73SQ M
GLUCOSE SERPL-MCNC: 182 MG/DL (ref 65–140)
HCT VFR BLD AUTO: 39 % (ref 36.5–49.3)
HGB BLD-MCNC: 12.5 G/DL (ref 12–17)
INR PPP: 1.23 (ref 0.84–1.19)
LDH SERPL-CCNC: 203 U/L (ref 140–271)
MCH RBC QN AUTO: 30.9 PG (ref 26.8–34.3)
MCHC RBC AUTO-ENTMCNC: 32.1 G/DL (ref 31.4–37.4)
MCV RBC AUTO: 96 FL (ref 82–98)
P AXIS: 65 DEGREES
PLATELET # BLD AUTO: 243 THOUSANDS/UL (ref 149–390)
PMV BLD AUTO: 10.5 FL (ref 8.9–12.7)
POTASSIUM SERPL-SCNC: 4.2 MMOL/L (ref 3.5–5.3)
PR INTERVAL: 210 MS
PROTHROMBIN TIME: 15.4 SECONDS (ref 11.6–14.5)
QRS AXIS: -71 DEGREES
QRSD INTERVAL: 148 MS
QT INTERVAL: 440 MS
QTC INTERVAL: 526 MS
RBC # BLD AUTO: 4.05 MILLION/UL (ref 3.88–5.62)
SODIUM SERPL-SCNC: 138 MMOL/L (ref 135–147)
T WAVE AXIS: -30 DEGREES
VENTRICULAR RATE: 86 BPM
WBC # BLD AUTO: 10.29 THOUSAND/UL (ref 4.31–10.16)

## 2024-05-09 PROCEDURE — 80048 BASIC METABOLIC PNL TOTAL CA: CPT | Performed by: PHYSICIAN ASSISTANT

## 2024-05-09 PROCEDURE — 85027 COMPLETE CBC AUTOMATED: CPT | Performed by: PHYSICIAN ASSISTANT

## 2024-05-09 PROCEDURE — NC001 PR NO CHARGE: Performed by: PHYSICIAN ASSISTANT

## 2024-05-09 PROCEDURE — 85610 PROTHROMBIN TIME: CPT | Performed by: PHYSICIAN ASSISTANT

## 2024-05-09 PROCEDURE — 83010 ASSAY OF HAPTOGLOBIN QUANT: CPT | Performed by: PHYSICIAN ASSISTANT

## 2024-05-09 PROCEDURE — 83615 LACTATE (LD) (LDH) ENZYME: CPT | Performed by: PHYSICIAN ASSISTANT

## 2024-05-09 PROCEDURE — 93010 ELECTROCARDIOGRAM REPORT: CPT | Performed by: INTERNAL MEDICINE

## 2024-05-09 RX ADMIN — APIXABAN 5 MG: 5 TABLET, FILM COATED ORAL at 08:42

## 2024-05-09 RX ADMIN — CARVEDILOL 25 MG: 25 TABLET, FILM COATED ORAL at 08:42

## 2024-05-10 LAB — HAPTOGLOB SERPL-MCNC: 103 MG/DL (ref 32–363)

## 2024-05-17 DIAGNOSIS — I48.0 PAROXYSMAL A-FIB (HCC): ICD-10-CM

## 2024-05-23 LAB
ALBUMIN SERPL-MCNC: 4.4 G/DL (ref 3.5–5.7)
ALP SERPL-CCNC: 74 U/L (ref 35–120)
ALT SERPL-CCNC: 24 U/L
ANION GAP SERPL CALCULATED.3IONS-SCNC: 11 MMOL/L (ref 3–11)
AST SERPL-CCNC: 17 U/L
BASOPHILS # BLD AUTO: 0.1 THOU/CMM (ref 0–0.1)
BASOPHILS NFR BLD AUTO: 1 %
BILIRUB SERPL-MCNC: 0.5 MG/DL (ref 0.2–1)
BUN SERPL-MCNC: 17 MG/DL (ref 7–28)
CALCIUM SERPL-MCNC: 9.5 MG/DL (ref 8.5–10.1)
CHLORIDE SERPL-SCNC: 105 MMOL/L (ref 100–109)
CHOLEST SERPL-MCNC: 136 MG/DL
CHOLEST/HDLC SERPL: 4.1 {RATIO}
CO2 SERPL-SCNC: 25 MMOL/L (ref 21–31)
CREAT SERPL-MCNC: 1.13 MG/DL (ref 0.53–1.3)
CYTOLOGY CMNT CVX/VAG CYTO-IMP: ABNORMAL
DIFFERENTIAL METHOD BLD: NORMAL
EOSINOPHIL # BLD AUTO: 0.2 THOU/CMM (ref 0–0.5)
EOSINOPHIL NFR BLD AUTO: 3 %
ERYTHROCYTE [DISTWIDTH] IN BLOOD BY AUTOMATED COUNT: 13.7 % (ref 12–16)
GFR/BSA.PRED SERPLBLD CYS-BASED-ARV: 72 ML/MIN/{1.73_M2}
GLUCOSE SERPL-MCNC: 107 MG/DL (ref 65–99)
HCT VFR BLD AUTO: 41 % (ref 37–48)
HDLC SERPL-MCNC: 33 MG/DL (ref 23–92)
HGB BLD-MCNC: 14 G/DL (ref 12.5–17)
LDLC SERPL CALC-MCNC: 54 MG/DL
LYMPHOCYTES # BLD AUTO: 2.5 THOU/CMM (ref 1–3)
LYMPHOCYTES NFR BLD AUTO: 34 %
MCH RBC QN AUTO: 31.5 PG (ref 27–36)
MCHC RBC AUTO-ENTMCNC: 34.1 G/DL (ref 32–37)
MCV RBC AUTO: 92 FL (ref 80–100)
MONOCYTES # BLD AUTO: 0.6 THOU/CMM (ref 0.3–1)
MONOCYTES NFR BLD AUTO: 7 %
NEUTROPHILS # BLD AUTO: 4.2 THOU/CMM (ref 1.8–7.8)
NEUTROPHILS NFR BLD AUTO: 55 %
NONHDLC SERPL-MCNC: 103 MG/DL
PLATELET # BLD AUTO: 255 THOU/CMM (ref 140–350)
PMV BLD REES-ECKER: 8.3 FL (ref 7.5–11.3)
POTASSIUM SERPL-SCNC: 4.1 MMOL/L (ref 3.5–5.2)
PROT SERPL-MCNC: 7.2 G/DL (ref 6.3–8.3)
PSA SERPL-MCNC: 0.67 NG/ML
RBC # BLD AUTO: 4.44 MILL/CMM (ref 4–5.4)
SODIUM SERPL-SCNC: 141 MMOL/L (ref 135–145)
TRIGL SERPL-MCNC: 243 MG/DL
TSH SERPL-ACNC: 2.35 UIU/ML (ref 0.45–5.33)
WBC # BLD AUTO: 7.5 THOU/CMM (ref 4–10.5)

## 2024-05-24 ENCOUNTER — TELEPHONE (OUTPATIENT)
Dept: CARDIOLOGY CLINIC | Facility: CLINIC | Age: 64
End: 2024-05-24

## 2024-05-24 ENCOUNTER — CLINICAL SUPPORT (OUTPATIENT)
Dept: CARDIOLOGY CLINIC | Facility: CLINIC | Age: 64
End: 2024-05-24
Payer: COMMERCIAL

## 2024-05-24 DIAGNOSIS — I48.0 PAROXYSMAL ATRIAL FIBRILLATION (HCC): ICD-10-CM

## 2024-05-24 DIAGNOSIS — I48.0 PAROXYSMAL ATRIAL FIBRILLATION (HCC): Primary | ICD-10-CM

## 2024-05-24 DIAGNOSIS — R00.2 PALPITATIONS: Primary | ICD-10-CM

## 2024-05-24 PROCEDURE — 93246 EXT ECG>7D<15D RECORDING: CPT | Performed by: INTERNAL MEDICINE

## 2024-05-24 PROCEDURE — 93000 ELECTROCARDIOGRAM COMPLETE: CPT

## 2024-05-24 NOTE — TELEPHONE ENCOUNTER
Spoke to pt. Notified of Dr. Kay message --    Subhash Kay MD       Don't think that's serious but can you bring in for nurse visit, EKG, and put on 2 week Zio XT. thanks     Pt understood and added to today's schedule for 2pm.    Zio XT order placed.

## 2024-05-24 NOTE — TELEPHONE ENCOUNTER
Pt called c/o difficulty sleeping since his ablation on 5/8/24. He said when he lies down flat he can feel his heart pounding in his chest and it's very uncomfortable. The last few nights he had to sleep in his recliner. He denies SOB or any other symptoms. He mentioned he's been checking his cardia mobile and he only had 1 episode of afib. Other reading have been in normal sinus rhythm.     Please review.

## 2024-05-24 NOTE — PROGRESS NOTES
Pt presents to the office under the direction of Dr. Kay for an EKG and 2 week Zio XT. Pt had an afib ablation on 05/08/24 and c/o difficulty sleeping from 05/22/24 - 05/23/24 due to heart pounding. Pt denies any other cardiac symptoms. [employer] recorded atrial fibrillation on 05/22/24 and strips have been uploaded to chart.     BP - 114/68  HR- 61    EKG was reviewed by Dr. Kay and appeared normal. Per Dr. Kay, Kardia showed PAC's and not atrial fibrillation.    A 2 week Zio XT was applied and all instructions were provided. Pt verbalized understanding.

## 2024-06-09 ENCOUNTER — APPOINTMENT (EMERGENCY)
Dept: RADIOLOGY | Facility: HOSPITAL | Age: 64
End: 2024-06-09
Payer: COMMERCIAL

## 2024-06-09 ENCOUNTER — HOSPITAL ENCOUNTER (OUTPATIENT)
Facility: HOSPITAL | Age: 64
Setting detail: OBSERVATION
Discharge: HOME | End: 2024-06-11
Attending: EMERGENCY MEDICINE | Admitting: INTERNAL MEDICINE
Payer: COMMERCIAL

## 2024-06-09 DIAGNOSIS — R00.2 PALPITATIONS: Primary | ICD-10-CM

## 2024-06-09 DIAGNOSIS — R55 NEAR SYNCOPE: ICD-10-CM

## 2024-06-09 DIAGNOSIS — R42 LIGHTHEADEDNESS: ICD-10-CM

## 2024-06-09 LAB
2HR DELTA HS TROPONIN: 0 NG/L
ANION GAP SERPL CALCULATED.3IONS-SCNC: 11 MMOL/L (ref 4–13)
ANION GAP SERPL CALCULATED.3IONS-SCNC: 12 MMOL/L (ref 4–13)
BASOPHILS # BLD AUTO: 0.08 THOUSANDS/ÂΜL (ref 0–0.1)
BASOPHILS NFR BLD AUTO: 1 % (ref 0–1)
BUN SERPL-MCNC: 15 MG/DL (ref 5–25)
BUN SERPL-MCNC: 16 MG/DL (ref 5–25)
CALCIUM SERPL-MCNC: 9.1 MG/DL (ref 8.4–10.2)
CALCIUM SERPL-MCNC: 9.3 MG/DL (ref 8.4–10.2)
CARDIAC TROPONIN I PNL SERPL HS: 10 NG/L
CARDIAC TROPONIN I PNL SERPL HS: 10 NG/L
CHLORIDE SERPL-SCNC: 104 MMOL/L (ref 96–108)
CHLORIDE SERPL-SCNC: 105 MMOL/L (ref 96–108)
CO2 SERPL-SCNC: 22 MMOL/L (ref 21–32)
CO2 SERPL-SCNC: 27 MMOL/L (ref 21–32)
CREAT SERPL-MCNC: 0.89 MG/DL (ref 0.6–1.3)
CREAT SERPL-MCNC: 0.93 MG/DL (ref 0.6–1.3)
EOSINOPHIL # BLD AUTO: 0.24 THOUSAND/ÂΜL (ref 0–0.61)
EOSINOPHIL NFR BLD AUTO: 3 % (ref 0–6)
ERYTHROCYTE [DISTWIDTH] IN BLOOD BY AUTOMATED COUNT: 12.7 % (ref 11.6–15.1)
ERYTHROCYTE [DISTWIDTH] IN BLOOD BY AUTOMATED COUNT: 13 % (ref 11.6–15.1)
GFR SERPL CREATININE-BSD FRML MDRD: 86 ML/MIN/1.73SQ M
GFR SERPL CREATININE-BSD FRML MDRD: 90 ML/MIN/1.73SQ M
GLUCOSE SERPL-MCNC: 107 MG/DL (ref 65–140)
GLUCOSE SERPL-MCNC: 121 MG/DL (ref 65–140)
HCT VFR BLD AUTO: 41 % (ref 36.5–49.3)
HCT VFR BLD AUTO: 42.3 % (ref 36.5–49.3)
HGB BLD-MCNC: 13.6 G/DL (ref 12–17)
HGB BLD-MCNC: 13.9 G/DL (ref 12–17)
IMM GRANULOCYTES # BLD AUTO: 0.02 THOUSAND/UL (ref 0–0.2)
IMM GRANULOCYTES NFR BLD AUTO: 0 % (ref 0–2)
LYMPHOCYTES # BLD AUTO: 2.3 THOUSANDS/ÂΜL (ref 0.6–4.47)
LYMPHOCYTES NFR BLD AUTO: 30 % (ref 14–44)
MCH RBC QN AUTO: 31 PG (ref 26.8–34.3)
MCH RBC QN AUTO: 31.2 PG (ref 26.8–34.3)
MCHC RBC AUTO-ENTMCNC: 32.9 G/DL (ref 31.4–37.4)
MCHC RBC AUTO-ENTMCNC: 33.2 G/DL (ref 31.4–37.4)
MCV RBC AUTO: 93 FL (ref 82–98)
MCV RBC AUTO: 95 FL (ref 82–98)
MONOCYTES # BLD AUTO: 0.81 THOUSAND/ÂΜL (ref 0.17–1.22)
MONOCYTES NFR BLD AUTO: 10 % (ref 4–12)
NEUTROPHILS # BLD AUTO: 4.35 THOUSANDS/ÂΜL (ref 1.85–7.62)
NEUTS SEG NFR BLD AUTO: 56 % (ref 43–75)
NRBC BLD AUTO-RTO: 0 /100 WBCS
PLATELET # BLD AUTO: 248 THOUSANDS/UL (ref 149–390)
PLATELET # BLD AUTO: 248 THOUSANDS/UL (ref 149–390)
PMV BLD AUTO: 9.5 FL (ref 8.9–12.7)
PMV BLD AUTO: 9.6 FL (ref 8.9–12.7)
POTASSIUM SERPL-SCNC: 3.5 MMOL/L (ref 3.5–5.3)
POTASSIUM SERPL-SCNC: 3.7 MMOL/L (ref 3.5–5.3)
RBC # BLD AUTO: 4.39 MILLION/UL (ref 3.88–5.62)
RBC # BLD AUTO: 4.45 MILLION/UL (ref 3.88–5.62)
SODIUM SERPL-SCNC: 139 MMOL/L (ref 135–147)
SODIUM SERPL-SCNC: 142 MMOL/L (ref 135–147)
WBC # BLD AUTO: 7.8 THOUSAND/UL (ref 4.31–10.16)
WBC # BLD AUTO: 8.26 THOUSAND/UL (ref 4.31–10.16)

## 2024-06-09 PROCEDURE — 85025 COMPLETE CBC W/AUTO DIFF WBC: CPT

## 2024-06-09 PROCEDURE — 99222 1ST HOSP IP/OBS MODERATE 55: CPT | Performed by: INTERNAL MEDICINE

## 2024-06-09 PROCEDURE — 36415 COLL VENOUS BLD VENIPUNCTURE: CPT

## 2024-06-09 PROCEDURE — 85027 COMPLETE CBC AUTOMATED: CPT | Performed by: INTERNAL MEDICINE

## 2024-06-09 PROCEDURE — 99215 OFFICE O/P EST HI 40 MIN: CPT | Performed by: INTERNAL MEDICINE

## 2024-06-09 PROCEDURE — 80048 BASIC METABOLIC PNL TOTAL CA: CPT | Performed by: INTERNAL MEDICINE

## 2024-06-09 PROCEDURE — 80048 BASIC METABOLIC PNL TOTAL CA: CPT

## 2024-06-09 PROCEDURE — RECHECK: Performed by: INTERNAL MEDICINE

## 2024-06-09 PROCEDURE — 99285 EMERGENCY DEPT VISIT HI MDM: CPT

## 2024-06-09 PROCEDURE — 99285 EMERGENCY DEPT VISIT HI MDM: CPT | Performed by: EMERGENCY MEDICINE

## 2024-06-09 PROCEDURE — 71046 X-RAY EXAM CHEST 2 VIEWS: CPT

## 2024-06-09 PROCEDURE — 93005 ELECTROCARDIOGRAM TRACING: CPT

## 2024-06-09 PROCEDURE — 84484 ASSAY OF TROPONIN QUANT: CPT

## 2024-06-09 RX ORDER — AMLODIPINE BESYLATE 5 MG/1
5 TABLET ORAL DAILY
Status: DISCONTINUED | OUTPATIENT
Start: 2024-06-09 | End: 2024-06-11 | Stop reason: HOSPADM

## 2024-06-09 RX ORDER — NICOTINE 21 MG/24HR
1 PATCH, TRANSDERMAL 24 HOURS TRANSDERMAL DAILY
Status: DISCONTINUED | OUTPATIENT
Start: 2024-06-09 | End: 2024-06-10

## 2024-06-09 RX ORDER — ONDANSETRON 2 MG/ML
4 INJECTION INTRAMUSCULAR; INTRAVENOUS EVERY 6 HOURS PRN
Status: DISCONTINUED | OUTPATIENT
Start: 2024-06-09 | End: 2024-06-11 | Stop reason: HOSPADM

## 2024-06-09 RX ORDER — CALCIUM CARBONATE 500 MG/1
1000 TABLET, CHEWABLE ORAL 2 TIMES DAILY PRN
Status: DISCONTINUED | OUTPATIENT
Start: 2024-06-09 | End: 2024-06-11 | Stop reason: HOSPADM

## 2024-06-09 RX ORDER — CARVEDILOL 25 MG/1
25 TABLET ORAL 2 TIMES DAILY WITH MEALS
Status: DISCONTINUED | OUTPATIENT
Start: 2024-06-09 | End: 2024-06-11 | Stop reason: HOSPADM

## 2024-06-09 RX ORDER — PRAVASTATIN SODIUM 80 MG/1
80 TABLET ORAL
Status: DISCONTINUED | OUTPATIENT
Start: 2024-06-09 | End: 2024-06-11 | Stop reason: HOSPADM

## 2024-06-09 RX ORDER — ASCORBIC ACID 500 MG
500 TABLET ORAL DAILY
Status: DISCONTINUED | OUTPATIENT
Start: 2024-06-09 | End: 2024-06-11 | Stop reason: HOSPADM

## 2024-06-09 RX ORDER — SODIUM CHLORIDE 9 MG/ML
3 INJECTION INTRAVENOUS
Status: DISCONTINUED | OUTPATIENT
Start: 2024-06-09 | End: 2024-06-09

## 2024-06-09 RX ORDER — ACETAMINOPHEN 325 MG/1
650 TABLET ORAL EVERY 6 HOURS PRN
Status: DISCONTINUED | OUTPATIENT
Start: 2024-06-09 | End: 2024-06-11 | Stop reason: HOSPADM

## 2024-06-09 RX ADMIN — Medication 1 TABLET: at 08:58

## 2024-06-09 RX ADMIN — CARVEDILOL 25 MG: 25 TABLET, FILM COATED ORAL at 09:00

## 2024-06-09 RX ADMIN — AMLODIPINE BESYLATE 5 MG: 5 TABLET ORAL at 08:58

## 2024-06-09 RX ADMIN — APIXABAN 5 MG: 5 TABLET, FILM COATED ORAL at 16:16

## 2024-06-09 RX ADMIN — PRAVASTATIN SODIUM 80 MG: 80 TABLET ORAL at 16:15

## 2024-06-09 RX ADMIN — APIXABAN 5 MG: 5 TABLET, FILM COATED ORAL at 08:58

## 2024-06-09 RX ADMIN — CARVEDILOL 25 MG: 25 TABLET, FILM COATED ORAL at 16:15

## 2024-06-09 RX ADMIN — OXYCODONE HYDROCHLORIDE AND ACETAMINOPHEN 500 MG: 500 TABLET ORAL at 08:58

## 2024-06-09 NOTE — ASSESSMENT & PLAN NOTE
S/p A-fib ablation with electrophysiology on 5/8/2024.  Patient had noted recurrent palpitations nightly following this for which Zio patch was initiated, patient returned this 6/7/2024 and is awaiting result  Appreciate electrophysiology evaluation, they will attempt to track down monitor on Monday 6/10 to investigate further   Continue Coreg 25 mg twice daily in the interim and continue anticoagulation with Eliquis

## 2024-06-09 NOTE — ASSESSMENT & PLAN NOTE
S/p A-fib ablation with electrophysiology on 5/8/2024.  Patient had noted recurrent palpitations nightly following this for which Zio patch was initiated, patient returned this 6/7/2024 and is awaiting result  Appreciate electrophysiology evaluation  Continue Coreg 25 mg twice daily in the interim and continue anticoagulation with Eliquis

## 2024-06-09 NOTE — ED PROVIDER NOTES
History  Chief Complaint   Patient presents with    Irregular Heart Beat     Patient states he had ablation for a flutter 5/8/24 and since then has been having irregular heart beat especially at night.  Patient states tonight he felt like he was going to pass out     64-year-old male with past medical history hospitalization for atrial fibrillation/atrial flutter with ablation presents emergency department complaining of palpitations.  Patiently complains of near syncope and weakness.  He states he is concerned about his heart being in an unusual dysrhythmia.        Prior to Admission Medications   Prescriptions Last Dose Informant Patient Reported? Taking?   Coenzyme Q10 (COQ-10) 10 MG CAPS  Self Yes No   Sig: Take by mouth   Multiple Vitamins-Minerals (MENS MULTIVITAMIN PLUS PO)  Self Yes No   Sig: Take by mouth   Omega-3 Fatty Acids (FISH OIL) 1200 MG CAPS  Self Yes No   Sig: Take by mouth   Zinc 50 MG CAPS  Self Yes No   Sig: Take by mouth   amLODIPine (NORVASC) 5 mg tablet  Self No No   Sig: Take 1 tablet (5 mg total) by mouth daily   Patient taking differently: Take 1 mg by mouth daily   apixaban (Eliquis) 5 mg  Self No No   Sig: Take 1 tablet (5 mg total) by mouth 2 (two) times a day   ascorbic acid (VITAMIN C) 500 mg tablet  Self Yes No   Sig: Take 500 mg by mouth daily   carvedilol (COREG) 25 mg tablet  Self No No   Sig: TAKE 1 TABLET TWICE A DAY WITH MEALS   simvastatin (ZOCOR) 40 mg tablet  Self No No   Sig: TAKE 1 TABLET DAILY AT BEDTIME   tadalafil (CIALIS) 10 MG tablet  Self No No   Sig: Take 1 tablet (10 mg total) by mouth daily as needed for erectile dysfunction      Facility-Administered Medications: None       Past Medical History:   Diagnosis Date    A-fib (HCC) 08/24/2021    Disc degeneration, lumbosacral 01/06/2016    Hyperlipidemia     hypercholesterolemia    Hypertension     last assessed 11/22/17    Pneumonia of both lower lobes due to infectious organism 01/29/2018    s/p Afib ablation (PFA  PVI, RF CTI) 5/8/2024 05/09/2024       Past Surgical History:   Procedure Laterality Date    CARDIAC ELECTROPHYSIOLOGY PROCEDURE N/A 5/8/2024    Procedure: Cardiac eps/afib ablation;  Surgeon: Subhash Kay MD;  Location: [facility];  Service: Cardiology    COLONOSCOPY      FL RETROGRADE PYELOGRAM  11/3/2023    HEMORRHOID SURGERY      KNEE SURGERY Bilateral     MO COLONOSCOPY FLX DX W/COLLJ SPEC WHEN PFRMD N/A 1/5/2019    Procedure: COLONOSCOPY;  Surgeon: Lane Bonner MD;  Location: [facility];  Service: Gastroenterology    MO CYSTO BLADDER W/URETERAL CATHETERIZATION Left 11/3/2023    Procedure: CYSTOSCOPY RETROGRADE PYELOGRAM WITH INSERTION STENT URETERAL, ureteroscopy, holmium laser;  Surgeon: Donald Jenkins MD;  Location: [facility];  Service: Urology    TRANSURETHRAL RESECTION OF BLADDER TUMOR N/A 11/3/2023    Procedure: TRANSURETHRAL RESECTION OF BLADDER TUMOR (TURBT);  Surgeon: Donald Jenkins MD;  Location: [facility];  Service: Urology    UPPER GASTROINTESTINAL ENDOSCOPY         Family History   Problem Relation Age of Onset    Obesity Mother     Aneurysm Father      I have reviewed and agree with the history as documented.    E-Cigarette/Vaping    E-Cigarette Use Never User      E-Cigarette/Vaping Substances    Nicotine No     THC No     CBD No     Flavoring No     Other No     Unknown No      Social History     Tobacco Use    Smoking status: Every Day     Current packs/day: 0.50     Average packs/day: 0.5 packs/day for 40.0 years (20.0 ttl pk-yrs)     Types: Cigarettes    Smokeless tobacco: Never   Vaping Use    Vaping status: Never Used   Substance Use Topics    Alcohol use: Yes     Comment: Occassional 1 x / month    Drug use: No        Review of Systems   Respiratory:  Negative for shortness of breath.    Cardiovascular:  Positive for palpitations. Negative for chest pain.   All other systems reviewed and are negative.      Physical Exam  ED Triage Vitals   Temperature Pulse  Respirations Blood Pressure SpO2   06/09/24 0027 06/09/24 0027 06/09/24 0027 06/09/24 0027 06/09/24 0027   98.4 °F (36.9 °C) 68 20 (!) 181/90 95 %      Temp Source Heart Rate Source Patient Position - Orthostatic VS BP Location FiO2 (%)   06/09/24 0027 06/09/24 0027 06/09/24 0245 06/09/24 0245 --   Temporal Monitor Sitting Right arm       Pain Score       06/09/24 0028       No Pain             Orthostatic Vital Signs  Vitals:    06/09/24 1617 06/09/24 2116 06/10/24 0629 06/10/24 0705   BP: 145/85 147/81 147/76 150/78   Pulse: 65 59 56 57   Patient Position - Orthostatic VS: Sitting          Physical Exam  Vitals and nursing note reviewed.   Constitutional:       General: He is not in acute distress.     Appearance: He is well-developed.   HENT:      Head: Normocephalic and atraumatic.   Eyes:      Conjunctiva/sclera: Conjunctivae normal.   Cardiovascular:      Rate and Rhythm: Normal rate and regular rhythm.      Heart sounds: No murmur heard.  Pulmonary:      Effort: Pulmonary effort is normal. No respiratory distress.      Breath sounds: Normal breath sounds.   Abdominal:      Palpations: Abdomen is soft.      Tenderness: There is no abdominal tenderness.   Musculoskeletal:         General: No swelling.      Cervical back: Neck supple.   Skin:     General: Skin is warm and dry.      Capillary Refill: Capillary refill takes less than 2 seconds.   Neurological:      Mental Status: He is alert.   Psychiatric:         Mood and Affect: Mood normal.         ED Medications  Medications   amLODIPine (NORVASC) tablet 5 mg (5 mg Oral Given 6/10/24 0920)   apixaban (ELIQUIS) tablet 5 mg (5 mg Oral Given 6/10/24 0920)   ascorbic acid (VITAMIN C) tablet 500 mg (500 mg Oral Given 6/10/24 0920)   carvedilol (COREG) tablet 25 mg (25 mg Oral Given 6/10/24 0717)   multivitamin-minerals (CENTRUM) tablet 1 tablet (1 tablet Oral Given 6/10/24 0920)   pravastatin (PRAVACHOL) tablet 80 mg (80 mg Oral Given 6/9/24 1615)   ondansetron  (ZOFRAN) injection 4 mg (has no administration in time range)   nicotine (NICODERM CQ) 14 mg/24hr TD 24 hr patch 1 patch (0 patches Transdermal Hold 6/10/24 0920)   acetaminophen (TYLENOL) tablet 650 mg (has no administration in time range)   calcium carbonate (TUMS) chewable tablet 1,000 mg (has no administration in time range)       Diagnostic Studies  Results Reviewed       Procedure Component Value Units Date/Time    Basic metabolic panel [[id number]] Collected: 06/09/24 0443    Lab Status: Final result Specimen: Blood from Arm, Left Updated: 06/09/24 0516     Sodium 139 mmol/L      Potassium 3.7 mmol/L      Chloride 105 mmol/L      CO2 22 mmol/L      ANION GAP 12 mmol/L      BUN 15 mg/dL      Creatinine 0.89 mg/dL      Glucose 107 mg/dL      Calcium 9.1 mg/dL      eGFR 90 ml/min/1.73sq m     Narrative:      [employer] guidelines for Chronic Kidney Disease (CKD):     Stage 1 with normal or high GFR (GFR > 90 mL/min/1.73 square meters)    Stage 2 Mild CKD (GFR = 60-89 mL/min/1.73 square meters)    Stage 3A Moderate CKD (GFR = 45-59 mL/min/1.73 square meters)    Stage 3B Moderate CKD (GFR = 30-44 mL/min/1.73 square meters)    Stage 4 Severe CKD (GFR = 15-29 mL/min/1.73 square meters)    Stage 5 End Stage CKD (GFR <15 mL/min/1.73 square meters)  Note: GFR calculation is accurate only with a steady state creatinine    CBC (With Platelets) [[id number]]  (Normal) Collected: 06/09/24 0443    Lab Status: Final result Specimen: Blood from Arm, Left Updated: 06/09/24 0453     WBC 8.26 Thousand/uL      RBC 4.45 Million/uL      Hemoglobin 13.9 g/dL      Hematocrit 42.3 %      MCV 95 fL      MCH 31.2 pg      MCHC 32.9 g/dL      RDW 13.0 %      Platelets 248 Thousands/uL      MPV 9.6 fL     HS Troponin I 2hr [[id number]]  (Normal) Collected: 06/09/24 0351    Lab Status: Final result Specimen: Blood from Arm, Left Updated: 06/09/24 0434     hs TnI 2hr 10 ng/L      Delta 2hr hsTnI 0 ng/L     HS Troponin 0hr  (reflex protocol) [[id number]]  (Normal) Collected: 06/09/24 0144    Lab Status: Final result Specimen: Blood from Arm, Left Updated: 06/09/24 0212     hs TnI 0hr 10 ng/L     Basic metabolic panel [[id number]] Collected: 06/09/24 0144    Lab Status: Final result Specimen: Blood from Arm, Left Updated: 06/09/24 0211     Sodium 142 mmol/L      Potassium 3.5 mmol/L      Chloride 104 mmol/L      CO2 27 mmol/L      ANION GAP 11 mmol/L      BUN 16 mg/dL      Creatinine 0.93 mg/dL      Glucose 121 mg/dL      Calcium 9.3 mg/dL      eGFR 86 ml/min/1.73sq m     Narrative:      National Kid[employer] Disease Foundation guidelines for Chronic Kidney Disease (CKD):     Stage 1 with normal or high GFR (GFR > 90 mL/min/1.73 square meters)    Stage 2 Mild CKD (GFR = 60-89 mL/min/1.73 square meters)    Stage 3A Moderate CKD (GFR = 45-59 mL/min/1.73 square meters)    Stage 3B Moderate CKD (GFR = 30-44 mL/min/1.73 square meters)    Stage 4 Severe CKD (GFR = 15-29 mL/min/1.73 square meters)    Stage 5 End Stage CKD (GFR <15 mL/min/1.73 square meters)  Note: GFR calculation is accurate only with a steady state creatinine    CBC and differential [[id number]] Collected: 06/09/24 0144    Lab Status: Final result Specimen: Blood from Arm, Left Updated: 06/09/24 0148     WBC 7.80 Thousand/uL      RBC 4.39 Million/uL      Hemoglobin 13.6 g/dL      Hematocrit 41.0 %      MCV 93 fL      MCH 31.0 pg      MCHC 33.2 g/dL      RDW 12.7 %      MPV 9.5 fL      Platelets 248 Thousands/uL      nRBC 0 /100 WBCs      Segmented % 56 %      Immature Grans % 0 %      Lymphocytes % 30 %      Monocytes % 10 %      Eosinophils Relative 3 %      Basophils Relative 1 %      Absolute Neutrophils 4.35 Thousands/µL      Absolute Immature Grans 0.02 Thousand/uL      Absolute Lymphocytes 2.30 Thousands/µL      Absolute Monocytes 0.81 Thousand/µL      Eosinophils Absolute 0.24 Thousand/µL      Basophils Absolute 0.08 Thousands/µL                    X-ray chest 2 views   ED  Interpretation by Roly Franco DO (06/09 0157)   No acute cardiopulmonary abnormality      Final Result by Paulette Salas MD (06/09 0602)      No acute cardiopulmonary disease.               Workstation performed: [id number]               Procedures  ECG 12 Lead Documentation Only    Date/Time: 6/9/2024 1:57 AM    Performed by: Roly Franco DO  Authorized by: Roly Franco DO    Indications / Diagnosis:  Palpitations  Rate:     ECG rate:  66    ECG rate assessment: normal    Rhythm:     Rhythm: sinus rhythm    Ectopy:     Ectopy: none    QRS:     QRS axis:  Normal    QRS intervals:  Normal  Conduction:     Conduction: abnormal      Abnormal conduction: 1st degree, LAFB and bifascicular block    ST segments:     ST segments:  Normal  T waves:     T waves: normal          ED Course             HEART Risk Score      Flowsheet Row Most Recent Value   Heart Score Risk Calculator    History 0 Filed at: 06/09/2024 0100   ECG 1 Filed at: 06/09/2024 0100   Age 1 Filed at: 06/09/2024 0100   Risk Factors 2 Filed at: 06/09/2024 0100   Troponin 0 Filed at: 06/09/2024 0100   HEART Score 4 Filed at: 06/09/2024 0100                                  Medical Decision Making      DDx: Dysrhythmia, metabolic disturbance, electrolyte disturbance, pneumonia, pneumothorax    Plan: Cardiac labs, repeat evaluation    No repeat episodes of near syncope in ED. Will be admitted to medicine service for syncope/near syncope w/u.     Amount and/or Complexity of Data Reviewed  Labs: ordered.  Radiology: ordered and independent interpretation performed.    Risk  Prescription drug management.  Decision regarding hospitalization.          Disposition  Final diagnoses:   Palpitations   Lightheadedness     Time reflects when diagnosis was documented in both MDM as applicable and the Disposition within this note       Time User Action Codes Description Comment    6/9/2024  3:47 AM Barrington Moulton Add [R00.2] Palpitations     6/9/2024   3:47 AM Barrington Moulton Add [R42] Lightheadedness     6/9/2024  3:48 AM Ryder Bustillo Add [R55] Near syncope           ED Disposition       ED Disposition   Admit    Condition   Stable    Date/Time   Sun Jun 9, 2024 0328    Comment   Case was discussed with Dr. Bustillo and the patient's admission status was agreed to be Admission Status: observation status to the service of Dr. Bustillo .               Follow-up Information    None         Current Discharge Medication List        CONTINUE these medications which have NOT CHANGED    Details   amLODIPine (NORVASC) 5 mg tablet Take 1 tablet (5 mg total) by mouth daily  Qty: 30 tablet, Refills: 0    Associated Diagnoses: Primary hypertension      apixaban (Eliquis) 5 mg Take 1 tablet (5 mg total) by mouth 2 (two) times a day  Qty: 180 tablet, Refills: 1    Associated Diagnoses: Paroxysmal A-fib (HCC)      ascorbic acid (VITAMIN C) 500 mg tablet Take 500 mg by mouth daily      carvedilol (COREG) 25 mg tablet TAKE 1 TABLET TWICE A DAY WITH MEALS  Qty: 180 tablet, Refills: 3    Associated Diagnoses: Paroxysmal atrial fibrillation (HCC); Primary hypertension      Coenzyme Q10 (COQ-10) 10 MG CAPS Take by mouth      Multiple Vitamins-Minerals (MENS MULTIVITAMIN PLUS PO) Take by mouth      Omega-3 Fatty Acids (FISH OIL) 1200 MG CAPS Take by mouth      simvastatin (ZOCOR) 40 mg tablet TAKE 1 TABLET DAILY AT BEDTIME  Qty: 90 tablet, Refills: 1    Associated Diagnoses: Mixed hyperlipidemia      tadalafil (CIALIS) 10 MG tablet Take 1 tablet (10 mg total) by mouth daily as needed for erectile dysfunction  Qty: 10 tablet, Refills: 3    Associated Diagnoses: Erectile dysfunction, unspecified erectile dysfunction type      Zinc 50 MG CAPS Take by mouth           No discharge procedures on file.    PDMP Review         Value Time User    PDMP Reviewed  Yes 6/9/2024  3:48 AM Ryder Bustillo DO             ED Provider  Attending physically available and evaluated Daniel Gardiner.  I managed the patient along with the ED Attending.    Electronically Signed by           Roly Franco DO  06/10/24 3917

## 2024-06-09 NOTE — ASSESSMENT & PLAN NOTE
Patient with nightly episodes of palpitations following A-fib ablation on 5/8/2024, reported 2 episodes prior to admission with increased palpitations with sensation of near syncope resolving after approximately 15 seconds each prompting presentation  Labs WNL, EKG normal sinus rhythm with RBBB and LAFB similar to prior, telemetry thus far without events  Unclear etiology for the palpitations, given recent ablation appreciate EP evaluation  Planning to attempt to locate outpatient monitor on 6/10 to see if any cause for palpations  Consideration for loop recorder vs EP study  Continue telemetry

## 2024-06-09 NOTE — CONSULTS
"EPS Consultation/New Patient Evaluation - Daniel Gardiner 64 y.o. male MRN: [id number]           ASSESSMENT:  1. Palpitations  Inpatient consult to Electrophysiology    Inpatient consult to Electrophysiology      2. Lightheadedness        3. Near syncope  Inpatient consult to Electrophysiology    Inpatient consult to Electrophysiology              PLAN:  This patient has bundle branch block left anterior fascicular block he is having palpitations and presyncope.  Concerning for arrhythmia.  Recommend continue telemetry monitoring.  Tomorrow we will try to act down his outpatient monitor to see if this reveals any potential cause for his palpitations.  Consideration for loop recorder versus electrophysiologic study.    We will make further recommendations after we track down his outpatient monitor was handed in 2 days ago              CC/HPI:   This patient underwent an atrial flutter and atrial fibrillation ablation on 5/8/2024.  He has been doing well however he has had some intermittent palpitations that he mostly feels at night.  Yesterday watching TV he had 2 episodes approximately 30 minutes apart where he felt his heart racing and then he felt like he may pass out.  He states very emphatically he felt like the lights were turning dark.  It happened once while he was hospital unfortunately he was not on telemetry he was being wheeled up to the room.    He handed in an ambulatory monitor on 6/7/2024 we are waiting for the results          ROS   Positive for lightheadedness and presyncope negative for chest pain or shortness of breath.  All other 12 point review of systems negative for complaints    Objective:     Vitals: Blood pressure 145/83, pulse 62, temperature 97.9 °F (36.6 °C), temperature source Oral, resp. rate 17, height 6' 2\" (1.88 m), weight 132 kg (290 lb), SpO2 94%., Body mass index is 37.23 kg/m².,   Orthostatic Blood Pressures      Flowsheet Row Most Recent Value   Blood Pressure 145/83 filed " at 06/09/2024 0803   Patient Position - Orthostatic VS Sitting filed at 06/09/2024 7024               Physical Exam:    GEN: Daniel Gardiner appears well, alert and oriented x 3, pleasant and cooperative   HEENT: pupils equal, round, and reactive to light; extraocular muscles intact  NECK: supple, no carotid bruits   HEART: regular rhythm, normal S1 and S2, no murmurs, clicks, gallops or rubs   LUNGS: clear to auscultation bilaterally; no wheezes, rales, or rhonchi   ABDOMEN: normal bowel sounds, soft, no tenderness, no distention  EXTREMITIES: peripheral pulses normal; no clubbing, cyanosis, or edema  NEURO: no focal findings   SKIN: normal without suspicious lesions on exposed skin    Medications:      Current Facility-Administered Medications:     acetaminophen (TYLENOL) tablet 650 mg, 650 mg, Oral, Q6H PRN, Ryder Bustillo DO    amLODIPine (NORVASC) tablet 5 mg, 5 mg, Oral, Daily, Ryder Bustillo DO, 5 mg at 06/09/24 0858    apixaban (ELIQUIS) tablet 5 mg, 5 mg, Oral, BID, Ryder Bustillo DO, 5 mg at 06/09/24 0858    ascorbic acid (VITAMIN C) tablet 500 mg, 500 mg, Oral, Daily, Ryder Bustillo DO, 500 mg at 06/09/24 0858    carvedilol (COREG) tablet 25 mg, 25 mg, Oral, BID With Meals, Ryder Bustillo DO, 25 mg at 06/09/24 0900    multivitamin-minerals (CENTRUM) tablet 1 tablet, 1 tablet, Oral, Daily, Ryder Bustillo DO, 1 tablet at 06/09/24 0858    nicotine (NICODERM CQ) 14 mg/24hr TD 24 hr patch 1 patch, 1 patch, Transdermal, Daily, Ryder Bustillo DO    ondansetron (ZOFRAN) injection 4 mg, 4 mg, Intravenous, Q6H PRN, Ryder Bustillo DO    pravastatin (PRAVACHOL) tablet 80 mg, 80 mg, Oral, Daily With Dinner, Ryder Bustillo DO     Family History   Problem Relation Age of Onset    Obesity Mother     Aneurysm Father      Social History     Socioeconomic History    Marital status: /Civil Union     Spouse name: Not on file    Number of children: 1    Years of education: Not on file    Highest education  level: Not on file   Occupational History    Occupation:    Tobacco Use    Smoking status: Every Day     Current packs/day: 0.50     Average packs/day: 0.5 packs/day for 40.0 years (20.0 ttl pk-yrs)     Types: Cigarettes    Smokeless tobacco: Never   Vaping Use    Vaping status: Never Used   Substance and Sexual Activity    Alcohol use: Yes     Comment: Occassional 1 x / month    Drug use: No    Sexual activity: Yes     Partners: Female     Birth control/protection: None   Other Topics Concern    Not on file   Social History Narrative    Lives with wife    Full time employment    active advance directive    1 child ; son, passed away        Does not consume caffeine     Social Determinants of Health     Financial Resource Strain: Not on file   Food Insecurity: No Food Insecurity (4/28/2024)    Hunger Vital Sign     Worried About Running Out of Food in the Last Year: Never true     Ran Out of Food in the Last Year: Never true   Transportation Needs: No Transportation Needs (4/28/2024)    PRAPARE - Transportation     Lack of Transportation (Medical): No     Lack of Transportation (Non-Medical): No   Physical Activity: Not on file   Stress: Not on file   Social Connections: Not on file   Intimate Partner Violence: Not on file   Housing Stability: Low Risk  (4/28/2024)    Housing Stability Vital Sign     Unable to Pay for Housing in the Last Year: No     Number of Times Moved in the Last Year: 1     Homeless in the Last Year: No     Social History     Tobacco Use   Smoking Status Every Day    Current packs/day: 0.50    Average packs/day: 0.5 packs/day for 40.0 years (20.0 ttl pk-yrs)    Types: Cigarettes   Smokeless Tobacco Never     Social History     Substance and Sexual Activity   Alcohol Use Yes    Comment: Occassional 1 x / month       Labs & Results:  Below is the patient's most recent value for Albumin, ALT, AST, BUN, Calcium, Chloride, Cholesterol, CO2, Creatinine, GFR, Glucose, HDL, Hematocrit,  Hemoglobin, Hemoglobin A1C, LDL, Magnesium, Phosphorus, Platelets, Potassium, PSA, Sodium, Triglycerides, and WBC.   Lab Results   Component Value Date    ALT 24 2024    AST 17 2024    BUN 15 2024    CALCIUM 9.1 2024     2024    CHOL 156 2017    CO2 22 2024    CREATININE 0.89 2024    HDL 33 2024    HCT 42.3 2024    HGB 13.9 2024    MG 1.9 2024    PHOS 4.0 2024     2024    K 3.7 2024    PSA 0.67 2024     2017    TRIG 243 (H) 2024    WBC 8.26 2024     Note: for a comprehensive list of the patient's lab results, access the Results Review activity.            Cardiac testing:   Results for orders placed during the hospital encounter of 21    Echo complete with contrast if indicated    Narrative  [address] [address]  [phone]    Transthoracic Echocardiogram  2D, M-mode, Doppler, and Color Doppler    Study date:  23-Aug-2021    Patient: DEONDRE REYES  MR number: [id number]  Account number: [id number]  : 1960  Age: 61 years  Gender: Male  Status: Outpatient  Location: Bedside  Height: 73 in  Weight: 274.3 lb  BP: 114/ 75 mmHg    Indications: Atrial Fibrillation    Diagnoses: I48.0 - Atrial fibrillation    Sonographer:  NII Sierra  Primary Physician:  Linh Erickson MD  Referring Physician:  Luis Eduardo Bosch MD  Group:  [facility]  Interpreting Physician:  Aroldo Greenwood MD    SUMMARY    LEFT VENTRICLE:  Size was normal.  Systolic function was normal. Ejection fraction was estimated to be 60 %.  There was mild concentric hypertrophy.    RIGHT VENTRICLE:  The size was normal.  Systolic function was normal.    LEFT ATRIUM:  The atrium was mildly dilated.    RIGHT ATRIUM:  The atrium was mildly dilated.    MITRAL VALVE:  There was trace regurgitation.    TRICUSPID  VALVE:  There was trace regurgitation.    HISTORY: PRIOR HISTORY: Hypertension,HLD,Tobacco Abuse    PROCEDURE: The procedure was performed at the bedside. This was a routine study. The transthoracic approach was used. The study included complete 2D imaging, M-mode, complete spectral Doppler, and color Doppler. The heart rate was 98 bpm,  at the start of the study. Images were obtained from the parasternal, apical, subcostal, and suprasternal notch acoustic windows. Image quality was adequate.    LEFT VENTRICLE: Size was normal. Systolic function was normal. Ejection fraction was estimated to be 60 %. There were no regional wall motion abnormalities. Wall thickness was mildly increased. There was mild concentric hypertrophy.  DOPPLER: Transmitral flow pattern: atrial fibrillation. The study was not technically sufficient to allow evaluation of LV diastolic function.    RIGHT VENTRICLE: The size was normal. Systolic function was normal. Wall thickness was normal.    LEFT ATRIUM: The atrium was mildly dilated.    RIGHT ATRIUM: The atrium was mildly dilated.    MITRAL VALVE: Valve structure was normal. There was normal leaflet separation. DOPPLER: The transmitral velocity was within the normal range. There was no evidence for stenosis. There was trace regurgitation.    AORTIC VALVE: The valve was trileaflet. Leaflets exhibited normal thickness and normal cuspal separation. DOPPLER: Transaortic velocity was within the normal range. There was no evidence for stenosis. There was no regurgitation.    TRICUSPID VALVE: The valve structure was normal. There was normal leaflet separation. DOPPLER: The transtricuspid velocity was within the normal range. There was no evidence for stenosis. There was trace regurgitation. The tricuspid jet  envelope definition was inadequate for estimation of RV systolic pressure. There are no indirect findings suggestive of moderate or severe pulmonary hypertension.    PULMONIC VALVE: Leaflets  exhibited normal thickness, no calcification, and normal cuspal separation. DOPPLER: The transpulmonic velocity was within the normal range. There was no regurgitation.    PERICARDIUM: There was no pericardial effusion. The pericardium was normal in appearance.    AORTA: The root exhibited normal size.    SYSTEMIC VEINS: IVC: The inferior vena cava was normal in size and course. Respirophasic changes were normal.    SYSTEM MEASUREMENT TABLES    2D  %FS: 24.99 %  Ao Diam: 2.93 cm  Ao asc: 2.84 cm  EDV(Teich): 95.3 ml  EF(Teich): 49.54 %  ESV(Teich): 48.09 ml  IVSd: 1.22 cm  LA Area: 29.05 cm2  LA Diam: 4.4 cm  LVEDV MOD A4C: 125.61 ml  LVEF MOD A4C: 64.28 %  LVESV MOD A4C: 44.86 ml  LVIDd: 4.56 cm  LVIDs: 3.42 cm  LVLd A4C: 8.85 cm  LVLs A4C: 7.39 cm  LVPWd: 1.21 cm  RA Area: 21.84 cm2  RVIDd: 3.34 cm  SV MOD A4C: 80.74 ml  SV(Teich): 47.21 ml    CW  TR Vmax: 2.13 m/s  TR maxP.21 mmHg    MM  TAPSE: 2.05 cm    [employer] Accredited Echocardiography Laboratory    Prepared and electronically signed by    Aroldo Greenwood MD  Signed 23-Aug-2021 11:29:29    No results found for this or any previous visit.    No results found for this or any previous visit.    No results found for this or any previous visit.

## 2024-06-09 NOTE — ED ATTENDING ATTESTATION
6/9/2024   I, Dominique Lange MD, saw and evaluated the patient. I have discussed the patient with the resident/non-physician practitioner and agree with the resident's/non-physician practitioner's findings, Plan of Care, and MDM as documented in the resident's/non-physician practitioner's note, except where noted. All available labs and Radiology studies were reviewed.  I was present for key portions of any procedure(s) performed by the resident/non-physician practitioner and I was immediately available to provide assistance.       At this point I agree with the current assessment done in the Emergency Department.  I have conducted an independent evaluation of this patient a history and physical is as follows:    Unit/Bed#: ED 16 Encounter: [id number]    Chief Complaint   Patient presents with    Irregular Heart Beat     Patient states he had ablation for a flutter 5/8/24 and since then has been having irregular heart beat especially at night.  Patient states tonight he felt like he was going to pass out     64-year-old male with past medical history of atrial fibrillation/atrial flutter status post ablation on 5/8/2024 with Dr. Kay, on [facility], presenting with intermittent rapid heartbeat as well as sensation of near syncope.  Patient reports that since having his ablation on 5/8, he has had episodes of rapid heartbeat and irregular heartbeat.  Today, he had a brief episode with rapid heartbeat lasting about 15 seconds during which she felt as though he was about to pass out.  No syncope.  No difficulty breathing.  No leg swelling.  No nausea, vomiting, abdominal pain, or diarrhea.  Patient has been taking all medications as prescribed.  He did recently have a Zio patch which he has just sent in yesterday, no results available at this time.  Patient is now slated to see Dr. Kay until later this month.    Physical Exam  ED Triage Vitals   Temperature Pulse Respirations Blood Pressure SpO2   06/09/24 0027  06/09/24 0027 06/09/24 0027 06/09/24 0027 06/09/24 0027   98.4 °F (36.9 °C) 68 20 (!) 181/90 95 %      Temp Source Heart Rate Source Patient Position - Orthostatic VS BP Location FiO2 (%)   06/09/24 0027 06/09/24 0027 -- -- --   Temporal Monitor         Pain Score       06/09/24 0028       No Pain           Vital signs and nursing notes reviewed    CONSTITUTIONAL: male appearing stated age resting in bed, in no acute distress  HEENT: atraumatic, normocephalic. Sclera anicteric, conjunctiva are not injected. Moist oral mucosa  CARDIOVASCULAR/CHEST: RRR, no M/R/G. 2+ radial pulses  PULMONARY: Breathing comfortably on RA. Breath sounds are equal and clear to auscultation  ABDOMEN: non-distended. BS present, normoactive. Non-tender  MSK: moves all extremities, no deformities, no peripheral edema, no calf asymmetry  NEURO: Awake, alert, and oriented x 3. Face symmetric. Moves all extremities spontaneously. No focal neurologic deficits  SKIN: Warm, appears well-perfused  MENTAL STATUS: Normal affect      Labs and Imaging  Labs Reviewed - No data to display    X-ray chest 2 views    (Results Pending)         Procedures  ECG 12 Lead Documentation Only    Date/Time: 6/9/2024 12:40 AM    Performed by: Dominique Lange MD  Authorized by: Dominique Lange MD    Comments:      Normal sinus rhythm, ventricular rate 66, LA interval 218 consistent with first-degree AV block,  consistent with right bundle branch block, QTc 488, left axis deviation, bifascicular block is present, LVH by aVL criteria, no ST/T wave changes to suggest ischemia, no STEMI.  Paired to prior EKG dated 5/8/2024, T wave inversions in anterior precordial leads are resolved and T wave inversions in inferior leads are no longer as evident.          ED Course  Medications - No data to display     64-year-old male presenting with episodes of irregular heartbeat, rapid heartbeat, as well as a 15-second episode of near syncope earlier today.  Vital signs  reviewed, afebrile, hypertensive, within normal limits otherwise.  At present, patient is in normal sinus rhythm as evidenced by EKG as well as on telemetry.  Plan to obtain labs and reassess, given episode of near syncope, plan for admission.

## 2024-06-09 NOTE — ASSESSMENT & PLAN NOTE
Patient with nightly episodes of palpitations following A-fib ablation, reported 2 episodes earlier this evening of increased palpitations with sensation of near syncope resolving after approximately 15 seconds each prompting presentation  Labs thus far without marked abnormality, EKG normal sinus rhythm with RBBB and LAFB similar to prior, telemetry thus far without events  Unclear etiology for the palpitations, given recent ablation appreciate EP evaluation.  Continue to monitor on telemetry

## 2024-06-09 NOTE — H&P
[facility]  H&P  Name: Daniel Gardiner 64 y.o. male I MRN: [id number]  Unit/Bed#: ED 16 I Date of Admission: 6/9/2024   Date of Service: 6/9/2024 I Hospital Day: 0      Assessment & Plan   * Near syncope  Assessment & Plan  Patient with nightly episodes of palpitations following A-fib ablation, reported 2 episodes earlier this evening of increased palpitations with sensation of near syncope resolving after approximately 15 seconds each prompting presentation  Labs thus far without marked abnormality, EKG normal sinus rhythm with RBBB and LAFB similar to prior, telemetry thus far without events  Unclear etiology for the palpitations, given recent ablation appreciate EP evaluation.  Continue to monitor on telemetry    Paroxysmal atrial fibrillation (HCC)  Assessment & Plan  S/p A-fib ablation with electrophysiology on 5/8/2024.  Patient had noted recurrent palpitations nightly following this for which Zio patch was initiated, patient returned this 6/7/2024 and is awaiting result  Appreciate electrophysiology evaluation  Continue Coreg 25 mg twice daily in the interim and continue anticoagulation with Eliquis    Cigarette nicotine dependence without complication  Assessment & Plan  Counseled on need for cessation, provide nicotine patch while admitted    Hypertension  Assessment & Plan  Continue home amlodipine 5 mg daily and Coreg 25 mg twice daily with strict hold parameters and monitor blood pressure per protocol             VTE Prophylaxis: Apixaban (Eliquis)  / sequential compression device   Code Status: Level 1 - Full Code   POLST: POLST form is not discussed and not completed at this time.  Discussion with family: Wife at bedside    Anticipated Length of Stay:  Patient will be admitted on an Observation basis with an anticipated length of stay of  less than 2 midnights.   Justification for Hospital Stay: Please see detailed plans noted above.    Chief Complaint:      Palpitations and near syncope  History of Present Illness:  Daniel Gardiner is a 64 y.o. male who has a past medical history seen for atrial fibrillation s/p ablation with electrophysiology 5/8/2024 anticoagulant Eliquis, hypertension, hyperlipidemia, bladder tumor, nicotine dependence who presents with palpitations and 2 episodes of near syncope.  Patient states that since his ablation he has noted nightly palpitations which would last for several minutes while he was attempting to sleep before spontaneously resolving without igor chest pain or dizziness/lightheadedness.  He had contacted his electrophysiology team about this and recently was placed on a Zio patch, which patient completed monitoring and returned patch 6/7/2024 with plan for outpatient follow-up tentatively 6/24/2024.  The day of presentation however he noted multiple episodes of palpitations during a party with family although denied any alcohol use, and this evening had 2 episodes of palpitations with near syncope each lasting approximately 15 seconds before spontaneously resolving without any syncope or other systemic symptoms which prompted presentation.    Throughout ED evaluation patient had no recurrence of these episodes, with EKG appearing normal sinus rhythm with RBBB and LAFB similar to prior and labs without marked abnormality.  Given degree of symptomatology however he is admitted as observation for further telemetry monitoring and EP evaluation.    Review of Systems:    Constitutional:  Denies fever or chills   Eyes:  Denies change in visual acuity   HENT:  Denies nasal congestion or sore throat   Respiratory:  Denies cough or shortness of breath   Cardiovascular:  Denies chest pain or edema but reported palpitations  GI:  Denies abdominal pain, nausea, vomiting, bloody stools or diarrhea   :  Denies dysuria   Musculoskeletal:  Denies back pain or joint pain   Integument:  Denies rash   Neurologic:  Denies headache, focal  weakness or sensory changes but reported near syncope  Endocrine:  Denies polyuria or polydipsia   Lymphatic:  Denies swollen glands   Psychiatric:  Denies depression or anxiety     Past Medical and Surgical History:   Past Medical History:   Diagnosis Date    A-fib (HCC) 08/24/2021    Disc degeneration, lumbosacral 01/06/2016    Hyperlipidemia     hypercholesterolemia    Hypertension     last assessed 11/22/17    Pneumonia of both lower lobes due to infectious organism 01/29/2018    s/p Afib ablation (PFA PVI, RF CTI) 5/8/2024 05/09/2024     Past Surgical History:   Procedure Laterality Date    CARDIAC ELECTROPHYSIOLOGY PROCEDURE N/A 5/8/2024    Procedure: Cardiac eps/afib ablation;  Surgeon: Subhash Kay MD;  Location: [facility];  Service: Cardiology    COLONOSCOPY      FL RETROGRADE PYELOGRAM  11/3/2023    HEMORRHOID SURGERY      KNEE SURGERY Bilateral     AR COLONOSCOPY FLX DX W/COLLJ SPEC WHEN PFRMD N/A 1/5/2019    Procedure: COLONOSCOPY;  Surgeon: Lane Bonner MD;  Location: [facility] GI [facility];  Service: Gastroenterology    AR CYSTO BLADDER W/URETERAL CATHETERIZATION Left 11/3/2023    Procedure: CYSTOSCOPY RETROGRADE PYELOGRAM WITH INSERTION STENT URETERAL, ureteroscopy, holmium laser;  Surgeon: Donald Jenkins MD;  Location: [facility];  Service: Urology    TRANSURETHRAL RESECTION OF BLADDER TUMOR N/A 11/3/2023    Procedure: TRANSURETHRAL RESECTION OF BLADDER TUMOR (TURBT);  Surgeon: Donald Jenkins MD;  Location: [facility];  Service: Urology    UPPER GASTROINTESTINAL ENDOSCOPY         Meds/Allergies:    Current Facility-Administered Medications:     acetaminophen (TYLENOL) tablet 650 mg, 650 mg, Oral, Q6H PRN, Ryder Bustillo DO    amLODIPine (NORVASC) tablet 5 mg, 5 mg, Oral, Daily, Ryder Bustillo DO    apixaban (ELIQUIS) tablet 5 mg, 5 mg, Oral, BID, Ryder Bustillo DO    ascorbic acid (VITAMIN C) tablet 500 mg, 500 mg, Oral, Daily, Ryder Bustillo DO    carvedilol (COREG) tablet 25 mg,  25 mg, Oral, BID With Meals, Ryder Bustillo DO    multivitamin-minerals (CENTRUM) tablet 1 tablet, 1 tablet, Oral, Daily, Ryder Bustillo DO    nicotine (NICODERM CQ) 14 mg/24hr TD 24 hr patch 1 patch, 1 patch, Transdermal, Daily, Ryder Bustillo DO    ondansetron (ZOFRAN) injection 4 mg, 4 mg, Intravenous, Q6H PRN, Ryder Bustillo DO    pravastatin (PRAVACHOL) tablet 80 mg, 80 mg, Oral, Daily With Dinner, Ryder Bustillo DO    Insert peripheral IV, , , Once **AND** sodium chloride (PF) 0.9 % injection 3 mL, 3 mL, Intravenous, Q1H PRN, Roly Franco DO      Allergies: No Known Allergies  History:  Marital Status: /Civil Union     Substance Use History:   Social History     Substance and Sexual Activity   Alcohol Use Yes    Comment: Occassional 1 x / month     Social History     Tobacco Use   Smoking Status Every Day    Current packs/day: 0.50    Average packs/day: 0.5 packs/day for 40.0 years (20.0 ttl pk-yrs)    Types: Cigarettes   Smokeless Tobacco Never     Social History     Substance and Sexual Activity   Drug Use No       Family History:  Family History   Problem Relation Age of Onset    Obesity Mother     Aneurysm Father        Physical Exam:     Vitals:   Blood Pressure: 130/64 (06/09/24 0245)  Pulse: 58 (06/09/24 0245)  Temperature: 98.4 °F (36.9 °C) (06/09/24 0027)  Temp Source: Temporal (06/09/24 0027)  Respirations: 18 (06/09/24 0245)  SpO2: 95 % (06/09/24 0245)    Constitutional:  Well developed, obese, no acute distress, non-toxic appearance   Eyes:  PERRL, conjunctiva normal   HENT:  Atraumatic, external ears normal, nose normal, oropharynx moist, no pharyngeal exudates. Neck- normal range of motion, no tenderness, supple   Respiratory:  No respiratory distress, normal breath sounds, no rales, no wheezing   Cardiovascular:  Normal rate, normal rhythm, no murmurs, no gallops, no rubs   GI:  Soft, nondistended, normal bowel sounds, nontender, no organomegaly, no mass, no rebound, no guarding    :  No costovertebral angle tenderness   Musculoskeletal:  No edema, no tenderness, no deformities. Back- no tenderness  Integument:  Well hydrated, no rash   Lymphatic:  No lymphadenopathy noted   Neurologic:  Alert &awake, communicative, CN 2-12 normal, normal motor function, normal sensory function, no focal deficits noted   Psychiatric:  Speech and behavior appropriate       Lab Results: I have personally reviewed pertinent reports.      Results from last 7 days   Lab Units 06/09/24  0144   WBC Thousand/uL 7.80   HEMOGLOBIN g/dL 13.6   HEMATOCRIT % 41.0   PLATELETS Thousands/uL 248   SEGS PCT % 56   LYMPHO PCT % 30   MONO PCT % 10   EOS PCT % 3     Results from last 7 days   Lab Units 06/09/24  0144   POTASSIUM mmol/L 3.5   CHLORIDE mmol/L 104   CO2 mmol/L 27   BUN mg/dL 16   CREATININE mg/dL 0.93   CALCIUM mg/dL 9.3           EKG: NSR HR 80, RBBB, LAFB    Imaging: I have personally reviewed pertinent films in PACS    CXR: Personally reviewed, no acute cardiopulmonary disease visualized.  Final radiology read is pending.    ** Please Note: Dragon 360 Dictation voice to text software was used in the creation of this document. **

## 2024-06-09 NOTE — ASSESSMENT & PLAN NOTE
Continue home amlodipine 5 mg daily and Coreg 25 mg twice daily with strict hold parameters and monitor blood pressure per protocol

## 2024-06-09 NOTE — PROGRESS NOTES
[facility]  POST ADMIT CHECK  Name: Daniel Gardiner I  MRN: [id number]  Unit/Bed#: CW2 213-01 I Date of Admission: 6/9/2024   Date of Service: 6/9/2024 I Hospital Day: 0      [facility] Internal Medicine Post Admit Check:     Date of visit: 06/09/24    The patient was admitted earlier to the [facility] Internal Medicine Service.  Please see initial intake history and physical for detailed admission history.  This is a supplemental update following a post admit checkup.    Subjective: Feels fine currently, just tired as he has not slept well. Has not had any chest pain, SOB or palpitations. He is hopeful they will happen while he is here and on telemetry.    Exam:   Gen: awake, alert, oriented x3. On RA  CV: RRR  Lungs: CTAB  Abd: soft  Ext: no edema     Wife updated at bedside.    Assessment and Plan:   Assessment & Plan   * Near syncope  Assessment & Plan  Patient with nightly episodes of palpitations following A-fib ablation on 5/8/2024, reported 2 episodes prior to admission with increased palpitations with sensation of near syncope resolving after approximately 15 seconds each prompting presentation  Labs WNL, EKG normal sinus rhythm with RBBB and LAFB similar to prior, telemetry thus far without events  Unclear etiology for the palpitations, given recent ablation appreciate EP evaluation  Planning to attempt to locate outpatient monitor on 6/10 to see if any cause for palpations  Consideration for loop recorder vs EP study  Continue telemetry     Paroxysmal atrial fibrillation (HCC)  Assessment & Plan  S/p A-fib ablation with electrophysiology on 5/8/2024.  Patient had noted recurrent palpitations nightly following this for which Zio patch was initiated, patient returned this 6/7/2024 and is awaiting result  Appreciate electrophysiology evaluation, they will attempt to track down monitor on Monday 6/10 to investigate further   Continue Coreg 25 mg twice daily in the  interim and continue anticoagulation with Eliquis    Cigarette nicotine dependence without complication  Assessment & Plan  Counseled on need for cessation, provide nicotine patch while admitted    Hypertension  Assessment & Plan  Continue home amlodipine 5 mg daily and Coreg 25 mg twice daily with strict hold parameters and monitor blood pressure per protocol           Peggy Pond PA-C

## 2024-06-10 PROBLEM — I47.29 NSVT (NONSUSTAINED VENTRICULAR TACHYCARDIA) (HCC): Status: ACTIVE | Noted: 2024-06-10

## 2024-06-10 LAB
ATRIAL RATE: 66 BPM
P AXIS: 91 DEGREES
PR INTERVAL: 218 MS
QRS AXIS: -65 DEGREES
QRSD INTERVAL: 144 MS
QT INTERVAL: 466 MS
QTC INTERVAL: 488 MS
T WAVE AXIS: 2 DEGREES
VENTRICULAR RATE: 66 BPM

## 2024-06-10 PROCEDURE — 99231 SBSQ HOSP IP/OBS SF/LOW 25: CPT | Performed by: INTERNAL MEDICINE

## 2024-06-10 PROCEDURE — 93010 ELECTROCARDIOGRAM REPORT: CPT | Performed by: INTERNAL MEDICINE

## 2024-06-10 PROCEDURE — 99214 OFFICE O/P EST MOD 30 MIN: CPT

## 2024-06-10 RX ORDER — POTASSIUM CHLORIDE 20 MEQ/1
40 TABLET, EXTENDED RELEASE ORAL ONCE
Status: COMPLETED | OUTPATIENT
Start: 2024-06-10 | End: 2024-06-10

## 2024-06-10 RX ORDER — MAGNESIUM SULFATE HEPTAHYDRATE 40 MG/ML
2 INJECTION, SOLUTION INTRAVENOUS ONCE
Status: COMPLETED | OUTPATIENT
Start: 2024-06-10 | End: 2024-06-11

## 2024-06-10 RX ADMIN — AMLODIPINE BESYLATE 5 MG: 5 TABLET ORAL at 09:20

## 2024-06-10 RX ADMIN — APIXABAN 5 MG: 5 TABLET, FILM COATED ORAL at 17:09

## 2024-06-10 RX ADMIN — PRAVASTATIN SODIUM 80 MG: 80 TABLET ORAL at 15:37

## 2024-06-10 RX ADMIN — Medication 1 TABLET: at 09:20

## 2024-06-10 RX ADMIN — OXYCODONE HYDROCHLORIDE AND ACETAMINOPHEN 500 MG: 500 TABLET ORAL at 09:20

## 2024-06-10 RX ADMIN — CARVEDILOL 25 MG: 25 TABLET, FILM COATED ORAL at 07:17

## 2024-06-10 RX ADMIN — MAGNESIUM SULFATE HEPTAHYDRATE 2 G: 40 INJECTION, SOLUTION INTRAVENOUS at 15:40

## 2024-06-10 RX ADMIN — POTASSIUM CHLORIDE 40 MEQ: 1500 TABLET, EXTENDED RELEASE ORAL at 15:37

## 2024-06-10 RX ADMIN — CARVEDILOL 25 MG: 25 TABLET, FILM COATED ORAL at 15:37

## 2024-06-10 RX ADMIN — APIXABAN 5 MG: 5 TABLET, FILM COATED ORAL at 09:20

## 2024-06-10 NOTE — ASSESSMENT & PLAN NOTE
S/p A-fib ablation with electrophysiology on 5/8/2024.  Patient had noted recurrent palpitations nightly following this for which Zio patch was initiated, patient returned this 6/7/2024 and is awaiting result  Continue Coreg 25 mg twice daily in the interim and continue anticoagulation with Eliquis

## 2024-06-10 NOTE — PROGRESS NOTES
[facility]  Progress Note  Name: Daniel Gardiner I  MRN: [id number]  Unit/Bed#: CW2 213-01 I Date of Admission: 6/9/2024   Date of Service: 6/10/2024 I Hospital Day: 0    Assessment & Plan   * Near syncope  Assessment & Plan  Patient with nightly episodes of palpitations following A-fib ablation on 5/8/2024, reported 2 episodes prior to admission with increased palpitations with sensation of near syncope resolving after approximately 15 seconds each prompting presentation.  Has RBBB with left anterior fascicular block  Telemetry monitoring today showed 12 beat run of VT during which he had palpitations.  Nuclear stress test on 5/26/23 - no ischemia  Echo on 4/28/24 - EF 58%  He shipped his zio monitor for interrogation  EP input appreciated - likely loop implantation tomorrow  Ct telemetry monitoring    NSVT (nonsustained ventricular tachycardia) (HCC)  Assessment & Plan  As above  Ct Carvedilol 25 mg BID  Keep K > 4 and Mag > 2  Ct telemetry    Paroxysmal atrial fibrillation (HCC)  Assessment & Plan  S/p A-fib ablation with electrophysiology on 5/8/2024.  Patient had noted recurrent palpitations nightly following this for which Zio patch was initiated, patient returned this 6/7/2024 and is awaiting result  Continue Coreg 25 mg twice daily in the interim and continue anticoagulation with Eliquis    Hypertension  Assessment & Plan  Continue home Amlodipine 5 mg daily and Coreg 25 mg twice daily with strict hold parameters and monitor blood pressure per protocol    Cigarette nicotine dependence without complication  Assessment & Plan  Counseled on need for cessation  Refuses nicotine patch    Hyperlipidemia  Assessment & Plan  Ct statin    Bladder tumor  Assessment & Plan  Noted incidentally in November 2023 when he underwent cysoscopy for left UVJ obstructice calculus  S/p TURBT  Pathology - Non-invasive papillary neoplasm, favor urothelial papilloma.   Outpatient urology  follow-up           VTE Pharmacologic Prophylaxis: VTE Score: 3 Moderate Risk (Score 3-4) - Pharmacological DVT Prophylaxis Ordered: apixaban (Eliquis).    Mobility:   Basic Mobility Inpatient Raw Score: 24  JH-HLM Goal: 8: Walk 250 feet or more  JH-HLM Achieved: 8: Walk 250 feet ot more  JH-HLM Goal achieved. Continue to encourage appropriate mobility.    Patient Centered Rounds: Discussed with RN   Discussions with Specialists or Other Care Team Provider: Discussed with EP    Education and Discussions with Family / Patient: Updated  (wife) at bedside.    Total Time Spent on Date of Encounter in care of patient: 20 mins. This time was spent on one or more of the following: performing physical exam; counseling and coordination of care; obtaining or reviewing history; documenting in the medical record; reviewing/ordering tests, medications or procedures; communicating with other healthcare professionals and discussing with patient's family/caregivers.    Current Length of Stay: 0 day(s)  Current Patient Status: Observation   Certification Statement: The patient will continue to require additional inpatient hospital stay due to near syncope, NSVT    Code Status: Level 1 - Full Code    Subjective:   Palpitations this afternoon and noted to have 12 beat run of VT.     Objective:     Vitals:   Temp (24hrs), Av.9 °F (36.6 °C), Min:97.8 °F (36.6 °C), Max:98.1 °F (36.7 °C)    Temp:  [97.8 °F (36.6 °C)-98.1 °F (36.7 °C)] 97.9 °F (36.6 °C)  HR:  [56-59] 59  Resp:  [17-20] 20  BP: (147-152)/(76-81) 152/77  SpO2:  [93 %-95 %] 94 %  Body mass index is 37.23 kg/m².     Physical Exam:   Physical Exam  Vitals reviewed.   HENT:      Head: Normocephalic.      Nose: Nose normal.      Mouth/Throat:      Mouth: Mucous membranes are moist.   Eyes:      Extraocular Movements: Extraocular movements intact.   Cardiovascular:      Rate and Rhythm: Normal rate and regular rhythm.   Pulmonary:      Effort: Pulmonary effort is  normal. No respiratory distress.      Breath sounds: Normal breath sounds. No wheezing.   Abdominal:      General: Bowel sounds are normal. There is no distension.      Palpations: Abdomen is soft.      Tenderness: There is no abdominal tenderness.   Musculoskeletal:         General: No swelling.      Cervical back: Neck supple.   Skin:     General: Skin is warm and dry.   Neurological:      General: No focal deficit present.      Mental Status: He is alert and oriented to person, place, and time.   Psychiatric:         Mood and Affect: Mood normal.         Behavior: Behavior normal.          Additional Data:     Labs:  Results from last 7 days   Lab Units 06/09/24  0443 06/09/24  0144   WBC Thousand/uL 8.26 7.80   HEMOGLOBIN g/dL 13.9 13.6   HEMATOCRIT % 42.3 41.0   PLATELETS Thousands/uL 248 248   SEGS PCT %  --  56   LYMPHO PCT %  --  30   MONO PCT %  --  10   EOS PCT %  --  3     Results from last 7 days   Lab Units 06/09/24  0443   SODIUM mmol/L 139   POTASSIUM mmol/L 3.7   CHLORIDE mmol/L 105   CO2 mmol/L 22   BUN mg/dL 15   CREATININE mg/dL 0.89   ANION GAP mmol/L 12   CALCIUM mg/dL 9.1   GLUCOSE RANDOM mg/dL 107                       Lines/Drains:  Invasive Devices       Peripheral Intravenous Line  Duration             Peripheral IV 06/09/24 Distal;Left;Upper;Ventral (anterior) Antecubital 1 day                  Last 24 Hours Medication List:   Current Facility-Administered Medications   Medication Dose Route Frequency Provider Last Rate    acetaminophen  650 mg Oral Q6H PRN Ryder Bustillo, DO      amLODIPine  5 mg Oral Daily Ryder Bustillo, DO      apixaban  5 mg Oral BID Ryder Bustillo, DO      ascorbic acid  500 mg Oral Daily Ryder Bustillo, DO      calcium carbonate  1,000 mg Oral BID PRN Hortensia Shipley PA-C      carvedilol  25 mg Oral BID With Meals Ryder Bustillo,       multivitamin-minerals  1 tablet Oral Daily Ryder Bustillo, DO      ondansetron  4 mg Intravenous Q6H PRN Ryder Bustillo, DO       pravastatin  80 mg Oral Daily With Dinner yRder Bustillo DO          Today, Patient Was Seen By: Lorrie Avila MD    **Please Note: This note may have been constructed using a voice recognition system.**

## 2024-06-10 NOTE — ASSESSMENT & PLAN NOTE
Patient with nightly episodes of palpitations following A-fib ablation on 5/8/2024, reported 2 episodes prior to admission with increased palpitations with sensation of near syncope resolving after approximately 15 seconds each prompting presentation.  Has RBBB with left anterior fascicular block  Telemetry monitoring today showed 12 beat run of VT during which he had palpitations.  Nuclear stress test on 5/26/23 - no ischemia  Echo on 4/28/24 - EF 58%  He shipped his [employer] monitor for interrogation  EP input appreciated - likely loop implantation tomorrow  Ct telemetry monitoring

## 2024-06-10 NOTE — UTILIZATION REVIEW
Initial Clinical Review    Admission: Date/Time/Statement:   Admission Orders (From admission, onward)       Ordered        06/09/24 0350  Place in Observation  Once            06/09/24 0348  Place in Observation  Once                          Orders Placed This Encounter   Procedures    Place in Observation     Standing Status:   Standing     Number of Occurrences:   1     Order Specific Question:   Level of Care     Answer:   Med Surg [16]    Place in Observation     Standing Status:   Standing     Number of Occurrences:   1     Order Specific Question:   Level of Care     Answer:   Med Surg [16]     Order Specific Question:   Bed request comments     Answer:   Tele     ED Arrival Information       Expected   -    Arrival   6/9/2024 00:24    Acuity   Emergent              Means of arrival   Walk-In    Escorted by   Family Member    Service   Hospitalist    Admission type   Emergency              Arrival complaint   Irregular Heart Rate             Chief Complaint   Patient presents with    Irregular Heart Beat     Patient states he had ablation for a flutter 5/8/24 and since then has been having irregular heart beat especially at night.  Patient states tonight he felt like he was going to pass out       Initial Presentation: 64 y.o. male who has a past medical history seen for atrial fibrillation s/p ablation with electrophysiology 5/8/2024 anticoagulant Eliquis, hypertension, hyperlipidemia, bladder tumor, nicotine dependence who presents with palpitations and 2 episodes of near syncope.  Patient states that since his ablation he has noted nightly palpitations which would last for several minutes while he was attempting to sleep before spontaneously resolving without igor chest pain or dizziness/lightheadedness.  He had contacted his electrophysiology team about this and recently was placed on a Zio patch, which patient completed monitoring and returned patch 6/7/2024 with plan for outpatient follow-up  tentatively 6/24/2024.  The day of presentation however he noted multiple episodes of palpitations during a party with family although denied any alcohol use, and this evening had 2 episodes of palpitations with near syncope each lasting approximately 15 seconds before spontaneously resolving without any syncope or other systemic symptoms which prompted presentation.     Throughout ED evaluation patient had no recurrence of these episodes, with EKG appearing normal sinus rhythm with RBBB and LAFB similar to prior and labs without marked abnormality.  Given degree of symptomatology however he is admitted as observation for further telemetry monitoring and EP evaluation.       ADMIT OBSERVATION STATUS    Anticipated Length of Stay/Certification Statement:      Date:     Day 2:      ED Triage Vitals   Temperature Pulse Respirations Blood Pressure SpO2   06/09/24 0027 06/09/24 0027 06/09/24 0027 06/09/24 0027 06/09/24 0027   98.4 °F (36.9 °C) 68 20 (!) 181/90 95 %      Temp Source Heart Rate Source Patient Position - Orthostatic VS BP Location FiO2 (%)   06/09/24 0027 06/09/24 0027 06/09/24 0245 06/09/24 0245 --   Temporal Monitor Sitting Right arm       Pain Score       06/09/24 0028       No Pain          Wt Readings from Last 1 Encounters:   06/09/24 132 kg (290 lb)     Additional Vital Signs:         Pertinent Labs/Diagnostic Test Results:   X-ray chest 2 views   ED Interpretation by Roly Franco DO (06/09 0157)   No acute cardiopulmonary abnormality      Final Result by Paulette Salas MD (06/09 0602)      No acute cardiopulmonary disease.               Workstation performed: [id number]               Results from last 7 days   Lab Units 06/09/24  0443 06/09/24  0144   WBC Thousand/uL 8.26 7.80   HEMOGLOBIN g/dL 13.9 13.6   HEMATOCRIT % 42.3 41.0   PLATELETS Thousands/uL 248 248   TOTAL NEUT ABS Thousands/µL  --  4.35         Results from last 7 days   Lab Units 06/09/24  0443 06/09/24  0144   SODIUM  "mmol/L 139 142   POTASSIUM mmol/L 3.7 3.5   CHLORIDE mmol/L 105 104   CO2 mmol/L 22 27   ANION GAP mmol/L 12 11   BUN mg/dL 15 16   CREATININE mg/dL 0.89 0.93   EGFR ml/min/1.73sq m 90 86   CALCIUM mg/dL 9.1 9.3             Results from last 7 days   Lab Units 06/09/24  0443 06/09/24  0144   GLUCOSE RANDOM mg/dL 107 121             No results found for: \"BETA-HYDROXYBUTYRATE\"                   Results from last 7 days   Lab Units 06/09/24  0351 06/09/24  0144   HS TNI 0HR ng/L  --  10   HS TNI 2HR ng/L 10  --    HSTNI D2 ng/L 0  --                                                                                                                        ED Treatment:   Medication Administration from 06/09/2024 0024 to 06/09/2024 0452       None          Past Medical History:   Diagnosis Date    A-fib (HCC) 08/24/2021    Disc degeneration, lumbosacral 01/06/2016    Hyperlipidemia     hypercholesterolemia    Hypertension     last assessed 11/22/17    Pneumonia of both lower lobes due to infectious organism 01/29/2018    s/p Afib ablation (PFA PVI, RF CTI) 5/8/2024 05/09/2024     Present on Admission:   Cigarette nicotine dependence without complication   Hypertension   Paroxysmal atrial fibrillation (HCC)   Near syncope      Admitting Diagnosis: Palpitations [R00.2]  Lightheadedness [R42]  Irregular heart beat [I49.9]  Near syncope [R55]  Age/Sex: 64 y.o. male  Admission Orders:  Scheduled Medications:  amLODIPine, 5 mg, Oral, Daily  apixaban, 5 mg, Oral, BID  ascorbic acid, 500 mg, Oral, Daily  carvedilol, 25 mg, Oral, BID With Meals  multivitamin-minerals, 1 tablet, Oral, Daily  nicotine, 1 patch, Transdermal, Daily  pravastatin, 80 mg, Oral, Daily With Dinner      Continuous IV Infusions:     PRN Meds:  acetaminophen, 650 mg, Oral, Q6H PRN  calcium carbonate, 1,000 mg, Oral, BID PRN  ondansetron, 4 mg, Intravenous, Q6H PRN        IP CONSULT TO ELECTROPHYSIOLOGY    Network Utilization Review Department  ATTENTION: Please " call with any questions or concerns to [phone] and carefully listen to the prompts so that you are directed to the right person. All voicemails are confidential.   For Discharge needs, contact Care Management DC Support Team at [phone] opt. [phone]  Send all requests for admission clinical reviews, approved or denied determinations and any other requests to dedicated fax number below belonging to the campus where the patient is receiving treatment. List of dedicated fax numbers for the Facilities:  FACILITY NAME UR FAX NUMBER   ADMISSION DENIALS (Administrative/Medical Necessity) [phone]   DISCHARGE SUPPORT TEAM (NETWORK) [phone]   PARENT CHILD HEALTH (Maternity/NICU/Pediatrics) [phone]   [facility] [phone]   [facility] [phone]   [facility] [phone]   [facility] [phone]   [facility] [phone]   [facility] [phone]   [facility] [phone]   [facility] [phone]   [facility] [phone]   [facility] [phone]   [facility] [phone]   [facility] [phone]

## 2024-06-10 NOTE — QUICK NOTE
Updated by primary team. 12-beat run of VT noted on tele. Patient symptomatic with palpitations during this. He has prior 05/2023 NM stress which was negative for ischemia. EF preserved on latest ECHO. Continue tele monitoring on tele. Possible loop implantation tomorrow.     K noted 3.7. Will provide 40meq PO K and 2g IV mag at this time.

## 2024-06-10 NOTE — ASSESSMENT & PLAN NOTE
Noted incidentally in November 2023 when he underwent cysoscopy for left UVJ obstructice calculus  S/p TURBT  Pathology - Non-invasive papillary neoplasm, favor urothelial papilloma.   Outpatient urology follow-up

## 2024-06-10 NOTE — PROGRESS NOTES
"[facility]  Progress Note: Electrophysiology  Date of Service: 6/10/2024  Hospital Day: 0  Name: Daniel Gardiner I  MRN: [id number]  Unit/Bed#: CW2 213-01    Assessment & Plan   Paroxysmal Atrial Fibrillation s/p ablation (05/8/24) by Dr. Kay  Reports worsening \"pounding\" palpitations and two associated presyncopal episodes after his ablation   Afib hx:  Diagnosed 08/23/21 with ED presentation for afib w/ RVR (patient feeling palpitations); discharged home on Toprol XL 50mg daily  Had poor rate control on toprol XL alone and was initiated on flecainide 50mg BID 08/25/21  Flecainide increased to 100mg BID given ongoing palpitations and runs of SVT noted on 03/2024 zio  QRS widened on increased flecainide dosing (discontinued)  Underwent ablation 05/8/24 by    Recently had zio monitor worn (in process of being shipped to [employer] for interrogation)   Ablation: afib ablation (pulse field PVI and RF CTI) 05/08/24 by Dr. Kay  Cardioversion: None prior   Rate control: carvedilol 25mg BID   Antiarrythmic: previously on flecainide 100mg BID (d/cd 2/2 QRS widening)  AC: [id number] - 1 (HTN) (next year will be 2 given HTN and age); maintained on eliquis 5mg BID  Device: None  ECHO: 04/28/24:  EF 58%, LA size WNL, no significant valvular disease  05/26/2023 NM stress w/o St deviation  TSH and LFT's WNL on 04/27/24 lab work  RBBB  LAFB  HTN  HLD  Tobacco abuse  Bladder tumor    Discussion/Plan:      Patient's zio monitor is en-route to reading facility. Data from this device would be very helpful for informed decision making in regards to this patient's care.    If results will have delayed interpretation may consider discharge home with loop recorder in the future.    Tele shows sinus rhythm without acute arrhythmia during the stay so far    For now continue monitoring on tele while awaiting zio results     Subjective     Patient reports feeling well currently. He " states his palpitations have not been occurring as often as they were at home and that he has been sleeping better in the hospital because of this. He denies repeat presyncopal/syncopal episode while in the hospital thus far.      Objective                            Vitals I/O    Most Recent Min/Max in 24hrs   Temp 98.1 °F (36.7 °C) Temp  Min: 97.8 °F (36.6 °C)  Max: 98.2 °F (36.8 °C)   Pulse 57 Pulse  Min: 56  Max: 65   Resp 17 Resp  Min: 17  Max: 17   /78 BP  Min: 145/85  Max: 150/78   O2 Sat 93 % SpO2  Min: 93 %  Max: 96 %      Intake/Output Summary (Last 24 hours) at 6/10/2024 0827  Last data filed at 6/9/2024 1300  Gross per 24 hour   Intake 400 ml   Output 350 ml   Net 50 ml                Physical Exam   Physical Exam  Constitutional:       General: He is not in acute distress.     Appearance: Normal appearance.   HENT:      Head: Normocephalic and atraumatic.   Cardiovascular:      Rate and Rhythm: Normal rate and regular rhythm.      Pulses: Normal pulses.      Heart sounds: Normal heart sounds.   Pulmonary:      Effort: Pulmonary effort is normal.      Breath sounds: Normal breath sounds.   Abdominal:      General: Abdomen is flat. Bowel sounds are normal.      Palpations: Abdomen is soft.   Musculoskeletal:      Right lower leg: No edema.      Left lower leg: No edema.   Skin:     General: Skin is warm and dry.      Capillary Refill: Capillary refill takes less than 2 seconds.   Neurological:      Mental Status: He is alert and oriented to person, place, and time.          Labs:    CBC  Recent Labs     06/09/24  0144 06/09/24  0443   WBC 7.80 8.26   HGB 13.6 13.9   HCT 41.0 42.3    248     BMP  Recent Labs     06/09/24  0144 06/09/24  0443   SODIUM 142 139   K 3.5 3.7    105   CO2 27 22   AGAP 11 12   BUN 16 15   CREATININE 0.93 0.89   CALCIUM 9.3 9.1       Coags  No recent results     Additional Electrolytes  No recent results       Blood Gas  No recent results  No recent results  LFTs  No recent results    Infectious  No recent results  Glucose  Recent Labs     06/09/24  0144 06/09/24  0443   GLUC 121 107           SIGNATURE: Olivier Cain PA-C

## 2024-06-11 VITALS
OXYGEN SATURATION: 94 % | WEIGHT: 290 LBS | BODY MASS INDEX: 37.22 KG/M2 | HEART RATE: 60 BPM | TEMPERATURE: 98.4 F | DIASTOLIC BLOOD PRESSURE: 71 MMHG | HEIGHT: 74 IN | SYSTOLIC BLOOD PRESSURE: 149 MMHG | RESPIRATION RATE: 20 BRPM

## 2024-06-11 LAB
ANION GAP SERPL CALCULATED.3IONS-SCNC: 8 MMOL/L (ref 4–13)
BUN SERPL-MCNC: 10 MG/DL (ref 5–25)
CALCIUM SERPL-MCNC: 8.4 MG/DL (ref 8.4–10.2)
CHLORIDE SERPL-SCNC: 107 MMOL/L (ref 96–108)
CO2 SERPL-SCNC: 24 MMOL/L (ref 21–32)
CREAT SERPL-MCNC: 0.85 MG/DL (ref 0.6–1.3)
ERYTHROCYTE [DISTWIDTH] IN BLOOD BY AUTOMATED COUNT: 12.4 % (ref 11.6–15.1)
GFR SERPL CREATININE-BSD FRML MDRD: 92 ML/MIN/1.73SQ M
GLUCOSE P FAST SERPL-MCNC: 99 MG/DL (ref 65–99)
GLUCOSE SERPL-MCNC: 99 MG/DL (ref 65–140)
HCT VFR BLD AUTO: 41.6 % (ref 36.5–49.3)
HGB BLD-MCNC: 13.7 G/DL (ref 12–17)
MAGNESIUM SERPL-MCNC: 2.3 MG/DL (ref 1.9–2.7)
MCH RBC QN AUTO: 31.1 PG (ref 26.8–34.3)
MCHC RBC AUTO-ENTMCNC: 32.9 G/DL (ref 31.4–37.4)
MCV RBC AUTO: 95 FL (ref 82–98)
PLATELET # BLD AUTO: 222 THOUSANDS/UL (ref 149–390)
PMV BLD AUTO: 9.6 FL (ref 8.9–12.7)
POTASSIUM SERPL-SCNC: 3.9 MMOL/L (ref 3.5–5.3)
RBC # BLD AUTO: 4.4 MILLION/UL (ref 3.88–5.62)
SODIUM SERPL-SCNC: 139 MMOL/L (ref 135–147)
WBC # BLD AUTO: 7.75 THOUSAND/UL (ref 4.31–10.16)

## 2024-06-11 PROCEDURE — NC001 PR NO CHARGE: Performed by: INTERNAL MEDICINE

## 2024-06-11 PROCEDURE — 33285 INSJ SUBQ CAR RHYTHM MNTR: CPT | Performed by: PHYSICIAN ASSISTANT

## 2024-06-11 PROCEDURE — 99232 SBSQ HOSP IP/OBS MODERATE 35: CPT | Performed by: INTERNAL MEDICINE

## 2024-06-11 PROCEDURE — 83735 ASSAY OF MAGNESIUM: CPT | Performed by: INTERNAL MEDICINE

## 2024-06-11 PROCEDURE — 85027 COMPLETE CBC AUTOMATED: CPT | Performed by: INTERNAL MEDICINE

## 2024-06-11 PROCEDURE — 99239 HOSP IP/OBS DSCHRG MGMT >30: CPT | Performed by: INTERNAL MEDICINE

## 2024-06-11 PROCEDURE — C1764 EVENT RECORDER, CARDIAC: HCPCS | Performed by: INTERNAL MEDICINE

## 2024-06-11 PROCEDURE — 80048 BASIC METABOLIC PNL TOTAL CA: CPT | Performed by: INTERNAL MEDICINE

## 2024-06-11 DEVICE — LOOP RECORDER REVEAL LINQ II SYS DEVICE ONLY: Type: IMPLANTABLE DEVICE | Site: CHEST  WALL | Status: FUNCTIONAL

## 2024-06-11 RX ORDER — LIDOCAINE HYDROCHLORIDE 10 MG/ML
INJECTION, SOLUTION EPIDURAL; INFILTRATION; INTRACAUDAL; PERINEURAL CODE/TRAUMA/SEDATION MEDICATION
Status: DISCONTINUED | OUTPATIENT
Start: 2024-06-11 | End: 2024-06-11 | Stop reason: HOSPADM

## 2024-06-11 RX ORDER — FLECAINIDE ACETATE 50 MG/1
50 TABLET ORAL EVERY 12 HOURS SCHEDULED
Status: DISCONTINUED | OUTPATIENT
Start: 2024-06-11 | End: 2024-06-11 | Stop reason: HOSPADM

## 2024-06-11 RX ORDER — FLECAINIDE ACETATE 50 MG/1
50 TABLET ORAL EVERY 12 HOURS SCHEDULED
Qty: 60 TABLET | Refills: 0 | Status: SHIPPED | OUTPATIENT
Start: 2024-06-11

## 2024-06-11 RX ADMIN — CARVEDILOL 25 MG: 25 TABLET, FILM COATED ORAL at 15:48

## 2024-06-11 RX ADMIN — CARVEDILOL 25 MG: 25 TABLET, FILM COATED ORAL at 08:25

## 2024-06-11 RX ADMIN — APIXABAN 5 MG: 5 TABLET, FILM COATED ORAL at 08:25

## 2024-06-11 RX ADMIN — Medication 1 TABLET: at 08:25

## 2024-06-11 RX ADMIN — PRAVASTATIN SODIUM 80 MG: 80 TABLET ORAL at 15:48

## 2024-06-11 RX ADMIN — OXYCODONE HYDROCHLORIDE AND ACETAMINOPHEN 500 MG: 500 TABLET ORAL at 08:25

## 2024-06-11 RX ADMIN — AMLODIPINE BESYLATE 5 MG: 5 TABLET ORAL at 08:25

## 2024-06-11 NOTE — DISCHARGE INSTR - AVS FIRST PAGE
It is acceptable to shower with the glue in place. The glue will fall off in 1 week on its own, but we ask please do not scrub the area or swim during the next 14 days. Do not use lotions/powders/creams on incision. Remove outer bandage 48 hours after procedure. Please call the office [phone] if you notice redness, swelling, bleeding, or drainage from incision or if you develop fevers.      Cardiac Loop Recorder Insertion      WHAT YOU SHOULD KNOW:    A cardiac loop recorder is a device used to diagnose heart rhythm problems, such as a fast or irregular heartbeat. It is implanted in your left chest, just under the skin. The device records a pattern of your heart's rhythm, called an EKG. Your device records automatic EKGs, depending on how your caregiver programs it. You may also receive a handheld controller. You press a button on the controller when you have symptoms, such as dizziness, lightheadedness, or palpitations. The device will record an EKG at that moment. The recording can help your caregiver see if your symptoms may be caused by heart rhythm problems. Your caregiver will remove the device after it has collected enough data. You may need the device for up to 3 years. The procedure to remove the device is similar to the procedure used to implant it.      AFTER YOU LEAVE:    Follow up with your cardiologist as directed: You will need to return in 1 to 2 weeks. Your cardiologist will check your incision. He may also program your device settings again. He will retrieve data from the device every 1 to 3 months with a monitor held over your skin. You may be able to transmit data from your device from home as well. You will do this by calling a number provided by your cardiologist, or as they have instructed you. Ask for information about this process. Write down your questions so you remember to ask them during your visits.      Wound care: Keep loop recorder incision dry for one week. Do not use  lotions/powders/creams on incision. Remove outer bandage 48 hours after procedure - leave underlying steri-strips in place, they will either fall off on their own or will be removed at 2 week follow up appointment. Please call the office if you notice redness, swelling, bleeding, or drainage from incision or if you develop fevers. After that first week, carefully wash your incision with soap and water. Keep the area clean and dry until it heals.      Return to activity: If you received anesthesia, you will not be able to drive for 24 hours. Otherwise, most people can return to normal activities soon after the procedure. Your cardiologist may want to know if your work involves electrical current or high-voltage equipment. Ask about other electrical items that could interfere with your cardiac loop recorder.      Contact your cardiologist if:   You have a fever or chills.    Your wound is red, swollen, or draining pus.    You have questions or concerns about your condition or care.    Seek care immediately or call 911 if:   You feel weak, dizzy, or faint.    You lose consciousness.      © 2014 [employer]. Information is for End User's use only and may not be sold, redistributed or otherwise used for commercial purposes. All illustrations and images included in CareNotes® are the copyrighted property of [employer] or [employer].    The above information is an  only. It is not intended as medical advice for individual conditions or treatments. Talk to your doctor, nurse or pharmacist before following any medical regimen to see if it is safe and effective for you.

## 2024-06-11 NOTE — ASSESSMENT & PLAN NOTE
Patient with nightly episodes of palpitations following A-fib ablation on 5/8/2024, reported 2 episodes prior to admission with increased palpitations with sensation of near syncope resolving after approximately 15 seconds each prompting presentation.  Has RBBB with left anterior fascicular block  Telemetry monitoring on 6/10 showed 12 beat run of VT during which he had palpitations.  Nuclear stress test on 5/26/23 - no ischemia  Echo on 4/28/24 - EF 58%  Zio interrogation pending   EP input appreciated, possibly loop recorder placement

## 2024-06-11 NOTE — DISCHARGE SUMMARY
[facility]  Discharge- Daniel Gardiner 1960, 64 y.o. male MRN: [id number]  Unit/Bed#: CW2 213-01 Encounter: [id number]  Primary Care Provider: Linh Erickson MD   Date and time admitted to hospital: 6/9/2024 12:33 AM    * Near syncope  Assessment & Plan  Patient with nightly episodes of palpitations following A-fib ablation on 5/8/2024, reported 2 episodes prior to admission with increased palpitations with sensation of near syncope resolving after approximately 15 seconds each prompting presentation.  Has RBBB with left anterior fascicular block  Telemetry monitoring on 6/10 showed 12 beat run of SVT (with wide QRS) during which he had palpitations.  Nuclear stress test on 5/26/23 - no ischemia  Echo on 4/28/24 - EF 58%  Zio interrogation pending   EP input appreciated, recommending loop placement and dc on Flecainide 50 mg BID    NSVT (nonsustained ventricular tachycardia) (HCC)  Assessment & Plan  Initially felt to be on tele on 6/10 however per my dw fellow is SVT with wide QRS  Ct Carvedilol 25 mg BID  Keep K > 4 and Mag > 2  Flecainide     Paroxysmal atrial fibrillation (HCC)  Assessment & Plan  S/p A-fib ablation with electrophysiology on 5/8/2024.  Patient had noted recurrent palpitations nightly following this for which Zio patch was initiated, patient returned this 6/7/2024 and is awaiting result  Continue Coreg 25 mg twice daily in the interim and continue anticoagulation with Eliquis    Bladder tumor  Assessment & Plan  Noted incidentally in November 2023 when he underwent cysoscopy for left UVJ obstructice calculus  S/p TURBT  Pathology - Non-invasive papillary neoplasm, favor urothelial papilloma.   Outpatient urology follow-up    Cigarette nicotine dependence without complication  Assessment & Plan  Counseled on need for cessation  Refuses nicotine patch    Hypertension  Assessment & Plan  Continue home Amlodipine 5 mg daily and Coreg 25 mg twice daily with  strict hold parameters and monitor blood pressure per protocol    Hyperlipidemia  Assessment & Plan  Ct statin      Medical Problems       Resolved Problems  Date Reviewed: 6/11/2024   None       Discharging Physician / Practitioner: Peggy Pond PA-C  PCP: Linh Erickson MD  Admission Date:   Admission Orders (From admission, onward)       Ordered        06/11/24 1548  Place in Observation  Once            06/11/24 1535  INPATIENT ADMISSION  Once            06/09/24 0350  Place in Observation  Once            06/09/24 0348  Place in Observation  Once                          Discharge Date: 06/11/24    Consultations During Hospital Stay:  EP    Procedures Performed:   Loop recorder placement 6/11  CXR 6/9: No acute cardiopulmonary disease    Significant Findings / Test Results:   See above    Incidental Findings:   See above       Test Results Pending at Discharge (will require follow up):   None      Outpatient Tests Requested:  F/u EP  F/u PCP     Complications:  none     Reason for Admission: Palpitations    Hospital Course:   Daniel Gardiner is a 64 y.o. male patient who originally presented to the hospital on 6/9/2024 due to palpitations. Seen by EP in consult. Started on Flecainide 50 mg BID and loop placed.     EP cleared for discharge. Will f/u in outpatient setting.     Please see above list of diagnoses and related plan for additional information.     Condition at Discharge: stable    Discharge Day Visit / Exam:   * Please refer to separate progress note for these details *    Discussion with Family:  family was updated earlier.     Discharge instructions/Information to patient and family:   See after visit summary for information provided to patient and family.      Provisions for Follow-Up Care:  See after visit summary for information related to follow-up care and any pertinent home health orders.      Mobility at time of Discharge:   Basic Mobility Inpatient Raw Score: 24  -[facility] Goal: 8: Walk 250  feet or more  JH-HLM Achieved: 7: Walk 25 feet or more  HLM Goal achieved. Continue to encourage appropriate mobility.     Disposition:   Home    Planned Readmission: no     Discharge Statement:  I spent 34 minutes discharging the patient. This time was spent on the day of discharge. I had direct contact with the patient on the day of discharge. Greater than 50% of the total time was spent examining patient, answering all patient questions, arranging and discussing plan of care with patient as well as directly providing post-discharge instructions.  Additional time then spent on discharge activities.    Discharge Medications:  See after visit summary for reconciled discharge medications provided to patient and/or family.      **Please Note: This note may have been constructed using a voice recognition system**

## 2024-06-11 NOTE — PROGRESS NOTES
[facility]  Progress Note  Name: Daniel Gardiner I  MRN: [id number]  Unit/Bed#: CW2 213-01 I Date of Admission: 6/9/2024   Date of Service: 6/11/2024 I Hospital Day: 0    Assessment & Plan   * Near syncope  Assessment & Plan  Patient with nightly episodes of palpitations following A-fib ablation on 5/8/2024, reported 2 episodes prior to admission with increased palpitations with sensation of near syncope resolving after approximately 15 seconds each prompting presentation.  Has RBBB with left anterior fascicular block  Telemetry monitoring on 6/10 showed 12 beat run of VT during which he had palpitations.  Nuclear stress test on 5/26/23 - no ischemia  Echo on 4/28/24 - EF 58%  Zio interrogation pending   EP input appreciated, possibly loop recorder placement     NSVT (nonsustained ventricular tachycardia) (HCC)  Assessment & Plan  As above  Ct Carvedilol 25 mg BID  Keep K > 4 and Mag > 2  Ct telemetry    Paroxysmal atrial fibrillation (HCC)  Assessment & Plan  S/p A-fib ablation with electrophysiology on 5/8/2024.  Patient had noted recurrent palpitations nightly following this for which Zio patch was initiated, patient returned this 6/7/2024 and is awaiting result  Continue Coreg 25 mg twice daily in the interim and continue anticoagulation with Eliquis    Bladder tumor  Assessment & Plan  Noted incidentally in November 2023 when he underwent cysoscopy for left UVJ obstructice calculus  S/p TURBT  Pathology - Non-invasive papillary neoplasm, favor urothelial papilloma.   Outpatient urology follow-up    Cigarette nicotine dependence without complication  Assessment & Plan  Counseled on need for cessation  Refuses nicotine patch    Hypertension  Assessment & Plan  Continue home Amlodipine 5 mg daily and Coreg 25 mg twice daily with strict hold parameters and monitor blood pressure per protocol    Hyperlipidemia  Assessment & Plan  Ct statin               VTE Pharmacologic  Prophylaxis: VTE Score: 3 Moderate Risk (Score 3-4) - Pharmacological DVT Prophylaxis Ordered: apixaban (Eliquis).    Mobility:   Basic Mobility Inpatient Raw Score: 24  JH-HLM Goal: 8: Walk 250 feet or more  JH-HLM Achieved: 7: Walk 25 feet or more  JH-HLM Goal achieved. Continue to encourage appropriate mobility.    Patient Centered Rounds: I performed bedside rounds with nursing staff today.   Discussions with Specialists or Other Care Team Provider: EP    Education and Discussions with Family / Patient: Updated  (wife) at bedside.    Total Time Spent on Date of Encounter in care of patient: 34 mins. This time was spent on one or more of the following: performing physical exam; counseling and coordination of care; obtaining or reviewing history; documenting in the medical record; reviewing/ordering tests, medications or procedures; communicating with other healthcare professionals and discussing with patient's family/caregivers.    Current Length of Stay: 0 day(s)  Current Patient Status: Observation   Certification Statement: The patient, admitted on an observation basis, will now require > 2 midnight hospital stay due to pendign cards plan   Discharge Plan: Anticipate discharge later today or tomorrow to home.    Code Status: Level 1 - Full Code    Subjective:   Doing okay. Wants to know plan.     Objective:     Vitals:   Temp (24hrs), Av.8 °F (36.6 °C), Min:97.6 °F (36.4 °C), Max:98.2 °F (36.8 °C)    Temp:  [97.6 °F (36.4 °C)-98.2 °F (36.8 °C)] 98.2 °F (36.8 °C)  HR:  [56-59] 58  Resp:  [20] 20  BP: (136-152)/(70-85) 152/70  SpO2:  [93 %-94 %] 93 %  Body mass index is 37.23 kg/m².     Input and Output Summary (last 24 hours):     Intake/Output Summary (Last 24 hours) at 2024 1332  Last data filed at 2024 1100  Gross per 24 hour   Intake 600 ml   Output --   Net 600 ml       Physical Exam:   Physical Exam  Vitals and nursing note reviewed.   Constitutional:       Appearance: He is  obese.   Cardiovascular:      Rate and Rhythm: Normal rate and regular rhythm.   Pulmonary:      Effort: No respiratory distress.   Abdominal:      Palpations: Abdomen is soft.   Musculoskeletal:      Right lower leg: No edema.      Left lower leg: No edema.   Neurological:      Mental Status: He is oriented to person, place, and time.   Psychiatric:         Mood and Affect: Mood normal.          Additional Data:     Labs:  Results from last 7 days   Lab Units 06/11/24  0458 06/09/24  0443 06/09/24  0144   WBC Thousand/uL 7.75   < > 7.80   HEMOGLOBIN g/dL 13.7   < > 13.6   HEMATOCRIT % 41.6   < > 41.0   PLATELETS Thousands/uL 222   < > 248   SEGS PCT %  --   --  56   LYMPHO PCT %  --   --  30   MONO PCT %  --   --  10   EOS PCT %  --   --  3    < > = values in this interval not displayed.     Results from last 7 days   Lab Units 06/11/24  0458   SODIUM mmol/L 139   POTASSIUM mmol/L 3.9   CHLORIDE mmol/L 107   CO2 mmol/L 24   BUN mg/dL 10   CREATININE mg/dL 0.85   ANION GAP mmol/L 8   CALCIUM mg/dL 8.4   GLUCOSE RANDOM mg/dL 99                       Lines/Drains:  Invasive Devices       Peripheral Intravenous Line  Duration             Peripheral IV 06/09/24 Distal;Left;Upper;Ventral (anterior) Antecubital 2 days                      Telemetry:  Telemetry Orders (From admission, onward)               24 Hour Telemetry Monitoring  Continuous x 24 Hours (Telem)        Question:  Reason for 24 Hour Telemetry  Answer:  Syncope suspected to be cardiac in origin                     Telemetry Reviewed: Normal Sinus Rhythm  Indication for Continued Telemetry Use: Arrthymias requiring medical therapy             Imaging: Reviewed radiology reports from this admission including: none    Recent Cultures (last 7 days):         Last 24 Hours Medication List:   Current Facility-Administered Medications   Medication Dose Route Frequency Provider Last Rate    acetaminophen  650 mg Oral Q6H PRN Ryder Bustillo DO      amLODIPine  5  mg Oral Daily Ryder Bustillo, DO      apixaban  5 mg Oral BID Ryder Bustillo, DO      ascorbic acid  500 mg Oral Daily Ryder Bustillo, DO      calcium carbonate  1,000 mg Oral BID PRN Hortensia Shipley PA-C      carvedilol  25 mg Oral BID With Meals Ryder Bustillo,       multivitamin-minerals  1 tablet Oral Daily Ryder Bustillo,       ondansetron  4 mg Intravenous Q6H PRN Ryder Bustillo, DO      pravastatin  80 mg Oral Daily With Dinner Ryder Bustillo, DO          Today, Patient Was Seen By: Peggy Pond PA-C    **Please Note: This note may have been constructed using a voice recognition system.**

## 2024-06-11 NOTE — ASSESSMENT & PLAN NOTE
Initially felt to be on tele on 6/10 however per my dw fellow is SVT with wide QRS  Ct Carvedilol 25 mg BID  Keep K > 4 and Mag > 2  Flecainide

## 2024-06-11 NOTE — PROGRESS NOTES
Progress Note - Daniel Gardiner 64 y.o. male MRN: [id number]    Unit/Bed#: CW2 213-01 Encounter: [id number]      A/P  64M with PMH of afib and aflutter (s/p pulse field PVI and RF CTI 05/08/24 by Dr. Kay), RBBB with LAFB, HTN, HLD, bladder tumoor, tobacco use, treated Lyme disease who is admitted for palpitations associated with pre-syncope.     #Pre-syncope  #SVT  Patient with multiple episodes of palpitations, some which caused near syncope.  He was previously wearing a ZIO which is in process, however most of the symptoms were after the zio was removed.  In the hospital he has had one 13 beat run of SVT (rate ~150, see below) which was associated with similar symptoms to prior.  Patient said he had the same type of palpitations prior to feeling pre-syncopal ECHO: 04/28/24: EF 58%, LA size WNL, no significant valvular disease. Will start flecainide 50mg BID to suppress SVT and place ILR for further monitoring.   -awaiting Zio results to be updated from company (currently in the mail, of note he did not have any significant symptoms while wearing ZIO).   -continue home carvedilol 25mg BID  -start flecainide 50mg BID (had qrs widening at 100mg BID)  -ILR will be placed inpatient today  -outpatient EP follow up 6/25         #Afib and atrial flutter  Pulse field PVI and RF CTI 05/08/24 by Dr. Kay. Had zio after procedure which is in process.   -did not tolerate flecainide 100mg BID given qrs widening.   -carvedilol 25mg BID  -apixaban 5mg BID    #HTN  On carvedilol and amlodipine       Subjective:   Patient feeling well this morning. He had some mild palpitations last night at 12:30AM that were short in duration and not as severe as prior episodes.     No significant events on tele since yesterday afternoon at 1240 where he had a 12 beat run of SVT that was symptomatic and similar to prior episodes.    Zio monitor is still en route to facility- no data yet.      Objective:     Vitals: Blood pressure 152/70,  "pulse 58, temperature 98.2 °F (36.8 °C), resp. rate 20, height 6' 2\" (1.88 m), weight 132 kg (290 lb), SpO2 93%.,Body mass index is 37.23 kg/m².      Intake/Output Summary (Last 24 hours) at 6/11/2024 1308  Last data filed at 6/11/2024 1100  Gross per 24 hour   Intake 600 ml   Output --   Net 600 ml       Physical Exam:   Constitutional:       Appearance: Normal appearance.   HENT:      Head: Normocephalic and atraumatic.   Neck:      Vascular: No JVD   Cardiovascular:      Rate and Rhythm: RRR, no murmurs  Pulmonary:      Effort: CTA-b  Abdominal:      General: Abdomen is flat. Bowel sounds are normal.      Palpations: Abdomen is soft.   Musculoskeletal:     No edema  Skin:     General: Skin is warm.   Neurological:      Mental Status: He is alert and oriented to person, place, and time.   Psychiatric:         Behavior: Behavior normal.   "

## 2024-06-11 NOTE — ASSESSMENT & PLAN NOTE
Patient with nightly episodes of palpitations following A-fib ablation on 5/8/2024, reported 2 episodes prior to admission with increased palpitations with sensation of near syncope resolving after approximately 15 seconds each prompting presentation.  Has RBBB with left anterior fascicular block  Telemetry monitoring on 6/10 showed 12 beat run of SVT (with wide QRS) during which he had palpitations.  Nuclear stress test on 5/26/23 - no ischemia  Echo on 4/28/24 - EF 58%  Zio interrogation pending   EP input appreciated, recommending loop placement and dc on Flecainide 50 mg BID

## 2024-06-12 ENCOUNTER — TRANSITIONAL CARE MANAGEMENT (OUTPATIENT)
Dept: FAMILY MEDICINE CLINIC | Facility: CLINIC | Age: 64
End: 2024-06-12

## 2024-06-12 ENCOUNTER — TELEPHONE (OUTPATIENT)
Dept: CARDIOLOGY CLINIC | Facility: CLINIC | Age: 64
End: 2024-06-12

## 2024-06-12 NOTE — TELEPHONE ENCOUNTER
Spoke to [facility] and requested that patient's zio results be expedited. They received the monitor and will take about 24 hours to process it. As soon as the results are available they will be posted to his chart.

## 2024-06-13 ENCOUNTER — CLINICAL SUPPORT (OUTPATIENT)
Dept: CARDIOLOGY CLINIC | Facility: CLINIC | Age: 64
End: 2024-06-13
Payer: COMMERCIAL

## 2024-06-13 ENCOUNTER — OFFICE VISIT (OUTPATIENT)
Dept: UROLOGY | Facility: CLINIC | Age: 64
End: 2024-06-13
Payer: COMMERCIAL

## 2024-06-13 VITALS
HEIGHT: 74 IN | BODY MASS INDEX: 37.86 KG/M2 | WEIGHT: 295 LBS | DIASTOLIC BLOOD PRESSURE: 74 MMHG | OXYGEN SATURATION: 96 % | SYSTOLIC BLOOD PRESSURE: 132 MMHG | HEART RATE: 62 BPM

## 2024-06-13 DIAGNOSIS — R00.2 PALPITATIONS: ICD-10-CM

## 2024-06-13 DIAGNOSIS — N13.2 HYDRONEPHROSIS WITH URINARY OBSTRUCTION DUE TO URETERAL CALCULUS: Primary | ICD-10-CM

## 2024-06-13 DIAGNOSIS — I48.0 PAROXYSMAL ATRIAL FIBRILLATION (HCC): ICD-10-CM

## 2024-06-13 PROCEDURE — 99213 OFFICE O/P EST LOW 20 MIN: CPT | Performed by: PHYSICIAN ASSISTANT

## 2024-06-13 PROCEDURE — 88112 CYTOPATH CELL ENHANCE TECH: CPT | Performed by: STUDENT IN AN ORGANIZED HEALTH CARE EDUCATION/TRAINING PROGRAM

## 2024-06-13 PROCEDURE — 93248 EXT ECG>7D<15D REV&INTERPJ: CPT | Performed by: INTERNAL MEDICINE

## 2024-06-13 NOTE — RESULT ENCOUNTER NOTE
Patient had a min HR of 48 bpm, max HR of 188 bpm, and avg HR of 61 bpm. Predominant underlying rhythm was Sinus Rhythm. First Degree AV Block was present. Bundle Branch Block/IVCD was present. 1 run of Ventricular Tachycardia occurred lasting 6 beats with a max rate of 136 bpm (avg 129 bpm). 62 Supraventricular Tachycardia runs occurred, the run with the fastest interval lasting 11 beats with a max rate of 188 bpm, the longest lasting 10 beats with an avg rate of 118 bpm. Some episodes of Supraventricular Tachycardia may be possible Atrial Tachycardia with variable block. Supraventricular Tachycardia was detected within +/- 45 seconds of symptomatic patient event(s). Isolated SVEs were occasional (1.2%, 63030), SVE Couplets were rare (<1.0%, 1196), and SVE Triplets were rare (<1.0%, 236). Isolated VEs were rare (<1.0%, 2367), VE Couplets were rare (<1.0%, 206), and VE Triplets were rare (<1.0%, 1).    Revieweed strips. No AFib. NSVT and SVT/ PAT episodes.

## 2024-06-13 NOTE — PROGRESS NOTES
UROLOGY PROGRESS NOTE   Patient Identifiers: Daniel Gardiner (MRN [id number])  Date of Service: 6/13/2024    Subjective:   64-year-old man history of kidney stones.  He had a left ureteral stone and a small papillary tumor last year.  His last KUB showed no stones.  PSA 0.67.  Voiding pattern is stable.  He is a smoker.    Reason for visit BPH and kidney stone follow-up    Objective:     VITALS:    There were no vitals filed for this visit.  AUA SYMPTOM SCORE      Flowsheet Row Most Recent Value   AUA SYMPTOM SCORE    How often have you had a sensation of not emptying your bladder completely after you finished urinating? 0 (P)     How often have you had to urinate again less than two hours after you finished urinating? 1 (P)     How often have you found you stopped and started again several times when you urinate? 0 (P)     How often have you found it difficult to postpone urination? 0 (P)     How often have you had a weak urinary stream? 0 (P)     How often have you had to push or strain to begin urination? 0 (P)     How many times did you most typically get up to urinate from the time you went to bed at night until the time you got up in the morning? 1 (P)     Quality of Life: If you were to spend the rest of your life with your urinary condition just the way it is now, how would you feel about that? 1 (P)     AUA SYMPTOM SCORE 2 (P)                LABS:  Lab Results   Component Value Date    HGB 13.7 06/11/2024    HCT 41.6 06/11/2024    WBC 7.75 06/11/2024     06/11/2024   ]    Lab Results   Component Value Date     11/22/2017    K 3.9 06/11/2024     06/11/2024    CO2 24 06/11/2024    BUN 10 06/11/2024    CREATININE 0.85 06/11/2024    CALCIUM 8.4 06/11/2024   ]        INPATIENT MEDS:    Current Outpatient Medications:     amLODIPine (NORVASC) 5 mg tablet, Take 1 tablet (5 mg total) by mouth daily, Disp: 30 tablet, Rfl: 0    apixaban (Eliquis) 5 mg, Take 1 tablet (5 mg total) by mouth 2  (two) times a day, Disp: 180 tablet, Rfl: 1    ascorbic acid (VITAMIN C) 500 mg tablet, Take 500 mg by mouth daily, Disp: , Rfl:     carvedilol (COREG) 25 mg tablet, TAKE 1 TABLET TWICE A DAY WITH MEALS, Disp: 180 tablet, Rfl: 3    Coenzyme Q10 (COQ-10) 10 MG CAPS, Take by mouth, Disp: , Rfl:     flecainide (TAMBOCOR) 50 mg tablet, Take 1 tablet (50 mg total) by mouth every 12 (twelve) hours, Disp: 60 tablet, Rfl: 0    Multiple Vitamins-Minerals (MENS MULTIVITAMIN PLUS PO), Take by mouth, Disp: , Rfl:     Omega-3 Fatty Acids (FISH OIL) 1200 MG CAPS, Take by mouth, Disp: , Rfl:     simvastatin (ZOCOR) 40 mg tablet, TAKE 1 TABLET DAILY AT BEDTIME, Disp: 90 tablet, Rfl: 1    tadalafil (CIALIS) 10 MG tablet, Take 1 tablet (10 mg total) by mouth daily as needed for erectile dysfunction, Disp: 10 tablet, Rfl: 3    Zinc 50 MG CAPS, Take by mouth, Disp: , Rfl:       Physical Exam:   There were no vitals taken for this visit.  GEN: no acute distress    RESP: breathing comfortably with no accessory muscle use    ABD: soft, non-tender, non-distended   INCISION:    EXT: no significant peripheral edema         RADIOLOGY:   none     Assessment:   #1.  BPH  #2.  Urothelial papilloma    Plan:   -Urine sent off for cytology  -Follow-up for office cystoscopy  -  -

## 2024-06-14 ENCOUNTER — OFFICE VISIT (OUTPATIENT)
Dept: FAMILY MEDICINE CLINIC | Facility: CLINIC | Age: 64
End: 2024-06-14
Payer: COMMERCIAL

## 2024-06-14 VITALS
HEART RATE: 62 BPM | SYSTOLIC BLOOD PRESSURE: 148 MMHG | DIASTOLIC BLOOD PRESSURE: 90 MMHG | BODY MASS INDEX: 37.73 KG/M2 | HEIGHT: 74 IN | OXYGEN SATURATION: 96 % | WEIGHT: 294 LBS

## 2024-06-14 DIAGNOSIS — I47.10 SVT (SUPRAVENTRICULAR TACHYCARDIA): ICD-10-CM

## 2024-06-14 DIAGNOSIS — I10 PRIMARY HYPERTENSION: ICD-10-CM

## 2024-06-14 DIAGNOSIS — I47.29 NSVT (NONSUSTAINED VENTRICULAR TACHYCARDIA) (HCC): ICD-10-CM

## 2024-06-14 DIAGNOSIS — I48.0 PAROXYSMAL ATRIAL FIBRILLATION (HCC): ICD-10-CM

## 2024-06-14 DIAGNOSIS — F17.210 CIGARETTE NICOTINE DEPENDENCE WITHOUT COMPLICATION: ICD-10-CM

## 2024-06-14 DIAGNOSIS — R55 NEAR SYNCOPE: Primary | ICD-10-CM

## 2024-06-14 DIAGNOSIS — E78.2 MIXED HYPERLIPIDEMIA: ICD-10-CM

## 2024-06-14 DIAGNOSIS — L57.0 MULTIPLE ACTINIC KERATOSES: ICD-10-CM

## 2024-06-14 PROCEDURE — 99496 TRANSJ CARE MGMT HIGH F2F 7D: CPT | Performed by: FAMILY MEDICINE

## 2024-06-14 RX ORDER — POTASSIUM CHLORIDE 750 MG/1
10 CAPSULE, EXTENDED RELEASE ORAL DAILY
Qty: 30 CAPSULE | Refills: 5 | Status: SHIPPED | OUTPATIENT
Start: 2024-06-14 | End: 2024-06-18 | Stop reason: SDUPTHER

## 2024-06-14 NOTE — PROGRESS NOTES
Transition of Care Visit  Name: Daniel Gardiner      : 1960      MRN: [id number]  Encounter Provider: Linh Erickson MD  Encounter Date: 2024   Encounter department: [facility] PRIMARY CARE    Assessment & Plan   1. Near syncope  -     potassium chloride (MICRO-K) 10 MEQ CR capsule; Take 1 capsule (10 mEq total) by mouth daily  2. SVT (supraventricular tachycardia)  -     potassium chloride (MICRO-K) 10 MEQ CR capsule; Take 1 capsule (10 mEq total) by mouth daily  3. Primary hypertension  Assessment & Plan:  BP up  a tad  with lower  dose  amlodipine  4. NSVT (nonsustained ventricular tachycardia) (HCC)  Assessment & Plan:  On Flecainide, has  loop recorder, on coreg  5. Paroxysmal atrial fibrillation (HCC)  Assessment & Plan:  On Flecainide  and  Eliquis, sees  cards  6. Cigarette nicotine dependence without complication  Assessment & Plan:  coundselled  7. Mixed hyperlipidemia  Assessment & Plan:  Ldl 54 on simvastatin  40  8. Multiple actinic keratoses  -     Ambulatory Referral to Dermatology; Future         History of Present Illness     Transitional Care Management Review:   Daniel Gardiner is a 64 y.o. male here for TCM follow up.     During the TCM phone call patient stated:  TCM Call       Date and time call was made  2024  6:31 PM    Hospital care reviewed  Records reviewed    Patient was hospitial[facility] at  [facility]    Date of Admission  24    Date of discharge  24    Diagnosis  Near syncope    Disposition  Home    Were the patients medications reviewed and updated  No    Current Symptoms  None          TCM Call       Post hospital issues  None    Should patient be enrolled in anticoag monitoring?  No    Scheduled for follow up?  Yes    Patients specialists  Cardiologist    Did you obtain your prescribed medications  Yes    Do you need help managing your prescriptions or medications  No    Is transportation to your appointment needed  No    I have  "advised the patient to call PCP with any new or worsening symptoms  Rebecca RYAN Ma pt already has an appt 6/14/24 with MF    Living Arrangements  Spouse or Significiant other    Support System  Spouse    The type of support provided  Emotional; Financial; Physical    Do you have social support  Yes, as much as I need    Are you recieving any outpatient services  No    Are you recieving home care services  No    Are you using any community resources  No    Current waiver services  No    Have you fallen in the last 12 months  No    Interperter language line needed  No    Counseling  Patient    Counseling topics  Prognosis    Comments  Pt refused appt. He has an ablation scheduled in 2 days. And also, Cardio appt. He prefered to keep appt as scheduled 6/14.          Patient presents with  Transition of Care Management: Pt present today for transitional care management after being admitted at [facility] in [place] for 2 days due to near syncope episode.nsvt, has  loop recorder  in, cards  says  he  might  need  pacemaker, cards  f/u  6/25  Follow-up: Pt due for his routine follow up.  Patient also has multiple dry rough spots on his face he has had an extensive history of sun exposure with doing work going inside and outside with being a  he has never seen a dermatologist      Review of Systems   Constitutional:  Negative for activity change, appetite change and fatigue.   Respiratory:  Negative for shortness of breath.    Cardiovascular:  Positive for palpitations. Negative for chest pain.        Worse  at  night   Genitourinary:  Negative for hematuria.   Neurological:  Negative for dizziness, light-headedness and headaches.   Hematological:  Negative for adenopathy.     Objective     /90 (BP Location: Right arm, Patient Position: Sitting, Cuff Size: Large) Comment: pt personal BP cuff  Pulse 62 Comment: pts personal BP cuff  Ht 6' 2\" (1.88 m)   Wt 133 kg (294 lb)   SpO2 96%   BMI 37.75 kg/m² "     Physical Exam  Vitals reviewed.   Constitutional:       Appearance: Normal appearance. He is obese.   Cardiovascular:      Rate and Rhythm: Normal rate and regular rhythm.      Pulses: Normal pulses.      Heart sounds: Normal heart sounds.   Pulmonary:      Effort: Pulmonary effort is normal.      Breath sounds: Normal breath sounds.   Musculoskeletal:      Right lower leg: No edema.      Left lower leg: No edema.   Lymphadenopathy:      Cervical: No cervical adenopathy.   Neurological:      Mental Status: He is alert.   Psychiatric:         Mood and Affect: Mood normal.       Medications have been reviewed by provider in current encounter    Administrative Statements

## 2024-06-17 PROCEDURE — 88112 CYTOPATH CELL ENHANCE TECH: CPT | Performed by: STUDENT IN AN ORGANIZED HEALTH CARE EDUCATION/TRAINING PROGRAM

## 2024-06-18 ENCOUNTER — TELEPHONE (OUTPATIENT)
Age: 64
End: 2024-06-18

## 2024-06-18 DIAGNOSIS — R55 NEAR SYNCOPE: ICD-10-CM

## 2024-06-18 DIAGNOSIS — I47.10 SVT (SUPRAVENTRICULAR TACHYCARDIA): ICD-10-CM

## 2024-06-18 RX ORDER — POTASSIUM CHLORIDE 750 MG/1
10 CAPSULE, EXTENDED RELEASE ORAL DAILY
Qty: 90 CAPSULE | Refills: 1 | Status: SHIPPED | OUTPATIENT
Start: 2024-06-18

## 2024-06-18 NOTE — TELEPHONE ENCOUNTER
Patient called and stated his mail order pharmacy stated the potassium chloride (MICRO-K) 10 MEQ CR capsule medication was sent for a 1 month supply. The insurance states they need a new prescription for 90 days. Please call patient to update.

## 2024-06-21 ENCOUNTER — TELEPHONE (OUTPATIENT)
Age: 64
End: 2024-06-21

## 2024-06-21 DIAGNOSIS — M10.9 ACUTE GOUT, UNSPECIFIED CAUSE, UNSPECIFIED SITE: Primary | ICD-10-CM

## 2024-06-21 RX ORDER — PREDNISONE 20 MG/1
20 TABLET ORAL 2 TIMES DAILY WITH MEALS
Qty: 10 TABLET | Refills: 0 | Status: SHIPPED | OUTPATIENT
Start: 2024-06-21 | End: 2024-06-26

## 2024-06-21 NOTE — TELEPHONE ENCOUNTER
Patient called, request a medication for gout/ankle, Patient states Dr. Erickson spoke with Patients wife regarding the matter. Upon chart review/medications, confirmed patients current pharmacy [id number] - [place] - [address] [phone] . Please advise Patient at [phone], if any further questions.

## 2024-06-22 NOTE — PROGRESS NOTES
Electrophysiology Follow Up    [facility]   [facility] [place]  [address]  [place] [address]      Daniel Gardiner  : 1960  MRN: [id number]    Date of Visit: 2024       Assessment & Plan     1. Paroxysmal atrial fibrillation (HCC)  POCT ECG      2. Primary hypertension        3. Near syncope        4. s/p Afib ablation (PFA PVI, RF CTI) 2024        5. Mixed hyperlipidemia            ASSESSMENT:  Symptomatic persistent atrial fibrillation and flutter status post pulsed field ablation with pulmonary vein isolation and RF ablation of typical CTI flutter 2024 - initially diagnosed, converted with IV Lopressor, discharged on Toprol-XL and aspirin ([id number] = 1)  2021 - ongoing palpitations reported, started on flecainide 50 mg twice daily  2022 - event monitor showed brief runs of SVT/PAT, no changes made  2024 - presented to hospital for worsening palpitations, flecainide increased to 100 but then discontinued due to QRS widening --- outpatient ablation recommended, discharged on Eliquis and carvedilol  2024 - ablation as noted above, no medication changes made  2024 - presented to ED with palpitations and presyncope, found to have PAT with aberrancy  event monitor being worn prior to admission showed episodes of NSVT and PAT  Status post [employer] loop recorder implantation  restarted on low-dose flecainide 50 mg twice daily along with carvedilol and Eliquis  Preserved LV systolic function with LVEF 58% per echo 2024  Hypertension  Hyperlipidemia  Obesity with BMI 37  History of tobacco use  Bladder tumor      DISCUSSION/SUMMARY:  He continues to have occasional palpitations, but denies sustained arrhythmias.  He has actually felt much better over the past several days, interesting that this has happened in the setting of prednisone use.  His EKG today shows stable QRS, thus he will continue flecainide 50 mg twice daily along with  carvedilol for rate control and Eliquis anticoagulation.    He had his 2 week loop recorder site check today, and device interrogation showed occasional ectopy and brief runs of PAT, with which he is symptomatic.  This is similar to findings on his prior event monitor.  No atrial fibrillation was noted, we will continue to monitor moving forward.    His blood pressure today is 102/74, and he reports occasional lightheadedness with these lower blood pressure readings.  He denies syncope associated.  Home blood pressure readings show that he is largely well-controlled, thus I have asked him to hold amlodipine for 1 week with close blood pressure monitoring to see if this helps with these symptoms.  We may need to consider 2.5 mg daily moving forward if needed.  The patient and his wife understand these plans, and will contact us in 1 week with updates.  He will otherwise continue carvedilol 25 mg twice daily, as I think this is helping with his palpitations.    He is maintained on statin therapy, recent lipid profile showed total cholesterol 136, triglycerides 243, HDL 33, and LDL 54.  Recent LFTs were within normal limits.  This is managed by his general cardiologist.    Unfortunately, his insurance recently changed and to switch to [facility] providers.  If this is needed, I asked him to reach out to us so that we can provide him a referral to another provider.    Otherwise, I have asked him to follow-up again in 4 months, he knows to contact us in the meantime with any questions, concerns, or changes in symptoms.      History of Present Illness     CHIEF COMPLAINT: Post ablation follow-up    HPI/INTERVAL HISTORY: aDniel Gardiner is a 64 y.o. male with symptomatic persistent atrial fibrillation and flutter, preserved LV systolic function with LVEF 58% per echo 4/2024, hypertension, hyperlipidemia, obesity with BMI 37, history of tobacco use, and bladder tumor.  He typically follows with Dr. Roly Martinez as  an outpatient.  He was initially diagnosed with atrial fibrillation 8/2021 around the time he was diagnosed with Lyme disease.  Per reports he converted with IV Lopressor, and was ultimately discharged on Toprol-XL and aspirin given his low [id number] score.  Several days later he contacted our office reporting ongoing palpitations, and was started on low-dose flecainide 50 mg twice daily.  An event monitor the following year showed only brief runs of SVT/PAT and no changes were made to his regimen.  More recently, he presented to the hospital 4/2024 with worsening palpitations.  He had taken an extra dose of flecainide prior to admission, and he was then increased to 100 mg twice daily.  With this he had mild QRS widening and thus it was discontinued.  He was then seen in EP consultation to discuss more definitive management, and outpatient ablation was recommended.  He was discharged on carvedilol along with Eliquis anticoagulation.  In 5/2024 he presented for elective ablation at which time he underwent pulsed field ablation with pulmonary vein isolation as well as RF ablation of typical CTI flutter.  He was discharged home the next day without issues, and was continued on his prior regimen of carvedilol and Eliquis.    He then presented to the hospital 6/2024 with recurrent palpitations and presyncope.  He was wearing an event monitor at that time, unfortunately it was en route to the company and those results were unable to be reviewed at that time.  Telemetry showed a possible run of nonsustained VT, however it was felt to be PAT with aberrancy.  He was restarted on low-dose flecainide 50 mg twice daily along with carvedilol and Eliquis.  Subsequent review of his event monitor showed brief runs of NSVT and SVT/PAT, which seem to correlate with his symptoms.  He is being seen today in post hospital/ablation follow-up.    He still has occasional palpitations, mostly at night when laying on his left side.  He  seems largely asymptomatic throughout the day.  His PCP recently started him on prednisone for right ankle swelling, and since he started this medication he reports that his nighttime symptoms/palpitations have improved.  He denies any significant sustained arrhythmias, his palpitations are brief and similar to what he experienced on his recent event monitor.  He also reports occasional lightheadedness, which is often associated with lower blood pressure.  He denies associated syncope.  He otherwise denies significant chest pain or pressure or other cardiac complaints.      Review of Systems   Respiratory:  Negative for shortness of breath.    Cardiovascular:  Positive for palpitations. Negative for chest pain and leg swelling.   Neurological:  Positive for light-headedness. Negative for dizziness and syncope.   All other systems reviewed and are negative.    ROS as noted above, otherwise 12 point review of systems was performed and is negative.       Historical Information  - I have personally reviewed and updated this information, including social history, medical history, and family history.  Social History     Socioeconomic History    Marital status: /Civil Union     Spouse name: Not on file    Number of children: 1    Years of education: Not on file    Highest education level: Not on file   Occupational History    Occupation:    Tobacco Use    Smoking status: Every Day     Current packs/day: 0.50     Average packs/day: 0.5 packs/day for 40.0 years (20.0 ttl pk-yrs)     Types: Cigarettes    Smokeless tobacco: Never   Vaping Use    Vaping status: Never Used   Substance and Sexual Activity    Alcohol use: Yes     Comment: Occassional 1 x / month    Drug use: No    Sexual activity: Yes     Partners: Female     Birth control/protection: None   Other Topics Concern    Not on file   Social History Narrative    Lives with wife    Full time employment    active advance directive    1 child ; son,  passed away        Does not consume caffeine     Social Determinants of Health     Financial Resource Strain: Not on file   Food Insecurity: No Food Insecurity (4/28/2024)    Hunger Vital Sign     Worried About Running Out of Food in the Last Year: Never true     Ran Out of Food in the Last Year: Never true   Transportation Needs: No Transportation Needs (4/28/2024)    PRAPARE - Transportation     Lack of Transportation (Medical): No     Lack of Transportation (Non-Medical): No   Physical Activity: Not on file   Stress: Not on file   Social Connections: Not on file   Intimate Partner Violence: Not on file   Housing Stability: Low Risk  (4/28/2024)    Housing Stability Vital Sign     Unable to Pay for Housing in the Last Year: No     Number of Times Moved in the Last Year: 1     Homeless in the Last Year: No     Past Medical History:   Diagnosis Date    A-fib (HCC) 08/24/2021    Disc degeneration, lumbosacral 01/06/2016    Hyperlipidemia     hypercholesterolemia    Hypertension     last assessed 11/22/17    Pneumonia of both lower lobes due to infectious organism 01/29/2018    s/p Afib ablation (PFA PVI, RF CTI) 5/8/2024 05/09/2024     Past Surgical History:   Procedure Laterality Date    CARDIAC ELECTROPHYSIOLOGY PROCEDURE N/A 5/8/2024    Procedure: Cardiac eps/afib ablation;  Surgeon: Subhash Kay MD;  Location: [facility];  Service: Cardiology    CARDIAC ELECTROPHYSIOLOGY PROCEDURE N/A 6/11/2024    Procedure: Cardiac loop recorder implant;  Surgeon: Romuol Conner MD;  Location: [facility];  Service: Cardiology    COLONOSCOPY      FL RETROGRADE PYELOGRAM  11/3/2023    HEMORRHOID SURGERY      KNEE SURGERY Bilateral     OH COLONOSCOPY FLX DX W/COLLJ SPEC WHEN PFRMD N/A 1/5/2019    Procedure: COLONOSCOPY;  Surgeon: Lane Bonner MD;  Location: [facility];  Service: Gastroenterology    OH CYSTO BLADDER W/URETERAL CATHETERIZATION Left 11/3/2023    Procedure: CYSTOSCOPY RETROGRADE PYELOGRAM  WITH INSERTION STENT URETERAL, ureteroscopy, holmium laser;  Surgeon: Donald Jenkins MD;  Location: [facility];  Service: Urology    TRANSURETHRAL RESECTION OF BLADDER TUMOR N/A 11/3/2023    Procedure: TRANSURETHRAL RESECTION OF BLADDER TUMOR (TURBT);  Surgeon: Donald Jenkins MD;  Location: [facility];  Service: Urology    UPPER GASTROINTESTINAL ENDOSCOPY       Social History     Substance and Sexual Activity   Alcohol Use Yes    Comment: Occassional 1 x / month     Social History     Substance and Sexual Activity   Drug Use No     Social History     Tobacco Use   Smoking Status Every Day    Current packs/day: 0.50    Average packs/day: 0.5 packs/day for 40.0 years (20.0 ttl pk-yrs)    Types: Cigarettes   Smokeless Tobacco Never     Family History   Problem Relation Age of Onset    Obesity Mother     Aneurysm Father        Meds/Allergies       Current Outpatient Medications:     amLODIPine (NORVASC) 5 mg tablet, Take 1 tablet (5 mg total) by mouth daily, Disp: 30 tablet, Rfl: 0    apixaban (Eliquis) 5 mg, Take 1 tablet (5 mg total) by mouth 2 (two) times a day, Disp: 180 tablet, Rfl: 1    ascorbic acid (VITAMIN C) 500 mg tablet, Take 500 mg by mouth daily, Disp: , Rfl:     carvedilol (COREG) 25 mg tablet, TAKE 1 TABLET TWICE A DAY WITH MEALS, Disp: 180 tablet, Rfl: 3    Coenzyme Q10 (COQ-10) 10 MG CAPS, Take by mouth, Disp: , Rfl:     flecainide (TAMBOCOR) 50 mg tablet, Take 1 tablet (50 mg total) by mouth every 12 (twelve) hours, Disp: 60 tablet, Rfl: 0    Multiple Vitamins-Minerals (MENS MULTIVITAMIN PLUS PO), Take by mouth, Disp: , Rfl:     Omega-3 Fatty Acids (FISH OIL) 1200 MG CAPS, Take by mouth, Disp: , Rfl:     potassium chloride (MICRO-K) 10 MEQ CR capsule, Take 1 capsule (10 mEq total) by mouth daily, Disp: 90 capsule, Rfl: 1    predniSONE 20 mg tablet, Take 1 tablet (20 mg total) by mouth 2 (two) times a day with meals for 5 days, Disp: 10 tablet, Rfl: 0    simvastatin (ZOCOR) 40 mg tablet,  "TAKE 1 TABLET DAILY AT BEDTIME, Disp: 90 tablet, Rfl: 1    tadalafil (CIALIS) 10 MG tablet, Take 1 tablet (10 mg total) by mouth daily as needed for erectile dysfunction, Disp: 10 tablet, Rfl: 3    Zinc 50 MG CAPS, Take by mouth, Disp: , Rfl:     No Known Allergies    Objective   Vitals: Blood pressure 102/74, pulse 63, height 6' 2\" (1.88 m), weight 133 kg (292 lb 3.2 oz).        Physical Exam  GEN: NAD, alert and oriented x 3, well appearing  SKIN: dry without significant lesions or rashes  HEENT: NCAT, PERRL, EOMs intact  NECK: No JVD appreciated  CARDIOVASCULAR: RRR, normal S1, S2 without murmurs, rubs, or gallops appreciated  LUNGS: Clear to auscultation bilaterally without wheezes, rhonchi, or rales  ABDOMEN: Soft, nontender, nondistended  EXTREMITIES/VASCULAR: perfused without clubbing, cyanosis, or LE edema b/l  PSYCH: Normal mood and affect  NEURO: CN ll-Xll grossly intact      Labs:  Office Visit on 06/13/2024   Component Date Value    Case Report 06/13/2024                      Value:Non-gynecologic Cytology                          Case: [id number]                                  Authorizing Provider:  Rahat Hawkins,   Collected:           06/13/2024 1112                                     NOE                                                                         Ordering Location:     [facility]        Received:            06/13/2024 [employer]                                     [facility]                                                               Pathologist:           Dionicio Butts MD                                                         Specimen:    Urine, Clean Catch                                                                         Final Diagnosis 06/13/2024                      Value:A. Urine, Clean Catch, :  Negative for high grade urothelial carcinoma ([facility]GUC) - see comment.  Benign urothelial cells.  Benign squamous cells.  Mixed inflammatory cells, red blood " cells and crystals..    Satisfactory for Evaluation.    Comment:  The above diagnostic category is from the recently published book, The Leyda System for Reporting Urinary Cytology, and is in keeping with the ongoing effort for utilization of standardized diagnostic terminology in urine cytology.*    *The Leyda System for Reporting Urinary Cytology. Viky Morales, Tereza Amador, Boogie Holden; 2016.          Gross Description 06/13/2024                      Value:A. 45 mL of dark yellow, hazy fluid, received in CytoLyt       Additional Information 06/13/2024                      Value:Interpretation performed at [facility], [address] [place] [address]    [employer]'s FDA approved ,  and ThinPrep Imaging Duo System are utilized with strict adherence to the 's instruction manual to prepare gynecologic and non-gynecologic cytology specimens for the production of ThinPrep slides as well as for gynecologic ThinPrep imaging. These processes have been validated by our laboratory and/or by the .    These tests were developed and their performance characteristics determined by [facility] or [facility]. They may not be cleared or approved by the U.S. Food and Drug Administration. The FDA has determined that such clearance or approval is not necessary. These tests are used for clinical purposes. They should not be regarded as investigational or for research. This laboratory has been approved by CLIA 88, designated as a high-complexity laboratory and is qualified to perform these tests.       Admission on 06/09/2024, Discharged on 06/11/2024   Component Date Value    Ventricular Rate 06/09/2024 66     Atrial Rate 06/09/2024 66     OR Interval 06/09/2024 218     QRSD Interval 06/09/2024 144     QT Interval 06/09/2024 466     QTC Interval 06/09/2024 488     P Axis 06/09/2024 91     QRS Axis 06/09/2024 -65     T Wave Axis 06/09/2024 2      WBC 06/09/2024 7.80     RBC 06/09/2024 4.39     Hemoglobin 06/09/2024 13.6     Hematocrit 06/09/2024 41.0     MCV 06/09/2024 93     MCH 06/09/2024 31.0     MCHC 06/09/2024 33.2     RDW 06/09/2024 12.7     MPV 06/09/2024 9.5     Platelets 06/09/2024 248     nRBC 06/09/2024 0     Segmented % 06/09/2024 56     Immature Grans % 06/09/2024 0     Lymphocytes % 06/09/2024 30     Monocytes % 06/09/2024 10     Eosinophils Relative 06/09/2024 3     Basophils Relative 06/09/2024 1     Absolute Neutrophils 06/09/2024 4.35     Absolute Immature Grans 06/09/2024 0.02     Absolute Lymphocytes 06/09/2024 2.30     Absolute Monocytes 06/09/2024 0.81     Eosinophils Absolute 06/09/2024 0.24     Basophils Absolute 06/09/2024 0.08     Sodium 06/09/2024 142     Potassium 06/09/2024 3.5     Chloride 06/09/2024 104     CO2 06/09/2024 27     ANION GAP 06/09/2024 11     BUN 06/09/2024 16     Creatinine 06/09/2024 0.93     Glucose 06/09/2024 121     Calcium 06/09/2024 9.3     eGFR 06/09/2024 86     hs TnI 0hr 06/09/2024 10     hs TnI 2hr 06/09/2024 10     Delta 2hr hsTnI 06/09/2024 0     Sodium 06/09/2024 139     Potassium 06/09/2024 3.7     Chloride 06/09/2024 105     CO2 06/09/2024 22     ANION GAP 06/09/2024 12     BUN 06/09/2024 15     Creatinine 06/09/2024 0.89     Glucose 06/09/2024 107     Calcium 06/09/2024 9.1     eGFR 06/09/2024 90     WBC 06/09/2024 8.26     RBC 06/09/2024 4.45     Hemoglobin 06/09/2024 13.9     Hematocrit 06/09/2024 42.3     MCV 06/09/2024 95     MCH 06/09/2024 31.2     MCHC 06/09/2024 32.9     RDW 06/09/2024 13.0     Platelets 06/09/2024 248     MPV 06/09/2024 9.6     Sodium 06/11/2024 139     Potassium 06/11/2024 3.9     Chloride 06/11/2024 107     CO2 06/11/2024 24     ANION GAP 06/11/2024 8     BUN 06/11/2024 10     Creatinine 06/11/2024 0.85     Glucose 06/11/2024 99     Glucose, Fasting 06/11/2024 99     Calcium 06/11/2024 8.4     eGFR 06/11/2024 92     Magnesium 06/11/2024 2.3     WBC 06/11/2024 7.75      RBC 06/11/2024 4.40     Hemoglobin 06/11/2024 13.7     Hematocrit 06/11/2024 41.6     MCV 06/11/2024 95     MCH 06/11/2024 31.1     MCHC 06/11/2024 32.9     RDW 06/11/2024 12.4     Platelets 06/11/2024 222     MPV 06/11/2024 9.6    Orders Only on 05/23/2024   Component Date Value    TSH 05/23/2024 2.35     Cholesterol, Total 05/23/2024 136     Triglycerides 05/23/2024 243 (H)     HDL Cholesterol 05/23/2024 33     Non HDL Chol. (LDL+VLDL) 05/23/2024 103     LDL Calculated 05/23/2024 54     Chol HDLC Ratio 05/23/2024 4.1     Hemoglobin 05/23/2024 14.0     HCT 05/23/2024 41.0     White Blood Cell Count 05/23/2024 7.5     Red Blood Cell Count 05/23/2024 4.44     Platelet Count 05/23/2024 255     SL AMB MPV 05/23/2024 8.3     MCV 05/23/2024 92     MCH 05/23/2024 31.5     MCHC 05/23/2024 34.1     RDW 05/23/2024 13.7     Differential Type 05/23/2024 AUTO     Neutrophils (Absolute) 05/23/2024 4.2     Lymphocytes (Absolute) 05/23/2024 2.5     Monocytes (Absolute) 05/23/2024 0.6     Eosinophils (Absolute) 05/23/2024 0.2     Basophils ABS 05/23/2024 0.1     Neutrophils 05/23/2024 55     Lymphocytes 05/23/2024 34     Monocytes 05/23/2024 7     Eosinophils 05/23/2024 3     Basophils PCT 05/23/2024 1     Glucose, Random 05/23/2024 107 (H)     BUN 05/23/2024 17     Creatinine 05/23/2024 1.13     Sodium 05/23/2024 141     Potassium 05/23/2024 4.1     Chloride 05/23/2024 105     CO2 05/23/2024 25     Calcium 05/23/2024 9.5     Alkaline Phosphatase 05/23/2024 74     Albumin 05/23/2024 4.4     TOTAL BILIRUBIN 05/23/2024 0.5     Protein, Total 05/23/2024 7.2     AST 05/23/2024 17     ALT 05/23/2024 24     ANION GAP 05/23/2024 11     eGFR 05/23/2024 72     Comment 05/23/2024 (Note)     Prostate Specific Antige* 05/23/2024 0.67    Admission on 05/08/2024, Discharged on 05/09/2024   Component Date Value    Sodium 05/08/2024 139     Potassium 05/08/2024 3.8     Chloride 05/08/2024 107     CO2 05/08/2024 23     ANION GAP 05/08/2024 9      BUN 05/08/2024 14     Creatinine 05/08/2024 1.13     Glucose 05/08/2024 127     Glucose, Fasting 05/08/2024 127 (H)     Calcium 05/08/2024 9.4     eGFR 05/08/2024 68     WBC 05/08/2024 8.44     RBC 05/08/2024 4.68     Hemoglobin 05/08/2024 14.4     Hematocrit 05/08/2024 43.0     MCV 05/08/2024 92     MCH 05/08/2024 30.8     MCHC 05/08/2024 33.5     RDW 05/08/2024 12.6     MPV 05/08/2024 9.7     Platelets 05/08/2024 258     nRBC 05/08/2024 0     Segmented % 05/08/2024 63     Immature Grans % 05/08/2024 0     Lymphocytes % 05/08/2024 24     Monocytes % 05/08/2024 9     Eosinophils Relative 05/08/2024 3     Basophils Relative 05/08/2024 1     Absolute Neutrophils 05/08/2024 5.28     Absolute Immature Grans 05/08/2024 0.02     Absolute Lymphocytes 05/08/2024 2.06     Absolute Monocytes 05/08/2024 0.74     Eosinophils Absolute 05/08/2024 0.24     Basophils Absolute 05/08/2024 0.10     Protime 05/08/2024 16.2 (H)     INR 05/08/2024 1.31 (H)     Activated Clotting Time,* 05/08/2024 302 (H)     Specimen Type 05/08/2024 VENOUS     Activated Clotting Time,* 05/08/2024 321 (H)     Specimen Type 05/08/2024 VENOUS     Activated Clotting Time,* 05/08/2024 295 (H)     Specimen Type 05/08/2024 VENOUS     Activated Clotting Time,* 05/08/2024 315 (H)     Specimen Type 05/08/2024 VENOUS     Ventricular Rate 05/08/2024 91     Atrial Rate 05/08/2024 286     QRSD Interval 05/08/2024 140     QT Interval 05/08/2024 418     QTC Interval 05/08/2024 514     P Axis 05/08/2024 -88     QRS Axis 05/08/2024 -76     T Wave Axis 05/08/2024 -12     Ventricular Rate 05/08/2024 69     Atrial Rate 05/08/2024 69     AK Interval 05/08/2024 217     QRSD Interval 05/08/2024 150     QT Interval 05/08/2024 471     QTC Interval 05/08/2024 505     P Axis 05/08/2024 59     QRS [place] 05/08/2024 26     T Wave Axis 05/08/2024 52     WBC 05/09/2024 10.29 (H)     RBC 05/09/2024 4.05     Hemoglobin 05/09/2024 12.5     Hematocrit 05/09/2024 39.0     MCV 05/09/2024 96      MCH 05/09/2024 30.9     MCHC 05/09/2024 32.1     RDW 05/09/2024 13.2     Platelets 05/09/2024 243     MPV 05/09/2024 10.5     Sodium 05/09/2024 138     Potassium 05/09/2024 4.2     Chloride 05/09/2024 104     CO2 05/09/2024 20 (L)     ANION GAP 05/09/2024 14 (H)     BUN 05/09/2024 18     Creatinine 05/09/2024 1.12     Glucose 05/09/2024 182 (H)     Calcium 05/09/2024 8.5     eGFR 05/09/2024 69     Protime 05/09/2024 15.4 (H)     INR 05/09/2024 1.23 (H)     LD 05/09/2024 203     Haptoglobin 05/09/2024 103     Ventricular Rate 05/08/2024 86     Atrial Rate 05/08/2024 86     NE Interval 05/08/2024 210     QRSD Interval 05/08/2024 148     QT Interval 05/08/2024 440     QTC Interval 05/08/2024 526     P Axis 05/08/2024 65     QRS Axis 05/08/2024 -71     T Wave Axis 05/08/2024 -30    Admission on 04/27/2024, Discharged on 04/29/2024   Component Date Value    Ventricular Rate 04/27/2024 89     Atrial Rate 04/27/2024 104     QRSD Interval 04/27/2024 150     QT Interval 04/27/2024 410     QTC Interval 04/27/2024 498     QRS Axis 04/27/2024 -69     T Wave Axis 04/27/2024 13     WBC 04/27/2024 10.06     RBC 04/27/2024 4.69     Hemoglobin 04/27/2024 14.2     Hematocrit 04/27/2024 42.9     MCV 04/27/2024 92     MCH 04/27/2024 30.3     MCHC 04/27/2024 33.1     RDW 04/27/2024 12.7     MPV 04/27/2024 9.5     Platelets 04/27/2024 260     nRBC 04/27/2024 0     Segmented % 04/27/2024 63     Immature Grans % 04/27/2024 0     Lymphocytes % 04/27/2024 24     Monocytes % 04/27/2024 9     Eosinophils Relative 04/27/2024 3     Basophils Relative 04/27/2024 1     Absolute Neutrophils 04/27/2024 6.41     Absolute Immature Grans 04/27/2024 0.03     Absolute Lymphocytes 04/27/2024 2.37     Absolute Monocytes 04/27/2024 0.89     Eosinophils Absolute 04/27/2024 0.26     Basophils Absolute 04/27/2024 0.10     Sodium 04/27/2024 138     Potassium 04/27/2024 4.0     Chloride 04/27/2024 106     CO2 04/27/2024 22     ANION GAP 04/27/2024 10     BUN  04/27/2024 22     Creatinine 04/27/2024 1.05     Glucose 04/27/2024 127     Calcium 04/27/2024 9.1     AST 04/27/2024 19     ALT 04/27/2024 25     Alkaline Phosphatase 04/27/2024 76     Total Protein 04/27/2024 7.0     Albumin 04/27/2024 4.3     Total Bilirubin 04/27/2024 0.35     eGFR 04/27/2024 74     Magnesium 04/27/2024 1.9     Phosphorus 04/27/2024 4.0     TSH 3RD GENERATON 04/27/2024 2.803     Ventricular Rate 04/27/2024 86     Atrial Rate 04/27/2024 234     QRSD Interval 04/27/2024 150     QT Interval 04/27/2024 408     QTC Interval 04/27/2024 488     P Axis 04/27/2024 -75     QRS Axis 04/27/2024 -71     T Wave Axis 04/27/2024 -2     TSH 3RD GENERATON 04/27/2024 2.638     BSA 04/28/2024 2.55     A4C EF 04/28/2024 58     LVOT stroke volume 04/28/2024 38.43     LVOT stroke volume index 04/28/2024 15.70     LVOT Cardiac Output 04/28/2024 3.40     LVOT Cardiac Index 04/28/2024 1.33     LVIDd 04/28/2024 5.10     LVIDS 04/28/2024 3.60     IVSd 04/28/2024 1.50     LVPWd 04/28/2024 1.40     LVOT diameter 04/28/2024 1.9     LVOT peak VTI 04/28/2024 13.56     FS 04/28/2024 29     LA Volume Index (BP) 04/28/2024 31.4     AV LVOT peak gradient 04/28/2024 2     LVOT peak immanuel 04/28/2024 0.69     RVID d 04/28/2024 3.9     Tricuspid annular plane * 04/28/2024 1.30     LA size 04/28/2024 3.5     LA length (A2C) 04/28/2024 5.50     ASUNCION A4C 04/28/2024 24.4     LA volume (BP) 04/28/2024 80     RAA A4C 04/28/2024 22.3     Aortic valve peak veloci* 04/28/2024 1.09     Ao VTI 04/28/2024 21.42     AV mean gradient 04/28/2024 3     LVOT mn grad 04/28/2024 1.0     AV peak gradient 04/28/2024 5     AV area by cont VTI 04/28/2024 1.8     AV area peak immanuel 04/28/2024 1.8     TR Peak Immanuel 04/28/2024 2.3     Triscuspid Valve Regurgi* 04/28/2024 20.0     Aortic valve mean veloci* 04/28/2024 9.10     Tricuspid valve peak reg* 04/28/2024 2.25     Left ventricular stroke * 04/28/2024 69.00     IVS 04/28/2024 1.5     LEFT VENTRICLE SYSTOLIC *  2024 54     LV DIASTOLIC VOLUME (MOD* 2024 123     Left Atrium Area-systoli* 2024 24.6     Left Atrium Area-systoli* 2024 23     LVSV, 2D 2024 69     LVOT area 2024 2.83     DVI 2024 0.63     AV valve area 2024 1.79     LV EF 2024 58          Imaging: I have personally reviewed pertinent reports.      ECHO: Results for orders placed during the hospital encounter of 21    Echo complete with contrast if indicated    [place] [address]  [phone]    Transthoracic Echocardiogram  2D, M-mode, Doppler, and Color Doppler    Study date:  23-Aug-2021    Patient: DEONDRE REYES  MR number: [id number]  Account number: [id number]  : 1960  Age: 61 years  Gender: Male  Status: Outpatient  Location: Bedside  Height: 73 in  Weight: 274.3 lb  BP: 114/ 75 mmHg    Indications: Atrial Fibrillation    Diagnoses: I48.0 - Atrial fibrillation    Sonographer:  NII Sierra  Primary Physician:  Linh Erickson MD  Referring Physician:  Luis Eduardo Bosch MD  Group:  [facility]  Interpreting Physician:  Aroldo Greenwood MD    SUMMARY    LEFT VENTRICLE:  Size was normal.  Systolic function was normal. Ejection fraction was estimated to be 60 %.  There was mild concentric hypertrophy.    RIGHT VENTRICLE:  The size was normal.  Systolic function was normal.    LEFT ATRIUM:  The atrium was mildly dilated.    RIGHT ATRIUM:  The atrium was mildly dilated.    MITRAL VALVE:  There was trace regurgitation.    TRICUSPID VALVE:  There was trace regurgitation.    HISTORY: PRIOR HISTORY: Hypertension,HLD,Tobacco Abuse    PROCEDURE: The procedure was performed at the bedside. This was a routine study. The transthoracic approach was used. The study included complete 2D imaging, M-mode, complete spectral Doppler, and color Doppler. The heart rate was 98 bpm,  at the start  of the study. Images were obtained from the parasternal, apical, subcostal, and suprasternal notch acoustic windows. Image quality was adequate.    LEFT VENTRICLE: Size was normal. Systolic function was normal. Ejection fraction was estimated to be 60 %. There were no regional wall motion abnormalities. Wall thickness was mildly increased. There was mild concentric hypertrophy.  DOPPLER: Transmitral flow pattern: atrial fibrillation. The study was not technically sufficient to allow evaluation of LV diastolic function.    RIGHT VENTRICLE: The size was normal. Systolic function was normal. Wall thickness was normal.    LEFT ATRIUM: The atrium was mildly dilated.    RIGHT ATRIUM: The atrium was mildly dilated.    MITRAL VALVE: Valve structure was normal. There was normal leaflet separation. DOPPLER: The transmitral velocity was within the normal range. There was no evidence for stenosis. There was trace regurgitation.    AORTIC VALVE: The valve was trileaflet. Leaflets exhibited normal thickness and normal cuspal separation. DOPPLER: Transaortic velocity was within the normal range. There was no evidence for stenosis. There was no regurgitation.    TRICUSPID VALVE: The valve structure was normal. There was normal leaflet separation. DOPPLER: The transtricuspid velocity was within the normal range. There was no evidence for stenosis. There was trace regurgitation. The tricuspid jet  envelope definition was inadequate for estimation of RV systolic pressure. There are no indirect findings suggestive of moderate or severe pulmonary hypertension.    PULMONIC VALVE: Leaflets exhibited normal thickness, no calcification, and normal cuspal separation. DOPPLER: The transpulmonic velocity was within the normal range. There was no regurgitation.    PERICARDIUM: There was no pericardial effusion. The pericardium was normal in appearance.    AORTA: The root exhibited normal size.    SYSTEMIC VEINS: IVC: The inferior vena cava was  normal in size and course. Respirophasic changes were normal.    SYSTEM MEASUREMENT TABLES    2D  %FS: 24.99 %  Ao Diam: 2.93 cm  Ao asc: 2.84 cm  EDV(Teich): 95.3 ml  EF(Teich): 49.54 %  ESV(Teich): 48.09 ml  IVSd: 1.22 cm  LA Area: 29.05 cm2  LA Diam: 4.4 cm  LVEDV MOD A4C: 125.61 ml  LVEF MOD A4C: 64.28 %  LVESV MOD A4C: 44.86 ml  LVIDd: 4.56 cm  LVIDs: 3.42 cm  LVLd A4C: 8.85 cm  LVLs A4C: 7.39 cm  LVPWd: 1.21 cm  RA Area: 21.84 cm2  RVIDd: 3.34 cm  SV MOD A4C: 80.74 ml  SV(Teich): 47.21 ml    CW  TR Vmax: 2.13 m/s  TR maxP.21 mmHg    MM  TAPSE: 2.05 cm    [facility]    Prepared and electronically signed by    Aroldo Greenwood MD  Signed 23-Aug-2021 11:29:29      Results for orders placed during the hospital encounter of 24    Echo complete w/ contrast if indicated    Interpretation Summary    Left Ventricle: Left ventricular cavity size is normal. Wall thickness is mildly increased. The left ventricular ejection fraction is 58% by 3D quantification. Systolic function is normal. Although no diagnostic regional wall motion abnormality was identified, this possibility cannot be completely excluded on the basis of this study.    Right Ventricle: Right ventricular cavity size is normal. Systolic function is normal.    Right Atrium: The atrium is dilated.    Mitral Valve: There is mild regurgitation.    Tricuspid Valve: There is mild regurgitation.        NUCLEAR STRESS TEST 2023:   Interpretation Summary  Show Result Comparison     Stress ECG: No ST deviation is noted. The ECG was not diagnostic due to pharmacological (vasodilator) stress.    Perfusion: There are no perfusion defects.    Stress Function: Left ventricular function post-stress is normal. Stress ejection fraction is 52 %.    Stress Combined Conclusion: The ECG and SPECT imaging portions of the stress study are concordant with no evidence of stress induced myocardial ischemia. Left  ventricular perfusion is normal.    Resting ECG: The ECG shows normal sinus rhythm.      EKG: Normal sinus rhythm, heart rate 60 bpm, bifascicular block,  ms, unchanged from prior EKGs

## 2024-06-25 ENCOUNTER — IN-CLINIC DEVICE VISIT (OUTPATIENT)
Dept: CARDIOLOGY CLINIC | Facility: CLINIC | Age: 64
End: 2024-06-25
Payer: COMMERCIAL

## 2024-06-25 ENCOUNTER — OFFICE VISIT (OUTPATIENT)
Dept: CARDIOLOGY CLINIC | Facility: CLINIC | Age: 64
End: 2024-06-25
Payer: COMMERCIAL

## 2024-06-25 VITALS
HEIGHT: 74 IN | DIASTOLIC BLOOD PRESSURE: 74 MMHG | SYSTOLIC BLOOD PRESSURE: 102 MMHG | WEIGHT: 292.2 LBS | BODY MASS INDEX: 37.5 KG/M2 | HEART RATE: 63 BPM

## 2024-06-25 DIAGNOSIS — R55 NEAR SYNCOPE: ICD-10-CM

## 2024-06-25 DIAGNOSIS — I48.0 PAROXYSMAL ATRIAL FIBRILLATION (HCC): ICD-10-CM

## 2024-06-25 DIAGNOSIS — E78.2 MIXED HYPERLIPIDEMIA: ICD-10-CM

## 2024-06-25 DIAGNOSIS — Z98.890 S/P ABLATION OF ATRIAL FIBRILLATION: ICD-10-CM

## 2024-06-25 DIAGNOSIS — I10 PRIMARY HYPERTENSION: ICD-10-CM

## 2024-06-25 DIAGNOSIS — I48.0 PAROXYSMAL ATRIAL FIBRILLATION (HCC): Primary | ICD-10-CM

## 2024-06-25 DIAGNOSIS — Z86.79 S/P ABLATION OF ATRIAL FIBRILLATION: ICD-10-CM

## 2024-06-25 DIAGNOSIS — Z95.818 PRESENCE OF CARDIAC DEVICE: Primary | ICD-10-CM

## 2024-06-25 PROCEDURE — 99024 POSTOP FOLLOW-UP VISIT: CPT

## 2024-06-25 PROCEDURE — 99214 OFFICE O/P EST MOD 30 MIN: CPT | Performed by: PHYSICIAN ASSISTANT

## 2024-06-25 PROCEDURE — 93000 ELECTROCARDIOGRAM COMPLETE: CPT | Performed by: PHYSICIAN ASSISTANT

## 2024-06-25 NOTE — PATIENT INSTRUCTIONS
Hold amlodipine for one week, and monitor your blood pressure closely. Please call us in one week with the results to see if you need additional medication adjustments.       EPs:  - Dr. Shiva Mesa  - Dr. Bonilla Foley

## 2024-06-25 NOTE — PROGRESS NOTES
Results for orders placed or performed in visit on 06/25/24   Cardiac EP device report    Narrative    MDT LNQ22 ILR/ ACTIVE SYSTEM IS MRI CONDITIONAL  DEVICE INTERROGATED IN THE [facility] OFFICE. BATTERY VOLTAGE OK. WOUND CHECK: INCISION CLEAN AND DRY WITH EDGES APPROXIMATED; SUTURES REMOVED; WOUND CARE AND RESTRICTIONS REVIEWED WITH PATIENT. (SEE PIC IN EPIC-MEDIA) PRESENTING RHYTHM SINUS @ 74 BPM. R WAVE 0.14MV. AF 0%. PVC'S <1%.  PT HAD 2 SYMPTOM ACTIVATIONS, BOTH SHOWING SR WITH PVC'S.  PAT NOTED IN BOTH ID # 9 & 10. NO PROGRAMMING CHANGES MADE TO DEVICE PARAMETERS.  NORMAL DEVICE FUNCTION. PAS/TRIANA

## 2024-07-18 ENCOUNTER — OFFICE VISIT (OUTPATIENT)
Dept: FAMILY MEDICINE CLINIC | Facility: CLINIC | Age: 64
End: 2024-07-18
Payer: COMMERCIAL

## 2024-07-18 VITALS
HEART RATE: 66 BPM | HEIGHT: 74 IN | BODY MASS INDEX: 37.47 KG/M2 | SYSTOLIC BLOOD PRESSURE: 110 MMHG | OXYGEN SATURATION: 98 % | DIASTOLIC BLOOD PRESSURE: 80 MMHG | WEIGHT: 292 LBS | TEMPERATURE: 97.8 F

## 2024-07-18 DIAGNOSIS — D49.4 BLADDER TUMOR: ICD-10-CM

## 2024-07-18 DIAGNOSIS — I10 PRIMARY HYPERTENSION: Primary | ICD-10-CM

## 2024-07-18 DIAGNOSIS — I48.0 PAROXYSMAL ATRIAL FIBRILLATION (HCC): ICD-10-CM

## 2024-07-18 DIAGNOSIS — E78.2 MIXED HYPERLIPIDEMIA: ICD-10-CM

## 2024-07-18 PROBLEM — R55 NEAR SYNCOPE: Status: RESOLVED | Noted: 2024-06-09 | Resolved: 2024-07-18

## 2024-07-18 PROCEDURE — 99214 OFFICE O/P EST MOD 30 MIN: CPT | Performed by: FAMILY MEDICINE

## 2024-07-18 NOTE — PROGRESS NOTES
"Ambulatory Visit  Name: Daniel Gardiner      : 1960      MRN: [id number]  Encounter Provider: Linh Erickson MD  Encounter Date: 2024   Encounter department: [facility]    Assessment & Plan   1. Primary hypertension  Assessment & Plan:  Labile  htn, amlodipine  had  been stopped  but  now  restarted,check bp twice per  day  2. Mixed hyperlipidemia  Assessment & Plan:  On simvastatin 10,ldl 54  3. Bladder tumor  Assessment & Plan:  Wife;s insurance  changed  so will need to go to [facility]  4. Paroxysmal atrial fibrillation (HCC)  Assessment & Plan:  On eliquis  and  flacanaide       History of Present Illness     BP s  are  all over  the  place using home BP cuff  with readings  150-170, had  stopped amlodipine  due to dizziness and  lower  BP readings, restarted  med    Hypertension  This is a recurrent problem. The current episode started more than 1 year ago. The problem has been gradually worsening since onset. The problem is resistant. Pertinent negatives include no chest pain, headaches or shortness of breath. There are no associated agents to hypertension. Risk factors for coronary artery disease include obesity, male gender and smoking/tobacco exposure. Past treatments include calcium channel blockers, beta blockers and lifestyle changes. Compliance problems include exercise.        Review of Systems   Constitutional:  Positive for diaphoresis. Negative for activity change, appetite change and fatigue.        Sweating more with heat   Respiratory:  Negative for shortness of breath.    Cardiovascular:  Negative for chest pain.   Neurological:  Negative for dizziness, light-headedness and headaches.   Hematological:  Negative for adenopathy.       Objective     /80 (BP Location: Left arm, Patient Position: Sitting, Cuff Size: Large)   Pulse 66   Temp 97.8 °F (36.6 °C) (Temporal)   Ht 6' 2\" (1.88 m)   Wt 132 kg (292 lb)   SpO2 98%   BMI 37.49 kg/m²     Physical " Exam  Vitals reviewed.   Constitutional:       Appearance: Normal appearance. He is obese.   Cardiovascular:      Rate and Rhythm: Normal rate and regular rhythm.      Pulses: Normal pulses.      Heart sounds: Normal heart sounds.   Pulmonary:      Effort: Pulmonary effort is normal.      Breath sounds: Normal breath sounds.   Musculoskeletal:      Right lower leg: No edema.      Left lower leg: No edema.   Lymphadenopathy:      Cervical: No cervical adenopathy.   Neurological:      Mental Status: He is alert.   Psychiatric:         Mood and Affect: Mood normal.       Administrative Statements

## 2024-07-29 DIAGNOSIS — E78.2 MIXED HYPERLIPIDEMIA: ICD-10-CM

## 2024-07-30 RX ORDER — SIMVASTATIN 40 MG
TABLET ORAL
Qty: 100 TABLET | Refills: 1 | Status: SHIPPED | OUTPATIENT
Start: 2024-07-30

## 2024-09-09 ENCOUNTER — OFFICE VISIT (OUTPATIENT)
Dept: FAMILY MEDICINE CLINIC | Facility: CLINIC | Age: 64
End: 2024-09-09
Payer: COMMERCIAL

## 2024-09-09 VITALS
RESPIRATION RATE: 20 BRPM | DIASTOLIC BLOOD PRESSURE: 76 MMHG | OXYGEN SATURATION: 96 % | SYSTOLIC BLOOD PRESSURE: 132 MMHG | HEIGHT: 74 IN | BODY MASS INDEX: 37.22 KG/M2 | HEART RATE: 65 BPM | WEIGHT: 290 LBS

## 2024-09-09 DIAGNOSIS — E78.2 MIXED HYPERLIPIDEMIA: ICD-10-CM

## 2024-09-09 DIAGNOSIS — I10 PRIMARY HYPERTENSION: Primary | ICD-10-CM

## 2024-09-09 DIAGNOSIS — K59.00 CONSTIPATION, UNSPECIFIED CONSTIPATION TYPE: ICD-10-CM

## 2024-09-09 PROCEDURE — 99214 OFFICE O/P EST MOD 30 MIN: CPT | Performed by: FAMILY MEDICINE

## 2024-09-09 RX ORDER — AMLODIPINE BESYLATE 2.5 MG/1
2.5 TABLET ORAL DAILY
Qty: 30 TABLET | Refills: 5 | Status: SHIPPED | OUTPATIENT
Start: 2024-09-09 | End: 2024-09-20

## 2024-09-09 NOTE — PATIENT INSTRUCTIONS
Increase  fiber  in diet, change amlodipine to 7.5  mg, consider  linzess if  increased  fiber  doesn't  he;p constipation

## 2024-09-09 NOTE — PROGRESS NOTES
Ambulatory Visit  Name: Daniel Gardiner      : 1960      MRN: [id number]  Encounter Provider: Linh Erickson MD  Encounter Date: 2024   Encounter department: [facility]    Assessment & Plan   1. Primary hypertension  Assessment & Plan:  1  amlodipine  not  enough for  blaine but 10  mg  too much, will choose  7.5  as a happy  medium  Orders:  -     amLODIPine (NORVASC) 2.5 mg tablet; Take 1 tablet (2.5 mg total) by mouth daily  -     Comprehensive metabolic panel; Future; Expected date: 10/21/2024  -     Comprehensive metabolic panel  2. Mixed hyperlipidemia  -     Lipid panel; Future; Expected date: 10/21/2024  -     Lipid panel  3. Constipation, unspecified constipation type  Assessment & Plan:  Increase fiber in diet, fiber  con or  metamucil,   consider  linzess  if  no better       History of Present Illness     Patient presents with:  Follow-up  Hypertension   Feels like  5  mg  amlodipine  not  enough, 10 mg too much  Issues  with constipation, some  relief  with prune juice, miralax  didn't  work    Hypertension  This is a recurrent problem. The current episode started more than 1 year ago. The problem has been waxing and waning since onset. The problem is resistant. Associated symptoms include palpitations. Pertinent negatives include no anxiety, blurred vision, chest pain, headaches, malaise/fatigue, neck pain, orthopnea, peripheral edema, PND, shortness of breath or sweats. Risk factors for coronary artery disease include obesity. Past treatments include beta blockers and calcium channel blockers. The current treatment provides mild improvement. Compliance problems include medication side effects.        Review of Systems   Constitutional:  Negative for activity change, appetite change, fatigue and malaise/fatigue.   Eyes:  Negative for blurred vision.   Respiratory:  Negative for shortness of breath.    Cardiovascular:  Positive for palpitations. Negative for chest pain,  "orthopnea and PND.   Gastrointestinal:  Positive for abdominal distention and constipation.   Musculoskeletal:  Negative for neck pain.   Neurological:  Negative for headaches.       Objective     /76 (BP Location: Right arm, Patient Position: Sitting, Cuff Size: Large)   Pulse 65   Resp 20   Ht 6' 2\" (1.88 m)   Wt 132 kg (290 lb)   SpO2 96%   BMI 37.23 kg/m²     Physical Exam  Vitals reviewed.   Constitutional:       Appearance: Normal appearance. He is obese.   Cardiovascular:      Rate and Rhythm: Normal rate and regular rhythm.      Pulses: Normal pulses.      Heart sounds: Normal heart sounds.   Pulmonary:      Effort: Pulmonary effort is normal.      Breath sounds: Normal breath sounds.   Abdominal:      General: Abdomen is flat. Bowel sounds are normal. There is no distension.      Palpations: Abdomen is soft.   Musculoskeletal:      Right lower leg: No edema.      Left lower leg: No edema.   Lymphadenopathy:      Cervical: No cervical adenopathy.   Neurological:      Mental Status: He is alert.   Psychiatric:         Mood and Affect: Mood normal.       Administrative Statements           "

## 2024-09-20 DIAGNOSIS — I10 HYPERTENSION, UNSPECIFIED TYPE: Primary | ICD-10-CM

## 2024-09-20 RX ORDER — LOSARTAN POTASSIUM 50 MG/1
50 TABLET ORAL DAILY
Qty: 90 TABLET | Refills: 3 | Status: SHIPPED | OUTPATIENT
Start: 2024-09-20

## 2024-09-24 ENCOUNTER — TELEPHONE (OUTPATIENT)
Dept: CARDIOLOGY CLINIC | Facility: CLINIC | Age: 64
End: 2024-09-24

## 2024-09-24 DIAGNOSIS — R00.2 PALPITATIONS: ICD-10-CM

## 2024-09-24 DIAGNOSIS — I10 HYPERTENSION, UNSPECIFIED TYPE: Primary | ICD-10-CM

## 2024-09-24 RX ORDER — FLECAINIDE ACETATE 50 MG/1
50 TABLET ORAL EVERY 12 HOURS SCHEDULED
Qty: 60 TABLET | Refills: 3 | Status: SHIPPED | OUTPATIENT
Start: 2024-09-24 | End: 2024-09-25 | Stop reason: SDUPTHER

## 2024-09-24 NOTE — TELEPHONE ENCOUNTER
Reason for call:   [x] Refill   [] Prior Auth  [] Other:     Office:   [] PCP/Provider -   [x] Specialty/Provider -     Medication:     flecainide (TAMBOCOR) 50 mg tablet       Dose/Frequency: Take 1 tablet (50 mg total) by mouth every 12 (twelve) hours,     Quantity:  60 tablet     Pharmacy: [facility] - [address] [phone]     Does the patient have enough for 3 days?   [] Yes   [x] No - Send as HP to POD

## 2024-09-24 NOTE — TELEPHONE ENCOUNTER
Pt picked up recent Rx of losartan potassium 50mg daily and he read on the bottle that he should not take any other meds containing potassium. He is also taking potassium chloride 10 mEq daily.    Should he continue taking both meds? Please advise. Thank you!

## 2024-09-24 NOTE — TELEPHONE ENCOUNTER
Spoke with pt. He understands he should stop taking his potassium chloride 10 mEq daily and only continue taking losartan potassium 50mg daily per Dr. Kay. He is also aware he should repeat his BMP 1 week after starting losartan, which would be 09/30/24.     BMP has been placed and pt plans to  there script on 09/30/24 from our office and have it performed at [facility] labs right after. He will be giving us a call to confirm he had the BMP performed.    Thanks!

## 2024-09-25 DIAGNOSIS — R00.2 PALPITATIONS: ICD-10-CM

## 2024-09-25 RX ORDER — FLECAINIDE ACETATE 50 MG/1
50 TABLET ORAL EVERY 12 HOURS SCHEDULED
Qty: 180 TABLET | Refills: 3 | Status: SHIPPED | OUTPATIENT
Start: 2024-09-25

## 2024-09-27 ENCOUNTER — REMOTE DEVICE CLINIC VISIT (OUTPATIENT)
Dept: CARDIOLOGY CLINIC | Facility: CLINIC | Age: 64
End: 2024-09-27
Payer: COMMERCIAL

## 2024-09-27 DIAGNOSIS — I47.29 NSVT (NONSUSTAINED VENTRICULAR TACHYCARDIA) (HCC): ICD-10-CM

## 2024-09-27 DIAGNOSIS — I48.0 PAROXYSMAL ATRIAL FIBRILLATION (HCC): Primary | ICD-10-CM

## 2024-09-27 PROCEDURE — 93298 REM INTERROG DEV EVAL SCRMS: CPT | Performed by: STUDENT IN AN ORGANIZED HEALTH CARE EDUCATION/TRAINING PROGRAM

## 2024-09-27 NOTE — PROGRESS NOTES
"MDT LNQ22 ILR/ ACTIVE SYSTEM IS MRI CONDITIONAL   CARELINK TRANSMISSION: LOOP RECORDER. PRESENTING RHYTHM VS @ 50 BPM. BATTERY STATUS \"OK.\" NO PATIENT OR DEVICE ACTIVATED EPISODES. HOWEVER THERE IS AN EGRAM SHOWING AF. HX OFP AF. PT TAKES FLECAINIDE, ELIQUIS AND COREG. NORMAL DEVICE FUNCTION. DL   "

## 2024-09-30 ENCOUNTER — VBI (OUTPATIENT)
Dept: ADMINISTRATIVE | Facility: OTHER | Age: 64
End: 2024-09-30

## 2024-09-30 LAB
ANION GAP SERPL CALCULATED.3IONS-SCNC: 11 MMOL/L (ref 3–11)
BUN SERPL-MCNC: 13 MG/DL (ref 7–28)
CALCIUM SERPL-MCNC: 9.5 MG/DL (ref 8.5–10.1)
CHLORIDE SERPL-SCNC: 103 MMOL/L (ref 100–109)
CO2 SERPL-SCNC: 26 MMOL/L (ref 21–31)
CREAT SERPL-MCNC: 0.91 MG/DL (ref 0.53–1.3)
CYTOLOGY CMNT CVX/VAG CYTO-IMP: ABNORMAL
GFR/BSA.PRED SERPLBLD CYS-BASED-ARV: 94 ML/MIN/{1.73_M2}
GLUCOSE SERPL-MCNC: 180 MG/DL (ref 65–99)
POTASSIUM SERPL-SCNC: 4.1 MMOL/L (ref 3.5–5.2)
SODIUM SERPL-SCNC: 140 MMOL/L (ref 135–145)

## 2024-09-30 NOTE — TELEPHONE ENCOUNTER
09/30/24 3:35 PM     Chart reviewed for CRC: Colonoscopy was/were submitted to the patient's insurance.     Lamar Jacinto MA   [facility]

## 2024-10-11 ENCOUNTER — OFFICE VISIT (OUTPATIENT)
Dept: CARDIOLOGY CLINIC | Facility: CLINIC | Age: 64
End: 2024-10-11
Payer: COMMERCIAL

## 2024-10-11 VITALS
DIASTOLIC BLOOD PRESSURE: 62 MMHG | SYSTOLIC BLOOD PRESSURE: 102 MMHG | HEART RATE: 61 BPM | BODY MASS INDEX: 36.77 KG/M2 | WEIGHT: 286.5 LBS | HEIGHT: 74 IN

## 2024-10-11 DIAGNOSIS — I48.0 PAROXYSMAL ATRIAL FIBRILLATION (HCC): Primary | ICD-10-CM

## 2024-10-11 DIAGNOSIS — G47.00 INSOMNIA, UNSPECIFIED TYPE: ICD-10-CM

## 2024-10-11 PROCEDURE — 99214 OFFICE O/P EST MOD 30 MIN: CPT | Performed by: INTERNAL MEDICINE

## 2024-10-11 PROCEDURE — 93000 ELECTROCARDIOGRAM COMPLETE: CPT | Performed by: INTERNAL MEDICINE

## 2024-10-11 NOTE — PROGRESS NOTES
"HEART AND VASCULAR  CARDIAC ELECTROPHYSIOLOGY   HEART RHYTHM CENTER  Formerly [facility], 1928–1983    Outpatient Follow-up  Today's Date: 10/11/24        Patient name: Daniel Gardiner  YOB: 1960  Sex: male         Chief Complaint: F/u afib      ASSESSMENT:  Problem List Items Addressed This Visit          Cardiovascular and Mediastinum    Paroxysmal atrial fibrillation (HCC) - Primary    Relevant Orders    POCT ECG     Other Visit Diagnoses       Insomnia, unspecified type        Relevant Orders    Ambulatory Referral to Sleep Medicine            65 yo male  1) Paroysmal afib and typical flutter, post CTI ablation and PFA of PVI, loop. Since then has has had \"seconds\" of mild palpitaitons correlating with benign PAC, PVC at night and occassional short bursts of PAT/SVT . We have him on low dose flecianide 50mg bid and coreg which seems to help. Remains on anticoagulaton. [id number]=6 but turns 65 this year.  RBBB and LAFB    2) HTN very labile and low today and checks at home often -160s . We added losartan to coreg and stopped amlodipine. He gets dizzy when BP low.    3) Dyslpidemia on statin    4) Insomnia, sleeping disorder, no recent sleep study. Has anxiety at night.    5) Sedentary with multiple orthopedic issues  6) obesity      PLAN:  Continue current meds. I am not sure if worth treating BP more aggressively since symptomatic when gets low BP and quite labile  Sees Dr Martinez next month.     Reassured his rhyhm went from 100% AFib/flutter to <0.1% so its markedly better, the seconds of palpitations are benign arrhythmias.  He had one brief episode of Afib in August since ablation only.     Refer to Sleep medicine, suspect ROSITA driving issues.    Try to exercise more, consider seated Bike since doesn't seem to do well with walking due to orthopedic issues.    Stop drinking 5 Propels a day, artificial sweetner is pro inflammatory may increase risk of MI/stroke and weight " gain.        Follow up in: 6 months    Orders Placed This Encounter   Procedures    Ambulatory Referral to Sleep Medicine    POCT ECG     There are no discontinued medications.      .............................................................................................    HPI/Subjective:     63 yo male who had afib/flutter ablation May 2024 after admission for rapid flutter Loop implanted. He has brief episodes of palpitations at night and triggers loop frequently. He doesn't sleep well. Gets dizzy when BP runs low, but then BP runs high often.   Sedentary, orthopedic issues prevent walking/exercise.       Please note HPI is listed by problem with with update following it, it is copied again in the assessment above and reflects medical decision making as well.           Past Medical History:   Diagnosis Date    A-fib (HCC) 08/24/2021    Arthritis     Disc degeneration, lumbosacral 01/06/2016    GERD (gastroesophageal reflux disease)     Hyperlipidemia     hypercholesterolemia    Hypertension     last assessed 11/22/17    Pneumonia of both lower lobes due to infectious organism 01/29/2018    s/p Afib ablation (PFA PVI, RF CTI) 5/8/2024 05/09/2024       No Known Allergies  I reviewed the Home Medication list and Allergies in the chart.   Scheduled Meds:  Current Outpatient Medications   Medication Sig Dispense Refill    apixaban (Eliquis) 5 mg Take 1 tablet (5 mg total) by mouth 2 (two) times a day 180 tablet 1    ascorbic acid (VITAMIN C) 500 mg tablet Take 500 mg by mouth daily      carvedilol (COREG) 25 mg tablet TAKE 1 TABLET TWICE A DAY WITH MEALS 180 tablet 3    Coenzyme Q10 (COQ-10) 10 MG CAPS Take by mouth      flecainide (TAMBOCOR) 50 mg tablet Take 1 tablet (50 mg total) by mouth every 12 (twelve) hours 180 tablet 3    losartan (COZAAR) 50 mg tablet Take 1 tablet (50 mg total) by mouth daily 90 tablet 3    Multiple Vitamins-Minerals (MENS MULTIVITAMIN PLUS PO) Take by mouth      simvastatin (ZOCOR) 40  mg tablet TAKE 1 TABLET DAILY AT BEDTIME 100 tablet 1    tadalafil (CIALIS) 10 MG tablet Take 1 tablet (10 mg total) by mouth daily as needed for erectile dysfunction 10 tablet 3    Zinc 50 MG CAPS Take by mouth       No current facility-administered medications for this visit.     PRN Meds:.        Family History   Problem Relation Age of Onset    Obesity Mother     Aneurysm Father        Social History     Socioeconomic History    Marital status: /Civil Union     Spouse name: Not on file    Number of children: 1    Years of education: Not on file    Highest education level: Not on file   Occupational History    Occupation:    Tobacco Use    Smoking status: Light Smoker     Current packs/day: 0.50     Average packs/day: 0.5 packs/day for 40.0 years (20.0 ttl pk-yrs)     Types: Cigarettes    Smokeless tobacco: Never   Vaping Use    Vaping status: Never Used   Substance and Sexual Activity    Alcohol use: Yes     Comment: Occassional 1 x / month    Drug use: No    Sexual activity: Yes     Partners: Female     Birth control/protection: None   Other Topics Concern    Not on file   Social History Narrative    Lives with wife    Full time employment    active advance directive    1 child ; son, passed away        Does not consume caffeine     Social Determinants of Health     Financial Resource Strain: Not on file   Food Insecurity: No Food Insecurity (4/28/2024)    Hunger Vital Sign     Worried About Running Out of Food in the Last Year: Never true     Ran Out of Food in the Last Year: Never true   Transportation Needs: No Transportation Needs (4/28/2024)    PRAPARE - Transportation     Lack of Transportation (Medical): No     Lack of Transportation (Non-Medical): No   Physical Activity: Not on file   Stress: Not on file   Social Connections: Not on file   Intimate Partner Violence: Not on file   Housing Stability: Low Risk  (4/28/2024)    Housing Stability Vital Sign     Unable to Pay for Housing in  "the Last Year: No     Number of Times Moved in the Last Year: 1     Homeless in the Last Year: No         OBJECTIVE:    /62 (BP Location: Left arm, Patient Position: Sitting, Cuff Size: Standard)   Pulse 61   Ht 6' 2\" (1.88 m)   Wt 130 kg (286 lb 8 oz)   BMI 36.78 kg/m²   Vitals:    10/11/24 1055   Weight: 130 kg (286 lb 8 oz)     GEN: No acute distress, Alert and oriented, well appearing  HEENT:External ears normal, oral pharynx clear, mucous membranes moist  EYES: Pupils equal, sclera anicteric, midline, normal conjuctiva  NECK: No JVD, supple, no obvious masses or thryomegaly or goiter  CARDIOVASCULAR:  RRR, No murmur, rub, gallops S1,S2  LUNGS: Clear To auscultation bilaterally, normal effort, no rales, rhonchi, crackles   ABDOMEN:  nondistended,  without obvious organomegaly or ascites  EXTREMITIES/VASCULAR:  No edema. warm an well perfused.  PSYCH: Normal Affect,  linear speech pattern without evidence of psychosis.   NEURO: Grossly intact, moving all extremiteis equal, face symmetric, alert and responsive, no obvious focal defecits   GAIT:  Ambulates normally without difficulty  HEME: No bleeding, bruising, petechia, purpura   SKIN: No significant rashes on visibile skin, warm, no diaphoresis or pallor.     Lab Results:       LABS:      Chemistry        Component Value Date/Time     11/22/2017 1027    K 4.1 09/30/2024 1333    K 4.5 08/19/2023 1035     09/30/2024 1333     08/19/2023 1035    CO2 26 09/30/2024 1333    CO2 26 08/19/2023 1035    BUN 13 09/30/2024 1333    BUN 13 08/19/2023 1035    CREATININE 0.91 09/30/2024 1333    CREATININE 0.85 06/11/2024 0458    CREATININE 1.07 08/19/2023 1035        Component Value Date/Time    CALCIUM 9.5 09/30/2024 1333    CALCIUM 9.3 08/19/2023 1035    ALKPHOS 74 05/23/2024 0753    ALKPHOS 68 08/19/2023 1035    AST 17 05/23/2024 0753    AST 19 08/19/2023 1035    ALT 24 05/23/2024 0753    ALT 27 08/19/2023 1035    BILITOT 0.4 11/22/2017 1027    " "        Lab Results   Component Value Date    CHOL 156 11/22/2017    CHOL 159 10/22/2016    CHOL 157 01/09/2016     Lab Results   Component Value Date    HDL 33 05/23/2024    HDL 35 (L) 04/03/2023    HDL 34 (L) 08/03/2022     Lab Results   Component Value Date    LDLCALC 54 05/23/2024    LDLCALC 67 04/03/2023    LDLCALC 159 (H) 08/03/2022     Lab Results   Component Value Date    TRIG 243 (H) 05/23/2024    TRIG 172 (H) 04/03/2023    TRIG 254 (H) 08/03/2022     No results found for: \"CHOLHDL\"    IMAGING: Cardiac EP device report    Result Date: 10/8/2024  Narrative: ST[facility]LEY [facility]ON ILR/ ACTIVE SYSTEM IS MRI CONDITIONAL NON-BILLABLE CARELINK TRANSMISSION: ALERT/PATIENT ACTIVATED EPISODE c/o Heart fluttering  Not at all active  Sitting rapid beats; ECG SHOWING SR @ AVG 62 BPM WITH SVT RUN, DURATION 24 BEATS @  BPM. EF 58% (4/28/24 ECHO). PATIENT TAKING ELIQUIS, FLECAINIDE, CARVEDILOL.  NORMAL DEVICE FUNCTION.  ES     Cardiac EP device report    Result Date: 9/27/2024  Narrative: [facility] ILR/ ACTIVE SYSTEM IS MRI CONDITIONAL CARELINK TRANSMISSION: LOOP RECORDER. PRESENTING RHYTHM VS @ 50 BPM. BATTERY STATUS \"OK.\" NO PATIENT OR DEVICE ACTIVATED EPISODES. HOWEVER THERE IS AN EGRAM SHOWING AF. HX OFP AF. PT TAKES FLECAINIDE, ELIQUIS AND COREG. NORMAL DEVICE FUNCTION. DL     Cardiac EP device report    Result Date: 9/15/2024  Narrative: [facility] ILR/ ACTIVE SYSTEM IS MRI CONDITIONAL NON-BILLABLE. CARELINK TRANSMISSION: ALERT/SYMPTOM. PER PT: Heart pounding  Not at all active  Deep heart beats felt a little light headed. EGRAM SHOWS SR, SB AND PVCs. DL     Cardiac EP device report    Result Date: 9/13/2024  Narrative: ST[facility]LEY [facility] ILR/ ACTIVE SYSTEM IS MRI CONDITIONAL NON-BILLABLE. CARELINK TRANSMISSION: ALERT/SYMPTOMS. PER PT: #27 Other  Not at all active  Having mussel spasms in chest? EGRAM SHOWS SR W/ RARE PAC. #28 Heart fluttering  Not at all active  Felt multiple beats. EGRAM SHOWS SR, PACs AND PAT. DL        " Cardiac testing:   Results for orders placed during the hospital encounter of 21    Echo complete with contrast if indicated    Narrative  [place] [id number] [phone]    Transthoracic Echocardiogram  2D, M-mode, Doppler, and Color Doppler    Study date:  23-Aug-2021    Patient: DEONDRE REYES  MR number: [id number]  Account number: [id number]  : 1960  Age: 61 years  Gender: Male  Status: Outpatient  Location: Bedside  Height: 73 in  Weight: 274.3 lb  BP: 114/ 75 mmHg    Indications: Atrial Fibrillation    Diagnoses: I48.0 - Atrial fibrillation    Sonographer:  NII Sierra  Primary Physician:  Linh Erickson MD  Referring Physician:  Luis Eduardo Bosch MD  Group:  [facility]  Interpreting Physician:  Aroldo Greenwood MD    SUMMARY    LEFT VENTRICLE:  Size was normal.  Systolic function was normal. Ejection fraction was estimated to be 60 %.  There was mild concentric hypertrophy.    RIGHT VENTRICLE:  The size was normal.  Systolic function was normal.    LEFT ATRIUM:  The atrium was mildly dilated.    RIGHT ATRIUM:  The atrium was mildly dilated.    MITRAL VALVE:  There was trace regurgitation.    TRICUSPID VALVE:  There was trace regurgitation.    HISTORY: PRIOR HISTORY: Hypertension,HLD,Tobacco Abuse    PROCEDURE: The procedure was performed at the bedside. This was a routine study. The transthoracic approach was used. The study included complete 2D imaging, M-mode, complete spectral Doppler, and color Doppler. The heart rate was 98 bpm,  at the start of the study. Images were obtained from the parasternal, apical, subcostal, and suprasternal notch acoustic windows. Image quality was adequate.    LEFT VENTRICLE: Size was normal. Systolic function was normal. Ejection fraction was estimated to be 60 %. There were no regional wall motion abnormalities. Wall thickness was mildly increased.  There was mild concentric hypertrophy.  DOPPLER: Transmitral flow pattern: atrial fibrillation. The study was not technically sufficient to allow evaluation of LV diastolic function.    RIGHT VENTRICLE: The size was normal. Systolic function was normal. Wall thickness was normal.    LEFT ATRIUM: The atrium was mildly dilated.    RIGHT ATRIUM: The atrium was mildly dilated.    MITRAL VALVE: Valve structure was normal. There was normal leaflet separation. DOPPLER: The transmitral velocity was within the normal range. There was no evidence for stenosis. There was trace regurgitation.    AORTIC VALVE: The valve was trileaflet. Leaflets exhibited normal thickness and normal cuspal separation. DOPPLER: Transaortic velocity was within the normal range. There was no evidence for stenosis. There was no regurgitation.    TRICUSPID VALVE: The valve structure was normal. There was normal leaflet separation. DOPPLER: The transtricuspid velocity was within the normal range. There was no evidence for stenosis. There was trace regurgitation. The tricuspid jet  envelope definition was inadequate for estimation of RV systolic pressure. There are no indirect findings suggestive of moderate or severe pulmonary hypertension.    PULMONIC VALVE: Leaflets exhibited normal thickness, no calcification, and normal cuspal separation. DOPPLER: The transpulmonic velocity was within the normal range. There was no regurgitation.    PERICARDIUM: There was no pericardial effusion. The pericardium was normal in appearance.    AORTA: The root exhibited normal size.    SYSTEMIC VEINS: IVC: The inferior vena cava was normal in size and course. Respirophasic changes were normal.    SYSTEM MEASUREMENT TABLES    2D  %FS: 24.99 %  Ao Diam: 2.93 cm  Ao asc: 2.84 cm  EDV(Teich): 95.3 ml  EF(Teich): 49.54 %  ESV(Teich): 48.09 ml  IVSd: 1.22 cm  LA Area: 29.05 cm2  LA Diam: 4.4 cm  LVEDV MOD A4C: 125.61 ml  LVEF MOD A4C: 64.28 %  LVESV MOD A4C: 44.86 ml  LVIDd:  4.56 cm  LVIDs: 3.42 cm  LVLd A4C: 8.85 cm  LVLs A4C: 7.39 cm  LVPWd: 1.21 cm  RA Area: 21.84 cm2  RVIDd: 3.34 cm  SV MOD A4C: 80.74 ml  SV(Teich): 47.21 ml    CW  TR Vmax: 2.13 m/s  TR maxP.21 mmHg    MM  TAPSE: 2.05 cm    [facility]    Prepared and electronically signed by    Aroldo Greenwood MD  Signed 23-Aug-2021 11:29:29    No results found for this or any previous visit.    No results found for this or any previous visit.    No results found for this or any previous visit.          I reviewed and interpreted the following LABS/EKG/TELE/IMAGING and below is summary of my interpretation (if data available):    Current EKG and Rhythm Strip:NSR RBBB< LAFB        Interrogation of Pacemaker/Defibrillator/Loop (prior recorded events), etc: see discussion

## 2024-10-18 ENCOUNTER — OFFICE VISIT (OUTPATIENT)
Dept: FAMILY MEDICINE CLINIC | Facility: CLINIC | Age: 64
End: 2024-10-18
Payer: COMMERCIAL

## 2024-10-18 VITALS
SYSTOLIC BLOOD PRESSURE: 120 MMHG | OXYGEN SATURATION: 97 % | BODY MASS INDEX: 36.93 KG/M2 | HEART RATE: 63 BPM | HEIGHT: 74 IN | WEIGHT: 287.8 LBS | TEMPERATURE: 97.6 F | DIASTOLIC BLOOD PRESSURE: 82 MMHG

## 2024-10-18 DIAGNOSIS — F17.210 CIGARETTE NICOTINE DEPENDENCE WITHOUT COMPLICATION: ICD-10-CM

## 2024-10-18 DIAGNOSIS — R11.0 NAUSEA: ICD-10-CM

## 2024-10-18 DIAGNOSIS — R09.89 LABILE BLOOD PRESSURE: Primary | ICD-10-CM

## 2024-10-18 DIAGNOSIS — E78.2 MIXED HYPERLIPIDEMIA: ICD-10-CM

## 2024-10-18 DIAGNOSIS — R73.02 IMPAIRED GLUCOSE TOLERANCE: ICD-10-CM

## 2024-10-18 DIAGNOSIS — F17.218 CIGARETTE NICOTINE DEPENDENCE WITH OTHER NICOTINE-INDUCED DISORDER: ICD-10-CM

## 2024-10-18 PROBLEM — F17.200 NICOTINE DEPENDENCE: Status: ACTIVE | Noted: 2024-10-18

## 2024-10-18 LAB
ALBUMIN SERPL-MCNC: 4.3 G/DL (ref 3.5–5.7)
ALP SERPL-CCNC: 86 U/L (ref 35–120)
ALT SERPL-CCNC: 52 U/L
ANION GAP SERPL CALCULATED.3IONS-SCNC: 12 MMOL/L (ref 3–11)
AST SERPL-CCNC: 31 U/L
BILIRUB SERPL-MCNC: 0.5 MG/DL (ref 0.2–1)
BUN SERPL-MCNC: 11 MG/DL (ref 7–28)
CALCIUM SERPL-MCNC: 9.8 MG/DL (ref 8.5–10.1)
CHLORIDE SERPL-SCNC: 104 MMOL/L (ref 100–109)
CHOLEST SERPL-MCNC: 237 MG/DL
CHOLEST/HDLC SERPL: 7.9 {RATIO}
CO2 SERPL-SCNC: 25 MMOL/L (ref 21–31)
CREAT SERPL-MCNC: 1.08 MG/DL (ref 0.53–1.3)
CYTOLOGY CMNT CVX/VAG CYTO-IMP: ABNORMAL
EST. AVERAGE GLUCOSE BLD GHB EST-MCNC: 131 MG/DL
GFR/BSA.PRED SERPLBLD CYS-BASED-ARV: 76 ML/MIN/{1.73_M2}
GLUCOSE SERPL-MCNC: 109 MG/DL (ref 65–99)
HBA1C MFR BLD: 6.2 %
HDLC SERPL-MCNC: 30 MG/DL (ref 23–92)
LDLC SERPL CALC-MCNC: 143 MG/DL
NONHDLC SERPL-MCNC: 207 MG/DL
POTASSIUM SERPL-SCNC: 4.6 MMOL/L (ref 3.5–5.2)
PROT SERPL-MCNC: 7 G/DL (ref 6.3–8.3)
SODIUM SERPL-SCNC: 141 MMOL/L (ref 135–145)
TRIGL SERPL-MCNC: 321 MG/DL

## 2024-10-18 PROCEDURE — 99214 OFFICE O/P EST MOD 30 MIN: CPT | Performed by: FAMILY MEDICINE

## 2024-10-18 RX ORDER — VARENICLINE TARTRATE 0.5 (11)-1
KIT ORAL
Qty: 53 EACH | Refills: 0 | Status: SHIPPED | OUTPATIENT
Start: 2024-10-18 | End: 2024-10-21 | Stop reason: SDUPTHER

## 2024-10-18 NOTE — ASSESSMENT & PLAN NOTE
24 hour  ambulatory blood pressure monitoring, f/u with cards    Orders:    24 hour blood pressure monitoring; Future

## 2024-10-18 NOTE — ASSESSMENT & PLAN NOTE
Orders:    Varenicline Tartrate, Starter, (Chantix Starting Month Jeff) 0.5 MG X 11 & 1 MG X 42 TBPK; 0.5 mg once daily for 3 days then 0.5mg twice daily for days 4-7 then 1 mg twice daily

## 2024-10-18 NOTE — PROGRESS NOTES
Ambulatory Visit  Name: Daniel Gardiner      : 1960      MRN: [id number]  Encounter Provider: Linh Erickson MD  Encounter Date: 10/18/2024   Encounter department: [facility] PR[facility]    Assessment & Plan  Labile blood pressure  24 hour  ambulatory blood pressure monitoring, f/u with cards    Orders:    24 hour blood pressure monitoring; Future    Cigarette nicotine dependence with other nicotine-induced disorder    Orders:    Varenicline Tartrate, Starter, (Chantix Starting Month ) 0.5 MG X 11 & 1 MG X 42 TBPK; 0.5 mg once daily for 3 days then 0.5mg twice daily for days 4-7 then 1 mg twice daily    Cigarette nicotine dependence without complication  Script for chantix starter pack, also rec nicotine patch         Nausea  Rec gi for scope to eval for ulcers, rec pepcid as needed    Orders:    Ambulatory Referral to Gastroenterology; Future    Mixed hyperlipidemia  Restart  chol  med three times/week    Orders:    Lipid panel    Comprehensive metabolic panel    Impaired glucose tolerance  Random bs 180, recheck with HBA1C    Orders:    Hemoglobin A1C; Future    Hemoglobin A1C       History of Present Illness     Patient presents with:  Follow-up: Bp has been high, stomach issues and wants to quit smoking-suggest Chantix in combo with patch, worried about weight gain  Nausea: Stomach not quite right   Trouble falling asleep, heartburn at night,using baking soda  Stopped cholesterol med-due for lab, agreeable to  three times/week    Hypertension  This is a recurrent problem. The current episode started more than 1 year ago. The problem has been waxing and waning since onset. The problem is resistant. Associated symptoms include palpitations. Pertinent negatives include no chest pain, headaches or shortness of breath. There are no associated agents to hypertension. Risk factors for coronary artery disease include dyslipidemia, obesity and smoking/tobacco exposure. The current treatment provides  "mild improvement. Compliance problems include exercise.  Identifiable causes of hypertension include sleep apnea.         Review of Systems   Constitutional:  Negative for activity change, appetite change and fatigue.   Respiratory:  Negative for shortness of breath.    Cardiovascular:  Positive for palpitations. Negative for chest pain.   Gastrointestinal:  Positive for nausea.   Neurological:  Negative for dizziness, light-headedness and headaches.   Hematological:  Negative for adenopathy.           Objective     /82 (BP Location: Left arm, Patient Position: Sitting, Cuff Size: Extra-Large)   Pulse 63   Temp 97.6 °F (36.4 °C) (Tympanic)   Ht 6' 2\" (1.88 m)   Wt 131 kg (287 lb 12.8 oz)   SpO2 97%   BMI 36.95 kg/m²     Physical Exam  Vitals reviewed.   Constitutional:       Appearance: Normal appearance. He is obese.   Neck:      Vascular: No carotid bruit.   Cardiovascular:      Rate and Rhythm: Normal rate and regular rhythm.      Pulses: Normal pulses.      Heart sounds: Normal heart sounds.   Pulmonary:      Effort: Pulmonary effort is normal.      Breath sounds: Normal breath sounds.   Musculoskeletal:      Right lower leg: No edema.      Left lower leg: No edema.   Lymphadenopathy:      Cervical: No cervical adenopathy.   Neurological:      Mental Status: He is alert.   Psychiatric:         Mood and Affect: Mood normal.         "

## 2024-10-18 NOTE — ASSESSMENT & PLAN NOTE
Rec gi for scope to eval for ulcers, rec pepcid as needed    Orders:    Ambulatory Referral to Gastroenterology; Future

## 2024-10-21 ENCOUNTER — TELEPHONE (OUTPATIENT)
Age: 64
End: 2024-10-21

## 2024-10-21 DIAGNOSIS — F17.218 CIGARETTE NICOTINE DEPENDENCE WITH OTHER NICOTINE-INDUCED DISORDER: ICD-10-CM

## 2024-10-21 RX ORDER — VARENICLINE TARTRATE 0.5 (11)-1
KIT ORAL
Qty: 53 EACH | Refills: 0 | Status: SHIPPED | OUTPATIENT
Start: 2024-10-21

## 2024-10-21 NOTE — TELEPHONE ENCOUNTER
Patient states he was written a script for Chantix but [facility] month [facility]. If this cannot be done, patient asking this be sent to the local pharmacy- [facility] [place].

## 2024-10-22 NOTE — TELEPHONE ENCOUNTER
Patient is aware that medication was sent to [facility]. Patient also states that his bp this morning was 190/112 and 205/116 before medication and is now  160/85 after taking medication at 11:30.

## 2024-10-23 NOTE — TELEPHONE ENCOUNTER
Spoke to patient he was advised with the below mentioned recommendations from Dr. Erickson. Patient states that his bp reading today was 140/82  Patient will keep a log and will notify the office if any changes.Patient did say that he gets lightheaded when he gets off the treadmill machine.

## 2024-10-24 DIAGNOSIS — I48.0 PAROXYSMAL A-FIB (HCC): ICD-10-CM

## 2024-10-24 DIAGNOSIS — I10 HYPERTENSION, UNSPECIFIED TYPE: ICD-10-CM

## 2024-10-24 RX ORDER — APIXABAN 5 MG/1
5 TABLET, FILM COATED ORAL 2 TIMES DAILY
Qty: 180 TABLET | Refills: 1 | Status: SHIPPED | OUTPATIENT
Start: 2024-10-24

## 2024-10-24 RX ORDER — LOSARTAN POTASSIUM 50 MG/1
50 TABLET ORAL 2 TIMES DAILY
Qty: 60 TABLET | Refills: 3 | Status: SHIPPED | OUTPATIENT
Start: 2024-10-24

## 2024-10-24 NOTE — TELEPHONE ENCOUNTER
Spoke with pt and informed him Dr. Kay agrees with the recommendation of increasing losartan to 50mg BID. Pt understood and prefers the rx to be sent to [facility].    Thanks!

## 2024-10-24 NOTE — TELEPHONE ENCOUNTER
Patient reports he is scheduled to be seen by cardiology 11/20/24 and is on a wait list to be seen sooner. He reports his BP was 164/89 this morning and he has only taken 2 losartan yesterday (once in the am and once at 1900). He would like a call back to advise what he should do until seen by cardiology and is concerned about his script running out early.

## 2024-10-24 NOTE — TELEPHONE ENCOUNTER
Pt called as his PCP recommended increasing his losartan 50mg daily to BID as his BP's were elevated to 190/112 and 205/116 on 10/22/24 and 140/82 on 10/23/24. He is looking for Dr. Kay' opinion if he should increase it.    If you agree with this increase, he is also looking for a new med rxn with increased dosage.

## 2024-10-30 ENCOUNTER — TELEPHONE (OUTPATIENT)
Dept: FAMILY MEDICINE CLINIC | Facility: CLINIC | Age: 64
End: 2024-10-30

## 2024-10-30 DIAGNOSIS — R09.89 LABILE BLOOD PRESSURE: Primary | ICD-10-CM

## 2024-10-30 RX ORDER — DOXAZOSIN 2 MG/1
2 TABLET ORAL
Qty: 30 TABLET | Refills: 5 | Status: SHIPPED | OUTPATIENT
Start: 2024-10-30

## 2024-10-30 NOTE — TELEPHONE ENCOUNTER
Pt.informed about add cardura at night for bp, script sent to pharmacy understand and will follow.

## 2024-11-11 ENCOUNTER — PROCEDURE VISIT (OUTPATIENT)
Dept: UROLOGY | Facility: CLINIC | Age: 64
End: 2024-11-11
Payer: COMMERCIAL

## 2024-11-11 VITALS
WEIGHT: 292 LBS | HEART RATE: 65 BPM | BODY MASS INDEX: 37.47 KG/M2 | SYSTOLIC BLOOD PRESSURE: 146 MMHG | HEIGHT: 74 IN | DIASTOLIC BLOOD PRESSURE: 78 MMHG | OXYGEN SATURATION: 96 %

## 2024-11-11 DIAGNOSIS — N39.8 UROTHELIAL LESION: Primary | ICD-10-CM

## 2024-11-11 LAB
SL AMB  POCT GLUCOSE, UA: NORMAL
SL AMB LEUKOCYTE ESTERASE,UA: NORMAL
SL AMB POCT BILIRUBIN,UA: NORMAL
SL AMB POCT BLOOD,UA: NORMAL
SL AMB POCT CLARITY,UA: CLEAR
SL AMB POCT COLOR,UA: YELLOW
SL AMB POCT KETONES,UA: NORMAL
SL AMB POCT NITRITE,UA: NORMAL
SL AMB POCT PH,UA: 6
SL AMB POCT SPECIFIC GRAVITY,UA: 1.02
SL AMB POCT URINE PROTEIN: NORMAL
SL AMB POCT UROBILINOGEN: 0.2

## 2024-11-11 PROCEDURE — 52000 CYSTOURETHROSCOPY: CPT | Performed by: UROLOGY

## 2024-11-11 PROCEDURE — 81002 URINALYSIS NONAUTO W/O SCOPE: CPT | Performed by: UROLOGY

## 2024-11-11 NOTE — PROGRESS NOTES
Cystoscopy     Date/Time  11/11/2024 11:00 AM     Performed by  Donald Jenkins MD   Authorized by  Donald Jenkins MD         Procedure Details:  Procedure type: cystoscopy       Office Cystoscopy Procedure Note    Indication:    History of benign bladder tumor resection at the time of stone search.    Informed consent   The risks, benefits, complications, treatment options, and expected outcomes were discussed with the patient. The patient concurred with the proposed plan and provided informed consent.    Anesthesia  Lidocaine jelly 2%    Procedure  The patient was placed in the supineposition, was prepped and draped in the usual manner using sterile technique, and 2% lidocaine jelly instilled into the urethra.  A 17 F flexible cystoscope was then inserted into the urethra and the urethra and bladder carefully examined.  The following findings were noted:    Findings:  Urethra:  Normal  Prostate:  Normal  Bladder:  Normal  Ureteral orifices:  Normal  Other findings:  None     Specimens: None                 Complications:    None; patient tolerated the procedure well           Disposition: To home after 30 minute observation.           Condition: Stable    Plan:     No evidence of recurrent bladder lesion.  In the absence of confirmed cancer on prior resection, does not require any further cystoscopic follow-up    He was counseled to reestablish care if he notes any gross hematuria or symptoms of recurrent stone disease.  Follow-up as needed

## 2024-11-20 ENCOUNTER — OFFICE VISIT (OUTPATIENT)
Dept: CARDIOLOGY CLINIC | Facility: CLINIC | Age: 64
End: 2024-11-20
Payer: COMMERCIAL

## 2024-11-20 VITALS
HEART RATE: 61 BPM | OXYGEN SATURATION: 98 % | DIASTOLIC BLOOD PRESSURE: 82 MMHG | WEIGHT: 291 LBS | BODY MASS INDEX: 37.35 KG/M2 | HEIGHT: 74 IN | SYSTOLIC BLOOD PRESSURE: 152 MMHG

## 2024-11-20 DIAGNOSIS — E78.2 MIXED HYPERLIPIDEMIA: ICD-10-CM

## 2024-11-20 DIAGNOSIS — Z86.79 S/P ABLATION OF ATRIAL FIBRILLATION: ICD-10-CM

## 2024-11-20 DIAGNOSIS — I10 PRIMARY HYPERTENSION: ICD-10-CM

## 2024-11-20 DIAGNOSIS — I48.0 PAROXYSMAL ATRIAL FIBRILLATION (HCC): Primary | ICD-10-CM

## 2024-11-20 DIAGNOSIS — R09.89 LABILE BLOOD PRESSURE: ICD-10-CM

## 2024-11-20 DIAGNOSIS — Z98.890 S/P ABLATION OF ATRIAL FIBRILLATION: ICD-10-CM

## 2024-11-20 PROCEDURE — 99214 OFFICE O/P EST MOD 30 MIN: CPT | Performed by: INTERNAL MEDICINE

## 2024-11-20 RX ORDER — AMLODIPINE BESYLATE 5 MG/1
5 TABLET ORAL
Qty: 90 TABLET | Refills: 3 | Status: SHIPPED | OUTPATIENT
Start: 2024-11-20

## 2024-11-20 NOTE — PATIENT INSTRUCTIONS
"Patient Education     Low-sodium diet   The Basics   Written by the doctors and editors at [facility]   What is sodium? -- This is the main ingredient in table salt. It is also found in lots of foods. The body needs a very small amount of sodium to work normally, but most people eat much more sodium than their body needs.  Who should eat less sodium? -- Nearly everyone eats too much sodium. The average American takes in 3400 milligrams of sodium each day. Experts say that most people should have no more than 2300 milligrams a day.  Some people with certain health conditions should follow a low-sodium diet. Ask your doctor how much sodium you should have.  Why should I eat less sodium? -- Reducing the amount of sodium you eat can have lots of health benefits:   It can lower your blood pressure. This means that it can help lower your risk of stroke, heart attack, kidney damage, and lots of other health problems.   It can reduce the amount of fluid in your body, which means that your heart doesn't have to work as hard.   It can keep the kidneys from having to work too hard. This is especially important in people who have kidney disease.   It can reduce swelling in the ankles and belly, which can be uncomfortable and make it hard to move.   It can lower the chances of forming kidney stones.   It can help keep your bones strong.  Which foods have the most sodium? -- Processed foods have the most sodium. These foods usually come in cans, boxes, jars, and bags. They tend to have a lot of sodium even if they don't taste salty. In fact, many sweet foods have a lot of sodium in them. The only way to know for sure how much sodium is in a food is to check the label (figure 1).  Here are some examples of foods that often have too much sodium:   Canned soups   Rice and noodle mixes   Sauces, dressings, and condiments (such as ketchup and mustard)   Pre-made frozen meals (also called \"TV dinners\")   Deli meats, hot dogs, and " "cheeses   Smoked, cured, or pickled foods   Salted snack foods and nuts   Restaurant meals  What should I do to reduce the amount of sodium in my diet? -- Many people think that eating a low-sodium diet just means not adding salt to their food. But this is not true. Not adding salt at the table or when cooking will help a little. But almost all of the sodium you eat is already in the food you buy at the grocery store or at restaurants (figure 2).  Here are some tips to help you eat less sodium:   Avoid processed foods when possible. This is the most important thing you can do to eat less sodium. Processed foods include most foods that are sold in cans, boxes, jars, and bags.   Instead of buying pre-made, processed foods, buy fresh or fresh-frozen fruits and vegetables. (\"Fresh-frozen\" means that the food is frozen without anything added to it.)   Buy meats, fish, chicken, and turkey that are fresh instead of canned or sold at the deli counter. (Meats sold at the deli counter are high in sodium.)   Try to eat at restaurants less often.   When possible, try to make meals from scratch at home using fresh ingredients.   If you do buy canned or packaged foods, choose ones that are labeled \"sodium free\" or \"very low sodium\" (table 1). Or choose foods that have less than 400 milligrams of sodium in each serving. The amount of sodium in each serving appears on the nutrition label (figure 1).  The table has some examples of foods to avoid and foods to choose instead (table 2).  Whatever changes you make, make them slowly. Choose 1 thing to do differently, and do that for a while. If you can keep doing that change easily, add another change. For instance, if you usually eat green beans from a can, try buying fresh or fresh-frozen green beans and cooking them at home without adding salt. If that works for you, keep doing it. Then, choose another thing to change.  If you try making a change and it doesn't work right away, don't " "give up. See if you can reduce sodium in other ways. The important thing is to take small steps and to keep doing the changes that work for you.  Can I still eat out at restaurants sometimes? -- One of best ways to limit your sodium is to only eat out at restaurants every once in a while. When you do eat out, try to choose places that offer healthier choices and fresh ingredients.  No matter where you eat, when choosing your food:   Ask your  if your meal can be made without salt.   Avoid foods that come with sauces or dips.   Choose plain grilled meats or fish and steamed vegetables.   Ask for oil and vinegar for your salad, rather than dressing.   If a meal you really want has more sodium than you should have, consider saving half of it to eat another day.  What if food just does not taste as good without sodium? -- Starting a low-sodium diet can be hard. The good news is that your taste buds can get used to having less sodium. But you have to give them some time to adjust.  It can also help to try other ways to add flavor to your foods. Try things like herbs, spices, lemon juice, and vinegar.  What about salt substitutes? -- Flavoring your food with a salt substitute is a good way to reduce how much salt you eat. But check with your doctor or nurse before trying this. Some salt substitutes can be dangerous if you have certain health problems or take certain medicines.  Do medicines have sodium? -- Yes, some medicines contain sodium. If you are buying medicines you can get without a prescription, look to see how much sodium they have. Avoid products that have \"sodium carbonate\" or \"sodium bicarbonate\" unless your doctor prescribes them. (Sodium bicarbonate is baking soda.)  All topics are updated as new evidence becomes available and our peer review process is complete.  This topic retrieved from [employer] on: Mar 22, 2024.  Topic 22903 Version 14.0  Release: 32.2.4 - C32.80  © 2024 [employer] and/or its " "affiliates. All rights reserved.  figure 1: Food labels can be tricky     To figure out how much sodium you are eating, check the label to find out how much sodium is in 1 serving. If you are having more than 1 serving, multiply that amount by the number of servings you plan to eat. For instance, if you are going to eat this whole can of soup, you should multiply 850 by 2. That means that you will be having 1700 milligrams of sodium. That's more sodium than many people are supposed to have in 1 day.  Graphic 98463 Version 8.0  figure 2: Sources of sodium in your diet     Graphic 37742 Version 2.0  table 1: A guide to common nutrient claims and what they mean  Salt/sodium-free  Less than 5 mg of sodium per serving   Very low sodium  35 mg or less of sodium per serving   Low sodium  140 mg or less of sodium per serving   Reduced sodium  At least 25% less sodium than the regular product   Light or lite in sodium  At least 50% less sodium than the regular product   No salt added or unsalted  No salt is added during processing, but these products may not be salt/sodium-free unless stated   mg: milligram; %: percent.  Graphic 98398 Version 7.0  table 2: Ways to cut down on salt (sodium)  Avoid these foods  Try these foods instead    Cured and smoked foods like lake, sausage, smoked fish and meats, hot dogs, ham, lunch meats, corned beef, and pickles Fresh turkey, chicken, and lean beef   Canned fish (tuna, sardines) Unsalted tuna or sardines   Canned meats Fresh unprocessed meats, vegetable protein, and fish  Frozen and canned meats, vegetable protein, and fish that are labeled \"low-sodium\"   Salted pretzels, crackers, potato chips, tortilla chips, and nuts Low-sodium and unsalted versions of these foods   Most cheeses Low-sodium cheeses (check label for actual sodium content)   Sauces (tomato and cream, etc), tomato juices Low-sodium versions of these foods, such as low-sodium tomato juice   Processed, instant, and " "convenience foods like frozen dinners, packaged meals, canned soups, and boxed pasta blends Cook and freeze your own low-sodium meals, soups, and broths    If you do need to use convenience or processed foods, read the labels. Choose items with 140 to 200 mg of sodium per serving.    For an entire convenience meal (frozen dinner), try to find options with less than 500 to 600 mg of sodium.    If you used canned foods, look for those labeled \"sodium-free.\" Or you can rinse the canned food under water to lower the sodium content.   Fast foods and foods prepared at restaurants (unless without cheese, sauces, or added salt) Fresh foods and foods with sauces on the side  Request that food be prepared without cheese or added salt   Graphic 83021 Version 10.0  Consumer Information Use and Disclaimer   Disclaimer: This generalized information is a limited summary of diagnosis, treatment, and/or medication information. It is not meant to be comprehensive and should be used as a tool to help the user understand and/or assess potential diagnostic and treatment options. It does NOT include all information about conditions, treatments, medications, side effects, or risks that may apply to a specific patient. It is not intended to be medical advice or a substitute for the medical advice, diagnosis, or treatment of a health care provider based on the health care provider's examination and assessment of a patient's specific and unique circumstances. Patients must speak with a health care provider for complete information about their health, medical questions, and treatment options, including any risks or benefits regarding use of medications. This information does not endorse any treatments or medications as safe, effective, or approved for treating a specific patient. [employer] and its affiliates disclaim any warranty or liability relating to this information or the use thereof.The use of this information is governed by the " Terms of Use, available at https://www.[employer] Translationuwer.com/en/know/clinical-effectiveness-terms. 2024© [employer] and its affiliates and/or licensors. All rights reserved.  Copyright   © 2024 [employer] and/or its affiliates. All rights reserved.

## 2024-11-20 NOTE — PROGRESS NOTES
Cardiology Follow Up    Daniel Gardiner  1960  [id number]  [facility] [place]  [address]  [address]  [place] [id number] [phone]    1. Paroxysmal atrial fibrillation (HCC)        2. Primary hypertension  amLODIPine (NORVASC) 5 mg tablet      3. Labile blood pressure        4. Mixed hyperlipidemia        5. s/p Afib ablation (PFA PVI, RF CTI) 5/8/2024            Discussion/Summary:    1.  Paroxysmal atrial fibrillation/flutter - Daniel had this on a few occasion around the time of christiane Lyme disease.  He is feeling better since this has resolved.  He remained in sinus rhythm for some time on carvedilol and flecainide, with just short runs of atrial tachycardia.  Then in April of this year he had recurrence of his atrial fibrillation and then atrial flutter brought on by higher doses of flecainide with QRS widening.  He is now status post atrial fibrillation/flutter ablation and he follows with electrophysiology closely.  His loop recorder interrogations have just shown some short nonsustained bursts of SVT.  He is on Eliquis for stroke prevention.    2.  Hypertension - Daniel's blood pressures have been labile as of late.  They chronically run high at he does have drops in blood pressure particularly when he is active.  He had his amlodipine switched to losartan and his PCP added doxazosin.  This has not changed his events and if anything doxazosin can make this worse.  I am going to stop doxazosin and add back a lower dose of amlodipine at night, 5 mg daily.  He will continue carvedilol and losartan and will follow his blood pressure closely.  I did speak with him about keeping well-hydrated but still watching his salt intake.  If his blood pressure remains elevated chronically we will consider adding low-dose spironolactone.  He will be back to see us in 3 months.    3.  Mixed hyperlipidemia - His last LDL  increased to 159 and he has an HDL in the 30s.  Simvastatin 40 mg daily was added to his regimen and he will continue to have blood work follow closely.        Interval History:     Daniel comes in for follow-up given his paroxysmal atrial fibrillation and risk factors for cardiovascular disease.  I met Daniel during hospitalization in August 2021 in which he had new onset atrial fibrillation.  This occurred on a few occasions.  He was discharged but then had recurrent symptoms of his atrial fibrillation and we started flecainide in addition to his metoprolol.  He was also found have Lyme disease and was treated for this as well.  He had elevated LFTs associated with that.    He had an echocardiogram that showed normal LV systolic function and biatrial enlargement.  He ended up going through an exercise treadmill test that showed no evidence of ischemia at about 80% MPHR.  He had no symptoms during his treadmill test.      Over the next several visits we had been following Daniel's blood pressure closely as it was trending to up.  His amlodipine had been increased to 10 mg daily.  We changed him from Toprol-XL to carvedilol 25 mg twice daily.  He did then call the office in August 2022 and noted that he was having some palpitations/dizziness and he was tracking his heart rate/rhythm at home.  At that point we had him wear an extended ambulatory Holter.  Over 2 weeks this showed sinus rhythm with PACs that were relatively frequent than a few short runs of nonsustained SVT.  There was a suggestion of nonsustained run of VT, but the voltage was low and this could have represented SVT with aberrancy.  He went through an updated ischemic evaluation in May 2023, stress nuclear study which was normal.    After our last visit, due to an acceleration of tachycardia and palpitations, I had him wear another extended ambulatory Holter in March.  This did not show any atrial fibrillation but he had 35 runs of nonsustained SVT, but  the longest was about 20 seconds.  Then in April of this year he went to the ER with worsening palpitations.  He was noted to be back in atrial fibrillation and his QRS had widened.  At that time flecainide was increased to 100 mg twice daily, but his QRS was noted to widening along with rapid atrial flutter.  At that time flecainide was stopped and EP was consulted.  He then went through an atrial fibrillation/flutter ablation on 5/8/2024 by Dr. Kay.  He came back to the hospital in June with palpitations along with lightheadedness and near syncope.  Given his bifascicular block a loop recorder was placed.  He was also noted to have runs of atrial tachycardia and flecainide was added back at the lower dose of 50 mg twice daily.  He continues to follow with Dr. Kay closely.    Since last visit Daniel has been dealing with labile hypertension.  His blood pressure tends to run high chronically, but he gets symptoms of orthostasis and his blood pressure will sometimes drop randomly if he is active.  He has not had any near-syncope or syncope but does not feel well when his blood pressure drops.  He does become lightheaded.  He saw Dr. Kay in follow-up and his amlodipine was discontinued for losartan.  His loop recorder interrogations have been unremarkable with just short bursts of PAT/SVT.  He denies any other cardiac symptoms.  Other than some intermittent palpitations which have improved he denies any chest pain or symptoms of angina.  No worsening shortness of breath or any orthopnea/PND.  No other signs of volume overload.      Patient Active Problem List   Diagnosis    Disc degeneration, lumbosacral    Hyperlipidemia    Hypertension    Cigarette nicotine dependence without complication    Paroxysmal atrial fibrillation (HCC)    Pain of right hip    Bladder tumor    Nephrolithiasis    s/p Afib ablation (PFA PVI, RF CTI) 5/8/2024    NSVT (nonsustained ventricular tachycardia) ([facility])    Constipation     Nicotine dependence    Labile blood pressure    Impaired glucose tolerance    Nausea     Past Medical History:   Diagnosis Date    A-fib (HCC) 08/24/2021    Arthritis     Disc degeneration, lumbosacral 01/06/2016    GERD (gastroesophageal reflux disease)     Hyperlipidemia     hypercholesterolemia    Hypertension     last assessed 11/22/17    Pneumonia of both lower lobes due to infectious organism 01/29/2018    s/p Afib ablation (PFA PVI, RF CTI) 5/8/2024 05/09/2024     Social History     Socioeconomic History    Marital status: /Civil Union     Spouse name: Not on file    Number of children: 1    Years of education: Not on file    Highest education level: Not on file   Occupational History    Occupation:    Tobacco Use    Smoking status: Light Smoker     Current packs/day: 0.50     Average packs/day: 0.5 packs/day for 40.0 years (20.0 ttl pk-yrs)     Types: Cigarettes    Smokeless tobacco: Never   Vaping Use    Vaping status: Never Used   Substance and Sexual Activity    Alcohol use: Yes     Comment: Occassional 1 x / month    Drug use: No    Sexual activity: Yes     Partners: Female     Birth control/protection: None   Other Topics Concern    Not on file   Social History Narrative    Lives with wife    Full time employment    active advance directive    1 child ; son, passed away        Does not consume caffeine     Social Drivers of Health     Financial Resource Strain: Not on file   Food Insecurity: No Food Insecurity (4/28/2024)    Nursing - Inadequate Food Risk Classification     Worried About Running Out of Food in the Last Year: Never true     Ran Out of Food in the Last Year: Never true     Ran Out of Food in the Last Year: Not on file   Transportation Needs: No Transportation Needs (4/28/2024)    PRAPARE - Transportation     Lack of Transportation (Medical): No     Lack of Transportation (Non-Medical): No   Physical Activity: Not on file   Stress: Not on file   Social Connections: Not  on file   Intimate Partner Violence: Not on file   Housing Stability: Low Risk  (4/28/2024)    Housing Stability Vital Sign     Unable to Pay for Housing in the Last Year: No     Number of Times Moved in the Last Year: 1     Homeless in the Last Year: No      Family History   Problem Relation Age of Onset    Obesity Mother     Aneurysm Father      Past Surgical History:   Procedure Laterality Date    CARDIAC ELECTROPHYSIOLOGY PROCEDURE N/A 5/8/2024    Procedure: Cardiac eps/afib ablation;  Surgeon: Subhash Kay MD;  Location: [facility];  Service: Cardiology    CARDIAC ELECTROPHYSIOLOGY PROCEDURE N/A 6/11/2024    Procedure: Cardiac loop recorder implant;  Surgeon: Romulo Conner MD;  Location: [facility];  Service: Cardiology    COLONOSCOPY      FL RETROGRADE PYELOGRAM  11/3/2023    HEMORRHOID SURGERY      KNEE SURGERY Bilateral     WV COLONOSCOPY FLX DX W/COLLJ SPEC WHEN PFRMD N/A 1/5/2019    Procedure: COLONOSCOPY;  Surgeon: Lane Bonner MD;  Location: [facility];  Service: Gastroenterology    WV CYSTO BLADDER W/URETERAL CATHETERIZATION Left 11/3/2023    Procedure: CYSTOSCOPY RETROGRADE PYELOGRAM WITH INSERTION STENT URETERAL, ureteroscopy, holmium laser;  Surgeon: Donald Jenkins MD;  Location: [facility];  Service: Urology    TRANSURETHRAL RESECTION OF BLADDER TUMOR N/A 11/3/2023    Procedure: TRANSURETHRAL RESECTION OF BLADDER TUMOR (TURBT);  Surgeon: Donald Jenkins MD;  Location: [facility];  Service: Urology    UPPER GASTROINTESTINAL ENDOSCOPY         Current Outpatient Medications:     amLODIPine (NORVASC) 5 mg tablet, Take 1 tablet (5 mg total) by mouth daily at bedtime, Disp: 90 tablet, Rfl: 3    apixaban (Eliquis) 5 mg, TAKE 1 TABLET TWICE A DAY, Disp: 180 tablet, Rfl: 1    ascorbic acid (VITAMIN C) 500 mg tablet, Take 500 mg by mouth daily, Disp: , Rfl:     carvedilol (COREG) 25 mg tablet, TAKE 1 TABLET TWICE A DAY WITH MEALS, Disp: 180 tablet, Rfl: 3    Coenzyme  Q10 (COQ-10) 10 MG CAPS, Take by mouth, Disp: , Rfl:     flecainide (TAMBOCOR) 50 mg tablet, Take 1 tablet (50 mg total) by mouth every 12 (twelve) hours, Disp: 180 tablet, Rfl: 3    losartan (COZAAR) 50 mg tablet, Take 1 tablet (50 mg total) by mouth 2 (two) times a day, Disp: 60 tablet, Rfl: 3    Multiple Vitamins-Minerals (MENS MULTIVITAMIN PLUS PO), Take by mouth, Disp: , Rfl:     simvastatin (ZOCOR) 40 mg tablet, TAKE 1 TABLET DAILY AT BEDTIME, Disp: 100 tablet, Rfl: 1    tadalafil (CIALIS) 10 MG tablet, Take 1 tablet (10 mg total) by mouth daily as needed for erectile dysfunction, Disp: 10 tablet, Rfl: 3    Varenicline Tartrate, Starter, (Chantix Starting Month Pak) 0.5 MG X 11 & 1 MG X 42 TBPK, 0.5 mg once daily for 3 days then 0.5mg twice daily for days 4-7 then 1 mg twice daily, Disp: 53 each, Rfl: 0    Zinc 50 MG CAPS, Take by mouth, Disp: , Rfl:   No Known Allergies    Labs:  Lab Results   Component Value Date     11/22/2017    K 4.6 10/18/2024    K 4.5 08/19/2023     10/18/2024     08/19/2023    CO2 25 10/18/2024    CO2 26 08/19/2023    BUN 11 10/18/2024    BUN 13 08/19/2023    CREATININE 1.08 10/18/2024    CREATININE 0.85 06/11/2024    CREATININE 1.07 08/19/2023    CALCIUM 9.8 10/18/2024    CALCIUM 9.3 08/19/2023     Lab Results   Component Value Date    WBC 7.75 06/11/2024    WBC 7.2 11/22/2017    HGB 13.7 06/11/2024    HGB 13.9 11/22/2017    HCT 41.6 06/11/2024    HCT 41.9 11/22/2017    MCV 95 06/11/2024    MCV 92.9 11/22/2017     06/11/2024     11/22/2017     Lab Results   Component Value Date    CHOL 156 11/22/2017    TRIG 321 (H) 10/18/2024    HDL 30 10/18/2024    HDL 35 (L) 04/03/2023    HDL 42 05/22/2021    LDLDIRECT 94 10/22/2016     Imaging:  ECG today shows sinus rhythm with a bifascicular block, left anterior fascicular block and a right bundle branch block.    STRESS NUCLEAR (5/26/2023):    Stress ECG: No ST deviation is noted. The ECG was not diagnostic due  "to pharmacological (vasodilator) stress.    Perfusion: There are no perfusion defects.    Stress Function: Left ventricular function post-stress is normal. Stress ejection fraction is 52 %.    Stress Combined Conclusion: The ECG and SPECT imaging portions of the stress study are concordant with no evidence of stress induced myocardial ischemia. Left ventricular perfusion is normal.    Resting ECG: The ECG shows normal sinus rhythm.       Stress test only, exercise  Result Date: 11/22/2021  Narrative:   Normal functional capacity for age & gender.   Exercise-limiting dyspnea. Normal blood pressure response with blunted heart rate response (79% MPHR achieved) to exercise. Took beta blocker medication this morning.   Non-diagnostic exercise stress ECG due to failure to achieve 85% MPHR.       ECHO (8/23/2021):  LEFT VENTRICLE:  Size was normal.  Systolic function was normal. Ejection fraction was estimated to be 60 %.  There was mild concentric hypertrophy.     RIGHT VENTRICLE:  The size was normal.  Systolic function was normal.     LEFT ATRIUM:  The atrium was mildly dilated.     RIGHT ATRIUM:  The atrium was mildly dilated.     MITRAL VALVE:  There was trace regurgitation.     TRICUSPID VALVE:  There was trace regurgitation.       Review of Systems:  Review of Systems   Constitutional: Negative.    HENT: Negative.     Eyes: Negative.    Respiratory: Negative.     Cardiovascular:  Positive for palpitations.   Gastrointestinal: Negative.    Musculoskeletal:  Positive for arthralgias and back pain.   Skin: Negative.    Allergic/Immunologic: Negative.    Neurological:  Positive for light-headedness.   Hematological: Negative.    Psychiatric/Behavioral: Negative.     All other systems reviewed and are negative.    Vitals:    11/20/24 1056   BP: 152/82   BP Location: Right arm   Patient Position: Sitting   Cuff Size: Large   Pulse: 61   SpO2: 98%   Weight: 132 kg (291 lb)   Height: 6' 2\" (1.88 m)     Physical Exam  Vitals " and nursing note reviewed.   Constitutional:       Appearance: He is well-developed.   HENT:      Head: Normocephalic and atraumatic.   Eyes:      General: No scleral icterus.        Right eye: No discharge.         Left eye: No discharge.      Pupils: Pupils are equal, round, and reactive to light.   Neck:      Thyroid: No thyromegaly.      Vascular: No JVD.   Cardiovascular:      Rate and Rhythm: Normal rate and regular rhythm. No extrasystoles are present.     Pulses: Normal pulses. No decreased pulses.      Heart sounds: Normal heart sounds, S1 normal and S2 normal. No murmur heard.     No friction rub. No gallop.   Pulmonary:      Effort: Pulmonary effort is normal. No respiratory distress.      Breath sounds: Normal breath sounds. No wheezing or rales.   Abdominal:      General: Bowel sounds are normal. There is no distension.      Palpations: Abdomen is soft.      Tenderness: There is no abdominal tenderness.   Musculoskeletal:         General: No tenderness or deformity. Normal range of motion.      Cervical back: Normal range of motion and neck supple.   Skin:     General: Skin is warm and dry.      Findings: No rash.   Neurological:      Mental Status: He is alert and oriented to person, place, and time.      Cranial Nerves: No cranial nerve deficit.   Psychiatric:         Thought Content: Thought content normal.         Judgment: Judgment normal.       Counseling / Coordination of Care  Total office time spent today 25 minutes.  Greater than 50% of total time was spent with the patient and / or family counseling and / or coordination of care.

## 2024-12-02 ENCOUNTER — RESULTS FOLLOW-UP (OUTPATIENT)
Dept: CARDIOLOGY CLINIC | Facility: CLINIC | Age: 64
End: 2024-12-02

## 2024-12-04 ENCOUNTER — RESULTS FOLLOW-UP (OUTPATIENT)
Dept: CARDIOLOGY CLINIC | Facility: CLINIC | Age: 64
End: 2024-12-04

## 2024-12-04 ENCOUNTER — OFFICE VISIT (OUTPATIENT)
Dept: FAMILY MEDICINE CLINIC | Facility: CLINIC | Age: 64
End: 2024-12-04
Payer: COMMERCIAL

## 2024-12-04 VITALS
HEART RATE: 65 BPM | SYSTOLIC BLOOD PRESSURE: 138 MMHG | TEMPERATURE: 98.2 F | HEIGHT: 74 IN | OXYGEN SATURATION: 99 % | WEIGHT: 293 LBS | DIASTOLIC BLOOD PRESSURE: 88 MMHG | BODY MASS INDEX: 37.6 KG/M2

## 2024-12-04 DIAGNOSIS — F17.210 CIGARETTE NICOTINE DEPENDENCE WITHOUT COMPLICATION: ICD-10-CM

## 2024-12-04 DIAGNOSIS — I10 PRIMARY HYPERTENSION: ICD-10-CM

## 2024-12-04 DIAGNOSIS — F17.211 CIGARETTE NICOTINE DEPENDENCE IN REMISSION: ICD-10-CM

## 2024-12-04 DIAGNOSIS — R07.0 THROAT PAIN: Primary | ICD-10-CM

## 2024-12-04 PROCEDURE — 99214 OFFICE O/P EST MOD 30 MIN: CPT | Performed by: FAMILY MEDICINE

## 2024-12-04 RX ORDER — VARENICLINE TARTRATE 1 MG/1
1 TABLET, FILM COATED ORAL 2 TIMES DAILY
Qty: 180 TABLET | Refills: 1 | Status: SHIPPED | OUTPATIENT
Start: 2024-12-04

## 2024-12-04 NOTE — PROGRESS NOTES
Assessment/Plan:    Cigarette nicotine dependence without complication  Continue  Chantix  for  at  least  3  months    Throat pain  Thyroid  top normal in size, throat  exam normal,check ultrasound    Hypertension  Cards  put  back on amlodipine 5 ,bps  improving  but  still not  at  goal       Diagnoses and all orders for this visit:    Throat pain  -     US head neck soft tissue; Future    Cigarette nicotine dependence in remission  -     varenicline (Chantix Continuing Month Jeff) 1 mg tablet; Take 1 tablet (1 mg total) by mouth 2 (two) times a day    Cigarette nicotine dependence without complication    Primary hypertension          Subjective:      Patient ID: Daniel Gardiner is a 64 y.o. male.    Patient presents with:  Sore Throat: Per pt does not feel like sore throat pt states feel like pressure and discomfort since 1 month ago per pt since quit smoking. , pain in mid  throat  area, feels  like  choking, cough with thick mucus-clear  mucus, no fatigue, still with dizziness       Sore Throat   This is a recurrent problem. The current episode started more than 1 month ago. The problem has been unchanged. Neither side of throat is experiencing more pain than the other. There has been no fever. The pain is at a severity of 1/10. The pain is mild. Associated symptoms include coughing. Pertinent negatives include no headaches or trouble swallowing. He has tried cool liquids for the symptoms. The treatment provided no relief.       The following portions of the patient's history were reviewed and updated as appropriate: allergies, current medications, past family history, past medical history, past social history, past surgical history, and problem list.      Review of Systems   HENT:  Positive for sore throat. Negative for trouble swallowing and voice change.         Has  to clear  throat  when speaking   Respiratory:  Positive for cough.    Cardiovascular:  Negative for chest pain.   Neurological:  Negative  "for dizziness, light-headedness and headaches.   Hematological:  Negative for adenopathy.         Objective:      /88 (BP Location: Left arm, Patient Position: Sitting, Cuff Size: Large)   Pulse 65   Temp 98.2 °F (36.8 °C) (Temporal)   Ht 6' 2\" (1.88 m)   Wt 133 kg (293 lb)   SpO2 99%   BMI 37.62 kg/m²          Physical Exam  Vitals reviewed.   Constitutional:       Appearance: He is well-developed. He is obese.   HENT:      Right Ear: Tympanic membrane normal.      Left Ear: Tympanic membrane normal.      Nose: No congestion or rhinorrhea.      Mouth/Throat:      Pharynx: No oropharyngeal exudate or posterior oropharyngeal erythema.      Tonsils: No tonsillar exudate.   Neck:      Thyroid: Thyromegaly present.   Cardiovascular:      Rate and Rhythm: Normal rate and regular rhythm.      Heart sounds: Normal heart sounds.   Pulmonary:      Effort: Pulmonary effort is normal.      Breath sounds: Normal breath sounds.   Lymphadenopathy:      Cervical: No cervical adenopathy.   Neurological:      Mental Status: He is alert.            "

## 2024-12-05 ENCOUNTER — HOSPITAL ENCOUNTER (OUTPATIENT)
Dept: RADIOLOGY | Facility: HOSPITAL | Age: 64
Discharge: HOME | End: 2024-12-05
Payer: COMMERCIAL

## 2024-12-05 DIAGNOSIS — R07.0 THROAT PAIN: ICD-10-CM

## 2024-12-05 PROCEDURE — 76536 US EXAM OF HEAD AND NECK: CPT

## 2024-12-12 ENCOUNTER — RESULTS FOLLOW-UP (OUTPATIENT)
Dept: FAMILY MEDICINE CLINIC | Facility: CLINIC | Age: 64
End: 2024-12-12

## 2024-12-21 ENCOUNTER — VBI (OUTPATIENT)
Dept: ADMINISTRATIVE | Facility: OTHER | Age: 64
End: 2024-12-21

## 2024-12-21 NOTE — TELEPHONE ENCOUNTER
12/21/24 3:29 PM     Chart reviewed for CRC: Colonoscopy was/were submitted to the patient's insurance.     Avery Krishna MA   [facility]

## 2024-12-27 ENCOUNTER — REMOTE DEVICE CLINIC VISIT (OUTPATIENT)
Dept: CARDIOLOGY CLINIC | Facility: CLINIC | Age: 64
End: 2024-12-27
Payer: COMMERCIAL

## 2024-12-27 ENCOUNTER — RESULTS FOLLOW-UP (OUTPATIENT)
Dept: CARDIOLOGY CLINIC | Facility: CLINIC | Age: 64
End: 2024-12-27

## 2024-12-27 DIAGNOSIS — I47.20 VT (VENTRICULAR TACHYCARDIA) (HCC): Primary | ICD-10-CM

## 2024-12-27 PROCEDURE — 93298 REM INTERROG DEV EVAL SCRMS: CPT | Performed by: INTERNAL MEDICINE

## 2024-12-27 NOTE — PROGRESS NOTES
"MDT LNQ22 ILR/ ACTIVE SYSTEM IS MRI CONDITIONAL   CARELINK TRANSMISSION: LOOP RECORDER. PRESENTING RHYTHM NSR @ 74 BPM. BATTERY STATUS \"OK.\" NO PATIENT OR DEVICE ACTIVATED EPISODES. NORMAL DEVICE FUNCTION. DL   " none...

## 2025-01-06 ENCOUNTER — OFFICE VISIT (OUTPATIENT)
Dept: FAMILY MEDICINE CLINIC | Facility: CLINIC | Age: 65
End: 2025-01-06
Payer: COMMERCIAL

## 2025-01-06 VITALS
WEIGHT: 299.2 LBS | BODY MASS INDEX: 38.4 KG/M2 | HEART RATE: 64 BPM | HEIGHT: 74 IN | SYSTOLIC BLOOD PRESSURE: 124 MMHG | OXYGEN SATURATION: 97 % | DIASTOLIC BLOOD PRESSURE: 82 MMHG

## 2025-01-06 DIAGNOSIS — F17.211 CIGARETTE NICOTINE DEPENDENCE IN REMISSION: ICD-10-CM

## 2025-01-06 DIAGNOSIS — G89.29 CHRONIC PAIN OF LEFT KNEE: Primary | ICD-10-CM

## 2025-01-06 DIAGNOSIS — M25.562 CHRONIC PAIN OF LEFT KNEE: Primary | ICD-10-CM

## 2025-01-06 DIAGNOSIS — I48.0 PAROXYSMAL ATRIAL FIBRILLATION (HCC): ICD-10-CM

## 2025-01-06 DIAGNOSIS — M25.551 PAIN OF RIGHT HIP: ICD-10-CM

## 2025-01-06 PROCEDURE — 99213 OFFICE O/P EST LOW 20 MIN: CPT | Performed by: FAMILY MEDICINE

## 2025-01-06 RX ORDER — METHYLPREDNISOLONE 4 MG/1
TABLET ORAL
Qty: 21 EACH | Refills: 0 | Status: SHIPPED | OUTPATIENT
Start: 2025-01-06

## 2025-01-06 NOTE — PROGRESS NOTES
"Name: Daniel Gardiner      : 1960      MRN: [id number]  Encounter Provider: Linh Erickson MD  Encounter Date: 2025   Encounter department: [facility]  :  Assessment & Plan  Paroxysmal atrial fibrillation (HCC)  Can't  take  NSAIDs  due  to Eliquis, will rx  Medrol dose  pack for  knee         Chronic pain of left knee    Orders:    methylPREDNISolone 4 MG tablet therapy pack; Use as directed on package    XR knee 3 vw left non injury; Future    Pain of right hip    Orders:    XR hip/pelv 2-3 vws right if performed; Future           History of Present Illness     Patient presents with:,   Knee Pain: Patient presents today with left knee pain onset since Thursday has  had  fluid  drained  in past, hx  [place] Schlatter  when younger, has  lumps  on both knees         Review of Systems   Constitutional:  Negative for activity change, appetite change and fatigue.   Respiratory:  Negative for shortness of breath.    Cardiovascular:  Negative for chest pain.   Musculoskeletal:  Positive for arthralgias.        R  hip pain, l  knee  pain   Neurological:  Negative for dizziness, light-headedness and headaches.       Objective   /82 (BP Location: Right arm, Patient Position: Sitting, Cuff Size: Standard)   Pulse 64   Ht 6' 2\" (1.88 m)   Wt 136 kg (299 lb 3.2 oz)   SpO2 97%   BMI 38.42 kg/m²      Physical Exam  Vitals reviewed.   Constitutional:       Appearance: Normal appearance. He is obese.   Cardiovascular:      Rate and Rhythm: Normal rate and regular rhythm.      Pulses: Normal pulses.      Heart sounds: Normal heart sounds.   Pulmonary:      Effort: Pulmonary effort is normal.      Breath sounds: Normal breath sounds.   Musculoskeletal:         General: Swelling, tenderness and deformity present.      Comments: L knee ,also  some  swelling of  r  knee   Lymphadenopathy:      Cervical: No cervical adenopathy.   Neurological:      Mental Status: He is alert.   Psychiatric:   "       Mood and Affect: Mood normal.

## 2025-01-07 ENCOUNTER — RESULTS FOLLOW-UP (OUTPATIENT)
Dept: FAMILY MEDICINE CLINIC | Facility: CLINIC | Age: 65
End: 2025-01-07

## 2025-01-21 ENCOUNTER — TELEPHONE (OUTPATIENT)
Age: 65
End: 2025-01-21

## 2025-01-21 ENCOUNTER — OFFICE VISIT (OUTPATIENT)
Dept: GASTROENTEROLOGY | Facility: AMBULARY SURGERY CENTER | Age: 65
End: 2025-01-21
Payer: COMMERCIAL

## 2025-01-21 VITALS
DIASTOLIC BLOOD PRESSURE: 78 MMHG | HEIGHT: 74 IN | HEART RATE: 68 BPM | BODY MASS INDEX: 38.6 KG/M2 | OXYGEN SATURATION: 95 % | SYSTOLIC BLOOD PRESSURE: 124 MMHG | WEIGHT: 300.8 LBS

## 2025-01-21 DIAGNOSIS — R11.0 NAUSEA: ICD-10-CM

## 2025-01-21 DIAGNOSIS — K21.9 GASTROESOPHAGEAL REFLUX DISEASE, UNSPECIFIED WHETHER ESOPHAGITIS PRESENT: ICD-10-CM

## 2025-01-21 DIAGNOSIS — Z86.0101 HISTORY OF ADENOMATOUS POLYP OF COLON: Primary | ICD-10-CM

## 2025-01-21 PROCEDURE — 99213 OFFICE O/P EST LOW 20 MIN: CPT | Performed by: PHYSICIAN ASSISTANT

## 2025-01-21 RX ORDER — SODIUM CHLORIDE, SODIUM LACTATE, POTASSIUM CHLORIDE, CALCIUM CHLORIDE 600; 310; 30; 20 MG/100ML; MG/100ML; MG/100ML; MG/100ML
125 INJECTION, SOLUTION INTRAVENOUS CONTINUOUS
OUTPATIENT
Start: 2025-01-21

## 2025-01-21 RX ORDER — NICOTINE POLACRILEX 4 MG/1
1 GUM, CHEWING ORAL DAILY
COMMUNITY

## 2025-01-21 RX ORDER — PANTOPRAZOLE SODIUM 40 MG/1
40 TABLET, DELAYED RELEASE ORAL DAILY
Qty: 30 TABLET | Refills: 4 | Status: SHIPPED | OUTPATIENT
Start: 2025-01-21

## 2025-01-21 NOTE — PROGRESS NOTES
Name: Daniel Gardiner      : 1960      MRN: [id number]  Encounter Provider: Nikita Hill PA-C  Encounter Date: 2025   Encounter department: [facility]  :  Assessment & Plan  Nausea  -Discontinue omeprazole 20 mg daily  -Start pantoprazole 40 mg daily  -As needed Zofran  -Plan EGD with Dr. Bonner [facility]   - Needs to be off Eliquis (Cardiologist is Roly Martinez MD)  - I have reviewed the risks of endoscopy which include but are not limited to; anesthesia complications, injury/perforation of the bowel, infection and bleeding.    -To consider gastric emptying study if EGD unrevealing  Orders:    Ambulatory Referral to Gastroenterology    pantoprazole (PROTONIX) 40 mg tablet; Take 1 tablet (40 mg total) by mouth daily    EGD; Future    History of adenomatous polyp of colon  -Last colonoscopy 2022 with adequate bowel prep and only sigmoid diverticulosis and internal hemorrhoids noted.  -Recommended recall colonoscopy 2025       Gastroesophageal reflux disease, unspecified whether esophagitis present  - discontinue omeprazole 20mg daily  - start pantoprazole 40mg daily  Orders:    pantoprazole (PROTONIX) 40 mg tablet; Take 1 tablet (40 mg total) by mouth daily    EGD; Future        History of Present Illness   HPI  Daniel Gardiner is a 64 y.o. male new to me with past medical history of hypertension, atrial fibrillation on Eliquis, bladder tumor, tobacco abuse, hyperlipidemia and history of colon polyps who presents today with chief complaint of nausea.    Denies any abdominal pain or vomiting.  Bowels are normal and no black or bloody stools.  Symptoms tend to be worse at night after he lays down.  He does not eat close to going to bed.  Denies any significant NSAID use.  No pain or difficulty swallowing.  He does not believe the omeprazole 20 mg was helping much.  Has been taking water with baking soda  when he has the symptoms at night.    GERD history:  Years of GERD - >10  Current medications - omeprazole 20mg , baking soda  Past medications - omeprazole  Trigger foods - spicy  Pain or difficulty swallowing - No  Weight loss - No  Night time symptoms - Yes  Last esophageal imaging - none recently  Last EGD - none previously      Endoscopic History  EGD -remote past, not in system  Colonoscopy -April 2022 with adequate bowel prep and only sigmoid diverticulosis and internal hemorrhoids noted.      History obtained from: patient    Review of Systems   Constitutional:  Negative for activity change, appetite change, chills, diaphoresis, fatigue and unexpected weight change.   HENT:  Negative for mouth sores, rhinorrhea, sore throat and trouble swallowing.    Eyes:  Negative for discharge, redness and visual disturbance.   Respiratory:  Negative for apnea, cough, choking, chest tightness and shortness of breath.    Cardiovascular:  Negative for chest pain, palpitations and leg swelling.   Gastrointestinal:  Positive for nausea. Negative for abdominal distention, abdominal pain, anal bleeding, blood in stool, constipation, diarrhea, rectal pain and vomiting.        Reflux   Genitourinary:  Negative for difficulty urinating, dysuria, frequency and hematuria.   Musculoskeletal:  Negative for arthralgias, back pain, gait problem, joint swelling and myalgias.   Skin:  Negative for color change, pallor and rash.   Allergic/Immunologic: Negative for environmental allergies and food allergies.   Neurological:  Negative for dizziness, tremors, weakness, light-headedness, numbness and headaches.   Psychiatric/Behavioral:  Negative for agitation, behavioral problems, confusion and sleep disturbance. The patient is not nervous/anxious.      Past Medical History   Past Medical History:   Diagnosis Date    A-fib ([facility]) 08/24/2021    Arthritis     Disc degeneration, lumbosacral 01/06/2016    GERD (gastroesophageal reflux disease)      Hyperlipidemia     hypercholesterolemia    Hypertension     last assessed 11/22/17    Pneumonia of both lower lobes due to infectious organism 01/29/2018    s/p Afib ablation (PFA PVI, RF CTI) 5/8/2024 05/09/2024     Past Surgical History:   Procedure Laterality Date    CARDIAC ELECTROPHYSIOLOGY PROCEDURE N/A 5/8/2024    Procedure: Cardiac eps/afib ablation;  Surgeon: Subhash Kay MD;  [facility]: [facility];  Service: Cardiology    CARDIAC ELECTROPHYSIOLOGY PROCEDURE N/A 6/11/2024    Procedure: Cardiac loop recorder implant;  Surgeon: Romulo Conner MD;  [facility]: [facility];  Service: Cardiology    COLONOSCOPY      FL RETROGRADE PYELOGRAM  11/3/2023    HEMORRHOID SURGERY      KNEE SURGERY Bilateral     KS COLONOSCOPY FLX DX W/COLLJ SPEC WHEN PFRMD N/A 1/5/2019    Procedure: COLONOSCOPY;  Surgeon: Lane Bonner MD;  Location: [facility];  Service: Gastroenterology    KS CYSTOURETHROSCOPY W/URETERAL CATHETERIZATION Left 11/3/2023    Procedure: CYSTOSCOPY RETROGRADE PYELOGRAM WITH INSERTION STENT URETERAL, ureteroscopy, holmium laser;  Surgeon: Donald Jenkins MD;  Location: [facility];  Service: Urology    TRANSURETHRAL RESECTION OF BLADDER TUMOR N/A 11/3/2023    Procedure: TRANSURETHRAL RESECTION OF BLADDER TUMOR (TURBT);  Surgeon: Donald Jenkins MD;  Location: [facility];  Service: Urology    UPPER GASTROINTESTINAL ENDOSCOPY       Family History   Problem Relation Age of Onset    Obesity Mother     Aneurysm Father       reports that he has been smoking cigarettes. He has a 20 pack-year smoking history. He has never used smokeless tobacco. He reports current alcohol use. He reports that he does not use drugs.  Current Outpatient Medications on File Prior to Visit   Medication Sig Dispense Refill    amLODIPine (NORVASC) 5 mg tablet Take 1 tablet (5 mg total) by mouth daily at bedtime 90 tablet 3    apixaban (Eliquis) 5 mg TAKE 1 TABLET TWICE A  tablet 1    ascorbic acid  (VITAMIN C) 500 mg tablet Take 500 mg by mouth daily      carvedilol (COREG) 25 mg tablet TAKE 1 TABLET TWICE A DAY WITH MEALS 180 tablet 3    Coenzyme Q10 (COQ-10) 10 MG CAPS Take by mouth      flecainide (TAMBOCOR) 50 mg tablet Take 1 tablet (50 mg total) by mouth every 12 (twelve) hours 180 tablet 3    Multiple Vitamins-Minerals (MENS MULTIVITAMIN PLUS PO) Take by mouth      Omeprazole 20 MG TBEC Take 1 mg by mouth in the morning      simvastatin (ZOCOR) 40 mg tablet TAKE 1 TABLET DAILY AT BEDTIME 100 tablet 1    tadalafil (CIALIS) 10 MG tablet Take 1 tablet (10 mg total) by mouth daily as needed for erectile dysfunction 10 tablet 3    varenicline (Chantix Continuing Month Jeff) 1 mg tablet Take 1 tablet (1 mg total) by mouth 2 (two) times a day 180 tablet 1    Zinc 50 MG CAPS Take by mouth      methylPREDNISolone 4 MG tablet therapy pack Use as directed on package 21 each 0     No current facility-administered medications on file prior to visit.   No Known Allergies   Current Outpatient Medications on File Prior to Visit   Medication Sig Dispense Refill    amLODIPine (NORVASC) 5 mg tablet Take 1 tablet (5 mg total) by mouth daily at bedtime 90 tablet 3    apixaban (Eliquis) 5 mg TAKE 1 TABLET TWICE A  tablet 1    ascorbic acid (VITAMIN C) 500 mg tablet Take 500 mg by mouth daily      carvedilol (COREG) 25 mg tablet TAKE 1 TABLET TWICE A DAY WITH MEALS 180 tablet 3    Coenzyme Q10 (COQ-10) 10 MG CAPS Take by mouth      flecainide (TAMBOCOR) 50 mg tablet Take 1 tablet (50 mg total) by mouth every 12 (twelve) hours 180 tablet 3    Multiple Vitamins-Minerals (MENS MULTIVITAMIN PLUS PO) Take by mouth      Omeprazole 20 MG TBEC Take 1 mg by mouth in the morning      simvastatin (ZOCOR) 40 mg tablet TAKE 1 TABLET DAILY AT BEDTIME 100 tablet 1    tadalafil (CIALIS) 10 MG tablet Take 1 tablet (10 mg total) by mouth daily as needed for erectile dysfunction 10 tablet 3    varenicline (Chantix Continuing Month Jeff) 1  "mg tablet Take 1 tablet (1 mg total) by mouth 2 (two) times a day 180 tablet 1    Zinc 50 MG CAPS Take by mouth      methylPREDNISolone 4 MG tablet therapy pack Use as directed on package 21 each 0     No current facility-administered medications on file prior to visit.      Social History     Tobacco Use    Smoking status: Light Smoker     Current packs/day: 0.50     Average packs/day: 0.5 packs/day for 40.0 years (20.0 ttl pk-yrs)     Types: Cigarettes    Smokeless tobacco: Never   Vaping Use    Vaping status: Never Used   Substance and Sexual Activity    Alcohol use: Yes     Comment: Occassional 1 x / month    Drug use: No    Sexual activity: Yes     Partners: Female     Birth control/protection: None        Objective   /78 (BP Location: Left arm, Patient Position: Sitting, Cuff Size: Large)   Pulse 68   Ht 6' 2\" (1.88 m)   Wt (!) 136 kg (300 lb 12.8 oz)   SpO2 95%   BMI 38.62 kg/m²      Physical Exam  Constitutional:       General: He is not in acute distress.     Appearance: Normal appearance. He is not ill-appearing.   HENT:      Head: Normocephalic and atraumatic.   Eyes:      General: No scleral icterus.     Conjunctiva/sclera: Conjunctivae normal.   Cardiovascular:      Rate and Rhythm: Normal rate and regular rhythm.   Pulmonary:      Effort: Pulmonary effort is normal. No respiratory distress.      Breath sounds: Normal breath sounds.   Abdominal:      General: Bowel sounds are normal. There is no distension.      Palpations: Abdomen is soft.      Tenderness: There is no abdominal tenderness. There is no guarding.   Skin:     General: Skin is warm and dry.   Neurological:      Mental Status: He is alert and oriented to person, place, and time.   Psychiatric:         Mood and Affect: Mood normal.         Behavior: Behavior normal.             Nikita Hill PA-C  [facility] - Gastroentrology    "

## 2025-01-21 NOTE — PATIENT INSTRUCTIONS
Scheduled date of EGD(as of today): will call back to schedule   Physician performing EGD: Dr. Bonner  Location of EGD: [facility]  Instructions reviewed with patient by: Nay RYAN  Clearances: tristen Martinez

## 2025-01-21 NOTE — ASSESSMENT & PLAN NOTE
-Discontinue omeprazole 20 mg daily  -Start pantoprazole 40 mg daily  -As needed Zofran  -Plan EGD with Dr. Bonner at the [facility] [place]   - Needs to be off Eliquis (Cardiologist is Roly Martinez MD)  - I have reviewed the risks of endoscopy which include but are not limited to; anesthesia complications, injury/perforation of the bowel, infection and bleeding.    -To consider gastric emptying study if EGD unrevealing  Orders:    Ambulatory Referral to Gastroenterology    pantoprazole (PROTONIX) 40 mg tablet; Take 1 tablet (40 mg total) by mouth daily    EGD; Future

## 2025-01-21 NOTE — TELEPHONE ENCOUNTER
Scheduled date of EGD(as of today):3/28/2025  Physician performing EGD:Dr. Bonner  [facility] of EGD[facility]  Instructions reviewed with patient by:CB-Prep instructions sent to [facility]  Clearances: n/a

## 2025-01-21 NOTE — LETTER
Hello,    Attached are your prep instructions for your upcoming procedure on 3/28/2025. If you have any questions, please give us a call at [phone].    Thank you,    [facility]    Medicine Instructions for Adults with Diabetes (NO Bowel Prep)      Follow these instructions when a BOWEL PREP is NOT required for your procedure or surgery!    NOTE:  GLP-1 Agonists taken weekly: do not take in the 7 days before your procedure  SGLT-2 Inhibitors: do not take in the 4 days before your procedure    On the Day Before Surgery/Procedure  If you are having a procedure (e.g. Colonoscopy) or surgery which DOES NOT require a bowel prep, follow the directions below based on the type of medicine you take for your diabetes.    Type of Medicine You Take Examples What to Do   Pre-Mixed Insulin - Intermediate Acting Humalog® 75/25, Humulin® 70/30, Novolog® 70/30, Regular Insulin Take ½ your regular dose the evening before your procedure.   Rapid/Fast Acting Insulin/Long-Acting Insulin Humalog® U200, NovoLog®, Apidra®, Lantus®, Levemir®, Tresiba®, Toujeo®, Fiasp®, Basaglar® Take your FULL regular dose the day before procedure.   Oral Diabetic Medicines (sulfonylurea) Glipizide/Glimepiride/Glucotrol® Take your regular dose with dinner the evening before your procedure.   Other Oral Diabetic Medicines   Metformin®, Glucophage®, Glucophage XR®, Riomet®, Glumetza®), Actose®, Avandia®, Glyset®, Prandin® Take your regular dose with dinner the evening before your procedure   GLP-1 Agonists Adlyxin®, Byetta®, Bydureon®, Ozempic®, Soliqua®, Tanzeum®, Trulicity®, Victoza®, Saxenda®, Rybelsus® If taken daily, take as normal    If taken weekly, do not take this medicine for 7 days before your procedure including the day of the procedure (resume taking after the procedure)   SGLT-2 Inhibitors Jardiance®, Invokana®, Farxiga®,   Steglatro®, Brenzavvy®, Qtern®, Segluromet®, Glyxambi®, Synjardy®,  Synjardy XR®, Invokamet®, Invokamet XR®, Trijary XR®, Xigduo XR®, Steglujan® Do not take for 4 days before your procedure including the day of the procedure (resume taking after the procedure)                 More information continued on back                  Medicine Instructions for Adults with Diabetes (No Bowel Prep)   Page 2      On the Day of Surgery/Procedure  Follow the directions below based on the type of medicine you take for your diabetes.    Type of Medicine You Take Examples What to Do   Long-Acting Insulin Lantus®, Levemir®, Tresiba®, Toujeo®, Basaglar®, Semglee® If you normally take your Long-Acting Insulin in the morning, take the full dose as scheduled.   GLP-1 Agonists AdlyxinÒ, ByettaÒ, BydureonÒ, OzempicÒ, SoliquaÒ, TanzeumÒ, TrulicityÒ, VictozaÒ, Saxenda®, Rybelsus® Do NOT take this medicine on the day of your procedure (resume taking after the procedure)       On the Day of Surgery/Procedure (continued)  Except for the morning Long-Acting Insulin, DO NOT take ANY diabetic medicine on the day of your procedure unless you were instructed by the doctor who manages your diabetes medicines.    Continue to check your blood sugars.  If you have an insulin pump, ask your endocrinologist for instructions at least 3 days before your procedure. NOTE: If you are not able to ask your endocrinologist in advance, on the day of the procedure set your insulin pump to your basal rate only. Bring your insulin pump supplies to the hospital.     If you have any questions about taking your diabetes medicines prior to your procedure, please contact the doctor who manages your diabetes medicines.

## 2025-01-21 NOTE — ASSESSMENT & PLAN NOTE
-Last colonoscopy April 2022 with adequate bowel prep and only sigmoid diverticulosis and internal hemorrhoids noted.  -Recommended recall colonoscopy April 2025

## 2025-01-24 DIAGNOSIS — E78.2 MIXED HYPERLIPIDEMIA: ICD-10-CM

## 2025-01-24 RX ORDER — SIMVASTATIN 40 MG
40 TABLET ORAL
Qty: 90 TABLET | Refills: 1 | Status: SHIPPED | OUTPATIENT
Start: 2025-01-24

## 2025-01-30 ENCOUNTER — RESULTS FOLLOW-UP (OUTPATIENT)
Dept: CARDIOLOGY CLINIC | Facility: CLINIC | Age: 65
End: 2025-01-30

## 2025-02-07 DIAGNOSIS — I10 PRIMARY HYPERTENSION: ICD-10-CM

## 2025-02-07 DIAGNOSIS — I48.0 PAROXYSMAL ATRIAL FIBRILLATION (HCC): ICD-10-CM

## 2025-02-07 RX ORDER — CARVEDILOL 25 MG/1
25 TABLET ORAL 2 TIMES DAILY WITH MEALS
Qty: 180 TABLET | Refills: 1 | Status: SHIPPED | OUTPATIENT
Start: 2025-02-07

## 2025-02-14 ENCOUNTER — RESULTS FOLLOW-UP (OUTPATIENT)
Dept: CARDIOLOGY CLINIC | Facility: CLINIC | Age: 65
End: 2025-02-14

## 2025-03-11 ENCOUNTER — RA CDI HCC (OUTPATIENT)
Dept: OTHER | Facility: HOSPITAL | Age: 65
End: 2025-03-11

## 2025-03-12 NOTE — DISCHARGE INSTR - AVS FIRST PAGE
PLEASE NOTE THE FOLLOWING MEDICATION RECOMMENDATIONS:  Continue Eliquis twice daily  Continue Coreg (carvedilol) twice daily  Get blood work in 1 week (CMP)      RESTRICTIONS:  No heavy lifting or strenuous activity for one week.    No soaking in a bath tub/hot tub/swimming pool for one week or until groin heals. You may shower - please let soap and water run over the groins, no scrubbing, and pat the area dry. Please place band-aid on groins daily for up to five days, but you may remove sooner if no issues are noted.     If you notice ongoing bleeding, swelling, or firm lumps in groin near ablation incision, please contact Dr. Kay' office - [phone].     Loose

## 2025-03-18 ENCOUNTER — OFFICE VISIT (OUTPATIENT)
Dept: FAMILY MEDICINE CLINIC | Facility: CLINIC | Age: 65
End: 2025-03-18
Payer: COMMERCIAL

## 2025-03-18 VITALS
HEIGHT: 74 IN | OXYGEN SATURATION: 96 % | SYSTOLIC BLOOD PRESSURE: 130 MMHG | WEIGHT: 304 LBS | BODY MASS INDEX: 39.01 KG/M2 | DIASTOLIC BLOOD PRESSURE: 84 MMHG | HEART RATE: 62 BPM

## 2025-03-18 DIAGNOSIS — K21.9 GASTROESOPHAGEAL REFLUX DISEASE WITHOUT ESOPHAGITIS: ICD-10-CM

## 2025-03-18 DIAGNOSIS — E78.2 MIXED HYPERLIPIDEMIA: ICD-10-CM

## 2025-03-18 DIAGNOSIS — R73.02 IMPAIRED GLUCOSE TOLERANCE: ICD-10-CM

## 2025-03-18 DIAGNOSIS — I10 PRIMARY HYPERTENSION: Primary | ICD-10-CM

## 2025-03-18 DIAGNOSIS — M25.551 PAIN OF RIGHT HIP: ICD-10-CM

## 2025-03-18 DIAGNOSIS — I48.0 PAROXYSMAL ATRIAL FIBRILLATION (HCC): ICD-10-CM

## 2025-03-18 PROCEDURE — 99214 OFFICE O/P EST MOD 30 MIN: CPT | Performed by: FAMILY MEDICINE

## 2025-03-18 NOTE — ASSESSMENT & PLAN NOTE
BP was  up at  home  last night  160/80 but  normal here, see what  cards BP  is, on coreg and  amlodipine

## 2025-03-18 NOTE — PROGRESS NOTES
"Name: Daniel Gardiner      : 1960      MRN: [id number]  Encounter Provider: Linh Erickson MD  Encounter Date: 3/18/2025   Encounter department: [facility]  :  Assessment & Plan  Primary hypertension  BP was  up at  home  last night  160/80 but  normal here, see what  cards BP  is, on coreg and  amlodipine         Paroxysmal atrial fibrillation (HCC)  On flecainide  and  eliquis         Pain of right hip  Having surgery  , rec  glucosamine chondroitin  1500  mg/day         Gastroesophageal reflux disease without esophagitis  On protonix  ,getting scope  next week         Mixed hyperlipidemia    Orders:    Lipid panel    Impaired glucose tolerance    Orders:    Hemoglobin A1C; Future           History of Present Illness   Patient presents with:  Follow-up  Hip Pain: Continues with hip pain. He is having surgery in  when his  wife is  off  from  work, will  need  pre-op after    by Dr. Shell   Seeing cards  next  week  and  gi 3/28 so not taking anti-inflammatory, doing exercises  and  using  cane,just taking  Tylenol, also cna't  take  NSAIDs  due to  blood  thinner  for  a  fib      Review of Systems   Constitutional:  Negative for activity change, appetite change and fatigue.   Respiratory:  Negative for shortness of breath.    Cardiovascular:  Negative for chest pain.   Musculoskeletal:  Positive for arthralgias.        R hip pain   Neurological:  Negative for dizziness, light-headedness and headaches.   Hematological:  Negative for adenopathy.   Psychiatric/Behavioral:  The patient is not nervous/anxious.        Objective   /84   Pulse 62   Ht 6' 2\" (1.88 m)   Wt (!) 138 kg (304 lb)   SpO2 96%   BMI 39.03 kg/m²      Physical Exam  Vitals reviewed.   Constitutional:       Appearance: Normal appearance. He is obese.   Neck:      Vascular: No carotid bruit.   Cardiovascular:      Rate and Rhythm: Normal rate and regular rhythm.      Pulses: Normal pulses.      " Heart sounds: Normal heart sounds.   Pulmonary:      Effort: Pulmonary effort is normal.      Breath sounds: Normal breath sounds.   Musculoskeletal:      Right lower leg: No edema.      Left lower leg: No edema.   Lymphadenopathy:      Cervical: No cervical adenopathy.   Neurological:      Mental Status: He is alert.   Psychiatric:         Mood and Affect: Mood normal.

## 2025-03-21 ENCOUNTER — TELEPHONE (OUTPATIENT)
Dept: GASTROENTEROLOGY | Facility: AMBULARY SURGERY CENTER | Age: 65
End: 2025-03-21

## 2025-03-21 NOTE — TELEPHONE ENCOUNTER
Please advise that pt is cleared for the following:    Our mutual patient is scheduled for procedure: colonoscopy    On: March 28 , 2025     With: Dr. Lane Bonner MD    He/She is taking the following blood thinner: Eliquis (Apixaban)    Can this be stopped  2 days prior to the procedure    Physician Approving clearance: Dr. Subhash Kay

## 2025-03-24 ENCOUNTER — TELEPHONE (OUTPATIENT)
Dept: DERMATOLOGY | Facility: CLINIC | Age: 65
End: 2025-03-24

## 2025-03-24 ENCOUNTER — TELEPHONE (OUTPATIENT)
Age: 65
End: 2025-03-24

## 2025-03-25 ENCOUNTER — OFFICE VISIT (OUTPATIENT)
Dept: CARDIOLOGY CLINIC | Facility: CLINIC | Age: 65
End: 2025-03-25
Payer: COMMERCIAL

## 2025-03-25 VITALS — DIASTOLIC BLOOD PRESSURE: 78 MMHG | SYSTOLIC BLOOD PRESSURE: 138 MMHG | HEART RATE: 61 BPM

## 2025-03-25 DIAGNOSIS — I48.0 PAROXYSMAL A-FIB (HCC): ICD-10-CM

## 2025-03-25 DIAGNOSIS — I48.0 PAROXYSMAL ATRIAL FIBRILLATION (HCC): Primary | ICD-10-CM

## 2025-03-25 DIAGNOSIS — R09.89 LABILE BLOOD PRESSURE: ICD-10-CM

## 2025-03-25 DIAGNOSIS — Z98.890 S/P ABLATION OF ATRIAL FIBRILLATION: ICD-10-CM

## 2025-03-25 DIAGNOSIS — Z86.79 S/P ABLATION OF ATRIAL FIBRILLATION: ICD-10-CM

## 2025-03-25 DIAGNOSIS — I10 PRIMARY HYPERTENSION: ICD-10-CM

## 2025-03-25 DIAGNOSIS — E78.2 MIXED HYPERLIPIDEMIA: ICD-10-CM

## 2025-03-25 PROCEDURE — 93000 ELECTROCARDIOGRAM COMPLETE: CPT | Performed by: INTERNAL MEDICINE

## 2025-03-25 PROCEDURE — 99214 OFFICE O/P EST MOD 30 MIN: CPT | Performed by: INTERNAL MEDICINE

## 2025-03-25 RX ORDER — AMLODIPINE BESYLATE 5 MG/1
5 TABLET ORAL 2 TIMES DAILY
Qty: 180 TABLET | Refills: 3 | Status: SHIPPED | OUTPATIENT
Start: 2025-03-25

## 2025-03-25 NOTE — PROGRESS NOTES
Cardiology Follow Up    Daniel Gardiner  1960  [id number]  [facility] [place]  [address]  [address]  [place] [id number] [phone]    1. Paroxysmal atrial fibrillation (HCC)  POCT ECG      2. Primary hypertension        3. Labile blood pressure        4. Mixed hyperlipidemia        5. s/p Afib ablation (PFA PVI, RF CTI) 5/8/2024            Discussion/Summary:    1.  Paroxysmal atrial fibrillation/flutter - Daniel had this on a few occasion around the time of christiane Lyme disease.  He is feeling better since this has resolved.  He remained in sinus rhythm for some time on carvedilol and flecainide, with just short runs of atrial tachycardia.  Then in April 2024 he had recurrence of his atrial fibrillation and then atrial flutter brought on by higher doses of flecainide with QRS widening.  He is now status post atrial fibrillation/flutter ablation and he follows with electrophysiology closely.  His loop recorder interrogations have just shown some short nonsustained bursts of SVT.  He does have follow-up with Dr. Kay in the near future.  He is on Eliquis for stroke prevention.    2.  Hypertension - Daniel's blood pressures have been labile as of late.  They chronically run high at he does have drops in blood pressure particularly when he is active.  He had his amlodipine switched to losartan and his PCP added doxazosin.  At our last visit I stopped the doxazosin and added back amlodipine 5 mg daily at night.  At some point he came off of the losartan.  With his blood pressure running I am going to make his amlodipine twice daily and we will continue carvedilol twice daily.  He will continue to check his blood pressure at home.  We will see him back in 6 months.    3.  Mixed hyperlipidemia - His last LDL increased to 159 and he has an HDL in the 30s.  Simvastatin 40 mg daily was added to his regimen and he will  continue to have blood work follow closely.        Interval History:     Daniel comes in for follow-up given his paroxysmal atrial fibrillation and risk factors for cardiovascular disease.  I met Daniel during hospitalization in August 2021 in which he had new onset atrial fibrillation.  This occurred on a few occasions.  He was discharged but then had recurrent symptoms of his atrial fibrillation and we started flecainide in addition to his metoprolol.  He was also found have Lyme disease and was treated for this as well.  He had elevated LFTs associated with that.    He had an echocardiogram that showed normal LV systolic function and biatrial enlargement.  He ended up going through an exercise treadmill test that showed no evidence of ischemia at about 80% MPHR.  He had no symptoms during his treadmill test.      Over the next several visits we had been following Daniel's blood pressure closely as it was trending to up.  His amlodipine had been increased to 10 mg daily.  We changed him from Toprol-XL to carvedilol 25 mg twice daily.  He did then call the office in August 2022 and noted that he was having some palpitations/dizziness and he was tracking his heart rate/rhythm at home.  At that point we had him wear an extended ambulatory Holter.  Over 2 weeks this showed sinus rhythm with PACs that were relatively frequent than a few short runs of nonsustained SVT.  There was a suggestion of nonsustained run of VT, but the voltage was low and this could have represented SVT with aberrancy.  He went through an updated ischemic evaluation in May 2023, stress nuclear study which was normal.    After our last visit, due to an acceleration of tachycardia and palpitations, I had him wear another extended ambulatory Holter in March.  This did not show any atrial fibrillation but he had 35 runs of nonsustained SVT, but the longest was about 20 seconds.  Then in April of this year he went to the ER with worsening palpitations.   He was noted to be back in atrial fibrillation and his QRS had widened.  At that time flecainide was increased to 100 mg twice daily, but his QRS was noted to widening along with rapid atrial flutter.  At that time flecainide was stopped and EP was consulted.  He then went through an atrial fibrillation/flutter ablation on 5/8/2024 by Dr. Kay.  He came back to the hospital in June with palpitations along with lightheadedness and near syncope.  Given his bifascicular block a loop recorder was placed.  He was also noted to have runs of atrial tachycardia and flecainide was added back at the lower dose of 50 mg twice daily.  He continues to follow with Dr. Kay closely.    When I last saw Daniel has been dealing with labile hypertension.  His blood pressure tends to run high chronically, but he gets symptoms of orthostasis and his blood pressure will sometimes drop randomly if he is active.  He did not have any near-syncope or any syncopal events.  Prior to our appointment Dr. Kay stopped his amlodipine and added losartan.  It was noted that his PCP started him on doxazosin, and if anything this made his orthostatic type symptoms worse.  At that visit I stopped his doxazosin and added back amlodipine 5 mg daily at night.  He continued on carvedilol.  His loop recorder interrogations have been unremarkable with just short bursts of PAT/SVT.  His last interrogation did have a 25 beat run.  Since last visit his blood pressures have been mildly elevated, with some systolic readings at home around 160.  However since last visit he stopped the losartan.    Daniel overall does feel better.  His lightheadedness is minimal.  No near-syncope or syncope.  He feels better on these medication changes.  He denies any new symptoms.  Other than some intermittent palpitations which have improved he denies any chest pain or symptoms of angina.  No worsening shortness of breath or any orthopnea/PND.  No other signs of volume  overload.      Patient Active Problem List   Diagnosis    Disc degeneration, lumbosacral    Hyperlipidemia    Hypertension    Cigarette nicotine dependence without complication    Paroxysmal atrial fibrillation (HCC)    Pain of right hip    Bladder tumor    Nephrolithiasis    s/p Afib ablation (PFA PVI, RF CTI) 5/8/2024    NSVT (nonsustained ventricular tachycardia) (HCC)    Constipation    Nicotine dependence    Labile blood pressure    Impaired glucose tolerance    Nausea    Throat pain    History of adenomatous polyp of colon    Gastroesophageal reflux disease without esophagitis     Past Medical History:   Diagnosis Date    A-fib (HCC) 08/24/2021    Arthritis     Disc degeneration, lumbosacral 01/06/2016    GERD (gastroesophageal reflux disease)     Hyperlipidemia     hypercholesterolemia    Hypertension     last assessed 11/22/17    Pneumonia of both lower lobes due to infectious organism 01/29/2018    s/p Afib ablation (PFA PVI, RF CTI) 5/8/2024 05/09/2024     Social History     Socioeconomic History    Marital status: /Civil Union     Spouse name: Not on file    Number of children: 1    Years of education: Not on file    Highest education level: Not on file   Occupational History    Occupation:    Tobacco Use    Smoking status: Former     Current packs/day: 0.50     Average packs/day: 0.5 packs/day for 40.0 years (20.0 ttl pk-yrs)     Types: Cigarettes    Smokeless tobacco: Never   Vaping Use    Vaping status: Never Used   Substance and Sexual Activity    Alcohol use: Yes     Comment: Occassional 1 x / month    Drug use: No    Sexual activity: Yes     Partners: Female     Birth control/protection: None   Other Topics Concern    Not on file   Social History Narrative    Lives with wife    Full time employment    active advance directive    1 child ; son, passed away        Does not consume caffeine     Social Drivers of Health     Financial Resource Strain: Not on file   Food Insecurity: No  Food Insecurity (4/28/2024)    Nursing - Inadequate Food Risk Classification     Worried About Running Out of Food in the Last Year: Never true     Ran Out of Food in the Last Year: Never true     Ran Out of Food in the Last Year: Not on file   Transportation Needs: No Transportation Needs (4/28/2024)    PRAPARE - Transportation     Lack of Transportation (Medical): No     Lack of Transportation (Non-Medical): No   Physical Activity: Not on file   Stress: Not on file   Social Connections: Not on file   Intimate Partner Violence: Not on file   Housing Stability: Low Risk  (4/28/2024)    Housing Stability Vital Sign     Unable to Pay for Housing in the Last Year: No     Number of Times Moved in the Last Year: 1     Homeless in the Last Year: No      Family History   Problem Relation Age of Onset    Obesity Mother     Aneurysm Father      Past Surgical History:   Procedure Laterality Date    CARDIAC ELECTROPHYSIOLOGY PROCEDURE N/A 5/8/2024    Procedure: Cardiac eps/afib ablation;  Surgeon: Subhash Kay MD;  [facility];  Service: Cardiology    CARDIAC ELECTROPHYSIOLOGY PROCEDURE N/A 6/11/2024    Procedure: Cardiac loop recorder implant;  Surgeon: Romulo Conner MD;  [facility];  Service: Cardiology    COLONOSCOPY      FL RETROGRADE PYELOGRAM  11/3/2023    HEMORRHOID SURGERY      KNEE SURGERY Bilateral     DC COLONOSCOPY FLX DX W/COLLJ SPEC WHEN PFRMD N/A 1/5/2019    Procedure: COLONOSCOPY;  Surgeon: Lane Bonner MD;  [facility];  Service: Gastroenterology    DC CYSTOURETHROSCOPY W/URETERAL CATHETERIZATION Left 11/3/2023    Procedure: CYSTOSCOPY RETROGRADE PYELOGRAM WITH INSERTION STENT URETERAL, ureteroscopy, holmium laser;  Surgeon: Donald Jenkins MD;  [facility];  Service: Urology    TRANSURETHRAL RESECTION OF BLADDER TUMOR N/A 11/3/2023    Procedure: TRANSURETHRAL RESECTION OF BLADDER TUMOR (TURBT);  Surgeon: Donald Jenkins MD;  Location:  [facility];  Service: Urology    UPPER GASTROINTESTINAL ENDOSCOPY         Current Outpatient Medications:     amLODIPine (NORVASC) 5 mg tablet, Take 1 tablet (5 mg total) by mouth daily at bedtime, Disp: 90 tablet, Rfl: 3    apixaban (Eliquis) 5 mg, TAKE 1 TABLET TWICE A DAY, Disp: 180 tablet, Rfl: 1    ascorbic acid (VITAMIN C) 500 mg tablet, Take 500 mg by mouth daily, Disp: , Rfl:     carvedilol (COREG) 25 mg tablet, TAKE 1 TABLET TWICE A DAY WITH MEALS, Disp: 180 tablet, Rfl: 1    Coenzyme Q10 (COQ-10) 10 MG CAPS, Take by mouth, Disp: , Rfl:     flecainide (TAMBOCOR) 50 mg tablet, Take 1 tablet (50 mg total) by mouth every 12 (twelve) hours, Disp: 180 tablet, Rfl: 3    Multiple Vitamins-Minerals (MENS MULTIVITAMIN PLUS PO), Take by mouth, Disp: , Rfl:     pantoprazole (PROTONIX) 40 mg tablet, Take 1 tablet (40 mg total) by mouth daily, Disp: 30 tablet, Rfl: 4    simvastatin (ZOCOR) 40 mg tablet, TAKE 1 TABLET DAILY AT BEDTIME, Disp: 90 tablet, Rfl: 1    tadalafil (CIALIS) 10 MG tablet, Take 1 tablet (10 mg total) by mouth daily as needed for erectile dysfunction, Disp: 10 tablet, Rfl: 3    Zinc 50 MG CAPS, Take by mouth, Disp: , Rfl:   No Known Allergies    Labs:  Lab Results   Component Value Date     11/22/2017    K 4.6 10/18/2024    K 4.5 08/19/2023     10/18/2024     08/19/2023    CO2 25 10/18/2024    CO2 26 08/19/2023    BUN 11 10/18/2024    BUN 13 08/19/2023    CREATININE 1.08 10/18/2024    CREATININE 0.85 06/11/2024    CREATININE 1.07 08/19/2023    CALCIUM 9.8 10/18/2024    CALCIUM 9.3 08/19/2023     Lab Results   Component Value Date    WBC 7.75 06/11/2024    WBC 7.2 11/22/2017    HGB 13.7 06/11/2024    HGB 13.9 11/22/2017    HCT 41.6 06/11/2024    HCT 41.9 11/22/2017    MCV 95 06/11/2024    MCV 92.9 11/22/2017     06/11/2024     11/22/2017     Lab Results   Component Value Date    CHOL 156 11/22/2017    TRIG 321 (H) 10/18/2024    HDL 30 10/18/2024    HDL 35 (L) 04/03/2023     HDL 42 05/22/2021    LDLDIRECT 94 10/22/2016     Imaging:  ECG today shows Normal sinus rhythm with a bifascicular block, left anterior fascicular block and a right bundle branch block.    STRESS NUCLEAR (5/26/2023):    Stress ECG: No ST deviation is noted. The ECG was not diagnostic due to pharmacological (vasodilator) stress.    Perfusion: There are no perfusion defects.    Stress Function: Left ventricular function post-stress is normal. Stress ejection fraction is 52 %.    Stress Combined Conclusion: The ECG and SPECT imaging portions of the stress study are concordant with no evidence of stress induced myocardial ischemia. Left ventricular perfusion is normal.    Resting ECG: The ECG shows normal sinus rhythm.      ECHO (4/28/2024):    Left Ventricle: Left ventricular cavity size is normal. Wall thickness is mildly increased. The left ventricular ejection fraction is 58% by 3D quantification. Systolic function is normal. Although no diagnostic regional wall motion abnormality was identified, this possibility cannot be completely excluded on the basis of this study.    Right Ventricle: Right ventricular cavity size is normal. Systolic function is normal.    Right Atrium: The atrium is dilated.    Mitral Valve: There is mild regurgitation.    Tricuspid Valve: There is mild regurgitation.      Review of Systems:  Review of Systems   Constitutional: Negative.    HENT: Negative.     Eyes: Negative.    Respiratory: Negative.     Cardiovascular:  Positive for palpitations.   Gastrointestinal: Negative.    Musculoskeletal:  Positive for arthralgias and back pain.   Skin: Negative.    Allergic/Immunologic: Negative.    Neurological:  Positive for light-headedness.   Hematological: Negative.    Psychiatric/Behavioral: Negative.     All other systems reviewed and are negative.    Vitals:    03/25/25 1025   BP: 138/78   BP Location: Left arm   Patient Position: Sitting   Cuff Size: Large   Pulse: 61     Physical  Exam  Vitals and nursing note reviewed.   Constitutional:       Appearance: He is well-developed.   HENT:      Head: Normocephalic and atraumatic.   Eyes:      General: No scleral icterus.        Right eye: No discharge.         Left eye: No discharge.      Pupils: Pupils are equal, round, and reactive to light.   Neck:      Thyroid: No thyromegaly.      Vascular: No JVD.   Cardiovascular:      Rate and Rhythm: Normal rate and regular rhythm. No extrasystoles are present.     Pulses: Normal pulses. No decreased pulses.      Heart sounds: Normal heart sounds, S1 normal and S2 normal. No murmur heard.     No friction rub. No gallop.   Pulmonary:      Effort: Pulmonary effort is normal. No respiratory distress.      Breath sounds: Normal breath sounds. No wheezing or rales.   Abdominal:      General: Bowel sounds are normal. There is no distension.      Palpations: Abdomen is soft.      Tenderness: There is no abdominal tenderness.   Musculoskeletal:         General: No tenderness or deformity. Normal range of motion.      Cervical back: Normal range of motion and neck supple.   Skin:     General: Skin is warm and dry.      Findings: No rash.   Neurological:      Mental Status: He is alert and oriented to person, place, and time.      Cranial Nerves: No cranial nerve deficit.   Psychiatric:         Thought Content: Thought content normal.         Judgment: Judgment normal.       Counseling / Coordination of Care  Total office time spent today 25 minutes.  Greater than 50% of total time was spent with the patient and / or family counseling and / or coordination of care.

## 2025-03-27 ENCOUNTER — TELEPHONE (OUTPATIENT)
Age: 65
End: 2025-03-27

## 2025-03-27 NOTE — TELEPHONE ENCOUNTER
03/27/25  Screened by: Yany Epstein    Referring Provider     Pre- Screening: Yes    There is no height or weight on file to calculate BMI.  Has patient been referred for a routine screening Colonoscopy? yes  Is the patient between 45-75 years old? yes      Previous Colonoscopy yes   If yes:    Date:     Facility:     Reason:     Does the patient want to see a Gastroenterologist prior to their procedure OR are they having any GI symptoms? no    Has the patient been hospitalized or had abdominal surgery in the past 6 months? no    Does the patient use supplemental oxygen? no    Does the patient take Coumadin, Lovenox, Plavix, Elliquis, Xarelto, or other blood thinning medication? yes - on Eliquis clearance received    Has the patient had a stroke, cardiac event, or stent placed in the past year? no

## 2025-03-27 NOTE — TELEPHONE ENCOUNTER
Pt calling to r/s EGD, was not aware of Eliquis hold.     Scheduled date of EGD/colonoscopy (as of today): 04/17/2025  Physician performing EGD/colonoscopy: Dr. Ordoñez  [facility] of [facility]oscopy[facility]  Desired bowel prep reviewed with patient: QUEENIE/DUL  Prep instructions sent via [employer]  Instructions reviewed with patient by: ROB  Clearances: Eliquis - 2day hold

## 2025-03-28 ENCOUNTER — RESULTS FOLLOW-UP (OUTPATIENT)
Dept: CARDIOLOGY CLINIC | Facility: CLINIC | Age: 65
End: 2025-03-28

## 2025-03-28 ENCOUNTER — REMOTE DEVICE CLINIC VISIT (OUTPATIENT)
Dept: CARDIOLOGY CLINIC | Facility: CLINIC | Age: 65
End: 2025-03-28
Payer: COMMERCIAL

## 2025-03-28 DIAGNOSIS — I47.20 VT (VENTRICULAR TACHYCARDIA) (HCC): Primary | ICD-10-CM

## 2025-03-28 PROCEDURE — 93298 REM INTERROG DEV EVAL SCRMS: CPT | Performed by: INTERNAL MEDICINE

## 2025-03-28 NOTE — PROGRESS NOTES
"MDT LNQ22 ILR/ ACTIVE SYSTEM IS MRI CONDITIONAL   CARELINK TRANSMISSION: LOOP RECORDER. PRESENTING RHYTHM VS @ 62 BPM. BATTERY STATUS \"OK.\" 4 PT ACTIVATED EPISODES PREVIOUSLY ADDRESSED IN ALERTS. NO NEW PATIENT OR DEVICE ACTIVATED EPISODES. NORMAL DEVICE FUNCTION. DL   "

## 2025-04-09 ENCOUNTER — TELEPHONE (OUTPATIENT)
Dept: GASTROENTEROLOGY | Facility: AMBULARY SURGERY CENTER | Age: 65
End: 2025-04-09

## 2025-04-09 DIAGNOSIS — Z86.0101 HISTORY OF ADENOMATOUS POLYP OF COLON: Primary | ICD-10-CM

## 2025-04-09 NOTE — TELEPHONE ENCOUNTER
Please call and inform patient that I send a prescription for GoLytely to his pharmacy on file.  Please provide him with instructions for GoLytely/Dulcolax bowel prep

## 2025-04-09 NOTE — TELEPHONE ENCOUNTER
Please call patient and find out he is taking MiraLAX daily basis and if he is moving his bowels daily or having constipation.   On Dr. Bonner's last colonoscopy he recommends taking MiraLAX daily but does not say patient needs a 2-day prep for repeat colonoscopy.  If patient is not taking MiraLAX on a daily basis and is not moving his bowels daily I would recommend that he do MiraLAX 1 capful daily for 5 days prior to colonoscopy to facilitate bowel cleansing.  If patient is experiencing constipation then I would recommend GoLytely bowel prep and you could send me a message for I can order GoLytely.

## 2025-04-09 NOTE — TELEPHONE ENCOUNTER
Spoke to patient and confirmed colonoscopy/egd for 4/17. Patient knows to hold Eliquis for 2 days prior to procedure. Patient mentioned that sebastian/dulc prep might not be adequate since his previous colonoscopy he had inadequate bowel prep. Please advise appropriate prep. Thank you

## 2025-04-16 ENCOUNTER — ANESTHESIA EVENT (OUTPATIENT)
Dept: ANESTHESIOLOGY | Facility: HOSPITAL | Age: 65
End: 2025-04-16

## 2025-04-16 ENCOUNTER — ANESTHESIA (OUTPATIENT)
Dept: ANESTHESIOLOGY | Facility: HOSPITAL | Age: 65
End: 2025-04-16

## 2025-04-16 RX ORDER — SODIUM CHLORIDE, SODIUM LACTATE, POTASSIUM CHLORIDE, CALCIUM CHLORIDE 600; 310; 30; 20 MG/100ML; MG/100ML; MG/100ML; MG/100ML
50 INJECTION, SOLUTION INTRAVENOUS CONTINUOUS
Status: CANCELLED | OUTPATIENT
Start: 2025-04-16

## 2025-04-17 ENCOUNTER — ANESTHESIA EVENT (OUTPATIENT)
Dept: GASTROENTEROLOGY | Facility: HOSPITAL | Age: 65
End: 2025-04-17
Payer: COMMERCIAL

## 2025-04-17 ENCOUNTER — HOSPITAL ENCOUNTER (OUTPATIENT)
Dept: GASTROENTEROLOGY | Facility: HOSPITAL | Age: 65
Setting detail: OUTPATIENT SURGERY
End: 2025-04-17
Payer: COMMERCIAL

## 2025-04-17 ENCOUNTER — ANESTHESIA (OUTPATIENT)
Dept: GASTROENTEROLOGY | Facility: HOSPITAL | Age: 65
End: 2025-04-17
Payer: COMMERCIAL

## 2025-04-17 VITALS
RESPIRATION RATE: 16 BRPM | DIASTOLIC BLOOD PRESSURE: 59 MMHG | TEMPERATURE: 97 F | SYSTOLIC BLOOD PRESSURE: 109 MMHG | HEART RATE: 60 BPM | OXYGEN SATURATION: 94 %

## 2025-04-17 DIAGNOSIS — K21.9 GASTROESOPHAGEAL REFLUX DISEASE, UNSPECIFIED WHETHER ESOPHAGITIS PRESENT: ICD-10-CM

## 2025-04-17 DIAGNOSIS — R11.0 NAUSEA: ICD-10-CM

## 2025-04-17 DIAGNOSIS — Z12.11 SCREENING FOR COLON CANCER: ICD-10-CM

## 2025-04-17 DIAGNOSIS — Z86.0101 HISTORY OF ADENOMATOUS POLYP OF COLON: ICD-10-CM

## 2025-04-17 PROCEDURE — 43239 EGD BIOPSY SINGLE/MULTIPLE: CPT | Performed by: INTERNAL MEDICINE

## 2025-04-17 PROCEDURE — 45380 COLONOSCOPY AND BIOPSY: CPT | Performed by: INTERNAL MEDICINE

## 2025-04-17 PROCEDURE — 45385 COLONOSCOPY W/LESION REMOVAL: CPT | Performed by: INTERNAL MEDICINE

## 2025-04-17 PROCEDURE — 88305 TISSUE EXAM BY PATHOLOGIST: CPT | Performed by: PATHOLOGY

## 2025-04-17 RX ORDER — SIMETHICONE 40MG/0.6ML
SUSPENSION, DROPS(FINAL DOSAGE FORM)(ML) ORAL AS NEEDED
Status: COMPLETED | OUTPATIENT
Start: 2025-04-17 | End: 2025-04-17

## 2025-04-17 RX ORDER — SODIUM CHLORIDE, SODIUM LACTATE, POTASSIUM CHLORIDE, CALCIUM CHLORIDE 600; 310; 30; 20 MG/100ML; MG/100ML; MG/100ML; MG/100ML
INJECTION, SOLUTION INTRAVENOUS CONTINUOUS PRN
Status: DISCONTINUED | OUTPATIENT
Start: 2025-04-17 | End: 2025-04-17

## 2025-04-17 RX ORDER — PROPOFOL 10 MG/ML
INJECTION, EMULSION INTRAVENOUS AS NEEDED
Status: DISCONTINUED | OUTPATIENT
Start: 2025-04-17 | End: 2025-04-17

## 2025-04-17 RX ORDER — LIDOCAINE HYDROCHLORIDE 10 MG/ML
INJECTION, SOLUTION EPIDURAL; INFILTRATION; INTRACAUDAL; PERINEURAL AS NEEDED
Status: DISCONTINUED | OUTPATIENT
Start: 2025-04-17 | End: 2025-04-17

## 2025-04-17 RX ADMIN — LIDOCAINE HYDROCHLORIDE 50 MG: 10 INJECTION, SOLUTION EPIDURAL; INFILTRATION; INTRACAUDAL at 07:31

## 2025-04-17 RX ADMIN — PROPOFOL 50 MG: 10 INJECTION, EMULSION INTRAVENOUS at 07:49

## 2025-04-17 RX ADMIN — PROPOFOL 50 MG: 10 INJECTION, EMULSION INTRAVENOUS at 07:41

## 2025-04-17 RX ADMIN — PROPOFOL 50 MG: 10 INJECTION, EMULSION INTRAVENOUS at 07:32

## 2025-04-17 RX ADMIN — PROPOFOL 150 MG: 10 INJECTION, EMULSION INTRAVENOUS at 07:31

## 2025-04-17 RX ADMIN — PROPOFOL 70 MG: 10 INJECTION, EMULSION INTRAVENOUS at 07:43

## 2025-04-17 RX ADMIN — PROPOFOL 30 MG: 10 INJECTION, EMULSION INTRAVENOUS at 07:38

## 2025-04-17 RX ADMIN — Medication 40 MG: at 07:46

## 2025-04-17 RX ADMIN — PROPOFOL 50 MG: 10 INJECTION, EMULSION INTRAVENOUS at 07:55

## 2025-04-17 RX ADMIN — PROPOFOL 40 MG: 10 INJECTION, EMULSION INTRAVENOUS at 07:33

## 2025-04-17 RX ADMIN — SODIUM CHLORIDE, SODIUM LACTATE, POTASSIUM CHLORIDE, AND CALCIUM CHLORIDE: .6; .31; .03; .02 INJECTION, SOLUTION INTRAVENOUS at 07:27

## 2025-04-17 RX ADMIN — PROPOFOL 30 MG: 10 INJECTION, EMULSION INTRAVENOUS at 07:35

## 2025-04-17 NOTE — ANESTHESIA POSTPROCEDURE EVALUATION
Post-Op Assessment Note    CV Status:  Stable  Pain Score: 0    Pain management: adequate       Mental Status:  Alert and awake   Hydration Status:  Euvolemic and stable   PONV Controlled:  Controlled   Airway Patency:  Patent and adequate     Post Op Vitals Reviewed: Yes    No anethesia notable event occurred.    Staff: CRNA       Last Filed PACU Vitals:  Vitals Value Taken Time   Temp     Pulse     BP     Resp     SpO2

## 2025-04-17 NOTE — ANESTHESIA PREPROCEDURE EVALUATION
Procedure:  EGD  COLONOSCOPY    Relevant Problems   CARDIO   (+) Hyperlipidemia   (+) Hypertension   (+) Paroxysmal atrial fibrillation (HCC)      GI/HEPATIC   (+) Gastroesophageal reflux disease without esophagitis      /RENAL   (+) Nephrolithiasis        Physical Exam    Airway    Mallampati score: II  TM Distance: >3 FB  Neck ROM: full     Dental   No notable dental hx     Cardiovascular  Cardiovascular exam normal    Pulmonary  Pulmonary exam normal     Other Findings        Anesthesia Plan  ASA Score- 3     Anesthesia Type- IV sedation with anesthesia with ASA Monitors.         Additional Monitors:     Airway Plan:            Plan Factors-Exercise tolerance (METS): >4 METS.    Chart reviewed. EKG reviewed.  Existing labs reviewed. Patient summary reviewed.    Patient is not a current smoker. Patient not instructed to abstain from smoking on day of procedure. Patient did not smoke on day of surgery.            Induction-     Postoperative Plan-     Perioperative Resuscitation Plan - Level 1 - Full Code.       Informed Consent- Anesthetic plan and risks discussed with patient and spouse.  I personally reviewed this patient with the CRNA. Discussed and agreed on the Anesthesia Plan with the CRNA..      NPO Status:  Vitals Value Taken Time   Date of last liquid 04/17/25 04/17/25 0645   Time of last liquid 0130 04/17/25 0645   Date of last solid 04/15/25 04/17/25 0645   Time of last solid 2200 04/17/25 0645         Lab Results   Component Value Date    HGBA1C 6.2 (H) 10/18/2024       Lab Results   Component Value Date     11/22/2017    K 4.6 10/18/2024     10/18/2024    CO2 25 10/18/2024    BUN 11 10/18/2024    CREATININE 1.08 10/18/2024    GLUF 99 06/11/2024    CALCIUM 9.8 10/18/2024    CORRECTEDCA 10.3 (H) 11/23/2022    AST 31 10/18/2024    ALT 52 10/18/2024    ALKPHOS 86 10/18/2024    PROT 7.1 11/22/2017    BILITOT 0.4 11/22/2017    EGFR 76 10/18/2024       Lab Results   Component Value Date    WBC  "7.75 06/11/2024    HGB 13.7 06/11/2024    HCT 41.6 06/11/2024    MCV 95 06/11/2024     06/11/2024     MDT LNQ22 ILR/ ACTIVE SYSTEM IS MRI CONDITIONAL  CARELINK TRANSMISSION: LOOP RECORDER. PRESENTING RHYTHM VS @ 62 BPM. BATTERY STATUS \"OK.\" 4 PT ACTIVATED EPISODES PREVIOUSLY ADDRESSED IN ALERTS. NO NEW PATIENT OR DEVICE ACTIVATED EPISODES. NORMAL DEVICE FUNCTION. DL         Specimen Collected: 03/28/25 23:10     "

## 2025-04-17 NOTE — H&P
History and Physical - [facility]  Daniel Gardiner 65 y.o. male MRN: [id number]                  HPI: Daniel Gardiner is a 65 y.o. year old male who presents for GERD, personal history of colon polyps.      REVIEW OF SYSTEMS: Per the HPI, and otherwise unremarkable.    Historical Information   Past Medical History:   Diagnosis Date    A-fib (HCC) 08/24/2021    Arthritis     Disc degeneration, lumbosacral 01/06/2016    GERD (gastroesophageal reflux disease)     Hyperlipidemia     hypercholesterolemia    Hypertension     last assessed 11/22/17    Pneumonia of both lower lobes due to infectious organism 01/29/2018    s/p Afib ablation (PFA PVI, RF CTI) 5/8/2024 05/09/2024     Past Surgical History:   Procedure Laterality Date    CARDIAC ELECTROPHYSIOLOGY PROCEDURE N/A 5/8/2024    Procedure: Cardiac eps/afib ablation;  Surgeon: Subhash Kay MD;  [facility];  Service: Cardiology    CARDIAC ELECTROPHYSIOLOGY PROCEDURE N/A 6/11/2024    Procedure: Cardiac loop recorder implant;  Surgeon: Romulo Conner MD;  [facility];  Service: Cardiology    COLONOSCOPY      FL RETROGRADE PYELOGRAM  11/3/2023    HEMORRHOID SURGERY      KNEE SURGERY Bilateral     [place] COLONOSCOPY FLX DX W/COLLJ SPEC WHEN PFRMD N/A 1/5/2019    Procedure: COLONOSCOPY;  Surgeon: Lane Bonner MD;  [facility];  Service: Gastroenterology    KY CYSTOURETHROSCOPY W/URETERAL CATHETERIZATION Left 11/3/2023    Procedure: CYSTOSCOPY RETROGRADE PYELOGRAM WITH INSERTION STENT URETERAL, ureteroscopy, holmium laser;  Surgeon: Donald Jenkins MD;  [facility];  Service: Urology    TRANSURETHRAL RESECTION OF BLADDER TUMOR N/A 11/3/2023    Procedure: TRANSURETHRAL RESECTION OF BLADDER TUMOR (TURBT);  Surgeon: Donald Jenkins MD;  [facility];  Service: Urology    UPPER GASTROINTESTINAL ENDOSCOPY       Social History   Social History     Substance and Sexual Activity    Alcohol Use Yes    Comment: Occassional 1 x / month     Social History     Substance and Sexual Activity   Drug Use No     Social History     Tobacco Use   Smoking Status Former    Current packs/day: 0.50    Average packs/day: 0.5 packs/day for 40.0 years (20.0 ttl pk-yrs)    Types: Cigarettes   Smokeless Tobacco Never     Family History   Problem Relation Age of Onset    Obesity Mother     Aneurysm Father        Meds/Allergies       Current Outpatient Medications:     amLODIPine (NORVASC) 5 mg tablet    apixaban (Eliquis) 5 mg    ascorbic acid (VITAMIN C) 500 mg tablet    carvedilol (COREG) 25 mg tablet    Coenzyme Q10 (COQ-10) 10 MG CAPS    flecainide (TAMBOCOR) 50 mg tablet    Multiple Vitamins-Minerals (MENS MULTIVITAMIN PLUS PO)    pantoprazole (PROTONIX) 40 mg tablet    simvastatin (ZOCOR) 40 mg tablet    tadalafil (CIALIS) 10 MG tablet    Zinc 50 MG CAPS    No Known Allergies    Objective     /75   Pulse 66   Temp (!) 97.1 °F (36.2 °C) (Temporal)   Resp 16   SpO2 97%       PHYSICAL EXAM    Gen: NAD  Head: NCAT  CV: RRR  CHEST: Clear  ABD: soft, NT/ND  EXT: no edema      ASSESSMENT/PLAN:  This is a 65 y.o. year old male here for EGD & colonoscopy, and he is stable and optimized for his procedure.

## 2025-04-17 NOTE — ANESTHESIA PREPROCEDURE EVALUATION
"Procedure:  PRE-OP ONLY    Relevant Problems   CARDIO   (+) Hyperlipidemia   (+) Hypertension   (+) Paroxysmal atrial fibrillation (HCC)      GI/HEPATIC   (+) Gastroesophageal reflux disease without esophagitis      /RENAL   (+) Nephrolithiasis      Behavioral Health   (+) Cigarette nicotine dependence without complication      Oncology   (+) Bladder tumor      Surgery/Wound/Pain   (+) s/p Afib ablation (PFA PVI, RF CTI) 5/8/2024             Anesthesia Plan  ASA Score- 3     Anesthesia Type- IV sedation with anesthesia with ASA Monitors.         Additional Monitors:     Airway Plan:            Plan Factors-    Chart reviewed. EKG reviewed.  Existing labs reviewed. Patient summary reviewed.                  Induction-     Postoperative Plan-     Perioperative Resuscitation Plan - Level 1 - Full Code.       Informed Consent- Anesthetic plan and risks discussed with patient.  I personally reviewed this patient with the CRNA. Discussed and agreed on the Anesthesia Plan with the CRNA..      NPO Status:  No vitals data found for the desired time range.        Lab Results   Component Value Date    HGBA1C 6.2 (H) 10/18/2024       Lab Results   Component Value Date     11/22/2017    K 4.6 10/18/2024     10/18/2024    CO2 25 10/18/2024    BUN 11 10/18/2024    CREATININE 1.08 10/18/2024    GLUF 99 06/11/2024    CALCIUM 9.8 10/18/2024    CORRECTEDCA 10.3 (H) 11/23/2022    AST 31 10/18/2024    ALT 52 10/18/2024    ALKPHOS 86 10/18/2024    PROT 7.1 11/22/2017    BILITOT 0.4 11/22/2017    EGFR 76 10/18/2024       Lab Results   Component Value Date    WBC 7.75 06/11/2024    HGB 13.7 06/11/2024    HCT 41.6 06/11/2024    MCV 95 06/11/2024     06/11/2024     MDT [id number] ILR/ ACTIVE SYSTEM IS MRI CONDITIONAL  CARELINK TRANSMISSION: LOOP RECORDER. PRESENTING RHYTHM VS @ 62 BPM. BATTERY STATUS \"OK.\" 4 PT ACTIVATED EPISODES PREVIOUSLY ADDRESSED IN ALERTS. NO NEW PATIENT OR DEVICE ACTIVATED EPISODES. NORMAL DEVICE " FUNCTION. DL         Specimen Collected: 03/28/25 23:10

## 2025-04-22 PROCEDURE — 88305 TISSUE EXAM BY PATHOLOGIST: CPT | Performed by: PATHOLOGY

## 2025-04-28 ENCOUNTER — RESULTS FOLLOW-UP (OUTPATIENT)
Dept: GASTROENTEROLOGY | Facility: AMBULARY SURGERY CENTER | Age: 65
End: 2025-04-28

## 2025-04-29 ENCOUNTER — OFFICE VISIT (OUTPATIENT)
Dept: CARDIOLOGY CLINIC | Facility: CLINIC | Age: 65
End: 2025-04-29
Payer: COMMERCIAL

## 2025-04-29 VITALS
DIASTOLIC BLOOD PRESSURE: 80 MMHG | HEART RATE: 63 BPM | WEIGHT: 305.7 LBS | BODY MASS INDEX: 39.23 KG/M2 | SYSTOLIC BLOOD PRESSURE: 130 MMHG | HEIGHT: 74 IN

## 2025-04-29 DIAGNOSIS — I48.0 PAROXYSMAL ATRIAL FIBRILLATION (HCC): Primary | ICD-10-CM

## 2025-04-29 LAB
ATRIAL RATE: 63 BPM
P AXIS: 50 DEGREES
PR INTERVAL: 212 MS
QRS AXIS: -63 DEGREES
QRSD INTERVAL: 160 MS
QT INTERVAL: 462 MS
QTC INTERVAL: 473 MS
T WAVE AXIS: 7 DEGREES
VENTRICULAR RATE: 63 BPM

## 2025-04-29 PROCEDURE — 93000 ELECTROCARDIOGRAM COMPLETE: CPT | Performed by: INTERNAL MEDICINE

## 2025-04-29 PROCEDURE — 99214 OFFICE O/P EST MOD 30 MIN: CPT | Performed by: INTERNAL MEDICINE

## 2025-04-29 RX ORDER — ASPIRIN 81 MG/1
81 TABLET, CHEWABLE ORAL DAILY
Start: 2025-04-29

## 2025-04-29 NOTE — PROGRESS NOTES
"HEART AND VASCULAR  CARDIAC ELECTROPHYSIOLOGY   HEART RHYTHM CENTER  [facility]    Outpatient Follow-up  Today's Date: 04/29/25        Patient name: Daniel Gardiner  YOB: 1960  Sex: male         Chief Complaint: F/u afib      ASSESSMENT:  Problem List Items Addressed This Visit          Cardiovascular and Mediastinum    Paroxysmal atrial fibrillation (HCC) - Primary    Relevant Medications    aspirin 81 mg chewable tablet    Other Relevant Orders    POCT ECG       66 yo male  1) Paroysmal afib and typical flutter, post CTI ablation and PFA of PVI, loop. Since then has has had \"seconds\" of mild palpitaitons correlating with benign PAC, PVC at night and occassional short bursts of PAT/SVT . We have him on low dose flecianide 50mg bid and coreg which seems to help. Remains on anticoagulaton. [id number], zERO AFIB 1 YEAR ON LOOP SINCE ABLATION. Palpitations much better later. .    Bifascicular block RBBB and LAFB unchanged        2) HTN better control now. DIzzyness improved now. ON Coreg and amlodipine    3) Dyslpidemia on statin    4) Insomnia, sleeping disorder, no recent sleep study. Has anxiety at night.    5) Sedentary with multiple orthopedic issues up coming hip replacment  6) obesity      PLAN:  STop Eliquis, zero afib on loop one year. Low risk overall [id number]=3. Will monitor with symptoms and loop. Explained low but not zero risk for CVA.  Start aspirin 81mg daily    COnt low dose flecainide and coreg    Can proceed with Hip replacement, if necessary can hold aspirin per Ortho recommendations.       Follow up in: 12 months    Orders Placed This Encounter   Procedures    POCT ECG     Medications Discontinued During This Encounter   Medication Reason    apixaban (Eliquis) 5 mg          .............................................................................................    HPI/Subjective:   66 yo male  1) Paroysmal afib and typical flutter, post CTI ablation " "and PFA of PVI, loop. Since then has has had \"seconds\" of mild palpitaitons correlating with benign PAC, PVC at night and occassional short bursts of PAT/SVT . We have him on low dose flecianide 50mg bid and coreg which seems to help. Remains on anticoagulaton. [id number]=6, zERO AFIB 1 YEAR ON LOOP SINCE ABLATION. Palpitations much better later. .  RBBB and LAFB    2) HTN better control now. DIzzyness improved now. ON Coreg and amlodipine    3) Dyslpidemia on statin    4) Insomnia, sleeping disorder, no recent sleep study. Has anxiety at night.    5) Sedentary with multiple orthopedic issues up coming hip replacment  6) obesity        Past Medical History:   Diagnosis Date    A-fib (HCC) 08/24/2021    Arthritis     Disc degeneration, lumbosacral 01/06/2016    GERD (gastroesophageal reflux disease)     Hyperlipidemia     hypercholesterolemia    Hypertension     last assessed 11/22/17    Pneumonia of both lower lobes due to infectious organism 01/29/2018    s/p Afib ablation (PFA PVI, RF CTI) 5/8/2024 05/09/2024       No Known Allergies  I reviewed the Home Medication list and Allergies in the chart.   Scheduled Meds:  Current Outpatient Medications   Medication Sig Dispense Refill    amLODIPine (NORVASC) 5 mg tablet Take 1 tablet (5 mg total) by mouth 2 (two) times a day 180 tablet 3    ascorbic acid (VITAMIN C) 500 mg tablet Take 500 mg by mouth daily      aspirin 81 mg chewable tablet Chew 1 tablet (81 mg total) daily      carvedilol (COREG) 25 mg tablet TAKE 1 TABLET TWICE A DAY WITH MEALS 180 tablet 1    Coenzyme Q10 (COQ-10) 10 MG CAPS Take by mouth      flecainide (TAMBOCOR) 50 mg tablet Take 1 tablet (50 mg total) by mouth every 12 (twelve) hours 180 tablet 3    Multiple Vitamins-Minerals (MENS MULTIVITAMIN PLUS PO) Take by mouth      pantoprazole (PROTONIX) 40 mg tablet Take 1 tablet (40 mg total) by mouth daily 30 tablet 4    simvastatin (ZOCOR) 40 mg tablet TAKE 1 TABLET DAILY AT BEDTIME 90 tablet 1    " tadalafil (CIALIS) 10 MG tablet Take 1 tablet (10 mg total) by mouth daily as needed for erectile dysfunction 10 tablet 3    Zinc 50 MG CAPS Take by mouth       No current facility-administered medications for this visit.     PRN Meds:.        Family History   Problem Relation Age of Onset    Obesity Mother     Aneurysm Father        Social History     Socioeconomic History    Marital status: /Civil Union     Spouse name: Not on file    Number of children: 1    Years of education: Not on file    Highest education level: Not on file   Occupational History    Occupation:    Tobacco Use    Smoking status: Former     Current packs/day: 0.50     Average packs/day: 0.5 packs/day for 40.0 years (20.0 ttl pk-yrs)     Types: Cigarettes    Smokeless tobacco: Never   Vaping Use    Vaping status: Never Used   Substance and Sexual Activity    Alcohol use: Yes     Comment: Occassional 1 x / month    Drug use: No    Sexual activity: Yes     Partners: Female     Birth control/protection: None   Other Topics Concern    Not on file   Social History Narrative    Lives with wife    Full time employment    active advance directive    1 child ; son, passed away        Does not consume caffeine     Social Drivers of Health     Financial Resource Strain: Not on file   Food Insecurity: No Food Insecurity (4/28/2024)    Nursing - Inadequate Food Risk Classification     Worried About Running Out of Food in the Last Year: Never true     Ran Out of Food in the Last Year: Never true     Ran Out of Food in the Last Year: Not on file   Transportation Needs: No Transportation Needs (4/28/2024)    PRAPARE - Transportation     Lack of Transportation (Medical): No     Lack of Transportation (Non-Medical): No   Physical Activity: Not on file   Stress: Not on file   Social Connections: Not on file   Intimate Partner Violence: Not on file   Housing Stability: Low Risk  (4/28/2024)    Housing Stability Vital Sign     Unable to Pay for  "Housing in the Last Year: No     Number of Times Moved in the Last Year: 1     Homeless in the Last Year: No         OBJECTIVE:    /80 (BP Location: Left arm, Patient Position: Sitting, Cuff Size: Large)   Pulse 63   Ht 6' 2\" (1.88 m)   Wt (!) 139 kg (305 lb 11.2 oz)   BMI 39.25 kg/m²   Vitals:    04/29/25 1136   Weight: (!) 139 kg (305 lb 11.2 oz)     GEN: No acute distress, Alert and oriented, well appearing  HEENT:External ears normal, oral pharynx clear, mucous membranes moist  EYES: Pupils equal, sclera anicteric, midline, normal conjuctiva  NECK: No JVD, supple, no obvious masses or thryomegaly or goiter  CARDIOVASCULAR:  RRR, No murmur, rub, gallops S1,S2  LUNGS: Clear To auscultation bilaterally, normal effort, no rales, rhonchi, crackles   ABDOMEN:  nondistended,  without obvious organomegaly or ascites  EXTREMITIES/VASCULAR:  No edema. warm an well perfused.  PSYCH: Normal Affect,  linear speech pattern without evidence of psychosis.   NEURO: Grossly intact, moving all extremiteis equal, face symmetric, alert and responsive, no obvious focal defecits   GAIT:  Ambulates normally without difficulty  HEME: No bleeding, bruising, petechia, purpura   SKIN: No significant rashes on visibile skin, warm, no diaphoresis or pallor.     Lab Results:       LABS:      Chemistry        Component Value Date/Time     11/22/2017 1027    K 4.6 10/18/2024 1120    K 4.5 08/19/2023 1035     10/18/2024 1120     08/19/2023 1035    CO2 25 10/18/2024 1120    CO2 26 08/19/2023 1035    BUN 11 10/18/2024 1120    BUN 13 08/19/2023 1035    CREATININE 1.08 10/18/2024 1120    CREATININE 0.85 06/11/2024 0458    CREATININE 1.07 08/19/2023 1035        Component Value Date/Time    CALCIUM 9.8 10/18/2024 1120    CALCIUM 9.3 08/19/2023 1035    ALKPHOS 86 10/18/2024 1120    ALKPHOS 68 08/19/2023 1035    AST 31 10/18/2024 1120    AST 19 08/19/2023 1035    ALT 52 10/18/2024 1120    ALT 27 08/19/2023 1035    BILITOT 0.4 " "11/22/2017 1027            Lab Results   Component Value Date    CHOL 156 11/22/2017    CHOL 159 10/22/2016    CHOL 157 01/09/2016     Lab Results   Component Value Date    HDL 30 10/18/2024    HDL 33 05/23/2024    HDL 35 (L) 04/03/2023     Lab Results   Component Value Date    LDLCALC 143 (H) 10/18/2024    LDLCALC 54 05/23/2024    LDLCALC 67 04/03/2023     Lab Results   Component Value Date    TRIG 321 (H) 10/18/2024    TRIG 243 (H) 05/23/2024    TRIG 172 (H) 04/03/2023     No results found for: \"CHOLHDL\"    IMAGING: Cardiac EP device report    Result Date: 10/8/2024  Narrative: STANLEY JOHNSON ILR/ ACTIVE SYSTEM IS MRI CONDITIONAL NON-BILLABLE CARELINK TRANSMISSION: ALERT/PATIENT ACTIVATED EPISODE c/o Heart fluttering  Not at all active  Sitting rapid beats; ECG SHOWING SR @ AVG 62 BPM WITH SVT RUN, DURATION 24 BEATS @  BPM. EF 58% (4/28/24 ECHO). PATIENT TAKING ELIQUIS, FLECAINIDE, CARVEDILOL.  NORMAL DEVICE FUNCTION.  ES     Cardiac EP device report    Result Date: 9/27/2024  Narrative: STANLEY JOHNSON ILR/ ACTIVE SYSTEM IS MRI CONDITIONAL CARELINK TRANSMISSION: LOOP RECORDER. PRESENTING RHYTHM VS @ 50 BPM. BATTERY STATUS \"OK.\" NO PATIENT OR DEVICE ACTIVATED EPISODES. HOWEVER THERE IS AN EGRAM SHOWING AF. HX OFP AF. PT TAKES FLECAINIDE, ELIQUIS AND COREG. NORMAL DEVICE FUNCTION. DL     Cardiac EP device report    Result Date: 9/15/2024  Narrative: STANLEY JOHNSON ILR/ ACTIVE SYSTEM IS MRI CONDITIONAL NON-BILLABLE. CARELINK TRANSMISSION: ALERT/SYMPTOM. PER PT: Heart pounding  Not at all active  Deep heart beats felt a little light headed. EGRAM SHOWS SR, SB AND PVCs. DL     Cardiac EP device report    Result Date: 9/13/2024  Narrative: STANLEY JOHNSON ILR/ ACTIVE SYSTEM IS MRI CONDITIONAL NON-BILLABLE. CARELINK TRANSMISSION: ALERT/SYMPTOMS. PER PT: #27 Other  Not at all active  Having mussel spasms in chest? EGRAM SHOWS SR W/ RARE PAC. #28 Heart fluttering  Not at all active  Felt multiple beats. EGRAM SHOWS SR, PACs AND " ALFRED ELLER        Cardiac testing:   Results for orders placed during the hospital encounter of 21    Echo complete with contrast if indicated    Narrative  [place] [id number] [phone]    Transthoracic Echocardiogram  2D, M-mode, Doppler, and Color Doppler    Study date:  23-Aug-2021    Patient: DEONDRE REYES  MR number: [id number]  Account number: [id number]  : 1960  Age: 61 years  Gender: Male  Status: Outpatient  Location: Bedside  Height: 73 in  Weight: 274.3 lb  BP: 114/ 75 mmHg    Indications: Atrial Fibrillation    Diagnoses: I48.0 - Atrial fibrillation    Sonographer:  NII Sierra  Primary Physician:  Linh Erickson MD  Referring Physician:  Luis Eduardo Bosch MD  Group:  [facility]  Interpreting Physician:  Aroldo Greenwood MD    SUMMARY    LEFT VENTRICLE:  Size was normal.  Systolic function was normal. Ejection fraction was estimated to be 60 %.  There was mild concentric hypertrophy.    RIGHT VENTRICLE:  The size was normal.  Systolic function was normal.    LEFT ATRIUM:  The atrium was mildly dilated.    RIGHT ATRIUM:  The atrium was mildly dilated.    MITRAL VALVE:  There was trace regurgitation.    TRICUSPID VALVE:  There was trace regurgitation.    HISTORY: PRIOR HISTORY: Hypertension,HLD,Tobacco Abuse    PROCEDURE: The procedure was performed at the bedside. This was a routine study. The transthoracic approach was used. The study included complete 2D imaging, M-mode, complete spectral Doppler, and color Doppler. The heart rate was 98 bpm,  at the start of the study. Images were obtained from the parasternal, apical, subcostal, and suprasternal notch acoustic windows. Image quality was adequate.    LEFT VENTRICLE: Size was normal. Systolic function was normal. Ejection fraction was estimated to be 60 %. There were no regional wall motion abnormalities. Wall thickness was  mildly increased. There was mild concentric hypertrophy.  DOPPLER: Transmitral flow pattern: atrial fibrillation. The study was not technically sufficient to allow evaluation of LV diastolic function.    RIGHT VENTRICLE: The size was normal. Systolic function was normal. Wall thickness was normal.    LEFT ATRIUM: The atrium was mildly dilated.    RIGHT ATRIUM: The atrium was mildly dilated.    MITRAL VALVE: Valve structure was normal. There was normal leaflet separation. DOPPLER: The transmitral velocity was within the normal range. There was no evidence for stenosis. There was trace regurgitation.    AORTIC VALVE: The valve was trileaflet. Leaflets exhibited normal thickness and normal cuspal separation. DOPPLER: Transaortic velocity was within the normal range. There was no evidence for stenosis. There was no regurgitation.    TRICUSPID VALVE: The valve structure was normal. There was normal leaflet separation. DOPPLER: The transtricuspid velocity was within the normal range. There was no evidence for stenosis. There was trace regurgitation. The tricuspid jet  envelope definition was inadequate for estimation of RV systolic pressure. There are no indirect findings suggestive of moderate or severe pulmonary hypertension.    PULMONIC VALVE: Leaflets exhibited normal thickness, no calcification, and normal cuspal separation. DOPPLER: The transpulmonic velocity was within the normal range. There was no regurgitation.    PERICARDIUM: There was no pericardial effusion. The pericardium was normal in appearance.    AORTA: The root exhibited normal size.    SYSTEMIC VEINS: IVC: The inferior vena cava was normal in size and course. Respirophasic changes were normal.    SYSTEM MEASUREMENT TABLES    2D  %FS: 24.99 %  Ao Diam: 2.93 cm  Ao asc: 2.84 cm  EDV(Teich): 95.3 ml  EF(Teich): 49.54 %  ESV(Teich): 48.09 ml  IVSd: 1.22 cm  LA Area: 29.05 cm2  LA Diam: 4.4 cm  LVEDV MOD A4C: 125.61 ml  LVEF MOD A4C: 64.28 %  LVESV MOD  A4C: 44.86 ml  LVIDd: 4.56 cm  LVIDs: 3.42 cm  LVLd A4C: 8.85 cm  LVLs A4C: 7.39 cm  LVPWd: 1.21 cm  RA Area: 21.84 cm2  RVIDd: 3.34 cm  SV MOD A4C: 80.74 ml  SV(Teich): 47.21 ml    CW  TR Vmax: 2.13 m/s  TR maxP.21 mmHg    MM  TAPSE: 2.05 cm    [facility]    Prepared and electronically signed by    Aroldo Greenwood MD  Signed 23-Aug-2021 11:29:29    No results found for this or any previous visit.    No results found for this or any previous visit.    No results found for this or any previous visit.          I reviewed and interpreted the following LABS/EKG/TELE/IMAGING and below is summary of my interpretation (if data available):    Current EKG and Rhythm Strip:NSR RBBB< LAFB        Interrogation of Pacemaker/Defibrillator/Loop (prior recorded events), etc: see discussion

## 2025-05-01 DIAGNOSIS — K21.9 GASTROESOPHAGEAL REFLUX DISEASE, UNSPECIFIED WHETHER ESOPHAGITIS PRESENT: ICD-10-CM

## 2025-05-01 DIAGNOSIS — R11.0 NAUSEA: ICD-10-CM

## 2025-05-01 RX ORDER — PANTOPRAZOLE SODIUM 40 MG/1
40 TABLET, DELAYED RELEASE ORAL DAILY
Qty: 90 TABLET | Refills: 1 | Status: SHIPPED | OUTPATIENT
Start: 2025-05-01

## 2025-05-01 NOTE — TELEPHONE ENCOUNTER
Reason for call:   [x] Refill   [] Prior Auth  [] Other:     Office:   [] PCP/Provider -   [x] Specialty/Provider - GI/Liz    Medication: Pantoprazole 40mg    Dose/Frequency: 1 tab daily     Quantity: 90    Pharmacy: [facility] - [address] [phone]     Mail Away Pharmacy   Does the patient have enough for 10 days?   [x] Yes   [] No - Send as HP to POD

## 2025-05-14 LAB
CHOLEST SERPL-MCNC: 139 MG/DL
CHOLEST/HDLC SERPL: 3.7 {RATIO}
EST. AVERAGE GLUCOSE BLD GHB EST-MCNC: 157 MG/DL
HBA1C MFR BLD: 7.1 %
HDLC SERPL-MCNC: 38 MG/DL (ref 23–92)
LDLC SERPL CALC-MCNC: 72 MG/DL
NONHDLC SERPL-MCNC: 101 MG/DL
TRIGL SERPL-MCNC: 143 MG/DL

## 2025-05-19 ENCOUNTER — OFFICE VISIT (OUTPATIENT)
Dept: FAMILY MEDICINE CLINIC | Facility: CLINIC | Age: 65
End: 2025-05-19
Payer: COMMERCIAL

## 2025-05-19 VITALS
RESPIRATION RATE: 16 BRPM | DIASTOLIC BLOOD PRESSURE: 70 MMHG | BODY MASS INDEX: 39.5 KG/M2 | HEART RATE: 62 BPM | TEMPERATURE: 97.4 F | HEIGHT: 74 IN | SYSTOLIC BLOOD PRESSURE: 122 MMHG | WEIGHT: 307.8 LBS | OXYGEN SATURATION: 97 %

## 2025-05-19 DIAGNOSIS — M25.532 LEFT WRIST PAIN: ICD-10-CM

## 2025-05-19 DIAGNOSIS — E78.2 MIXED HYPERLIPIDEMIA: ICD-10-CM

## 2025-05-19 DIAGNOSIS — M25.551 PAIN OF RIGHT HIP: Primary | ICD-10-CM

## 2025-05-19 DIAGNOSIS — R73.02 IMPAIRED GLUCOSE TOLERANCE: ICD-10-CM

## 2025-05-19 PROBLEM — F17.210 CIGARETTE NICOTINE DEPENDENCE WITHOUT COMPLICATION: Status: RESOLVED | Noted: 2017-11-22 | Resolved: 2025-05-19

## 2025-05-19 PROBLEM — R07.0 THROAT PAIN: Status: RESOLVED | Noted: 2024-12-04 | Resolved: 2025-05-19

## 2025-05-19 PROBLEM — F17.200 NICOTINE DEPENDENCE: Status: RESOLVED | Noted: 2024-10-18 | Resolved: 2025-05-19

## 2025-05-19 PROBLEM — R11.0 NAUSEA: Status: RESOLVED | Noted: 2024-10-18 | Resolved: 2025-05-19

## 2025-05-19 PROBLEM — R09.89 LABILE BLOOD PRESSURE: Status: RESOLVED | Noted: 2024-10-18 | Resolved: 2025-05-19

## 2025-05-19 PROCEDURE — 99214 OFFICE O/P EST MOD 30 MIN: CPT | Performed by: FAMILY MEDICINE

## 2025-05-19 RX ORDER — MUPIROCIN 20 MG/G
OINTMENT TOPICAL DAILY
COMMUNITY
Start: 2025-05-15

## 2025-05-19 NOTE — ASSESSMENT & PLAN NOTE
Lipids  stable on simvastatin    Orders:    Lipid panel; Future    Comprehensive metabolic panel; Future

## 2025-05-19 NOTE — ASSESSMENT & PLAN NOTE
Bs  up  , will lose  weight and then reassess 4 months    Orders:    Comprehensive metabolic panel; Future    Hemoglobin A1C; Future

## 2025-05-19 NOTE — PROGRESS NOTES
Pre-operative Clearance  Name: Daniel Gardiner      : 1960      MRN: [id number]  Encounter Provider: Linh Erickson MD  Encounter Date: 2025   Encounter department: [facility]    :  Assessment & Plan  Pain of right hip  Cleared  for  surgery       Impaired glucose tolerance  Bs  up  , will lose  weight and then reassess 4 months    Orders:  •  Comprehensive metabolic panel; Future  •  Hemoglobin A1C; Future    Mixed hyperlipidemia  Lipids  stable on simvastatin    Orders:  •  Lipid panel; Future  •  Comprehensive metabolic panel; Future        Pre-operative Clearance:     Revised Cardiac Risk Index:  RCI RISK CLASS I (0 risk factors, risk of major cardiac complications approximately 0.5%)    Clearance:  Patient is medically optimized (CLEARED) for proposed surgery without any additional cardiac testing.      Medication Instructions:   - Avoid herbs or non-directed vitamins one week prior to surgery    - Avoid aspirin containing medications or non-steroidal anti-inflammatory drugs one week preceding surgery    - May take tylenol for pain up until the night before surgery    - Beta blockers:  Continue to take this medication on your normal schedule.  - Calcium channel blockers: Continue to take this medication on your normal schedule.  - Hyperlipidemia meds: Continue to take this medication on your normal schedule.       History of Present Illness     Pre-Op Examination     Surgery: r  hip  replacement   Anticipated Date of Surgery: 25   Surgeon: Sumaya     Going for  r  hip replacement, feels  well, no cp, no sob, no uri sx, ni gi  sx, no rashes, no urinary  sx, saw  cards  already, c/o  l wrist  pain     Previous history of bleeding disorders or clots?: No    Previous Anesthesia reaction?: No    Prolonged steroid use in the last 6 months?: No      Assessment of Cardiac Risk:   - Unstable or severe angina or MI in the last 6 weeks or history of stent placement in the last  year?: No    - Decompensated heart failure (e.g. New onset heart failure, NYHA  Class IV heart failure, or worsening existing heart failure)?: No    - Significant arrhythmias such as high grade AV block, symptomatic ventricular arrhythmia, newly recognized ventricular tachycardia, supraventricular tachycardia with resting heart rate >100, or symptomatic bradycardia?: No      Pre-operative Risk Factors:  - Elevated-risk surgery: No    - History of cerebrovascular disease: No    - History of ischemic heart disease: No    - History of congestive heart failure: No    - Pre-operative treatment with insulin: No    - Pre-operative creatinine >2 mg/dL: No      Medications of Perioperative Concern:    Anti-platelet (aspirin, clopidogrel, ticagrelor, prasugrel, cilostazol, dipyridamole), calcium channel blockers and Beta blockers    Review of Systems   Constitutional:  Negative for activity change, appetite change and fatigue.   HENT:  Negative for congestion, ear pain, rhinorrhea, sinus pressure, sinus pain and sore throat.    Respiratory:  Negative for shortness of breath.    Cardiovascular:  Negative for chest pain.   Gastrointestinal:  Negative for abdominal pain, diarrhea, nausea and vomiting.   Neurological:  Negative for dizziness, light-headedness and headaches.   Hematological:  Negative for adenopathy.   Psychiatric/Behavioral:  The patient is not nervous/anxious.      Past Medical History   Past Medical History:   Diagnosis Date   • A-fib (HCC) 08/24/2021   • Arthritis    • Disc degeneration, lumbosacral 01/06/2016   • GERD (gastroesophageal reflux disease)    • Hyperlipidemia     hypercholesterolemia   • Hypertension     last assessed 11/22/17   • Pneumonia of both lower lobes due to infectious organism 01/29/2018   • s/p Afib ablation (PFA PVI, RF CTI) 5/8/2024 05/09/2024     Past Surgical History:   Procedure Laterality Date   • CARDIAC ELECTROPHYSIOLOGY PROCEDURE N/A 5/8/2024    Procedure: Cardiac eps/afib  "ablation;  Surgeon: Subhash Kay MD;  Location: [facility];  Service: Cardiology   • CARDIAC ELECTROPHYSIOLOGY PROCEDURE N/A 6/11/2024    Procedure: Cardiac loop recorder implant;  Surgeon: Romulo Conner MD;  Location: [facility];  Service: Cardiology   • COLONOSCOPY     • FL RETROGRADE PYELOGRAM  11/3/2023   • HEMORRHOID SURGERY     • KNEE SURGERY Bilateral    • DC COLONOSCOPY FLX DX W/COLLJ SPEC WHEN PFRMD N/A 1/5/2019    Procedure: COLONOSCOPY;  Surgeon: Lane Bonner MD;  [facility]: [facility];  Service: Gastroenterology   • DC CYSTOURETHROSCOPY W/URETERAL CATHETERIZATION Left 11/3/2023    Procedure: CYSTOSCOPY RETROGRADE PYELOGRAM WITH INSERTION STENT URETERAL, ureteroscopy, holmium laser;  Surgeon: Donald Jenkins MD;  Location: [facility];  Service: Urology   • TRANSURETHRAL RESECTION OF BLADDER TUMOR N/A 11/3/2023    Procedure: TRANSURETHRAL RESECTION OF BLADDER TUMOR (TURBT);  Surgeon: Donald Jenkins MD;  Location: [facility];  Service: Urology   • UPPER GASTROINTESTINAL ENDOSCOPY       Family History   Problem Relation Age of Onset   • Obesity Mother    • Aneurysm Father      Social History     Tobacco Use   • Smoking status: Former     Current packs/day: 0.50     Average packs/day: 0.5 packs/day for 40.0 years (20.0 ttl pk-yrs)     Types: Cigarettes   • Smokeless tobacco: Never   Vaping Use   • Vaping status: Never Used   Substance and Sexual Activity   • Alcohol use: Yes     Comment: Occassional 1 x / month   • Drug use: No   • Sexual activity: Yes     Partners: Female     Birth control/protection: None     Medications[1]  No Known Allergies  Objective   /70 (BP Location: Right arm, Patient Position: Sitting, Cuff Size: Adult)   Pulse 62   Temp (!) 97.4 °F (36.3 °C) (Temporal)   Resp 16   Ht 6' 2\" (1.88 m)   Wt (!) 140 kg (307 lb 12.8 oz)   SpO2 97%   BMI 39.52 kg/m²     Physical Exam  Vitals reviewed.   Constitutional:       Appearance: Normal " appearance. He is obese.   HENT:      Right Ear: Tympanic membrane normal.      Left Ear: Tympanic membrane normal.      Nose: No congestion or rhinorrhea.      Mouth/Throat:      Pharynx: No oropharyngeal exudate or posterior oropharyngeal erythema.     Cardiovascular:      Rate and Rhythm: Normal rate and regular rhythm.      Pulses: Normal pulses.      Heart sounds: Normal heart sounds.   Pulmonary:      Effort: Pulmonary effort is normal.      Breath sounds: Normal breath sounds.   Abdominal:      General: Abdomen is flat.      Palpations: Abdomen is soft.      Tenderness: There is no abdominal tenderness.     Musculoskeletal:      Right lower leg: No edema.      Left lower leg: No edema.   Lymphadenopathy:      Cervical: No cervical adenopathy.     Neurological:      Mental Status: He is alert.     Psychiatric:         Mood and Affect: Mood normal.         Linh Erickson MD           [1]  Current Outpatient Medications on File Prior to Visit   Medication Sig   • amLODIPine (NORVASC) 5 mg tablet Take 1 tablet (5 mg total) by mouth 2 (two) times a day   • ascorbic acid (VITAMIN C) 500 mg tablet Take 500 mg by mouth in the morning.   • aspirin 81 mg chewable tablet Chew 1 tablet (81 mg total) daily   • carvedilol (COREG) 25 mg tablet TAKE 1 TABLET TWICE A DAY WITH MEALS   • Coenzyme Q10 (COQ-10) 10 MG CAPS Take by mouth   • flecainide (TAMBOCOR) 50 mg tablet Take 1 tablet (50 mg total) by mouth every 12 (twelve) hours   • Multiple Vitamins-Minerals (MENS MULTIVITAMIN PLUS PO) Take by mouth   • mupirocin (BACTROBAN) 2 % ointment Apply topically daily   • pantoprazole (PROTONIX) 40 mg tablet Take 1 tablet (40 mg total) by mouth daily   • simvastatin (ZOCOR) 40 mg tablet TAKE 1 TABLET DAILY AT BEDTIME   • tadalafil (CIALIS) 10 MG tablet Take 1 tablet (10 mg total) by mouth daily as needed for erectile dysfunction   • Zinc 50 MG CAPS Take by mouth

## 2025-05-19 NOTE — PATIENT INSTRUCTIONS
Pre-operative Medication Instructions    Avoid herbs or non-directed vitamins one week prior to surgery  Avoid aspirin containing medications or non-steroidal anti-inflammatory drugs one week preceding surgery  May take tylenol for pain up until the night before surgery    Beta Blockers     Medication Name     carvedilol (COREG) 25 mg tablet      Continue to take this heart medication on your normal schedule.  If this is an oral medication and you take it in the morning, then you may take this medicine with a sip of water.    Calcium Channel Blockers     Medication Name     amLODIPine (NORVASC) 5 mg tablet      Continue to take this heart medication on your normal schedule.  If this is an oral medication and you take it in the morning, then you may take this medicine with a sip of water.    Cholesterol lowering meds     Medication Name     simvastatin (ZOCOR) 40 mg tablet      Continue to take this medication on your normal schedule.  If this is an oral medication and you take it in the morning, then you may take this medicine with a sip of water.

## 2025-05-20 ENCOUNTER — RESULTS FOLLOW-UP (OUTPATIENT)
Dept: FAMILY MEDICINE CLINIC | Facility: CLINIC | Age: 65
End: 2025-05-20

## 2025-06-20 ENCOUNTER — RESULTS FOLLOW-UP (OUTPATIENT)
Dept: CARDIOLOGY CLINIC | Facility: CLINIC | Age: 65
End: 2025-06-20

## 2025-06-20 ENCOUNTER — REMOTE DEVICE CLINIC VISIT (OUTPATIENT)
Dept: CARDIOLOGY CLINIC | Facility: CLINIC | Age: 65
End: 2025-06-20
Payer: COMMERCIAL

## 2025-06-20 DIAGNOSIS — I47.20 VT (VENTRICULAR TACHYCARDIA) (HCC): Primary | ICD-10-CM

## 2025-06-20 PROCEDURE — 93298 REM INTERROG DEV EVAL SCRMS: CPT | Performed by: INTERNAL MEDICINE

## 2025-06-20 NOTE — PROGRESS NOTES
"MDT LNQ22 ILR/ ACTIVE SYSTEM IS MRI CONDITIONAL   CARELINK TRANSMISSION: LOOP RECORDER. PRESENTING RHYTHM SB @ 55 BPM. BATTERY STATUS \"OK.\" NO PATIENT OR DEVICE ACTIVATED EPISODES. NORMAL DEVICE FUNCTION. DL   "

## 2025-07-23 DIAGNOSIS — E78.2 MIXED HYPERLIPIDEMIA: ICD-10-CM

## 2025-07-24 RX ORDER — SIMVASTATIN 40 MG
40 TABLET ORAL
Qty: 90 TABLET | Refills: 1 | Status: SHIPPED | OUTPATIENT
Start: 2025-07-24

## 2025-08-06 DIAGNOSIS — I10 PRIMARY HYPERTENSION: ICD-10-CM

## 2025-08-06 DIAGNOSIS — I48.0 PAROXYSMAL ATRIAL FIBRILLATION (HCC): ICD-10-CM

## 2025-08-07 RX ORDER — CARVEDILOL 25 MG/1
25 TABLET ORAL 2 TIMES DAILY WITH MEALS
Qty: 180 TABLET | Refills: 1 | Status: SHIPPED | OUTPATIENT
Start: 2025-08-07

## (undated) DEVICE — CATH EP ADVISOR HD GRID MAPPING 8F

## (undated) DEVICE — ACQCROSS QX - AG 71CM: Brand: ACQCROSS QX

## (undated) DEVICE — CATH EP 7FR DI-DIR 10-POLE DEFL CS 2-8-2 FJ

## (undated) DEVICE — CHLORHEXIDINE 4PCT 4 OZ

## (undated) DEVICE — GLOVE SRG BIOGEL 7

## (undated) DEVICE — PINNACLE INTRODUCER SHEATH: Brand: PINNACLE

## (undated) DEVICE — CATH ABLATION FARAWAVE PULSED FIELD 31MM

## (undated) DEVICE — STERILE CYSTO PACK: Brand: CARDINAL HEALTH

## (undated) DEVICE — SPECIMEN CONTAINER STERILE PEEL PACK

## (undated) DEVICE — Device: Brand: PROTRACK PIGTAIL WIRE

## (undated) DEVICE — SHEATH STEERABLE FARADRIVE

## (undated) DEVICE — INVIEW CLEAR LEGGINGS: Brand: CONVERTORS

## (undated) DEVICE — PREMIUM DRY TRAY LF: Brand: MEDLINE INDUSTRIES, INC.

## (undated) DEVICE — CABLE CATH CONNECTION FARASTAR

## (undated) DEVICE — TACTIFLEX SE BI D CURVE F-J

## (undated) DEVICE — REF PATCH ENSITE X

## (undated) DEVICE — FIBER STD QUANTA 365 MICRON

## (undated) DEVICE — GUIDEWIRE STRGHT TIP 0.035 IN  SOLO PLUS

## (undated) DEVICE — CATH ULTRASOUND ACUNAV ICE 8FR 90CM GE VIVID-I

## (undated) DEVICE — CATH FOLEY 16FR 5ML 2WAY LUBRICATH